# Patient Record
Sex: MALE | Race: WHITE | NOT HISPANIC OR LATINO | Employment: PART TIME | URBAN - METROPOLITAN AREA
[De-identification: names, ages, dates, MRNs, and addresses within clinical notes are randomized per-mention and may not be internally consistent; named-entity substitution may affect disease eponyms.]

---

## 2018-07-16 ENCOUNTER — OFFICE VISIT - HEALTHEAST (OUTPATIENT)
Dept: FAMILY MEDICINE | Facility: CLINIC | Age: 33
End: 2018-07-16

## 2018-07-16 DIAGNOSIS — L03.116 CELLULITIS OF LEFT LOWER EXTREMITY: ICD-10-CM

## 2018-07-16 DIAGNOSIS — I10 HYPERTENSION, UNSPECIFIED TYPE: ICD-10-CM

## 2018-07-16 LAB
ALBUMIN SERPL-MCNC: 3.3 G/DL (ref 3.5–5)
ALP SERPL-CCNC: 134 U/L (ref 45–120)
ALT SERPL W P-5'-P-CCNC: 43 U/L (ref 0–45)
ANION GAP SERPL CALCULATED.3IONS-SCNC: 10 MMOL/L (ref 5–18)
AST SERPL W P-5'-P-CCNC: 21 U/L (ref 0–40)
BASOPHILS # BLD AUTO: 0.1 THOU/UL (ref 0–0.2)
BASOPHILS NFR BLD AUTO: 1 % (ref 0–2)
BILIRUB SERPL-MCNC: 0.2 MG/DL (ref 0–1)
BUN SERPL-MCNC: 19 MG/DL (ref 8–22)
C REACTIVE PROTEIN LHE: 1.9 MG/DL (ref 0–0.8)
CALCIUM SERPL-MCNC: 9.3 MG/DL (ref 8.5–10.5)
CHLORIDE BLD-SCNC: 111 MMOL/L (ref 98–107)
CO2 SERPL-SCNC: 19 MMOL/L (ref 22–31)
CREAT SERPL-MCNC: 0.74 MG/DL (ref 0.7–1.3)
EOSINOPHIL # BLD AUTO: 0.4 THOU/UL (ref 0–0.4)
EOSINOPHIL NFR BLD AUTO: 4 % (ref 0–6)
ERYTHROCYTE [DISTWIDTH] IN BLOOD BY AUTOMATED COUNT: 14 % (ref 11–14.5)
GFR SERPL CREATININE-BSD FRML MDRD: >60 ML/MIN/1.73M2
GLUCOSE BLD-MCNC: 134 MG/DL (ref 70–125)
HCT VFR BLD AUTO: 45.5 % (ref 40–54)
HGB BLD-MCNC: 14.6 G/DL (ref 14–18)
LYMPHOCYTES # BLD AUTO: 2.3 THOU/UL (ref 0.8–4.4)
LYMPHOCYTES NFR BLD AUTO: 19 % (ref 20–40)
MCH RBC QN AUTO: 29.9 PG (ref 27–34)
MCHC RBC AUTO-ENTMCNC: 32.1 G/DL (ref 32–36)
MCV RBC AUTO: 93 FL (ref 80–100)
MONOCYTES # BLD AUTO: 0.7 THOU/UL (ref 0–0.9)
MONOCYTES NFR BLD AUTO: 5 % (ref 2–10)
NEUTROPHILS # BLD AUTO: 8.6 THOU/UL (ref 2–7.7)
NEUTROPHILS NFR BLD AUTO: 72 % (ref 50–70)
PLATELET # BLD AUTO: 389 THOU/UL (ref 140–440)
PMV BLD AUTO: 10 FL (ref 8.5–12.5)
POTASSIUM BLD-SCNC: 4.8 MMOL/L (ref 3.5–5)
PROT SERPL-MCNC: 7.5 G/DL (ref 6–8)
RBC # BLD AUTO: 4.89 MILL/UL (ref 4.4–6.2)
SODIUM SERPL-SCNC: 140 MMOL/L (ref 136–145)
WBC: 12.6 THOU/UL (ref 4–11)

## 2020-10-08 ENCOUNTER — RECORDS - HEALTHEAST (OUTPATIENT)
Dept: ADMINISTRATIVE | Facility: OTHER | Age: 35
End: 2020-10-08

## 2021-04-13 ENCOUNTER — APPOINTMENT (OUTPATIENT)
Dept: MRI IMAGING | Facility: CLINIC | Age: 36
DRG: 103 | End: 2021-04-13
Attending: PHYSICIAN ASSISTANT

## 2021-04-13 ENCOUNTER — HOSPITAL ENCOUNTER (INPATIENT)
Facility: CLINIC | Age: 36
LOS: 3 days | Discharge: HOME | DRG: 103 | End: 2021-04-16
Attending: INTERNAL MEDICINE | Admitting: HOSPITALIST

## 2021-04-13 DIAGNOSIS — R51.9 ACUTE INTRACTABLE HEADACHE, UNSPECIFIED HEADACHE TYPE: Primary | ICD-10-CM

## 2021-04-13 PROCEDURE — 120N000001 HC R&B MED SURG/OB

## 2021-04-13 PROCEDURE — 99254 IP/OBS CNSLTJ NEW/EST MOD 60: CPT | Mod: GC | Performed by: PSYCHIATRY & NEUROLOGY

## 2021-04-13 PROCEDURE — 250N000011 HC RX IP 250 OP 636: Performed by: PHYSICIAN ASSISTANT

## 2021-04-13 PROCEDURE — 99223 1ST HOSP IP/OBS HIGH 75: CPT | Mod: AI | Performed by: PHYSICIAN ASSISTANT

## 2021-04-13 PROCEDURE — 258N000003 HC RX IP 258 OP 636: Performed by: PHYSICIAN ASSISTANT

## 2021-04-13 PROCEDURE — 250N000013 HC RX MED GY IP 250 OP 250 PS 637: Performed by: PHYSICIAN ASSISTANT

## 2021-04-13 PROCEDURE — A9585 GADOBUTROL INJECTION: HCPCS | Performed by: INTERNAL MEDICINE

## 2021-04-13 PROCEDURE — 255N000002 HC RX 255 OP 636: Performed by: INTERNAL MEDICINE

## 2021-04-13 PROCEDURE — 70553 MRI BRAIN STEM W/O & W/DYE: CPT

## 2021-04-13 RX ORDER — PROCHLORPERAZINE MALEATE 10 MG
10 TABLET ORAL EVERY 6 HOURS PRN
Status: DISCONTINUED | OUTPATIENT
Start: 2021-04-13 | End: 2021-04-16 | Stop reason: HOSPADM

## 2021-04-13 RX ORDER — LABETALOL HYDROCHLORIDE 5 MG/ML
10 INJECTION, SOLUTION INTRAVENOUS
Status: DISCONTINUED | OUTPATIENT
Start: 2021-04-13 | End: 2021-04-16 | Stop reason: HOSPADM

## 2021-04-13 RX ORDER — GADOBUTROL 604.72 MG/ML
14 INJECTION INTRAVENOUS ONCE
Status: COMPLETED | OUTPATIENT
Start: 2021-04-13 | End: 2021-04-13

## 2021-04-13 RX ORDER — NALOXONE HYDROCHLORIDE 0.4 MG/ML
0.4 INJECTION, SOLUTION INTRAMUSCULAR; INTRAVENOUS; SUBCUTANEOUS
Status: DISCONTINUED | OUTPATIENT
Start: 2021-04-13 | End: 2021-04-16 | Stop reason: HOSPADM

## 2021-04-13 RX ORDER — NALOXONE HYDROCHLORIDE 0.4 MG/ML
0.2 INJECTION, SOLUTION INTRAMUSCULAR; INTRAVENOUS; SUBCUTANEOUS
Status: DISCONTINUED | OUTPATIENT
Start: 2021-04-13 | End: 2021-04-16 | Stop reason: HOSPADM

## 2021-04-13 RX ORDER — ACETAMINOPHEN 650 MG/1
650 SUPPOSITORY RECTAL EVERY 4 HOURS PRN
Status: DISCONTINUED | OUTPATIENT
Start: 2021-04-13 | End: 2021-04-14

## 2021-04-13 RX ORDER — ONDANSETRON 4 MG/1
4 TABLET, ORALLY DISINTEGRATING ORAL EVERY 6 HOURS PRN
Status: DISCONTINUED | OUTPATIENT
Start: 2021-04-13 | End: 2021-04-16 | Stop reason: HOSPADM

## 2021-04-13 RX ORDER — NAPHAZOLINE HCL 0.012 %
2 DROPS OPHTHALMIC (EYE) EVERY 8 HOURS PRN
Status: ON HOLD | COMMUNITY
End: 2021-04-16

## 2021-04-13 RX ORDER — SODIUM CHLORIDE 9 MG/ML
INJECTION, SOLUTION INTRAVENOUS CONTINUOUS
Status: DISCONTINUED | OUTPATIENT
Start: 2021-04-13 | End: 2021-04-14 | Stop reason: CLARIF

## 2021-04-13 RX ORDER — LIDOCAINE 40 MG/G
CREAM TOPICAL
Status: DISCONTINUED | OUTPATIENT
Start: 2021-04-13 | End: 2021-04-16 | Stop reason: HOSPADM

## 2021-04-13 RX ORDER — ACETAMINOPHEN 325 MG/1
650 TABLET ORAL EVERY 4 HOURS PRN
Status: DISCONTINUED | OUTPATIENT
Start: 2021-04-13 | End: 2021-04-14

## 2021-04-13 RX ORDER — HYDROMORPHONE HYDROCHLORIDE 1 MG/ML
.3-.5 INJECTION, SOLUTION INTRAMUSCULAR; INTRAVENOUS; SUBCUTANEOUS
Status: DISCONTINUED | OUTPATIENT
Start: 2021-04-13 | End: 2021-04-16

## 2021-04-13 RX ORDER — PROCHLORPERAZINE 25 MG
25 SUPPOSITORY, RECTAL RECTAL EVERY 12 HOURS PRN
Status: DISCONTINUED | OUTPATIENT
Start: 2021-04-13 | End: 2021-04-16 | Stop reason: HOSPADM

## 2021-04-13 RX ORDER — HYDRALAZINE HYDROCHLORIDE 20 MG/ML
10 INJECTION INTRAMUSCULAR; INTRAVENOUS EVERY 4 HOURS PRN
Status: DISCONTINUED | OUTPATIENT
Start: 2021-04-13 | End: 2021-04-16 | Stop reason: HOSPADM

## 2021-04-13 RX ORDER — ONDANSETRON 2 MG/ML
4 INJECTION INTRAMUSCULAR; INTRAVENOUS EVERY 6 HOURS PRN
Status: DISCONTINUED | OUTPATIENT
Start: 2021-04-13 | End: 2021-04-16 | Stop reason: HOSPADM

## 2021-04-13 RX ORDER — LORAZEPAM 2 MG/ML
1 INJECTION INTRAMUSCULAR
Status: DISCONTINUED | OUTPATIENT
Start: 2021-04-13 | End: 2021-04-16 | Stop reason: HOSPADM

## 2021-04-13 RX ADMIN — SODIUM CHLORIDE: 9 INJECTION, SOLUTION INTRAVENOUS at 15:54

## 2021-04-13 RX ADMIN — HYDROMORPHONE HYDROCHLORIDE 0.5 MG: 1 INJECTION, SOLUTION INTRAMUSCULAR; INTRAVENOUS; SUBCUTANEOUS at 20:58

## 2021-04-13 RX ADMIN — GADOBUTROL 14 ML: 604.72 INJECTION INTRAVENOUS at 20:16

## 2021-04-13 RX ADMIN — ACETAMINOPHEN 650 MG: 325 TABLET, FILM COATED ORAL at 20:58

## 2021-04-13 ASSESSMENT — MIFFLIN-ST. JEOR: SCORE: 2781

## 2021-04-13 ASSESSMENT — ACTIVITIES OF DAILY LIVING (ADL)
ADLS_ACUITY_SCORE: 15
ADLS_ACUITY_SCORE: 14

## 2021-04-13 NOTE — PHARMACY-ADMISSION MEDICATION HISTORY
Pharmacy Medication History  Admission medication history interview status for the 4/13/2021  admission is complete. See EPIC admission navigator for prior to admission medications     Location of Interview: Phone  Medication history sources: Patient    Significant changes made to the medication list:  Added excedrin ex    Additional medication history information:   none    Medication reconciliation completed by provider prior to medication history? No    Time spent in this activity: 5 minutes    Prior to Admission medications    Medication Sig Last Dose Taking? Auth Provider   aspirin-acetaminophen-caffeine (EXCEDRIN EXTRA STRENGTH) 250-250-65 MG tablet Take 2 tablets by mouth every 8 hours as needed for headaches 0120 Yes Unknown, Entered By History       The information provided in this note is only as accurate as the sources available at the time of update(s)

## 2021-04-13 NOTE — CONSULTS
"  [facility]    Stroke Consult Note    Reason for Consult:  Concern for CT negative SAH    Chief Complaint: No chief complaint on file.       HPI  Ady Godinez is a 35 year old male who presented after having thunderclap coital headache. Initial CT/CTA was negative, but LP had 1000 RBC in 1st and 4th tube and SAH could not be ruled out. Negative for xanthochromia. He was transferred here for cerebral angiogram.     Patient states that this is the first time he has ever experienced anything like this. He described a classes thunderclap headache with maximum intensity at onset that rapidly peak in seconds associated with light sensitivity. He did not have LOC and denies any focal weakness/sensory changes or nausea/vomitting. He states that he does not have history of migraine, but does have morning headache twice per week which he attributes to \"likely\" sleep apnea. He currently denies any neck stiffness, diplopia, new back pain, leg weakness, bowel/bladder symptoms.       Impression  Thunderclap Headache  Coital Cephalagia    Concern for CT negative SAH.   LP was equivocal.     Recommendations  MRI brain   Cerebral angiogram  NPO at midnight  We will reach out to our endovascular team.     Patient Follow-up    - final recommendation pending work-up    Thank you for this consult. We will continue to follow.     The Stroke Staff is Dr. Padgett.    Andrea Owen MD  Vascular Neurology Fellow  To page me or covering stroke neurology team member, click here: AMCOM   Choose \"On Call\" tab at top, then search dropdown box for \"Neurology Adult\", select location, press Enter, then look for stroke/neuro ICU/telestroke.    _____________________________________________________    Past Medical History   No past medical history on file.  Past Surgical History   No past surgical history on file.  Medications   Home Meds  Prior to Admission medications    Medication Sig Start Date End Date Taking? " Authorizing Provider   aspirin-acetaminophen-caffeine (EXCEDRIN EXTRA STRENGTH) 250-250-65 MG tablet Take 2 tablets by mouth every 8 hours as needed for headaches   Yes Unknown, Entered By History       Scheduled Meds    sodium chloride (PF)  3 mL Intracatheter Q8H       Infusion Meds    sodium chloride 100 mL/hr at 04/13/21 1554       PRN Meds  acetaminophen, acetaminophen, hydrALAZINE, HYDROmorphone, labetalol, lidocaine 4%, lidocaine (buffered or not buffered), LORazepam, melatonin, naloxone **OR** naloxone **OR** naloxone **OR** naloxone, ondansetron **OR** ondansetron, prochlorperazine **OR** prochlorperazine **OR** prochlorperazine, sodium chloride (PF), sodium chloride (PF)    Allergies   No Known Allergies  Family History   No family history on file.  Social History   Social History     Tobacco Use     Smoking status: Not on file   Substance Use Topics     Alcohol use: Not on file     Drug use: Not on file       Review of Systems   The 10 point Review of Systems is negative other than noted in the HPI or here.   Headache        PHYSICAL EXAMINATION   Temp:  [97.6  F (36.4  C)-98  F (36.7  C)] 98  F (36.7  C)  Pulse:  [82-86] 86  Resp:  [18] 18  BP: (129-135)/(67-81) 129/80  SpO2:  [96 %-97 %] 97 %    Neurologic  Mental Status:  alert, oriented x 3, follows commands, speech clear and fluent, naming and repetition normal  Cranial Nerves:  visual fields intact, PERRL, EOMI with normal smooth pursuit, facial sensation intact and symmetric, facial movements symmetric, hearing not formally tested but intact to conversation, palate elevation symmetric and uvula midline, no dysarthria, shoulder shrug strong bilaterally, tongue protrusion midline  Motor:  normal muscle tone and bulk, no abnormal movements, able to move all limbs spontaneously, strength 5/5 throughout upper and lower extremities, no pronator drift  Reflexes:  toes down-going  Sensory:  light touch sensation intact and symmetric throughout upper and  lower extremities, no extinction on double simultaneous stimulation   Coordination:  normal finger-to-nose and heel-to-shin bilaterally without dysmetria, rapid alternating movements symmetric  Station/Gait:  deferred    Dysphagia Screen  Per Nursing    Stroke Scales    NIHSS  Interval baseline (04/13/21 1400)   Interval Comments     1a. Level of Consciousness 0-->Alert, keenly responsive   1b. LOC Questions 0-->Answers both questions correctly   1c. LOC Commands 0-->Performs both tasks correctly   2.   Best Gaze 0-->Normal   3.   Visual 0-->No visual loss   4.   Facial Palsy 0-->Normal symmetrical movements   5a. Motor Arm, Left 0-->No drift, limb holds 90 (or 45) degrees for full 10 secs   5b. Motor Arm, Right 0-->No drift, limb holds 90 (or 45) degrees for full 10 secs   6a. Motor Leg, Left 0-->No drift, leg holds 30 degree position for full 5 secs   6b. Motor Leg, right 0-->No drift, leg holds 30 degree position for full 5 secs   7.   Limb Ataxia 0-->Absent   8.   Sensory 0-->Normal, no sensory loss   9.   Best Language 0-->No aphasia, normal   10. Dysarthria 0-->Normal   11. Extinction and Inattention  0-->No abnormality   Total 0 (04/13/21 1400)       Imaging  I personally reviewed all imaging; relevant findings per HPI.    Labs Data   CBC  No results for input(s): WBC, RBC, HGB, HCT, PLT in the last 168 hours.  Basic Metabolic Panel   No results for input(s): NA, POTASSIUM, CHLORIDE, CO2, BUN, CR, GLC, JEFRY in the last 168 hours.  Liver Panel  No results for input(s): PROTTOTAL, ALBUMIN, BILITOTAL, ALKPHOS, AST, ALT, BILIDIRECT in the last 168 hours.  INR  No lab results found.   Lipid Profile  No lab results found.  A1C  No lab results found.  Troponin I  No results for input(s): TROPI in the last 168 hours.       Stroke Code / Stroke Consult Data Data This was a non-emergent, non-tele stroke consult.

## 2021-04-13 NOTE — H&P
[facility]    History and Physical - Hospitalist Service       Date of Admission:  4/13/2021    Assessment & Plan   Ady Godinez is a 35 year old male admitted on 4/13/2021. He has PMH of morbid obesity. He presented to [facility] evaluation of thunderclap headache. CT and CTA negative for aneurysm and SAH. LP RBC and cannot rule out SAH. Transferred for consideration of cerebral angiogram.    Severe Headache: Presented for sudden onset headache during intercourse. CT and CTA negative. Underwent LP per IR and 1000 RBC on 1st tube and 1000 RBC on tube 4. He was transferred for consideration of cerebral angiogram  - Admit Neuro floor  - Stroke neurology consulted and appreciate assistance.   - Q2 neuro checks per neurology  - Stat MRI if body habitus allows  - Goal SBP < 140, IV labetalol and hydralazine available  - Pain control with tylenol and IV dilaudid  - Ok per neurology for diet. NPO at midnight for possible cerebral angiogram tomorrow    Morbid Obesity: BMI 54  - Follow up with PCP    COVID-19: Asymptomatic swab negative     Diet: NPO for Medical/Clinical Reasons Except for: Meds    DVT Prophylaxis: Pneumatic Compression Devices  Duvall Catheter: not present  Code Status: Full Code           Disposition Plan   Expected discharge: Admit inpatient   Entered: Jocelynn Nunes PA-C 04/13/2021, 3:13 PM     The patient's care was discussed with the Bedside Nurse, Patient and Neurology Consultant.    Jocelynn Nunes PA-C  [facility]  Contact information available via [facility] Paging/Directory      ______________________________________________________________________    Chief Complaint   Headache    History is obtained from the patient    History of Present Illness   Ady Godinez is a 35 year old male with a past medical history of morbid obesity who was transferred from [facility] emergency department for evaluation of severe, persistent  headache.    He had sudden onset of severe, thunderclap headache yesterday evening after having intercourse.  Starts behind his eyes and radiates to the back of his neck.  Has some light sensitivity but no other focal neurologic symptoms.  Presented to [facility] for evaluation.  CT head and CTA were negative.  He underwent a lumbar puncture by interventional radiology which was notable for thousand RBCs on first tube and 1000 RBCs on fourth tube.  It was recommended that he proceed with cerebral angiogram which was not possible at his outside facility so he was transferred for further evaluation    Presently, he is evaluated in his hospital room.  Continues to complain of severe persistent headache.  Indicates that improves with Dilaudid but comes back.  Notes some light sensitivity.  No visual changes.  No focal weakness.  Has a history of headaches that occur approximately 4 times a month though this is different.  Denies recent fevers or chills.  No runny nose sore throat or cough.  No nausea or vomiting.    Review of Systems    The 10 point Review of Systems is negative other than noted in the HPI or here.     Past Medical History    I have reviewed this patient's medical history and updated it with pertinent information if needed.   Obesity   Headaches    Past Surgical History   I have reviewed this patient's surgical history and updated it with pertinent information if needed.  Appendectomy    Social History   I have reviewed this patient's social history and updated it with pertinent information if needed.  Social History     Tobacco Use     Smoking status: Not on file   Substance Use Topics     Alcohol use: Not on file     Drug use: Not on file     He is a non smoker, drinks occasional alcohol. Denies recreation drug use. . Works for [employer]    Family History     States he does not know much regarding his PMH    Prior to Admission Medications   Prior to Admission Medications   Prescriptions Last Dose  Informant Patient Reported? Taking?   aspirin-acetaminophen-caffeine (EXCEDRIN EXTRA STRENGTH) 250-250-65 MG tablet 0120  Yes Yes   Sig: Take 2 tablets by mouth every 8 hours as needed for headaches      Facility-Administered Medications: None     Allergies   No Known Allergies    Physical Exam   Vital Signs: Temp: 97.6  F (36.4  C) Temp src: Oral BP: 134/81 Pulse: 85   Resp: 18 SpO2: 97 % O2 Device: None (Room air)    Weight: 398 lbs 9.47 oz    Physical Exam  Vitals signs and nursing note reviewed.   Constitutional:       General: He is not in acute distress.     Appearance: He is obese.   HENT:      Head: Normocephalic and atraumatic.   Eyes:      General: No scleral icterus.  Cardiovascular:      Rate and Rhythm: Normal rate and regular rhythm.      Heart sounds: No murmur.   Pulmonary:      Effort: Pulmonary effort is normal.      Breath sounds: No wheezing.   Abdominal:      General: There is no distension.   Skin:     General: Skin is warm and dry.      Findings: No rash.   Neurological:      Mental Status: He is alert and oriented to person, place, and time. Mental status is at baseline.      Cranial Nerves: No cranial nerve deficit or facial asymmetry.      Motor: No weakness.      Coordination: Romberg sign negative.           Data   Data reviewed today: I reviewed all medications, new labs and imaging results over the last 24 hours. I personally reviewed no images or EKG's today.    No lab results found in last 7 days.    No results found for this or any previous visit (from the past 24 hour(s)).

## 2021-04-14 ENCOUNTER — COMMUNICATION - HEALTHEAST (OUTPATIENT)
Dept: SCHEDULING | Facility: CLINIC | Age: 36
End: 2021-04-14

## 2021-04-14 ENCOUNTER — APPOINTMENT (OUTPATIENT)
Dept: CT IMAGING | Facility: CLINIC | Age: 36
DRG: 103 | End: 2021-04-14
Attending: INTERNAL MEDICINE

## 2021-04-14 LAB
ANION GAP SERPL CALCULATED.3IONS-SCNC: 4 MMOL/L (ref 3–14)
BASOPHILS # BLD AUTO: 0 10E9/L (ref 0–0.2)
BASOPHILS NFR BLD AUTO: 0.4 %
BUN SERPL-MCNC: 16 MG/DL (ref 7–30)
CALCIUM SERPL-MCNC: 8.6 MG/DL (ref 8.5–10.1)
CHLORIDE SERPL-SCNC: 107 MMOL/L (ref 94–109)
CO2 SERPL-SCNC: 30 MMOL/L (ref 20–32)
CREAT SERPL-MCNC: 0.86 MG/DL (ref 0.66–1.25)
DIFFERENTIAL METHOD BLD: NORMAL
EOSINOPHIL # BLD AUTO: 0.4 10E9/L (ref 0–0.7)
EOSINOPHIL NFR BLD AUTO: 4.9 %
ERYTHROCYTE [DISTWIDTH] IN BLOOD BY AUTOMATED COUNT: 13.8 % (ref 10–15)
GFR SERPL CREATININE-BSD FRML MDRD: >90 ML/MIN/{1.73_M2}
GLUCOSE SERPL-MCNC: 107 MG/DL (ref 70–99)
HCT VFR BLD AUTO: 42.8 % (ref 40–53)
HGB BLD-MCNC: 13.5 G/DL (ref 13.3–17.7)
IMM GRANULOCYTES # BLD: 0 10E9/L (ref 0–0.4)
IMM GRANULOCYTES NFR BLD: 0.4 %
INR PPP: 0.98 (ref 0.86–1.14)
LYMPHOCYTES # BLD AUTO: 2 10E9/L (ref 0.8–5.3)
LYMPHOCYTES NFR BLD AUTO: 23.9 %
MCH RBC QN AUTO: 28.4 PG (ref 26.5–33)
MCHC RBC AUTO-ENTMCNC: 31.5 G/DL (ref 31.5–36.5)
MCV RBC AUTO: 90 FL (ref 78–100)
MONOCYTES # BLD AUTO: 0.5 10E9/L (ref 0–1.3)
MONOCYTES NFR BLD AUTO: 5.4 %
NEUTROPHILS # BLD AUTO: 5.5 10E9/L (ref 1.6–8.3)
NEUTROPHILS NFR BLD AUTO: 65 %
NRBC # BLD AUTO: 0 10*3/UL
NRBC BLD AUTO-RTO: 0 /100
PLATELET # BLD AUTO: 240 10E9/L (ref 150–450)
POTASSIUM SERPL-SCNC: 3.9 MMOL/L (ref 3.4–5.3)
RBC # BLD AUTO: 4.75 10E12/L (ref 4.4–5.9)
SODIUM SERPL-SCNC: 141 MMOL/L (ref 133–144)
WBC # BLD AUTO: 8.5 10E9/L (ref 4–11)

## 2021-04-14 PROCEDURE — 70498 CT ANGIOGRAPHY NECK: CPT

## 2021-04-14 PROCEDURE — 85610 PROTHROMBIN TIME: CPT | Performed by: PHYSICIAN ASSISTANT

## 2021-04-14 PROCEDURE — 80320 DRUG SCREEN QUANTALCOHOLS: CPT | Performed by: STUDENT IN AN ORGANIZED HEALTH CARE EDUCATION/TRAINING PROGRAM

## 2021-04-14 PROCEDURE — 250N000013 HC RX MED GY IP 250 OP 250 PS 637: Performed by: PHYSICIAN ASSISTANT

## 2021-04-14 PROCEDURE — 250N000011 HC RX IP 250 OP 636: Performed by: INTERNAL MEDICINE

## 2021-04-14 PROCEDURE — 85025 COMPLETE CBC W/AUTO DIFF WBC: CPT | Performed by: PHYSICIAN ASSISTANT

## 2021-04-14 PROCEDURE — 250N000011 HC RX IP 250 OP 636: Performed by: PHYSICIAN ASSISTANT

## 2021-04-14 PROCEDURE — 250N000011 HC RX IP 250 OP 636: Performed by: STUDENT IN AN ORGANIZED HEALTH CARE EDUCATION/TRAINING PROGRAM

## 2021-04-14 PROCEDURE — 250N000009 HC RX 250: Performed by: INTERNAL MEDICINE

## 2021-04-14 PROCEDURE — 99233 SBSQ HOSP IP/OBS HIGH 50: CPT | Mod: GC | Performed by: PSYCHIATRY & NEUROLOGY

## 2021-04-14 PROCEDURE — 80048 BASIC METABOLIC PNL TOTAL CA: CPT | Performed by: PHYSICIAN ASSISTANT

## 2021-04-14 PROCEDURE — 250N000013 HC RX MED GY IP 250 OP 250 PS 637: Performed by: STUDENT IN AN ORGANIZED HEALTH CARE EDUCATION/TRAINING PROGRAM

## 2021-04-14 PROCEDURE — 258N000003 HC RX IP 258 OP 636: Performed by: PHYSICIAN ASSISTANT

## 2021-04-14 PROCEDURE — 36415 COLL VENOUS BLD VENIPUNCTURE: CPT | Performed by: PHYSICIAN ASSISTANT

## 2021-04-14 PROCEDURE — 99232 SBSQ HOSP IP/OBS MODERATE 35: CPT | Performed by: PHYSICIAN ASSISTANT

## 2021-04-14 PROCEDURE — 80307 DRUG TEST PRSMV CHEM ANLYZR: CPT | Performed by: STUDENT IN AN ORGANIZED HEALTH CARE EDUCATION/TRAINING PROGRAM

## 2021-04-14 PROCEDURE — 120N000001 HC R&B MED SURG/OB

## 2021-04-14 RX ORDER — ACETAMINOPHEN 325 MG/1
650 TABLET ORAL EVERY 6 HOURS
Status: DISCONTINUED | OUTPATIENT
Start: 2021-04-14 | End: 2021-04-16 | Stop reason: HOSPADM

## 2021-04-14 RX ORDER — IBUPROFEN 200 MG
400 TABLET ORAL
Status: COMPLETED | OUTPATIENT
Start: 2021-04-14 | End: 2021-04-15

## 2021-04-14 RX ORDER — IOPAMIDOL 755 MG/ML
70 INJECTION, SOLUTION INTRAVASCULAR ONCE
Status: COMPLETED | OUTPATIENT
Start: 2021-04-14 | End: 2021-04-14

## 2021-04-14 RX ORDER — MAGNESIUM SULFATE HEPTAHYDRATE 40 MG/ML
2 INJECTION, SOLUTION INTRAVENOUS ONCE
Status: COMPLETED | OUTPATIENT
Start: 2021-04-14 | End: 2021-04-14

## 2021-04-14 RX ORDER — OXYCODONE HYDROCHLORIDE 5 MG/1
5 TABLET ORAL EVERY 4 HOURS PRN
Status: DISCONTINUED | OUTPATIENT
Start: 2021-04-14 | End: 2021-04-16 | Stop reason: HOSPADM

## 2021-04-14 RX ORDER — VERAPAMIL HYDROCHLORIDE 40 MG/1
40 TABLET ORAL EVERY 8 HOURS SCHEDULED
Status: DISCONTINUED | OUTPATIENT
Start: 2021-04-14 | End: 2021-04-15

## 2021-04-14 RX ADMIN — OXYCODONE HYDROCHLORIDE 5 MG: 5 TABLET ORAL at 19:56

## 2021-04-14 RX ADMIN — SODIUM CHLORIDE 90 ML: 9 INJECTION, SOLUTION INTRAVENOUS at 11:22

## 2021-04-14 RX ADMIN — ACETAMINOPHEN 650 MG: 325 TABLET, FILM COATED ORAL at 10:53

## 2021-04-14 RX ADMIN — VERAPAMIL HYDROCHLORIDE 40 MG: 40 TABLET, FILM COATED ORAL at 21:09

## 2021-04-14 RX ADMIN — MAGNESIUM SULFATE HEPTAHYDRATE 2 G: 40 INJECTION, SOLUTION INTRAVENOUS at 13:34

## 2021-04-14 RX ADMIN — SODIUM CHLORIDE: 9 INJECTION, SOLUTION INTRAVENOUS at 02:55

## 2021-04-14 RX ADMIN — ACETAMINOPHEN 650 MG: 325 TABLET, FILM COATED ORAL at 03:50

## 2021-04-14 RX ADMIN — ACETAMINOPHEN 650 MG: 325 TABLET, FILM COATED ORAL at 15:30

## 2021-04-14 RX ADMIN — ACETAMINOPHEN 650 MG: 325 TABLET, FILM COATED ORAL at 20:20

## 2021-04-14 RX ADMIN — IOPAMIDOL 70 ML: 755 INJECTION, SOLUTION INTRAVENOUS at 11:22

## 2021-04-14 RX ADMIN — VERAPAMIL HYDROCHLORIDE 40 MG: 40 TABLET, FILM COATED ORAL at 13:33

## 2021-04-14 RX ADMIN — HYDROMORPHONE HYDROCHLORIDE 0.3 MG: 1 INJECTION, SOLUTION INTRAMUSCULAR; INTRAVENOUS; SUBCUTANEOUS at 03:58

## 2021-04-14 ASSESSMENT — ACTIVITIES OF DAILY LIVING (ADL)
ADLS_ACUITY_SCORE: 14
ADLS_ACUITY_SCORE: 15

## 2021-04-14 NOTE — PROVIDER NOTIFICATION
Hospitalist Vamshi Cover     Paged by RN regarding on-going headache. Pt was admitted for severe headache, and has thorough work-up.     Will try 1x dose of ibuprofen tonight to see if this provides any benefit and add Oxycodone PRN. Pt also has Tylenol and Dilaudid available.     Ayla Matt MD

## 2021-04-14 NOTE — UTILIZATION REVIEW
"Admission Status; Secondary Review Determination       Under the authority of the Utilization Management Committee, the utilization review process indicated a secondary review on the above patient. The review outcome is based on review of the medical records, discussions with staff, and applying clinical experience noted on the date of the review.     (x) Inpatient Status Appropriate - This patient's medical care is consistent with medical management for inpatient care and reasonable inpatient medical practice.     RATIONALE FOR DETERMINATION   34 yo man with severe morbid obesity (BMI 55) who presented to an outside emergency department ([facility]) and was directly admitted to [facility] due to lack of bed availability at the referring facility.  The patient presented with a \"thunderclap\" headache described as \"worst headache of my life\" during intercourse.  CT and CTA were negative for aneurysm and subarachnoid hemorrhage.  A neurology code stroke was activated.  MRI of the brain was negative for acute pathology on admission.  Cerebral angiogram was recommended.  CTA of the head and neck with contrast revealed no high-grade stenosis or large vessel occlusion involving the major cranial cervical arterial vasculature and no intracranial aneurysm or high flow vascular malformation were identified.  Latest neurology note indicates suspicion for reversible cerebral vasoconstriction syndrome.  Next step is to get a diagnostic cerebral angiogram which is unable to be done until tomorrow morning. Has needed at least 2 doses of IV opiate pain medication. Will continue to need pain medication adjustments and q 4 hour neuro checks. Possible discharge tomorrow.     At the time of admission with the information available to the attending physician more than 2 nights hospital complex care was anticipated, based on patient risk of adverse outcome if treated as outpatient and complex care required. Inpatient " admission is appropriate based on the Medicare guidelines.     This document was produced using voice recognition software.    The information on this document is developed by the utilization review team in order for the business office to ensure compliance. This only denotes the appropriateness of proper admission status and does not reflect the quality of care rendered.   The definitions of Inpatient Status and Observation Status used in making the determination above are those provided in the CMS Coverage Manual, Chapter 1 and Chapter 6, section 70.4.     Sincerely,   Simin Ayoub MD  Utilization Review  Physician Advisor  [facility].

## 2021-04-14 NOTE — PROGRESS NOTES
"[facility]    Medicine Progress Note - Hospitalist Service       Date of Admission:  4/13/2021    Assessment & Plan       Ady Godinez is a 35 year old male admitted on 4/13/2021. He has PMH of morbid obesity. He presented to [facility] evaluation of thunderclap headache. CT and CTA negative for aneurysm and SAH. LP RBC and cannot rule out SAH. Transferred for consideration of cerebral angiogram.     Severe Headache: Presented for sudden onset headache during intercourse. Localized to frontal region with radiation to posterior neck. Reports photophobia, but no N/V. No vision changes, tinnitus, or focal neuro deficits. No recent URI. Gets intermittent headaches, but this is the \"worse headache of my life.\" No recent chiropractic or masseuse visits. Denies recreational drug, alcohol, or tobacco use. CT and CTA negative. Underwent LP per IR and 1000 RBC on 1st tube and 1000 RBC on tube 4. He was transferred for consideration of cerebral angiogram  * MRI brain negative for acute pathology on admission   * Headache continues with photophobia, rates 5/10   - Stroke neurology consulted, plan for cerebral angiogram. CTA head and neck ordered today.    - Q2 neuro checks per neurology  - Goal SBP < 140, IV labetalol and hydralazine available  - Pain control with tylenol and IV dilaudid  - NPO until timing of cerebral angiogram established today.      Morbid Obesity: BMI 54  - Follow up with PCP     COVID-19: Asymptomatic swab negative     Diet: Regular Diet Adult    DVT Prophylaxis: Ambulate every shift  Duvall Catheter: not present  Code Status: Full Code         Disposition Plan   Expected discharge: 2 - 3 days, recommended to prior living arrangement once Neuro eval is completed.  Entered: Diana Fish PA-C 04/14/2021, 7:38 AM       The patient's care was discussed with the Attending Physician, Dr. Montoya, Bedside Nurse and Patient.    Diana Fish, " PRAKASH  Hospitalist Service  [facility]  Contact information available via [facility] Paging/Directory  ______________________________________________________________________    Interval History   Still with 5/10 headache and photophobia. Awaiting cerebral angiogram.     Data reviewed today: I reviewed all medications, new labs and imaging results over the last 24 hours. I personally reviewed all labs and imaging to date.     Physical Exam   Vital Signs: Temp: 97.4  F (36.3  C) Temp src: Oral BP: (!) 140/75 Pulse: 72   Resp: 16 SpO2: 97 % O2 Device: (P) Nasal cannula    Weight: 398 lbs 9.47 oz    CONSTITUTIONAL: Obese male laying in bed, dressed in hospital garb. Appears comfortable. Cooperative with interview.   HEENT: Normocephalic, atraumatic. Pupils equal, round, and reactive to light. Negative for conjunctival redness or scleral icterus.  Oral mucosa pink and moist; negative for ulcerations, erythema, or exudates.  Dentition in good repair.   CARDIOVASCULAR: RRR, no murmurs, rubs, or extra heart sounds appreciated. Pulses +2/4 and regular in upper and lower extremities, bilaterally.   RESPIRATORY: No increased work of breathing. CTA, bilat; no wheezes, rales, or rhonchi appreciated.  GASTROINTESTINAL:  Abdomen soft, non-distended. BS auscultated in all four quadrants. Negative for tenderness to palpation.  No masses or organomegaly noted.  MUSCULOSKELETAL: No gross deformities noted. Normal muscle tone.   HEMATOLOGIC/LYMPHATIC/IMMUNOLOGIC: No anterior or posterior cervical LAD, bilaterally. Negative for lower extremity edema, bilaterally.  NEUROLOGIC: Alert and oriented to person, place, and time.  strength intact. CN's II-XII grossly intact. Sensation equal and intact in all four extremities. Photophobia noted.   SKIN: Warm, dry, intact. No jaundice noted. Negative for suspicious lesions, rashes, bruising, open sores or abrasions.     Data   Recent Labs   Lab 04/14/21  0735   WBC 8.5    HGB 13.5   MCV 90      INR 0.98      POTASSIUM 3.9   CHLORIDE 107   CO2 30   BUN 16   CR 0.86   ANIONGAP 4   JEFRY 8.6   *     Recent Results (from the past 24 hour(s))   MR Brain w/o & w Contrast    Narrative    MRI BRAIN WITHOUT AND WITH CONTRAST  4/13/2021 8:28 PM     HISTORY:  Headache, intracranial hemorrhage suspected.    TECHNIQUE:  Multiplanar, multisequence MRI of the brain without and  with 14 mL Gadavist.     COMPARISON: Head CT from earlier the same day.     FINDINGS: There is no evidence of acute infarct, hemorrhage, mass, or  herniation. The brain parenchyma, ventricles and subarachnoid spaces  appear normal.     There is no abnormal intracranial enhancement.     The facial structures appear normal.       Impression    IMPRESSION:  Unremarkable brain MRI without evidence of acute infarct,  mass, hemorrhage, or herniation.      ISABEL WRIGHT MD     Medications       magnesium sulfate  2 g Intravenous Once     sodium chloride (PF)  3 mL Intracatheter Q8H     verapamil  40 mg Oral Q8H PABLO

## 2021-04-14 NOTE — CONSULTS
"[facility]    Stroke Progress Note    Interval Events  No acute events overnight.  Vitals stable.  Neuro exam remains unchanged.  -140 mmHg.  CT angiogram of the head and neck repeated this morning was reviewed, and appears to be similar to the one completed yesterday.  No obvious evidence of vasospasm observed on today's imaging.  No aneurysms noted.  These findings were discussed with the patient as well as his father, Lalo, over the phone.    Impression  Thunderclap headache, likely secondary to RCVS  Risk factors: Morbid obesity, energy drink consumption.  Urine toxicology was not sent upon admission.    Plan  [] Diagnostic cerebral angiogram in a.m.  [] RCVS/headache management:  migraine cocktail [Benadryl/Reglan], IV magnesium and verapamil 40 mg every 8 hours scheduled.  Tylenol changed to scheduled from as needed.  If there is no improvement, will consider starting the patient on scheduled Depakote.  [] Follow-up on urine tox screen    The Stroke Staff is Dr. Padgett.    RAJAN RODRIGUEZ MD  Vascular Neurology Fellow  To page me or covering stroke neurology team member, click here: AMCOM   Choose \"On Call\" tab at top, then search dropdown box for \"Neurology Adult\", select location, press Enter, then look for stroke/neuro ICU/telestroke.    ______________________________________________________    Medications   Home Meds  Prior to Admission medications    Medication Sig Start Date End Date Taking? Authorizing Provider   aspirin-acetaminophen-caffeine (EXCEDRIN EXTRA STRENGTH) 250-250-65 MG tablet Take 2 tablets by mouth every 8 hours as needed for headaches   Yes Unknown, Entered By History       Scheduled Meds    sodium chloride (PF)  3 mL Intracatheter Q8H     verapamil  40 mg Oral Q8H PABLO         PRN Meds  acetaminophen, acetaminophen, hydrALAZINE, HYDROmorphone, labetalol, lidocaine 4%, lidocaine (buffered or not buffered), LORazepam, melatonin, naloxone **OR** naloxone " **OR** naloxone **OR** naloxone, ondansetron **OR** ondansetron, prochlorperazine **OR** prochlorperazine **OR** prochlorperazine, sodium chloride (PF), sodium chloride (PF)       PHYSICAL EXAMINATION  Temp:  [97.3  F (36.3  C)-98.3  F (36.8  C)] 97.4  F (36.3  C)  Pulse:  [72-86] 80  Resp:  [14-18] 16  BP: (106-140)/(63-81) 106/63  SpO2:  [84 %-98 %] 94 %     Neurologic  Mental Status:  alert, oriented x 3, follows commands, speech clear and fluent, naming and repetition normal  Cranial Nerves:  visual fields intact, PERRL, EOMI with normal smooth pursuit, facial sensation intact and symmetric, facial movements symmetric, hearing not formally tested but intact to conversation, palate elevation symmetric and uvula midline, no dysarthria, shoulder shrug strong bilaterally, tongue protrusion midline  Motor:  normal muscle tone and bulk, no abnormal movements, able to move all limbs spontaneously, strength 5/5 throughout upper and lower extremities, no pronator drift  Reflexes:  toes down-going  Sensory:  light touch sensation intact and symmetric throughout upper and lower extremities, no extinction on double simultaneous stimulation   Coordination:  normal finger-to-nose and heel-to-shin bilaterally without dysmetria, rapid alternating movements symmetric  Station/Gait:  deferred     Dysphagia Screen  Per Nursing     Stroke Scales     NIHSS  Interval baseline (04/13/21 1400)   Interval Comments    1a. Level of Consciousness 0-->Alert, keenly responsive   1b. LOC Questions 0-->Answers both questions correctly   1c. LOC Commands 0-->Performs both tasks correctly   2.   Best Gaze 0-->Normal   3.   Visual 0-->No visual loss   4.   Facial Palsy 0-->Normal symmetrical movements   5a. Motor Arm, Left 0-->No drift, limb holds 90 (or 45) degrees for full 10 secs   5b. Motor Arm, Right 0-->No drift, limb holds 90 (or 45) degrees for full 10 secs   6a. Motor Leg, Left 0-->No drift, leg holds 30 degree position for full 5 secs    6b. Motor Leg, right 0-->No drift, leg holds 30 degree position for full 5 secs   7.   Limb Ataxia 0-->Absent   8.   Sensory 0-->Normal, no sensory loss   9.   Best Language 0-->No aphasia, normal   10. Dysarthria 0-->Normal   11. Extinction and Inattention  0-->No abnormality   Total 0 (04/13/21 1400)             Imaging  I personally reviewed all imaging; relevant findings per HPI.     Lab Results Data   CBC  Recent Labs   Lab 04/14/21  0735   WBC 8.5   RBC 4.75   HGB 13.5   HCT 42.8        Basic Metabolic Panel    Recent Labs   Lab 04/14/21  0735      POTASSIUM 3.9   CHLORIDE 107   CO2 30   BUN 16   CR 0.86   *   JEFRY 8.6     Liver Panel  No results for input(s): PROTTOTAL, ALBUMIN, BILITOTAL, ALKPHOS, AST, ALT, BILIDIRECT in the last 168 hours.  INR    Recent Labs   Lab Test 04/14/21  0735   INR 0.98      Lipid Profile  No lab results found.  A1C  No lab results found.  Troponin I  No results for input(s): TROPI in the last 168 hours.

## 2021-04-15 ENCOUNTER — APPOINTMENT (OUTPATIENT)
Dept: INTERVENTIONAL RADIOLOGY/VASCULAR | Facility: CLINIC | Age: 36
DRG: 103 | End: 2021-04-15
Attending: PHYSICIAN ASSISTANT

## 2021-04-15 LAB
AMPHETAMINES UR QL SCN: NEGATIVE
BARBITURATES UR QL: NEGATIVE
BENZODIAZ UR QL: NEGATIVE
CANNABINOIDS UR QL SCN: NEGATIVE
COCAINE UR QL: NEGATIVE
ETHANOL UR QL SCN: NEGATIVE
OPIATES UR QL SCN: NEGATIVE
PCP UR QL SCN: NEGATIVE

## 2021-04-15 PROCEDURE — 36218 PLACE CATHETER IN ARTERY: CPT | Mod: GC | Performed by: NEUROLOGICAL SURGERY

## 2021-04-15 PROCEDURE — 36226 PLACE CATH VERTEBRAL ART: CPT | Mod: 50

## 2021-04-15 PROCEDURE — 250N000013 HC RX MED GY IP 250 OP 250 PS 637: Performed by: PHYSICIAN ASSISTANT

## 2021-04-15 PROCEDURE — B3181ZZ FLUOROSCOPY OF BILATERAL INTERNAL CAROTID ARTERIES USING LOW OSMOLAR CONTRAST: ICD-10-PCS | Performed by: NEUROLOGICAL SURGERY

## 2021-04-15 PROCEDURE — B31G1ZZ FLUOROSCOPY OF BILATERAL VERTEBRAL ARTERIES USING LOW OSMOLAR CONTRAST: ICD-10-PCS | Performed by: NEUROLOGICAL SURGERY

## 2021-04-15 PROCEDURE — 250N000009 HC RX 250: Performed by: PSYCHIATRY & NEUROLOGY

## 2021-04-15 PROCEDURE — 36217 PLACE CATHETER IN ARTERY: CPT | Mod: GC | Performed by: NEUROLOGICAL SURGERY

## 2021-04-15 PROCEDURE — 272N000192 HC ACCESSORY CR2

## 2021-04-15 PROCEDURE — 250N000011 HC RX IP 250 OP 636: Performed by: PSYCHIATRY & NEUROLOGY

## 2021-04-15 PROCEDURE — 36216 PLACE CATHETER IN ARTERY: CPT | Mod: GC | Performed by: NEUROLOGICAL SURGERY

## 2021-04-15 PROCEDURE — 255N000002 HC RX 255 OP 636: Performed by: NEUROLOGICAL SURGERY

## 2021-04-15 PROCEDURE — 120N000001 HC R&B MED SURG/OB

## 2021-04-15 PROCEDURE — 250N000011 HC RX IP 250 OP 636: Performed by: PHYSICIAN ASSISTANT

## 2021-04-15 PROCEDURE — 99152 MOD SED SAME PHYS/QHP 5/>YRS: CPT

## 2021-04-15 PROCEDURE — 272N000570 HC SHEATH CR7

## 2021-04-15 PROCEDURE — C1769 GUIDE WIRE: HCPCS

## 2021-04-15 PROCEDURE — 250N000013 HC RX MED GY IP 250 OP 250 PS 637: Performed by: STUDENT IN AN ORGANIZED HEALTH CARE EDUCATION/TRAINING PROGRAM

## 2021-04-15 PROCEDURE — 99232 SBSQ HOSP IP/OBS MODERATE 35: CPT | Performed by: PHYSICIAN ASSISTANT

## 2021-04-15 PROCEDURE — C1887 CATHETER, GUIDING: HCPCS

## 2021-04-15 RX ORDER — HEPARIN SODIUM 1000 [USP'U]/ML
2000 INJECTION, SOLUTION INTRAVENOUS; SUBCUTANEOUS ONCE
Status: COMPLETED | OUTPATIENT
Start: 2021-04-15 | End: 2021-04-15

## 2021-04-15 RX ORDER — HEPARIN SODIUM 200 [USP'U]/100ML
1 INJECTION, SOLUTION INTRAVENOUS CONTINUOUS PRN
Status: DISCONTINUED | OUTPATIENT
Start: 2021-04-15 | End: 2021-04-15 | Stop reason: HOSPADM

## 2021-04-15 RX ORDER — FLUMAZENIL 0.1 MG/ML
0.2 INJECTION, SOLUTION INTRAVENOUS
Status: DISCONTINUED | OUTPATIENT
Start: 2021-04-15 | End: 2021-04-15 | Stop reason: HOSPADM

## 2021-04-15 RX ORDER — LIDOCAINE 40 MG/G
CREAM TOPICAL
Status: DISCONTINUED | OUTPATIENT
Start: 2021-04-15 | End: 2021-04-15

## 2021-04-15 RX ORDER — NALOXONE HYDROCHLORIDE 0.4 MG/ML
0.4 INJECTION, SOLUTION INTRAMUSCULAR; INTRAVENOUS; SUBCUTANEOUS
Status: DISCONTINUED | OUTPATIENT
Start: 2021-04-15 | End: 2021-04-15

## 2021-04-15 RX ORDER — INDOMETHACIN 25 MG/1
25 CAPSULE ORAL
Status: DISCONTINUED | OUTPATIENT
Start: 2021-04-15 | End: 2021-04-16 | Stop reason: HOSPADM

## 2021-04-15 RX ORDER — IOPAMIDOL 612 MG/ML
100 INJECTION, SOLUTION INTRAVASCULAR ONCE
Status: COMPLETED | OUTPATIENT
Start: 2021-04-15 | End: 2021-04-15

## 2021-04-15 RX ORDER — DEXTROSE MONOHYDRATE 25 G/50ML
25-50 INJECTION, SOLUTION INTRAVENOUS
Status: DISCONTINUED | OUTPATIENT
Start: 2021-04-15 | End: 2021-04-15 | Stop reason: HOSPADM

## 2021-04-15 RX ORDER — VERAPAMIL HYDROCHLORIDE 2.5 MG/ML
2.5 INJECTION, SOLUTION INTRAVENOUS ONCE
Status: COMPLETED | OUTPATIENT
Start: 2021-04-15 | End: 2021-04-15

## 2021-04-15 RX ORDER — NITROGLYCERIN 5 MG/ML
200 VIAL (ML) INTRAVENOUS ONCE
Status: COMPLETED | OUTPATIENT
Start: 2021-04-15 | End: 2021-04-15

## 2021-04-15 RX ORDER — NALOXONE HYDROCHLORIDE 0.4 MG/ML
0.2 INJECTION, SOLUTION INTRAMUSCULAR; INTRAVENOUS; SUBCUTANEOUS
Status: DISCONTINUED | OUTPATIENT
Start: 2021-04-15 | End: 2021-04-15

## 2021-04-15 RX ORDER — SODIUM CHLORIDE 9 MG/ML
INJECTION, SOLUTION INTRAVENOUS CONTINUOUS
Status: DISCONTINUED | OUTPATIENT
Start: 2021-04-15 | End: 2021-04-15 | Stop reason: HOSPADM

## 2021-04-15 RX ORDER — FENTANYL CITRATE 50 UG/ML
25-50 INJECTION, SOLUTION INTRAMUSCULAR; INTRAVENOUS EVERY 5 MIN PRN
Status: DISCONTINUED | OUTPATIENT
Start: 2021-04-15 | End: 2021-04-15 | Stop reason: HOSPADM

## 2021-04-15 RX ORDER — NICOTINE POLACRILEX 4 MG
15-30 LOZENGE BUCCAL
Status: DISCONTINUED | OUTPATIENT
Start: 2021-04-15 | End: 2021-04-15 | Stop reason: HOSPADM

## 2021-04-15 RX ADMIN — NITROGLYCERIN 200 MCG: 10 INJECTION INTRAVENOUS at 09:28

## 2021-04-15 RX ADMIN — IOPAMIDOL 105 ML: 612 INJECTION, SOLUTION INTRAVENOUS at 10:17

## 2021-04-15 RX ADMIN — MIDAZOLAM HYDROCHLORIDE 1 MG: 1 INJECTION, SOLUTION INTRAMUSCULAR; INTRAVENOUS at 09:28

## 2021-04-15 RX ADMIN — INDOMETHACIN 25 MG: 25 CAPSULE ORAL at 14:05

## 2021-04-15 RX ADMIN — ACETAMINOPHEN 650 MG: 325 TABLET, FILM COATED ORAL at 20:33

## 2021-04-15 RX ADMIN — MIDAZOLAM HYDROCHLORIDE 1 MG: 1 INJECTION, SOLUTION INTRAMUSCULAR; INTRAVENOUS at 09:12

## 2021-04-15 RX ADMIN — FENTANYL CITRATE 25 MCG: 50 INJECTION, SOLUTION INTRAMUSCULAR; INTRAVENOUS at 09:28

## 2021-04-15 RX ADMIN — FENTANYL CITRATE 25 MCG: 50 INJECTION, SOLUTION INTRAMUSCULAR; INTRAVENOUS at 09:57

## 2021-04-15 RX ADMIN — FENTANYL CITRATE 50 MCG: 50 INJECTION, SOLUTION INTRAMUSCULAR; INTRAVENOUS at 09:20

## 2021-04-15 RX ADMIN — ACETAMINOPHEN 650 MG: 325 TABLET, FILM COATED ORAL at 03:52

## 2021-04-15 RX ADMIN — FENTANYL CITRATE 50 MCG: 50 INJECTION, SOLUTION INTRAMUSCULAR; INTRAVENOUS at 09:33

## 2021-04-15 RX ADMIN — OXYCODONE HYDROCHLORIDE 5 MG: 5 TABLET ORAL at 12:44

## 2021-04-15 RX ADMIN — LIDOCAINE HYDROCHLORIDE 2 ML: 10 INJECTION, SOLUTION INFILTRATION; PERINEURAL at 09:25

## 2021-04-15 RX ADMIN — HEPARIN SODIUM 2000 UNITS: 1000 INJECTION INTRAVENOUS; SUBCUTANEOUS at 09:28

## 2021-04-15 RX ADMIN — HEPARIN SODIUM 4 BAG: 200 INJECTION, SOLUTION INTRAVENOUS at 09:25

## 2021-04-15 RX ADMIN — VERAPAMIL HYDROCHLORIDE 2.5 MG: 2.5 INJECTION INTRAVENOUS at 09:28

## 2021-04-15 RX ADMIN — MIDAZOLAM HYDROCHLORIDE 1 MG: 1 INJECTION, SOLUTION INTRAMUSCULAR; INTRAVENOUS at 09:20

## 2021-04-15 RX ADMIN — FENTANYL CITRATE 25 MCG: 50 INJECTION, SOLUTION INTRAMUSCULAR; INTRAVENOUS at 09:12

## 2021-04-15 RX ADMIN — OXYCODONE HYDROCHLORIDE 5 MG: 5 TABLET ORAL at 17:14

## 2021-04-15 RX ADMIN — ACETAMINOPHEN 650 MG: 325 TABLET, FILM COATED ORAL at 14:05

## 2021-04-15 RX ADMIN — OXYCODONE HYDROCHLORIDE 5 MG: 5 TABLET ORAL at 03:59

## 2021-04-15 RX ADMIN — FENTANYL CITRATE 25 MCG: 50 INJECTION, SOLUTION INTRAMUSCULAR; INTRAVENOUS at 10:06

## 2021-04-15 RX ADMIN — OXYCODONE HYDROCHLORIDE 5 MG: 5 TABLET ORAL at 08:43

## 2021-04-15 RX ADMIN — ONDANSETRON 4 MG: 2 INJECTION INTRAMUSCULAR; INTRAVENOUS at 10:36

## 2021-04-15 RX ADMIN — IBUPROFEN 400 MG: 200 TABLET, FILM COATED ORAL at 14:54

## 2021-04-15 RX ADMIN — INDOMETHACIN 25 MG: 25 CAPSULE ORAL at 17:13

## 2021-04-15 RX ADMIN — VERAPAMIL HYDROCHLORIDE 40 MG: 40 TABLET, FILM COATED ORAL at 06:30

## 2021-04-15 RX ADMIN — ACETAMINOPHEN 650 MG: 325 TABLET, FILM COATED ORAL at 08:43

## 2021-04-15 ASSESSMENT — ACTIVITIES OF DAILY LIVING (ADL)
ADLS_ACUITY_SCORE: 16
ADLS_ACUITY_SCORE: 14
ADLS_ACUITY_SCORE: 15
ADLS_ACUITY_SCORE: 14
ADLS_ACUITY_SCORE: 16
ADLS_ACUITY_SCORE: 14

## 2021-04-15 NOTE — PROGRESS NOTES
[facility]     Endovascular Surgical Neuroradiology Pre-Procedure Note      HPI:  Ady Godinez is a 35 year old male with history of obesity and headaches presents with thunderclap post coital headache. During workup, CTH/CTA and MRI and LP were negative for any significant findings. No neurological deficits, however does describe this headache as worst and lasting longer [ 3 days now]. Neuroendovascular service consulted for formal angiogram to rule out RCVS.          Medical History:  History reviewed. No pertinent past medical history.    Surgical History:  No past surgical history on file.    Family History:  No family history on file.    Social History:  Social History     Socioeconomic History     Marital status:      Spouse name: Not on file     Number of children: Not on file     Years of education: Not on file     Highest education level: Not on file   Occupational History     Not on file   Social Needs     Financial resource strain: Not on file     Food insecurity     Worry: Not on file     Inability: Not on file     Transportation needs     Medical: Not on file     Non-medical: Not on file   Tobacco Use     Smoking status: Not on file   Substance and Sexual Activity     Alcohol use: Not on file     Drug use: Not on file     Sexual activity: Not on file   Lifestyle     Physical activity     Days per week: Not on file     Minutes per session: Not on file     Stress: Not on file   Relationships     Social connections     Talks on phone: Not on file     Gets together: Not on file     Attends Zoroastrian service: Not on file     Active member of club or organization: Not on file     Attends meetings of clubs or organizations: Not on file     Relationship status: Not on file     Intimate partner violence     Fear of current or ex partner: Not on file     Emotionally abused: Not on file     Physically abused: Not on file     Forced sexual activity: Not on file   Other  Topics Concern     Not on file   Social History Narrative     Not on file       Allergies:  No Known Allergies    Is there a contrast allergy?  No    Medications:  Medications Prior to Admission   Medication Sig Dispense Refill Last Dose     aspirin-acetaminophen-caffeine (EXCEDRIN EXTRA STRENGTH) 250-250-65 MG tablet Take 2 tablets by mouth every 8 hours as needed for headaches   0120   .    ROS:  The 10 point Review of Systems is negative other than noted in the HPI or here.     PHYSICAL EXAMINATION  Vital Signs:  B/P: 128/77,  T: 98,  P: 64,  R: 16    Rrr, lungs clear bilaterally, abdomen soft/nt/nd  aao X 3, no facial droop, no pronator drift all 4 ext 5/5       LABS  (most recent Cr, BUN, GFR, PLT, INR, PTT within the past 7 days):  Recent Labs   Lab 04/14/21  0735   CR 0.86   BUN 16   GFRESTIMATED >90   GFRESTBLACK >90      INR 0.98            A/P: Proceed with angiogram            PRE-PROCEDURE SEDATION ASSESSMENT     Pre-Procedure Sedation Assessment done at 0830.    Expected Level:  Moderate Sedation    Indication:  Sedation is required to allow for neurointerventional procedure.    Consent obtained from patient after discussing the risks, benefits and alternatives.     PO Intake:  Appropriately NPO for procedure    ASA Class:  Class 2 - MILD SYSTEMIC DISEASE, NO ACUTE PROBLEMS, NO FUNCTIONAL LIMITATIONS.    Mallampati:  Grade 2:  Soft palate, base of uvula, tonsillar pillars, and portion of posterior pharyngeal wall visible    History and physical reviewed and no updates needed. I have reviewed the lab findings, diagnostic data, medications, and the plan for sedation. I have determined this patient to be an appropriate candidate for the planned sedation and procedure and have reassessed the patient IMMEDIATELY PRIOR to sedation and procedure.    Patient was discussed with the Attending, Dr. Potter, who agrees with the plan.    Medina Caraballo MD  Neuroendovascular Fellow  Pager: [phone]  04/15/2021 8:48  AM

## 2021-04-15 NOTE — PROGRESS NOTES
"  [facility]    Stroke Progress Note    Interval Events  No acute events overnight.  Vitals stable.  Neuro exam remains unchanged.  -191 mmHg.  CT angiogram of the head and neck [4/13 and 4/14] and Conventional angiogram 4/15/21 were unremarkable for vasospasm or beading.  No aneurysms reported either.  These findings were discussed with the patient as well as his father, Lalo, over the phone.    Impression  Post-Coital Headache without angiographic evidence of RCVS   Risk factors: Morbid obesity, energy drink consumption.  Urine toxicology was negative when checked on hospital day 3    Plan  [] headache management:  migraine cocktail Benadryl/Reglan, IV magnesium. Discontinue verapamil 40 mg every 8 hours, and start indomethacin 25mg TID, which maybe titrated  to be titrated upwards to 50mg TID over 1-2 weeks for headache control.  Indomethacin timed 30-60 minutes prior to sexual intercourse may help prevent the headache. Tylenol changed to scheduled from as needed.  If there is no improvement, will consider starting the patient on a triptan vs. Depakote.  Follow-up on urine tox screen    The Stroke Staff is Dr. Padgett.    RAJAN RODRIGUEZ MD  Vascular Neurology Fellow  To page me or covering stroke neurology team member, click here: AMCOM   Choose \"On Call\" tab at top, then search dropdown box for \"Neurology Adult\", select location, press Enter, then look for stroke/neuro ICU/telestroke.    ______________________________________________________    Medications   Home Meds  Prior to Admission medications    Medication Sig Start Date End Date Taking? Authorizing Provider   aspirin-acetaminophen-caffeine (EXCEDRIN EXTRA STRENGTH) 250-250-65 MG tablet Take 2 tablets by mouth every 8 hours as needed for headaches   Yes Unknown, Entered By History       Scheduled Meds    acetaminophen  650 mg Oral Q6H     verapamil  40 mg Oral Q8H PABLO         PRN Meds  hydrALAZINE, HYDROmorphone, " ibuprofen, labetalol, lidocaine 4%, lidocaine (buffered or not buffered), LORazepam, melatonin, naloxone **OR** naloxone **OR** naloxone **OR** naloxone, ondansetron **OR** ondansetron, oxyCODONE, prochlorperazine **OR** prochlorperazine **OR** prochlorperazine, sodium chloride (PF)       PHYSICAL EXAMINATION  Temp:  [97.2  F (36.2  C)-98.2  F (36.8  C)] 97.6  F (36.4  C)  Pulse:  [63-87] 83  Resp:  [10-24] 16  BP: (106-191)/() 125/64  SpO2:  [93 %-100 %] 95 %     Neurologic  Mental Status:  alert, oriented x 3, follows commands, speech clear and fluent, naming and repetition normal  Cranial Nerves:  visual fields intact, PERRL, EOMI with normal smooth pursuit, facial sensation intact and symmetric, facial movements symmetric, hearing not formally tested but intact to conversation, palate elevation symmetric and uvula midline, no dysarthria, shoulder shrug strong bilaterally, tongue protrusion midline  Motor:  normal muscle tone and bulk, no abnormal movements, able to move all limbs spontaneously, strength 5/5 throughout upper and lower extremities, no pronator drift  Reflexes:  toes down-going  Sensory:  light touch sensation intact and symmetric throughout upper and lower extremities, no extinction on double simultaneous stimulation   Coordination:  normal finger-to-nose and heel-to-shin bilaterally without dysmetria, rapid alternating movements symmetric  Station/Gait:  deferred     Dysphagia Screen  Per Nursing     Stroke Scales     NIHSS  Interval baseline (04/13/21 1400)   Interval Comments    1a. Level of Consciousness 0-->Alert, keenly responsive   1b. LOC Questions 0-->Answers both questions correctly   1c. LOC Commands 0-->Performs both tasks correctly   2.   Best Gaze 0-->Normal   3.   Visual 0-->No visual loss   4.   Facial Palsy 0-->Normal symmetrical movements   5a. Motor Arm, Left 0-->No drift, limb holds 90 (or 45) degrees for full 10 secs   5b. Motor Arm, Right 0-->No drift, limb holds 90 (or  45) degrees for full 10 secs   6a. Motor Leg, Left 0-->No drift, leg holds 30 degree position for full 5 secs   6b. Motor Leg, right 0-->No drift, leg holds 30 degree position for full 5 secs   7.   Limb Ataxia 0-->Absent   8.   Sensory 0-->Normal, no sensory loss   9.   Best Language 0-->No aphasia, normal   10. Dysarthria 0-->Normal   11. Extinction and Inattention  0-->No abnormality   Total 0 (04/13/21 1400)             Imaging  I personally reviewed all imaging; relevant findings per HPI.     Lab Results Data   CBC  Recent Labs   Lab 04/14/21  0735   WBC 8.5   RBC 4.75   HGB 13.5   HCT 42.8        Basic Metabolic Panel    Recent Labs   Lab 04/14/21  0735      POTASSIUM 3.9   CHLORIDE 107   CO2 30   BUN 16   CR 0.86   *   JEFRY 8.6     Liver Panel  No results for input(s): PROTTOTAL, ALBUMIN, BILITOTAL, ALKPHOS, AST, ALT, BILIDIRECT in the last 168 hours.  INR    Recent Labs   Lab Test 04/14/21  0735   INR 0.98      Lipid Profile  No lab results found.  A1C  No lab results found.  Troponin I  No results for input(s): TROPI in the last 168 hours.

## 2021-04-15 NOTE — PROGRESS NOTES
"[facility]    Medicine Progress Note - Hospitalist Service       Date of Admission:  4/13/2021    Assessment & Plan       Ady Godinez is a 35 year old male admitted on 4/13/2021. He has PMH of morbid obesity. He presented to [facility] evaluation of thunderclap headache. CT and CTA negative for aneurysm and SAH. LP RBC and cannot rule out SAH. Transferred for consideration of cerebral angiogram.     Severe Headache: Presented for sudden onset headache during intercourse. Localized to frontal region with radiation to posterior neck. Reports photophobia, but no N/V. No vision changes, tinnitus, or focal neuro deficits. No recent URI. Gets intermittent headaches, but this is the \"worse headache of my life.\" No recent chiropractic or masseuse visits. Denies recreational drug, alcohol, or tobacco use. CT and CTA negative. Underwent LP per IR and 1000 RBC on 1st tube and 1000 RBC on tube 4. He was transferred for consideration of cerebral angiogram  * MRI brain negative for acute pathology on admission   * Repeat CTA negative   * Headache waxes and wanes   - Stroke neurology consulted, cerebral angiogram today without evidence of RCVS  - Tylenol 650 mg q6 hrs   - Initially started on Verapamil due to concern for RCVS, given no angiographic evidence, this was discontinued and pt was started on indomethacin 25 mg TID. Will assess response and can uptitrate to 50 mg TID over 1-2 weeks for headache control. Consider Indomethacin 30-60 min prior to sexual intercourse. Consider Triptan vs Depakote if no response to above.       Morbid Obesity: BMI 54  - Follow up with PCP     COVID-19: Asymptomatic swab negative     Diet: Regular Diet Adult    DVT Prophylaxis: Ambulate every shift  Duvall Catheter: not present  Code Status: Full Code         Disposition Plan   Expected discharge: tomorrow, recommended to prior living arrangement once analgesic relief obtained with above regimen.     Entered: " Diana Fish PA-C 04/15/2021, 12:28 PM       The patient's care was discussed with Dr. Matt of the Hospitalist service who agrees with the plan as outlined above.     Diana Fish PA-C  Hospitalist Service  [facility]  Contact information available via [facility] Paging/Directory  ______________________________________________________________________    Interval History   Seen post-cerebral angiogram. Still with headache and mild photophobia. Waxes and wanes.     Data reviewed today: I reviewed all medications, new labs and imaging results over the last 24 hours. I personally reviewed all labs and imaging to date.     Physical Exam   Vital Signs: Temp: 97.6  F (36.4  C) Temp src: Oral BP: (!) 145/84 Pulse: 74   Resp: 16 SpO2: 99 % O2 Device: None (Room air) Oxygen Delivery: 2 LPM  Weight: 398 lbs 9.47 oz    CONSTITUTIONAL: Obese male laying in bed, dressed in hospital garb. Appears comfortable. Cooperative with interview. Father at bedside.   HEENT: Normocephalic, atraumatic. Pupils equal, round, and reactive to light. Mild photophobia noted. EOMI, bilat. Negative for conjunctival redness or scleral icterus.  Oral mucosa pink and moist; negative for ulcerations, erythema, or exudates.  Dentition in good repair.   CARDIOVASCULAR: RRR, no murmurs, rubs, or extra heart sounds appreciated. Pulses +2/4 and regular in upper and lower extremities, bilaterally.   RESPIRATORY: No increased work of breathing. CTA, bilat; no wheezes, rales, or rhonchi appreciated.  GASTROINTESTINAL:  Abdomen soft, non-distended. BS auscultated in all four quadrants. Negative for tenderness to palpation.  No masses or organomegaly noted.  MUSCULOSKELETAL: No gross deformities noted. Normal muscle tone.   HEMATOLOGIC/LYMPHATIC/IMMUNOLOGIC: Negative for lower extremity edema, bilaterally.  NEUROLOGIC: Alert and oriented to person, place, and time.  strength intact. CN's II-XII  grossly intact. Sensation equal and intact in all four extremities. Photophobia noted. Negative for pronator drift, bilat UE. Cerebellar fxn intact per finger to nose.   SKIN: Warm, dry, intact. No jaundice noted. Negative for suspicious lesions, rashes, bruising, open sores or abrasions.     Data   Recent Labs   Lab 04/14/21  0735   WBC 8.5   HGB 13.5   MCV 90      INR 0.98      POTASSIUM 3.9   CHLORIDE 107   CO2 30   BUN 16   CR 0.86   ANIONGAP 4   JEFRY 8.6   *     No results found for this or any previous visit (from the past 24 hour(s)).  Medications       acetaminophen  650 mg Oral Q6H     indomethacin  25 mg Oral TID w/meals

## 2021-04-15 NOTE — IR NOTE
Interventional Radiology Intra-procedural Nursing Note    Patient Name: Ady Godinez  Medical Record Number: [id number]  Today's Date: April 15, 2021    Procedure: Diagnostic cerebral angiogram with radial and/or femoral access and closure device.  Start time: 0923  End time: 1009  Report provided to: Station 73 RN  Patient depart time and location: 1017 to Station 73    Note: Patient entered IR Suite number 3 via cart. Patient awake, alert and orientated. Placed on procedural table in supine position. Prepped and draped.  Dr. Potter and Dr. Caraballo in room. Time out and procedure started. VSS. Telemetry reading SR.  Procedure well tolerated by patient without complications. Procedure end with debrief by Dr. Caraballo.  Pressure held until hemostasis achieved.  @ 1014 TR band applied to right radial access site with 10 mL of air.    Administered medication totals:  Versed 3 mg  Fentanyl 200 mcg  Lidocaine 1% 2 mL   Heparin 2,000 units intra-arterial  Nitroglycerin 200 mcg intra-arterial  Verapamil 2.5 mg intra-arterial    Last dose of sedation administered at 1006.      Nida Chinchilla RN

## 2021-04-15 NOTE — PRE-PROCEDURE
Neurosurgery Attending Preop Note:    35 y.o. with worst headache of his life.  Presented to [facility] and was found to have a negative head CT but blood in his CSF.  Concern for ruptured aneurysm.  Transferred to [facility] for cerebral angiogram.    Discussed risks and benefits with patient including stroke, dissection of blood vessels, bleeding at access site and death.  He understands the risks and has signed an informed consent.     Plan for 4 vessel cerebral angiogram.

## 2021-04-15 NOTE — PROCEDURES
[facility]     Endovascular Surgical Neuroradiology Post-Procedure Note    Pre-Procedure Diagnosis:  Concern for rcvs  Post-Procedure Diagnosis:  No vasculitis seen    Procedure(s):   Diagnostic cervicocerebral angiography    Findings:    No evidence of RCVS seen  No other vascular abnormalities seen    Plan:    As per primary team     Primary Surgeon:  Dr. Julio Cesar Potter  Fellow:  Rashmi      Prior to the start of the procedure and with procedural staff participation, I verbally confirmed: the patient s identity using two indicators, relevant allergies, that the procedure was appropriate and matched the consent or emergent situation, and that the correct equipment/implants were available. Immediately prior to starting the procedure I conducted the Time Out with the procedural staff and re-confirmed the patient s name, procedure, and site/side. (The Joint Commission universal protocol was followed.)  Yes    PRU value: Not applicable    Anesthesia:  Conscious Sedation  Medications:  Fentanyl, Midazolam  Puncture site:  Right Radial Artery    Fluoroscopy time (minutes):  12.5  Radiation dose (mGy):  1889    Contrast amount (mL):  105     Estimated blood loss (mL):  minimal    Closure:  TR band on right wrist     Disposition:  Will be followed in hospital by the Neurology team.        Sedation Post-Procedure Summary    Sedatives: Fentanyl and Midazolam (Versed)    Vital signs and pulse oximetry were monitored and remained stable throughout the procedure, and sedation was maintained until the procedure was complete.  The patient was monitored by staff until sedation discharge criteria were met.    Patient tolerance:  Patient tolerated the procedure well with no immediate complications.    Time of sedation in minutes:  45 minutes from beginning to end of physician one to one monitoring.    Medina Caraballo MD  Neuroendovascular Fellow  Pager: [phone]  04/15/2021 10:22 AM

## 2021-04-16 VITALS
WEIGHT: 315 LBS | HEIGHT: 72 IN | OXYGEN SATURATION: 95 % | TEMPERATURE: 97.5 F | BODY MASS INDEX: 42.66 KG/M2 | HEART RATE: 69 BPM | SYSTOLIC BLOOD PRESSURE: 127 MMHG | RESPIRATION RATE: 16 BRPM | DIASTOLIC BLOOD PRESSURE: 76 MMHG

## 2021-04-16 PROCEDURE — 250N000013 HC RX MED GY IP 250 OP 250 PS 637: Performed by: PHYSICIAN ASSISTANT

## 2021-04-16 PROCEDURE — 250N000013 HC RX MED GY IP 250 OP 250 PS 637: Performed by: STUDENT IN AN ORGANIZED HEALTH CARE EDUCATION/TRAINING PROGRAM

## 2021-04-16 PROCEDURE — 99232 SBSQ HOSP IP/OBS MODERATE 35: CPT | Mod: GC | Performed by: PSYCHIATRY & NEUROLOGY

## 2021-04-16 PROCEDURE — 99238 HOSP IP/OBS DSCHRG MGMT 30/<: CPT | Performed by: PHYSICIAN ASSISTANT

## 2021-04-16 RX ORDER — INDOMETHACIN 25 MG/1
25 CAPSULE ORAL
Qty: 40 CAPSULE | Refills: 0 | Status: SHIPPED | OUTPATIENT
Start: 2021-04-16 | End: 2021-09-10

## 2021-04-16 RX ORDER — ACETAMINOPHEN 325 MG/1
650 TABLET ORAL EVERY 6 HOURS PRN
Qty: 90 TABLET | Refills: 0 | Status: SHIPPED | OUTPATIENT
Start: 2021-04-16

## 2021-04-16 RX ADMIN — INDOMETHACIN 25 MG: 25 CAPSULE ORAL at 13:35

## 2021-04-16 RX ADMIN — INDOMETHACIN 25 MG: 25 CAPSULE ORAL at 08:28

## 2021-04-16 RX ADMIN — ACETAMINOPHEN 650 MG: 325 TABLET, FILM COATED ORAL at 14:47

## 2021-04-16 RX ADMIN — ACETAMINOPHEN 650 MG: 325 TABLET, FILM COATED ORAL at 08:28

## 2021-04-16 RX ADMIN — ACETAMINOPHEN 650 MG: 325 TABLET, FILM COATED ORAL at 03:56

## 2021-04-16 ASSESSMENT — ACTIVITIES OF DAILY LIVING (ADL)
ADLS_ACUITY_SCORE: 16
ADLS_ACUITY_SCORE: 14
ADLS_ACUITY_SCORE: 14
ADLS_ACUITY_SCORE: 15

## 2021-04-16 NOTE — PROGRESS NOTES
Care Management Discharge Note    Discharge Date: 04/16/21       Discharge Disposition:  home      Discharge Transportation: family or friend will provide    Private pay costs discussed: Let patient know that a downpayment may be requested at follow up appointment, he stated understanding.      Patient/family educated on Medicare website which has current facility and service quality ratings: no  Patient/Family in Agreement with the Plan: yes    Handoff Referral Completed: No    Additional Information:  Patient needs to establish care with a PCP.  Spoke to patient about this and he would like appointment at the [facility].  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  He does not have medical insurance, but is hoping to be eligible soon through his employer.  He would like to speak with a financial counselor.    An appointment for hospital follow up has been scheduled, see AVS.  Patient also needs follow up at the [facility] Neurology.  He will need to schedule this due to not having insurance.  He will need to provide insurance information if obtained or discuss financial policy when scheduling appointment.  AVS updated with [facility] contact information.    Email sent to Rudolph Ruano, financial counselor, requesting he contact patient.        Carlyn Hartmann RN

## 2021-04-16 NOTE — DISCHARGE INSTRUCTIONS
Please follow up with neurology in 4-6 weeks. You may call one of the following neurology clinics for an appointment or a clinic of your choice. Tell them you were recently hospitalized and need follow up as recommended at discharge.    1. [facility]   [address] , [address]   [place], [place] [address] [phone]  2. [facility]   [address], [address]   [place], [place] [address] [phone]

## 2021-04-16 NOTE — PLAN OF CARE
"Patient admitted with thunderclap headache. Imaging and cerebral angio negative for bleed, aneurysm or vasculitis. VSS on room air. CMS and neuros intact aside from photophobia and headache. Pain managed with tylenol and oxycodone, was a bit groggy this evening. Per patient, headache \"gets better but then comes back\" after oxycodone wears off. Up with Ax1/GB/W. Tolerating regular diet. Tele NSR. Plan is for discharge to home tomorrow if pain well managed with current regimen.   "
A&O. VSS. Pain reduced with PRN tylenol, oxy. Neuros intact ex photophobia, mild HA. Up SBA. Voiding adequately. Discharge pending progress.  
Date & Time: 4/16 8172-5885  Diagnosis: HA  Procedures: Angio 4/15   Orientation/Cognitive: AOx4  VS/O2: VSS, RA   Mobility: Independent  Diet: Regular   Pain Management: Scheduled tylenol, indocin   Bowel & Bladder: Continent   Skin: Pale   Abnormal Labs:    Tele: NSR    IV Access/Drips/Fluids: R PIVx2 SL   Drains: NA  Tests: Angio - negative   Consults: Neurology, hospitalist   Discharge Plan: 4/16 home @ 1530   Other: Hx chronic HA    Discharge instructions & safety reviewed. All questions answered. Discharge medications reviewed. Patient discharged home with wife & all belongings.   
Here with severe headache.  Neuros stable.  Headache managed with tylenol and oxycodone.  Nausea x 1 after cerebral angiogram, zofran with relief.  Right wrist TR band removed, no complications noted.  Tele: NSR.  Up with SBA, gait belt.  Plan for discharge tomorrow if headache remains controlled with oral medications.    1455 c/o headache not better, gave one time dose of ibuprofen  
Pt here with Possible SAH. A&Ox4. Neuros intact. VSS, SBP <140. Neuros and vitals Q2 hours. Tele NSR. Regular diet, thin liquids. Takes pills whole. Up with SBA and GB. Complaints of HA pain 9/10. Pt scoring green on the Aggression Stop Light Tool. Plan complete MRI, possible cerebral angio tomorrow, NPO at midnight. Discharge pending.  
Pt here with headache, possible SAH. A+Ox4. VSS on RA. SBP Parameters <140. Neuros intact. Tele: NSR. Regular diet, thin liquids. NPO since midnight for procedure today. Takes pills whole. Up with SBA and GB. C/O 8-/10 headache pain- Gave PRN 0.3 mg IV dilaudid X2 and PRN tylenol X2, along with cold application and environmental changes for relief. Bariatric bed now in place for patient comfort. IVF infusing. MRI completed overnight. Scoring Green on Aggression Stop Light Tool. Plan for cerebral angiogram today. Discharge pending.     ADDENDUM 0600: Pt placed on 2 L O2 via NC to maintain O2 saturations >94%, as pt dipped to 84% on RA while asleep. RR 15. Easily arousable, following commands.         
Pt here with thunderclap headache suspect related to reversible cerebral vasoconstriction syndrome. A+Ox4. VSS on RA- Elevated blood pressures but within parameters. SBP Parameters <140. Neuros intact, ex for headache, photophobia (Not new today per pt), finger to nose and heel to shin are slow but deliberate. Tele: NSR. Regular diet, thin liquids. NPO since midnight for procedure today. Takes pills whole. Up with SBA and GB. C/O 6-7/10 headache pain- Gave PRN 5 mg oxycodone X2 along with scheduled tylenol for relief. Scoring Green on Aggression Stop Light Tool. Plan for cerebral angiogram today. Discharge pending.  
Pt here with thunderclap headache suspected related to  reversible cerebral vasoconstriction syndrome. A&Ox4. Neuros intake. VSS. SBP parameters <140 Tele nsr. Reg diet, thin liquids. Takes pills whole. Up with sba. Rates headache 5-9/10 tylenol given. Pt scoring green on the Aggression Stop Light Tool. Plan is for angiogram tomorrow to rule our hemorrhage. Discharge home.    
No

## 2021-04-16 NOTE — DISCHARGE SUMMARY
"[facility]  Hospitalist Discharge Summary      Date of Admission:  4/13/2021  Date of Discharge:  4/16/2021  Discharging Provider: Diana Fish PA-C    Discharge Diagnoses   Severe headache; post coital headache vs migraine     Follow-ups Needed After Discharge   Follow-up Appointments     Follow-up and recommended labs and tests       Follow up with primary care provider, Physician No Ref-Primary, within 7   days for hospital follow- up.  No follow up labs or test are needed.    Follow up with [facility]rology in 4-6 weeks for hospital   follow-up           Unresulted Labs Ordered in the Past 30 Days of this Admission     No orders found from 3/14/2021 to 4/14/2021.      These results will be followed up by PCP    Discharge Disposition   Discharged to home  Condition at discharge: Stable    Hospital Course   Ady Godinez is a 35 year old male admitted on 4/13/2021. He has PMH of morbid obesity. He presented to [facility] evaluation of thunderclap headache. CT and CTA negative for aneurysm and SAH. LP RBC and cannot rule out SAH. Transferred for consideration of cerebral angiogram.     Severe Headache: Presented for sudden onset headache during intercourse. Localized to frontal region with radiation to posterior neck. Reports photophobia, but no N/V. No vision changes, tinnitus, or focal neuro deficits. No recent URI. Gets intermittent headaches, but this is the \"worse headache of my life.\" No recent chiropractic or masseuse visits. Denies recreational drug, alcohol, or tobacco use. CT and CTA negative. Underwent LP per IR and 1000 RBC on 1st tube and 1000 RBC on tube 4. He was transferred for consideration of cerebral angiogram  * MRI brain negative for acute pathology on admission   * Repeat CTA negative   * Headache waxes and wanes   - Stroke neurology consulted, cerebral angiogram today without evidence of RCVS  - Tylenol PRN  - Initially " started on Verapamil due to concern for RCVS, given no angiographic evidence, this was discontinued and pt was started on indomethacin 25 mg TID. Plan to take scheduled dose X7 days then switch to 25-50 mg PRN 30-60 minutes prior to intercourse. Consider Triptan vs Depakote outpatient if no response to above.    - Follow-up with [facility] within 4-8 weeks of discharge   - PCP follow-up within 7-10 days of hospital discharge; Care Coordinator helped to facilitate this.   - Limit caffeine intake      Morbid Obesity: BMI 54  - Follow up with PCP     COVID-19: Asymptomatic swab negative     Consultations This Hospital Stay   NEUROLOGY IP CONSULT  CARE MANAGEMENT / SOCIAL WORK IP CONSULT  CARE MANAGEMENT / SOCIAL WORK IP CONSULT    Code Status   Full Code    Time Spent on this Encounter   I, Diana Fish PA-C, personally saw the patient today and spent less than or equal to 30 minutes discharging this patient.       Diana Fish PA-C  [address] [place]  [address] [place] [address]  Phone: [phone]  ______________________________________________________________________    Physical Exam   Vital Signs: Temp: 97.5  F (36.4  C) Temp src: Oral BP: 127/76 Pulse: 69   Resp: 16 SpO2: 95 % O2 Device: None (Room air)    Weight: 398 lbs 9.47 oz    CONSTITUTIONAL: Obese male laying in bed, dressed in hospital garb. Appears comfortable. Cooperative with interview.  HEENT: Normocephalic, atraumatic. Pupils equal, round, and reactive to light. EOMI, bilat. Negative for conjunctival redness or scleral icterus.  Oral mucosa pink and moist; negative for ulcerations, erythema, or exudates.  Dentition in good repair.   CARDIOVASCULAR: RRR, no murmurs, rubs, or extra heart sounds appreciated. Pulses +2/4 and regular in upper and lower extremities, bilaterally.   RESPIRATORY: No increased work of breathing. CTA, bilat; no wheezes, rales, or rhonchi  appreciated.  GASTROINTESTINAL:  Abdomen soft, non-distended. BS auscultated in all four quadrants. Negative for tenderness to palpation.  No masses or organomegaly noted.  MUSCULOSKELETAL: No gross deformities noted. Normal muscle tone.   HEMATOLOGIC/LYMPHATIC/IMMUNOLOGIC: Negative for lower extremity edema, bilaterally.  NEUROLOGIC: Alert and oriented to person, place, and time.  strength intact. CN's II-XII grossly intact. Sensation equal and intact in all four extremities. Photophobia noted. Negative for pronator drift, bilat UE. Cerebellar fxn intact per finger to nose.   SKIN: Warm, dry, intact. No jaundice noted. Negative for suspicious lesions, rashes, bruising, open sores or abrasions.        Primary Care Physician   Physician No Ref-Primary    Discharge Orders      Reason for your hospital stay    You were hospitalized for further evaluation of intractable headache.     Follow-up and recommended labs and tests     Follow up with primary care provider, Physician No Ref-Primary, within 7 days for hospital follow- up.  No follow up labs or test are needed.    Follow up with [facility] in 4-6 weeks for hospital follow-up     Activity    Your activity upon discharge: activity as tolerated     Discharge Instructions    1. Complete a 7-day course of indomethacin 25mg TID.  Following that, take indomethacin 25 or 50 mg orally approximately 30-60 minutes prior to intercourse.  Can supplement tylenol as needed for pain   2. Follow-up with [facility] neurology within 4-8 weeks of discharge.  3. Limit caffeine intake  4. Take indomethacin on a full stomach as it can cause GI upset.     Diet    Follow this diet upon discharge: Orders Placed This Encounter      Regular Diet Adult       Significant Results and Procedures   Most Recent 3 CBC's:  Recent Labs   Lab Test 04/14/21  0735   WBC 8.5   HGB 13.5   MCV 90        Most Recent 3 BMP's:  Recent Labs   Lab Test 04/14/21  0735       POTASSIUM 3.9   CHLORIDE 107   CO2 30   BUN 16   CR 0.86   ANIONGAP 4   JEFRY 8.6   *   ,   Results for orders placed or performed during the hospital encounter of 04/13/21   IR Carotid Cerebral Angiogram Bilateral    Narrative    ADY GODINEZ  [id number]  1985    History:  Ady Godinez is a 35 year old male with?history of obesity and  headaches presents with thunderclap post coital headache. During  workup, CTH/CTA and MRI and LP were negative for any significant  findings. No neurological deficits, however does describe this  headache as worst and lasting longer [ 3 days now]. Neuroendovascular  service consulted for formal angiogram to rule out RCVS.    Indication for the procedure:   Thunderclap headache    : Julio Cesar Potter MD  [place]: Medina Caraballo MD  Anesthesia: Local anesthesia and conscious sedation  Contrast used: 105 cc of VISI 320  Fluoro time: 12.5 minutes, 1889 mGy  Sedation time: 45 minutes  Sedatives: Midazolam 2.5 mg, Fentanyl 200 mcg  Other medications: Radial Cocktail: Heparin 2000 Unit, Nitroglycerin  200 mcg, Verapamil 2.5 mg     Procedure:  1.  Diagnostic cerebral angiography and interpretation of the images.  2.  Ultrasound guided right radial arteriotomy with permanent image of  the anatomy stored in the electronic medical record.  3.  Selective catheterization and diagnostic cerebral angiography of  the right vertebral artery, right internal carotid artery and left  internal carotid artery, and left vertebral artery.    Consent: The risks, benefits of a conventional diagnostic cerebral  angiography were discussed with the patient who agreed to proceed. The  risks, which were discussed included risk of stroke, arterial  dissection in the neck, hematoma / arteriovenous fistula /  pseudoaneurysm at the puncture site of the femoral/ radial artery.  Complications related to radial arteriotomy including occlusion of the  radial artery, complications related to  brachial artery dissection,  and possibility of requiring femoral arteriotomy in setting of failure  to obtain ideal radial artery access were separately explained.  Subsequently, verbal and written informed consent was obtained.    Technique/findings: The patient was brought to the angiography suite  and placed in supine position. The medications were administered by  the radiology nursing staff. The nursing staff independently monitored  the patient's vital signs during the procedure.    The patient's right groin and wrist were prepped and draped in  standard fashion. Aime's test was performed preprocedurally with  evidence of adequate collateral circulation in the hand after complete  radial artery occlusion.  Lidocaine was injected locally and a small  skin incision was made over the radial artery using a scalpel.  Using  real-time ultrasound guidance, a 21 gauge needle was placed into the  right radial artery which was exchanged for a 5 Israeli slender Terumo  radial sheath over the radial access micro-wire. The sheath was  connected to a continuous flush of heparinized saline and radial  artery cocktail (Heparin 2000 Unit, Nitroglycerin 200 mcg, Verapamil  2.5 mg) was infused through the sheath. A hard copy of ultrasound  image was stored in the patient's record.     A 5 Israeli Sylvester 2 diagnostic catheter was advanced through the  radial sheath over a 0.035 inch diameter Terumo glidewire under  roadmap guidance. The Sylvester 2 catheter curve was formed over the  aortic arch by initially advancing the catheter into the proximal  descending thoracic aorta. Double flush technique was used throughout  the procedure.    Findings:    Right vertebral artery: Cranial view  Under fluoroscopic guidance and using roadmap technique, the catheter  was advanced over the guidewire into the proximal right vertebral  artery. Biplane angiography was performed over the cranium.  Intracranial view of the right vertebral artery  in the AP and lateral  projections demonstrates a normal right vertebral artery. The right  posterior inferior cerebellar artery originates from the distal right  vertebral artery. The basilar artery is patent and bifurcates into  bilateral posterior cerebral arteries. The bilateral anterior inferior  cerebellar arteries and the bilateral superior cerebellar arteries are  normal. The capillary and venous phases are normal. No evidence of  vasculitis or aneurysms.    Right internal carotid artery injection: Cranial view   Under fluoroscopic guidance, the catheter was advanced over the  glidewire into the right internal carotid artery. Biplane angiography  was performed over the cranium. Cranial view of the right internal  carotid artery injection in the AP and lateral projections  demonstrates normal cervical, petrous, laceral, cavernous, clinoid,  ophthalmic, and communicating segments of the right internal carotid  artery. The right internal carotid artery bifurcates into the right  anterior cerebral artery and right middle cerebral artery. There is  contralateral filling of left A2 through the anterior communicating  artery. The proximal segments and distal branches of the right  anterior cerebral artery and right middle cerebral artery are normal.  The right posterior communicating artery is visualized. The capillary  and venous phases are normal. No evidence of vasculitis or aneurysms.    Left internal carotid artery injection: Cranial view   Under fluoroscopic guidance and roadmap technique the catheter was  advanced over the glidewire into the left internal carotid artery.  Biplane angiography was performed over the cranium. Intracranial view  of the left internal carotid artery injection in the AP and lateral  projections demonstrates normal cervical, petrous, laceral, cavernous,  clinoidal, ophthalmic, and communicating segments of the left internal  carotid artery. The left internal carotid artery  bifurcates into the  left anterior cerebral artery and left middle cerebral artery. The  proximal segments and distal branches of the left anterior cerebral  artery and left middle cerebral artery are normal. The left posterior  communicating artery is visualized and there is incidental 1 mm left  pcom infundibulum.  The capillary and venous phases are normal. No evidence of vasculitis  or aneurysms.    Left vertebral artery: Cranial view  Under fluoroscopic guidance, the catheter was advanced over the  guidewire into the proximal left vertebral artery. Biplane angiography  was performed over the cranium. Intracranial view of the left  vertebral artery in the AP and lateral projections demonstrates a  normal left vertebral artery. The left posterior inferior cerebellar  artery originates from the distal left vertebral artery and there is  no evidence of aneurysm. The basilar artery is patent and bifurcates  into bilateral posterior cerebral arteries.     Upon completion of the procedure the 5 Burkinan sheath was removed.  Hemostasis was obtained with a right TR band. Procedure was completed  without any complications. Patient was then transferred to stroke unit  in stable condition.    Julio Cesar Potter MD was present for the entire procedure.      Impression    Impression:  1.  No angiographic evidence of reversible cerebral vasoconstriction  syndrome, vascular malformations or intracranial aneurysms. Study was  limited by patient movement predominantly with difficulty with breath  holds.   2.  Incidental 1 mm left posterior communicating artery infundibulum  is seen.    Plan:  Rest as per stroke team.    Medina Caraballo MD   Endovascular Surgical Neuroradiology Fellow  Pager: [phone]   MR Brain w/o & w Contrast    Narrative    MRI BRAIN WITHOUT AND WITH CONTRAST  4/13/2021 8:28 PM     HISTORY:  Headache, intracranial hemorrhage suspected.    TECHNIQUE:  Multiplanar, multisequence MRI of the brain without and  with 14  mL Gadavist.     COMPARISON: Head CT from earlier the same day.     FINDINGS: There is no evidence of acute infarct, hemorrhage, mass, or  herniation. The brain parenchyma, ventricles and subarachnoid spaces  appear normal.     There is no abnormal intracranial enhancement.     The facial structures appear normal.       Impression    IMPRESSION:  Unremarkable brain MRI without evidence of acute infarct,  mass, hemorrhage, or herniation.      ISABEL WRIGHT MD   CTA Head Neck with Contrast    Narrative    CT ANGIOGRAM OF THE HEAD AND NECK WITH CONTRAST  4/14/2021 12:07 PM     HISTORY: Stroke/TIA, assess intracranial arteries. Severe headache.     TECHNIQUE:  CT angiography with an injection of 70mL [id number]-[id number] IV  with scans through the head and neck. Images were transferred to a  separate 3-D workstation where multiplanar reformations and 3-D images  were created. Estimates of carotid stenoses are made relative to the  distal internal carotid artery diameters except as noted. Radiation  dose for this scan was reduced using automated exposure control,  adjustment of the mA and/or kV according to patient size, or iterative  reconstruction technique.    COMPARISON: MRI brain dated 4/13/2021.     CT HEAD FINDINGS: No contrast enhancing lesions. Cerebral blood flow  is grossly normal.     CT ANGIOGRAM HEAD FINDINGS:  The major intracranial arteries including  the proximal branches of the anterior cerebral, middle cerebral, and  posterior cerebral arteries appear patent without vascular cutoff. No  aneurysm identified. No significant stenosis. Venous circulation is  unremarkable.     CT ANGIOGRAM NECK FINDINGS:   Common origin of the brachiocephalic and left common carotid arteries  from the aortic arch, a normal variant. No stenosis of the origins of  the great vessels.    Right carotid artery: The right common and internal carotid arteries  are patent. No significant stenosis or atherosclerotic disease in the  carotid  artery.     Left carotid artery: The left common and internal carotid arteries are  patent. No significant stenosis or atherosclerotic disease in the  carotid artery.     Vertebral arteries: Vertebral arteries are patent without evidence of  dissection. No significant stenosis.       Impression    IMPRESSION:   1. No high-grade stenosis or large vessel occlusion involving the  major craniocervical arterial vasculature.  2. No intracranial aneurysm or high flow vascular malformation  identified.    TRESSA SANTIZO MD       Discharge Medications   Current Discharge Medication List      START taking these medications    Details   acetaminophen (TYLENOL) 325 MG tablet Take 2 tablets (650 mg) by mouth every 6 hours as needed for mild pain  Qty: 90 tablet, Refills: 0    Associated Diagnoses: Acute intractable headache, unspecified headache type      indomethacin (INDOCIN) 25 MG capsule Take 1 capsule (25 mg) by mouth 3 times daily (with meals)  Qty: 40 capsule, Refills: 0    Comments: Complete a 7-day course of indomethacin 25mg TID.  Following that, take indomethacin 25 or 50 mg orally approximately 30-60 minutes prior to intercourse.  Associated Diagnoses: Acute intractable headache, unspecified headache type         STOP taking these medications       aspirin-acetaminophen-caffeine (EXCEDRIN EXTRA STRENGTH) 250-250-65 MG tablet Comments:   Reason for Stopping:             Allergies   No Known Allergies

## 2021-06-01 VITALS — BODY MASS INDEX: 47.23 KG/M2 | WEIGHT: 315 LBS

## 2021-06-19 NOTE — PROGRESS NOTES
Assessment/Plan:        1. Cellulitis of left lower extremity  Improving   Continue current management.     - HM1(CBC and Differential)  - C-Reactive Protein(CRP)  - HM1 (CBC with Diff)    See workability note.     2. Hypertension, unspecified type  Normotension  Continue current management.     - Comprehensive Metabolic Panel     follow up in a month .           Subjective:    Patient ID:   Ady Godinez is a 32 y.o. male with a h/o Obesity and HTN, here to establish care and in post hospital follow up of sepsis from cellulitis.  He had presented to the hospital with left lower extremity cellulitis complicated with sepsis I was consulted by infectious disease.  He was cleared by ID with 7 day course of p.o. Bactrim and Omnicef.  He also presented with leukocytosis which needs to be monitored in outpatient.    He is reporting that is starting to feel all better, with BP normalizing, but still having pain on walking.  He would like to know when he can return to work as he has a physical job and up on his feet while at work.          Review of Systems  Constitutional: negative  Eyes: negative  ENT and face: negative  Respiratory: negative  CV: negative  GI: negative  : neg   Skin: see HPi    Musculoskeletal:see HPI , + pain with walking   Neuro/ psych: neg      The following patient's history were reviewed and updated as appropriate:   He  has a past medical history of Cellulitis (05/2016); Obesity; and Pure hypercholesterolemia.  He  does not have any pertinent problems on file.  He  has a past surgical history that includes Appendectomy.  His family history is not on file.  He  reports that he has never smoked. He has never used smokeless tobacco. He reports that he does not drink alcohol or use illicit drugs..      Outpatient Encounter Prescriptions as of 7/16/2018   Medication Sig Dispense Refill     cefdinir (OMNICEF) 300 MG capsule Take 1 capsule (300 mg total) by mouth 2 (two) times a day for 7 days. 14  capsule 0     ibuprofen (ADVIL,MOTRIN) 600 MG tablet Take 1 tablet (600 mg total) by mouth every 6 (six) hours as needed. 30 tablet 0     lisinopril (PRINIVIL,ZESTRIL) 10 MG tablet Take 1 tablet (10 mg total) by mouth daily. 30 tablet 0     sulfamethoxazole-trimethoprim (SEPTRA DS) 800-160 mg per tablet Take 1 tablet by mouth 2 (two) times a day for 7 days. 14 tablet 0     acetaminophen (TYLENOL) 500 MG tablet Take 1 tablet (500 mg total) by mouth every 6 (six) hours as needed for pain.  0     aspirin-acetaminophen-caffeine (EXCEDRIN EXTRA STRENGTH) 250-250-65 mg per tablet Take 2 tablets by mouth every 6 (six) hours as needed for pain.       No facility-administered encounter medications on file as of 7/16/2018.          Objective:   /70 (Patient Site: Right Arm, Patient Position: Sitting, Cuff Size: Adult Regular)  Pulse 96  Temp 98.2  F (36.8  C) (Oral)   Wt (!) 338 lb 9.6 oz (153.6 kg)  SpO2 97%  BMI 47.23 kg/m2      Physical Exam  General Appearance:    Alert, cooperative, no distress, appears stated age   Head:    Normocephalic, without obvious abnormality, atraumatic   Eyes:    PERRL, conjunctiva/corneas clear, EOM's intact, fundi     benign, both eyes        Throat:   Lips, mucosa, and tongue normal;  gums normal   Neck:   Supple, symmetrical, trachea midline, no adenopathy;        thyroid:  No enlargement/tenderness/nodules; no carotid    bruit or JVD   Back:     Symmetric, no curvature, ROM normal, no CVA tenderness   Lungs:     Clear to auscultation bilaterally, respirations unlabored   Heart:    Regular rate and rhythm, S1 and S2 normal, no murmur, rub   or gallop   Abdomen:     Obese, Soft, non-tender, bowel sounds active all four quadrants, no guarding     no masses, no organomegaly   Extremities:   No palpable pain to the left ext. No sig swelling atraumatic, no cyanosis    Pulses:   2+ and symmetric all extremities   Skin:   Skin color, texture, turgor normal, no rashes or lesions    Neurologic:   CNII-XII intact. Normal strength, sensation and reflexes       throughout

## 2021-09-10 ENCOUNTER — APPOINTMENT (OUTPATIENT)
Dept: RADIOLOGY | Facility: HOSPITAL | Age: 36
End: 2021-09-10
Attending: HOSPITALIST
Payer: COMMERCIAL

## 2021-09-10 ENCOUNTER — APPOINTMENT (OUTPATIENT)
Dept: CT IMAGING | Facility: HOSPITAL | Age: 36
End: 2021-09-10
Attending: EMERGENCY MEDICINE
Payer: COMMERCIAL

## 2021-09-10 ENCOUNTER — HOSPITAL ENCOUNTER (OUTPATIENT)
Facility: HOSPITAL | Age: 36
Setting detail: OBSERVATION
Discharge: HOME | End: 2021-09-11
Attending: EMERGENCY MEDICINE | Admitting: HOSPITALIST
Payer: COMMERCIAL

## 2021-09-10 DIAGNOSIS — R06.89 HYPERCARBIA: ICD-10-CM

## 2021-09-10 DIAGNOSIS — K42.9 UMBILICAL HERNIA WITHOUT OBSTRUCTION AND WITHOUT GANGRENE: ICD-10-CM

## 2021-09-10 DIAGNOSIS — E66.01 MORBID OBESITY (H): Primary | ICD-10-CM

## 2021-09-10 LAB
ALBUMIN SERPL-MCNC: 3.6 G/DL (ref 3.5–5)
ALP SERPL-CCNC: 103 U/L (ref 45–120)
ALT SERPL W P-5'-P-CCNC: 40 U/L (ref 0–45)
ANION GAP SERPL CALCULATED.3IONS-SCNC: 8 MMOL/L (ref 5–18)
AST SERPL W P-5'-P-CCNC: 37 U/L (ref 0–40)
BASE EXCESS BLDV CALC-SCNC: 6.9 MMOL/L
BASOPHILS # BLD AUTO: 0 10E3/UL (ref 0–0.2)
BASOPHILS NFR BLD AUTO: 0 %
BILIRUB SERPL-MCNC: 0.3 MG/DL (ref 0–1)
BUN SERPL-MCNC: 19 MG/DL (ref 8–22)
CALCIUM SERPL-MCNC: 9.9 MG/DL (ref 8.5–10.5)
CHLORIDE BLD-SCNC: 102 MMOL/L (ref 98–107)
CO2 SERPL-SCNC: 30 MMOL/L (ref 22–31)
CREAT SERPL-MCNC: 0.81 MG/DL (ref 0.7–1.3)
EOSINOPHIL # BLD AUTO: 0.3 10E3/UL (ref 0–0.7)
EOSINOPHIL NFR BLD AUTO: 2 %
ERYTHROCYTE [DISTWIDTH] IN BLOOD BY AUTOMATED COUNT: 14.1 % (ref 10–15)
GFR SERPL CREATININE-BSD FRML MDRD: >90 ML/MIN/1.73M2
GLUCOSE BLD-MCNC: 156 MG/DL (ref 70–125)
GLUCOSE BLDC GLUCOMTR-MCNC: 138 MG/DL (ref 70–99)
HBA1C MFR BLD: 5.9 %
HCO3 BLDV-SCNC: 28 MMOL/L (ref 24–30)
HCT VFR BLD AUTO: 44.6 % (ref 40–53)
HGB BLD-MCNC: 14.3 G/DL (ref 13.3–17.7)
IMM GRANULOCYTES # BLD: 0.1 10E3/UL
IMM GRANULOCYTES NFR BLD: 1 %
LIPASE SERPL-CCNC: 13 U/L (ref 0–52)
LYMPHOCYTES # BLD AUTO: 1.9 10E3/UL (ref 0.8–5.3)
LYMPHOCYTES NFR BLD AUTO: 15 %
MCH RBC QN AUTO: 28.8 PG (ref 26.5–33)
MCHC RBC AUTO-ENTMCNC: 32.1 G/DL (ref 31.5–36.5)
MCV RBC AUTO: 90 FL (ref 78–100)
MONOCYTES # BLD AUTO: 0.5 10E3/UL (ref 0–1.3)
MONOCYTES NFR BLD AUTO: 4 %
NEUTROPHILS # BLD AUTO: 9.5 10E3/UL (ref 1.6–8.3)
NEUTROPHILS NFR BLD AUTO: 78 %
NRBC # BLD AUTO: 0 10E3/UL
NRBC BLD AUTO-RTO: 0 /100
OXYHGB MFR BLDV: 69.3 % (ref 70–75)
PCO2 BLDV: 72 MM HG (ref 35–50)
PH BLDV: 7.28 [PH] (ref 7.35–7.45)
PLATELET # BLD AUTO: 279 10E3/UL (ref 150–450)
PO2 BLDV: 39 MM HG (ref 25–47)
POTASSIUM BLD-SCNC: 4.7 MMOL/L (ref 3.5–5)
PROT SERPL-MCNC: 8.3 G/DL (ref 6–8)
RBC # BLD AUTO: 4.96 10E6/UL (ref 4.4–5.9)
SAO2 % BLDV: 70.4 % (ref 70–75)
SARS-COV-2 RNA RESP QL NAA+PROBE: NEGATIVE
SODIUM SERPL-SCNC: 140 MMOL/L (ref 136–145)
WBC # BLD AUTO: 12.1 10E3/UL (ref 4–11)

## 2021-09-10 PROCEDURE — 999N000065 XR ABDOMEN 1 VIEW

## 2021-09-10 PROCEDURE — 99219 PR INITIAL OBSERVATION CARE,LEVEL II: CPT | Performed by: HOSPITALIST

## 2021-09-10 PROCEDURE — 82805 BLOOD GASES W/O2 SATURATION: CPT | Performed by: HOSPITALIST

## 2021-09-10 PROCEDURE — 250N000011 HC RX IP 250 OP 636: Performed by: EMERGENCY MEDICINE

## 2021-09-10 PROCEDURE — 36415 COLL VENOUS BLD VENIPUNCTURE: CPT | Performed by: EMERGENCY MEDICINE

## 2021-09-10 PROCEDURE — 96376 TX/PRO/DX INJ SAME DRUG ADON: CPT

## 2021-09-10 PROCEDURE — 258N000003 HC RX IP 258 OP 636: Performed by: EMERGENCY MEDICINE

## 2021-09-10 PROCEDURE — 96361 HYDRATE IV INFUSION ADD-ON: CPT

## 2021-09-10 PROCEDURE — 74177 CT ABD & PELVIS W/CONTRAST: CPT

## 2021-09-10 PROCEDURE — 96374 THER/PROPH/DIAG INJ IV PUSH: CPT | Mod: XU

## 2021-09-10 PROCEDURE — G0378 HOSPITAL OBSERVATION PER HR: HCPCS

## 2021-09-10 PROCEDURE — 87635 SARS-COV-2 COVID-19 AMP PRB: CPT | Performed by: EMERGENCY MEDICINE

## 2021-09-10 PROCEDURE — C9803 HOPD COVID-19 SPEC COLLECT: HCPCS

## 2021-09-10 PROCEDURE — 250N000013 HC RX MED GY IP 250 OP 250 PS 637: Performed by: HOSPITALIST

## 2021-09-10 PROCEDURE — 258N000003 HC RX IP 258 OP 636: Performed by: HOSPITALIST

## 2021-09-10 PROCEDURE — 999N000065 XR ABDOMEN PORT 1 VIEWS

## 2021-09-10 PROCEDURE — 74250 X-RAY XM SM INT 1CNTRST STD: CPT

## 2021-09-10 PROCEDURE — 83036 HEMOGLOBIN GLYCOSYLATED A1C: CPT | Performed by: HOSPITALIST

## 2021-09-10 PROCEDURE — 99285 EMERGENCY DEPT VISIT HI MDM: CPT | Mod: 25

## 2021-09-10 PROCEDURE — 36415 COLL VENOUS BLD VENIPUNCTURE: CPT | Performed by: HOSPITALIST

## 2021-09-10 PROCEDURE — 85025 COMPLETE CBC W/AUTO DIFF WBC: CPT | Performed by: EMERGENCY MEDICINE

## 2021-09-10 PROCEDURE — 80053 COMPREHEN METABOLIC PANEL: CPT | Performed by: EMERGENCY MEDICINE

## 2021-09-10 PROCEDURE — 96375 TX/PRO/DX INJ NEW DRUG ADDON: CPT

## 2021-09-10 PROCEDURE — 83690 ASSAY OF LIPASE: CPT | Performed by: EMERGENCY MEDICINE

## 2021-09-10 PROCEDURE — 99204 OFFICE O/P NEW MOD 45 MIN: CPT | Performed by: SURGERY

## 2021-09-10 RX ORDER — MAGNESIUM HYDROXIDE/ALUMINUM HYDROXICE/SIMETHICONE 120; 1200; 1200 MG/30ML; MG/30ML; MG/30ML
30 SUSPENSION ORAL EVERY 4 HOURS PRN
Status: DISCONTINUED | OUTPATIENT
Start: 2021-09-10 | End: 2021-09-11 | Stop reason: HOSPADM

## 2021-09-10 RX ORDER — DEXTROSE MONOHYDRATE 25 G/50ML
25-50 INJECTION, SOLUTION INTRAVENOUS
Status: DISCONTINUED | OUTPATIENT
Start: 2021-09-10 | End: 2021-09-11 | Stop reason: HOSPADM

## 2021-09-10 RX ORDER — NICOTINE POLACRILEX 4 MG
15-30 LOZENGE BUCCAL
Status: DISCONTINUED | OUTPATIENT
Start: 2021-09-10 | End: 2021-09-11 | Stop reason: HOSPADM

## 2021-09-10 RX ORDER — LIDOCAINE 40 MG/G
CREAM TOPICAL
Status: DISCONTINUED | OUTPATIENT
Start: 2021-09-10 | End: 2021-09-11 | Stop reason: HOSPADM

## 2021-09-10 RX ORDER — MORPHINE SULFATE 4 MG/ML
4 INJECTION, SOLUTION INTRAMUSCULAR; INTRAVENOUS
Status: COMPLETED | OUTPATIENT
Start: 2021-09-10 | End: 2021-09-10

## 2021-09-10 RX ORDER — AMITRIPTYLINE HYDROCHLORIDE 10 MG/1
10 TABLET ORAL AT BEDTIME
Status: DISCONTINUED | OUTPATIENT
Start: 2021-09-10 | End: 2021-09-10

## 2021-09-10 RX ORDER — SODIUM CHLORIDE, SODIUM LACTATE, POTASSIUM CHLORIDE, CALCIUM CHLORIDE 600; 310; 30; 20 MG/100ML; MG/100ML; MG/100ML; MG/100ML
INJECTION, SOLUTION INTRAVENOUS CONTINUOUS
Status: DISCONTINUED | OUTPATIENT
Start: 2021-09-10 | End: 2021-09-11

## 2021-09-10 RX ORDER — ONDANSETRON 2 MG/ML
4 INJECTION INTRAMUSCULAR; INTRAVENOUS EVERY 6 HOURS PRN
Status: DISCONTINUED | OUTPATIENT
Start: 2021-09-10 | End: 2021-09-11 | Stop reason: HOSPADM

## 2021-09-10 RX ORDER — NALOXONE HYDROCHLORIDE 0.4 MG/ML
0.4 INJECTION, SOLUTION INTRAMUSCULAR; INTRAVENOUS; SUBCUTANEOUS
Status: DISCONTINUED | OUTPATIENT
Start: 2021-09-10 | End: 2021-09-11 | Stop reason: HOSPADM

## 2021-09-10 RX ORDER — MORPHINE SULFATE 4 MG/ML
4 INJECTION, SOLUTION INTRAMUSCULAR; INTRAVENOUS ONCE
Status: COMPLETED | OUTPATIENT
Start: 2021-09-10 | End: 2021-09-10

## 2021-09-10 RX ORDER — ACETAMINOPHEN 325 MG/1
650 TABLET ORAL EVERY 6 HOURS PRN
Status: DISCONTINUED | OUTPATIENT
Start: 2021-09-10 | End: 2021-09-11 | Stop reason: HOSPADM

## 2021-09-10 RX ORDER — AMITRIPTYLINE HYDROCHLORIDE 10 MG/1
10 TABLET ORAL AT BEDTIME
COMMUNITY
Start: 2021-08-23 | End: 2023-11-13

## 2021-09-10 RX ORDER — ONDANSETRON 4 MG/1
4 TABLET, ORALLY DISINTEGRATING ORAL EVERY 6 HOURS PRN
Status: DISCONTINUED | OUTPATIENT
Start: 2021-09-10 | End: 2021-09-11 | Stop reason: HOSPADM

## 2021-09-10 RX ORDER — ACETAMINOPHEN 650 MG/1
650 SUPPOSITORY RECTAL EVERY 6 HOURS PRN
Status: DISCONTINUED | OUTPATIENT
Start: 2021-09-10 | End: 2021-09-11 | Stop reason: HOSPADM

## 2021-09-10 RX ORDER — ONDANSETRON 2 MG/ML
4 INJECTION INTRAMUSCULAR; INTRAVENOUS EVERY 30 MIN PRN
Status: COMPLETED | OUTPATIENT
Start: 2021-09-10 | End: 2021-09-10

## 2021-09-10 RX ORDER — NALOXONE HYDROCHLORIDE 0.4 MG/ML
0.2 INJECTION, SOLUTION INTRAMUSCULAR; INTRAVENOUS; SUBCUTANEOUS
Status: DISCONTINUED | OUTPATIENT
Start: 2021-09-10 | End: 2021-09-11 | Stop reason: HOSPADM

## 2021-09-10 RX ORDER — IOPAMIDOL 755 MG/ML
100 INJECTION, SOLUTION INTRAVASCULAR ONCE
Status: COMPLETED | OUTPATIENT
Start: 2021-09-10 | End: 2021-09-10

## 2021-09-10 RX ADMIN — MORPHINE SULFATE 4 MG: 4 INJECTION INTRAVENOUS at 06:43

## 2021-09-10 RX ADMIN — MORPHINE SULFATE 4 MG: 4 INJECTION INTRAVENOUS at 05:42

## 2021-09-10 RX ADMIN — DIATRIZOATE MEGLUMINE AND DIATRIZOATE SODIUM 120 ML: 660; 100 SOLUTION ORAL; RECTAL at 20:09

## 2021-09-10 RX ADMIN — ONDANSETRON 4 MG: 2 INJECTION INTRAMUSCULAR; INTRAVENOUS at 14:59

## 2021-09-10 RX ADMIN — PROMETHAZINE HYDROCHLORIDE 25 MG: 25 INJECTION INTRAMUSCULAR; INTRAVENOUS at 13:18

## 2021-09-10 RX ADMIN — IOPAMIDOL 100 ML: 755 INJECTION, SOLUTION INTRAVENOUS at 06:31

## 2021-09-10 RX ADMIN — SODIUM CHLORIDE, POTASSIUM CHLORIDE, SODIUM LACTATE AND CALCIUM CHLORIDE: 600; 310; 30; 20 INJECTION, SOLUTION INTRAVENOUS at 16:41

## 2021-09-10 RX ADMIN — PHENOL 1 ML: 1.5 LIQUID ORAL at 18:36

## 2021-09-10 RX ADMIN — ONDANSETRON 4 MG: 2 INJECTION INTRAMUSCULAR; INTRAVENOUS at 11:41

## 2021-09-10 RX ADMIN — MORPHINE SULFATE 4 MG: 4 INJECTION INTRAVENOUS at 05:17

## 2021-09-10 RX ADMIN — ONDANSETRON 4 MG: 2 INJECTION INTRAMUSCULAR; INTRAVENOUS at 05:17

## 2021-09-10 RX ADMIN — SODIUM CHLORIDE 1000 ML: 9 INJECTION, SOLUTION INTRAVENOUS at 08:59

## 2021-09-10 RX ADMIN — MORPHINE SULFATE 4 MG: 4 INJECTION INTRAVENOUS at 09:27

## 2021-09-10 ASSESSMENT — ENCOUNTER SYMPTOMS
HEMATURIA: 0
EYE REDNESS: 0
NECK STIFFNESS: 0
BLOOD IN STOOL: 0
DIARRHEA: 0
COLOR CHANGE: 0
CONFUSION: 0
VOMITING: 1
ARTHRALGIAS: 0
DIFFICULTY URINATING: 0
ABDOMINAL PAIN: 1
NAUSEA: 1
HEADACHES: 0
CHEST TIGHTNESS: 0
BACK PAIN: 0
FEVER: 0
CONSTIPATION: 0
DYSURIA: 0
FREQUENCY: 0
SHORTNESS OF BREATH: 0
FLANK PAIN: 0

## 2021-09-10 ASSESSMENT — MIFFLIN-ST. JEOR
SCORE: 2817.32
SCORE: 2777.85

## 2021-09-10 NOTE — PROGRESS NOTES
Seen again around 5PM.  Much more awake and able to sustain conversation.  VBG with modest acute on mostly chronic respiratory acidosis.  Discussed with nursing just now and alert and interacting appropriately and getting up to go the restroom. Wouldn't use BiPAP with vomiting and clinically mental status is much improved.    NG to LIWS until SBFT.  Chloraseptic spray PRN  Referral to sleep medicine on discharge for evaluation for RODNEY/OHS.    Shiv Lees MD  Internal Medicine Hospitalist  9/10/2021

## 2021-09-10 NOTE — CONSULTS
HPI  36 year old year old male who I have been consulted by No ref. provider found for evaluation of Abdominal Pain  36-year-old morbidly obese male presents with several days of periumbilical discomfort. He has noticed a bulge above the umbilicus in the past but attributed this to his body habitus. Over the past several days he has had some intermittent upper abdominal discomfort with some nausea. No fevers or chills. No sick contacts or any other complaints.      Allergies:Patient has no known allergies.    Past Medical History:   Diagnosis Date     Cellulitis 05/2016    right index finger     Obesity      Pure hypercholesterolemia        Past Surgical History:   Procedure Laterality Date     APPENDECTOMY       IR CAROTID CEREBRAL ANGIOGRAM BILATERAL  4/15/2021     LUMBAR PUNCTURE FLUORO GUIDED DIAGNOSTIC  4/13/2021         CURRENT MEDS:    Current Facility-Administered Medications:      0.9% sodium chloride BOLUS, 1,000 mL, Intravenous, Once, Bro Benton MD, Last Rate: 250 mL/hr at 09/10/21 0859, 1,000 mL at 09/10/21 0859     morphine (PF) injection 4 mg, 4 mg, Intravenous, Once, Bro Benton MD, 4 mg at 09/10/21 0927     ondansetron (ZOFRAN) injection 4 mg, 4 mg, Intravenous, Q30 Min PRN, Paula Cheek MD, 4 mg at 09/10/21 0517    Current Outpatient Medications:      acetaminophen (TYLENOL) 325 MG tablet, Take 2 tablets (650 mg) by mouth every 6 hours as needed for mild pain, Disp: 90 tablet, Rfl: 0     amitriptyline (ELAVIL) 10 MG tablet, Take 10 mg by mouth At Bedtime, Disp: , Rfl:      aspirin-acetaminophen-caffeine (EXCEDRIN MIGRAINE) 250-250-65 MG tablet, Take 2 tablets by mouth every 6 hours as needed, Disp: , Rfl:     FAMHX-reviewed and noncontributory to the current admission         Review of Systems:  The 12 point review of systems  is within normal limits except for as mentioned above in the HPI.  General ROS: No complaints or constitutional symptoms  Ophthalmic ROS: No complaints of  visual changes  Skin: No complaints or symptoms   Endocrine: No complaints or symptoms  Hematologic/Lymphatic: No symptoms or complaints  Psychiatric: No symptoms or complaints  Respiratory ROS: no cough, shortness of breath, or wheezing  Cardiovascular ROS: no chest pain or dyspnea on exertion  Gastrointestinal ROS: As per HPI  Genito-Urinary ROS: no dysuria, trouble voiding, or hematuria  Musculoskeletal ROS: no joint or muscle pain  Neurological ROS: no TIA or stroke symptoms      EXAM:  BP (!) 151/81   Pulse 92   Temp 98.3  F (36.8  C) (Oral)   Resp 16   Ht 1.829 m (6')   Wt (!) 181 kg (399 lb)   SpO2 99%   BMI 54.11 kg/m    GENERAL: Well developed male, No acute distress, pleasant and conversant   EYES: Pupils equal, round and reactive, no scleral icterus  ABDOMEN: Morbidly obese with mild tenderness palpation in the upper umbilical region with visible incarcerated adipose tissue  SKIN: Pink, warm and dry, no obvious rashes or lesions   NEURO:No focal deficits, ambulatory  MUSCULOSKELETAL:No obvious deformities, no swelling, normal appearing      LABS:  Lab Results   Component Value Date    WBC 12.1 09/10/2021    WBC 8.5 04/14/2021    HGB 14.3 09/10/2021    HGB 13.5 04/14/2021    HCT 44.6 09/10/2021    HCT 42.8 04/14/2021    MCV 90 09/10/2021    MCV 90 04/14/2021     09/10/2021     04/14/2021     INR/Prothrombin Time  Recent Labs   Lab 09/10/21  0533      CO2 30   BUN 19     Lab Results   Component Value Date    ALT 40 09/10/2021    AST 37 09/10/2021    ALKPHOS 103 09/10/2021       IMAGES:   Relevant images were reviewed and discussed with the patient.  Notable findings were:   CT scan demonstrates 2 separate areas of ventral hernias. The superior 1 has a small amount of incarcerated adipose tissue the inferior 1 has a loop of small intestine    Assessment/Plan:   Ady Godinez is a 36 year old male with morbid obesity as well as 2 separate ventral hernias. The most superior  ventral hernia is the most uncomfortable. This is likely incarcerated adipose tissue that is causing him discomfort. The inferior umbilical defect is soft and nontender. I was able to push on this and heard intestinal contents swishing around indicating there is no evidence of any significant distention and/or strangulation. At this point I will order a small bowel follow-through which can be done either orally or through a nasogastric tube to see if we can alleviate the intestinal loop incarceration. Additionally, I will place an order for an inpatient bariatric consult as his BMI is over 54.  -Continue to follow      Asad Alvarez D.O. [facility]  [phone]

## 2021-09-10 NOTE — ED NOTES
Bed: JNED-11  Expected date: 9/10/21  Expected time: 4:28 AM  Means of arrival: Ambulance  Comments:  36 M ABD PAIN

## 2021-09-10 NOTE — ED NOTES
[facility] ED Handoff Report    ED Chief Complaint:Abd pain    ED Diagnosis:  (K42.9) Umbilical hernia without obstruction and without gangrene  Comment:  Plan:Manage nausea and pain       PMH:    Past Medical History:   Diagnosis Date     Cellulitis 05/2016    right index finger     Obesity      Pure hypercholesterolemia         Code Status:  Prior     Falls Risk: No Band: Not applicable    Current Living Situation/Residence: lives alone     Elimination Status: Continent: Yes     Activity Level: Independent    Patients Preferred Language:  English     Needed: No    Vital Signs:  /65   Pulse 84   Temp 98.3  F (36.8  C) (Oral)   Resp 15   Ht 1.829 m (6')   Wt (!) 181 kg (399 lb)   SpO2 94%   BMI 54.11 kg/m       Cardiac Rhythm: NSR    Pain Score: Currently 5    Is the Patient Confused:  No    Last Food or Drink: 09/10/21 at Unknown    Focused Assessment: Pt has abd pain and nausea. Surgeon attempted to manipulate hernia without success. Medicated for nausea. Pt desats when lying down or asleep. 3L via NC applied.    Tests Performed:Labs/Imaging  Treatments Provided: N/A    Family Dynamics/Concerns: No    Family Updated On Visitor Policy: Yes    Plan of Care Communicated to Family: Yes    Who Was Updated about Plan of Care: per pt    Belongings Checklist Done and Signed by Patient: No    Covid: asymptomatic , negative    Additional Information: Unable to obtain UA.    RN: Kelsie Madera     9/10/2021 12:34 PM

## 2021-09-10 NOTE — H&P
Admission History and Physical   Ady Godinez,  1985, MRN [id number]    [facility]    PCP: No Ref-Primary, Physician, None          Extended Emergency Contact Information  Primary Emergency Contact: sharmin godinez  Home Phone: [phone]  Relation: Father  Secondary Emergency Contact: LANI GODINEZ  Address: [address] [place], [place] [address] [place] [id number] [place]  Mobile Phone: [phone]  Relation: Significant other       Assessment and Plan     36M with morbid obesity a/w venral hernia and pSBO.    #pSBO due to ventral hernia - Appreciate surgery care.  SBFT. Supportive care.  #morbid obesity Body mass index is 54.11 kg/m . - bariatrics consulted       Addendum:  Emesis.  Continued lethargy on arrival to unit but able to be awakened and call his wife and answer questions appropriately but then quickly back to sleep.  +asterixis on exam.  -check VBG,  Continuous O2 sat monitoring, hold on further opiates for now  -has narcan PRN, hold off for the moment as easily awakened.    -NG and SBFT  -A1c, sliding scale insulin for hyperglycemia  3:44 PM    Checklist:  Code Status:   Full  Diet: NPO for Medical/Clinical Reasons Except for: No Exceptions    Duvall Catheter: Not present  Central Lines: None  DVT px:  Enoxaparin (Lovenox) SQ        Advanced Care Planning  I anticipate the patient will be admitted to the hospital for at least 2 midnights for the evaluation and treatment of the conditions discussed above.     Chief Complaint: Abdominal pain     HPI:    Ady Godinez is a 36 year old old male with morbid obesity presents with several days of abdominal bulge, mild discomfort and nausea without vomiting.  +BM today.  Last emesis yesterday.      In the ER:  CT A/P with ventral hernia with pSBO.  Surgery consulted and hernia could not be reduced.  Morphine 4mg IV x 4, zofran and phenergen thus far..    History is provided by patient       Physical  Exam:  Temp:  [97.8  F (36.6  C)-98.3  F (36.8  C)] 98.3  F (36.8  C)  Pulse:  [84-92] 91  Resp:  [12-22] 12  BP: (122-166)/(65-90) 123/68  SpO2:  [87 %-99 %] 97 %  /68   Pulse 91   Temp 98.3  F (36.8  C) (Oral)   Resp 12   Ht 1.829 m (6')   Wt (!) 181 kg (399 lb)   SpO2 97%   BMI 54.11 kg/m       General:  Lethargic after pain meds but easily awakened and answers questions appropriately, cooperative, no distress,  Appears stated age  Neurologic:  oriented, facial symmetry preserved, fluent speech. Moves all 4 spontaneously  Psych: calm, mood and affect appropriate to situation  HEENT:  Anicteric, MMM, unremarkable dentition  CV: RRR no MRG, normal S1 and S2, no edema  Lungs: CTAB.  Easyrespirations  Abd: soft, obese, umbilical hernia is soft, NT, normoactive BS  Skin: no rashes noted on exposed skin. Color and turgor normal  Central Lines and Tubes: None (no gandhi, CVC, feeding tubes)         Pertinent Test Findings  Radiology Results (results reviewed):   Results for orders placed or performed during the hospital encounter of 09/10/21   CT Abdomen Pelvis w Contrast    Impression    IMPRESSION:   1.  Small to moderate-sized periumbilical hernia containing a loop of ileum appears to be causing at least partial small bowel obstruction.  2.  Additional small supraumbilical abdominal wall hernia containing only fat.  3.  Hepatic steatosis.         Medical History  Past Medical History:   Diagnosis Date     Cellulitis 05/2016    right index finger     Obesity      Pure hypercholesterolemia         Surgical History  Past Surgical History:   Procedure Laterality Date     APPENDECTOMY       IR CAROTID CEREBRAL ANGIOGRAM BILATERAL  4/15/2021     LUMBAR PUNCTURE FLUORO GUIDED DIAGNOSTIC  4/13/2021          Social History  Social History     Tobacco Use     Smoking status: None   Substance Use Topics     Alcohol use: None     Drug use: None          Allergies  No Known Allergies Family History  Family  History    Medical History Relation Name Comments   Good Health Brother   Younger half-brother   Unknown Father   No contact   Unknown Mother   Multiple, but exact ones not known   Good Health Sister   Older half-sister     No CAD          Prior to Admission Medications   Prior to Admission Medications   Prescriptions Last Dose Informant Patient Reported? Taking?   acetaminophen (TYLENOL) 325 MG tablet Past Month at Unknown time  No Yes   Sig: Take 2 tablets (650 mg) by mouth every 6 hours as needed for mild pain   amitriptyline (ELAVIL) 10 MG tablet 9/9/2021 at Unknown time  Yes Yes   Sig: Take 10 mg by mouth At Bedtime   aspirin-acetaminophen-caffeine (EXCEDRIN MIGRAINE) 250-250-65 MG tablet Past Month at Unknown time  Yes Yes   Sig: Take 2 tablets by mouth every 6 hours as needed      Facility-Administered Medications: None          Review of Systems:    10point review of systems negative except as listed in HPI      Pertinent Labs  Lab Results: personally reviewed.     Most Recent 3 CBC's:  Recent Labs   Lab Test 09/10/21  0533 04/14/21  0735 04/13/21  0243   WBC 12.1* 8.5 12.2*   HGB 14.3 13.5 14.0   MCV 90 90 89    240 253     Most Recent 3 BMP's:  Recent Labs   Lab Test 09/10/21  0533 04/14/21  0735 04/13/21  0242    141 141   POTASSIUM 4.7 3.9 3.7   CHLORIDE 102 107 103   CO2 30 30 28   BUN 19 16 20   CR 0.81 0.86 0.81   ANIONGAP 8 4 10   JEFRY 9.9 8.6 9.4   * 107* 119     Most Recent 2 LFT's:  Recent Labs   Lab Test 09/10/21  0533 07/16/18  1020   AST 37 21   ALT 40 43   ALKPHOS 103 134*   BILITOTAL 0.3 0.2     Most Recent 3 INR's:  Recent Labs   Lab Test 04/14/21  0735 07/07/18  0034   INR 0.98 1.00     Most Recent 3 Troponin's:No lab results found.    Shiv Lees MD  Internal Medicine Hospitalist  9/10/2021  10:47 AM

## 2021-09-10 NOTE — ED PROVIDER NOTES
ED SIGNOUT  Date/Time:9/10/2021 6:31 AM    Patient signed out to me by my colleague, Dr. Cheek.  Please see their note for complete history and physical. Plan to follow up on CT and disposition.     The creation of this record is based on the scribe s observations of the work being performed by Dr. Benton and the provider s statements to them. It was created on their behalf by Patti varela trained medical scribe. This document has been checked and approved by the attending provider.      REMAINING ED WORKUP:    Vitals:  BP (!) 140/72   Pulse 84   Temp 97.8  F (36.6  C) (Oral)   Resp 13   Ht 1.829 m (6')   Wt (!) 181 kg (399 lb)   SpO2 97%   BMI 54.11 kg/m        Pertinent labs results reviewed   Results for orders placed or performed during the hospital encounter of 09/10/21   CT Abdomen Pelvis w Contrast    Impression    IMPRESSION:   1.  Small to moderate-sized periumbilical hernia containing a loop of ileum appears to be causing at least partial small bowel obstruction.  2.  Additional small supraumbilical abdominal wall hernia containing only fat.  3.  Hepatic steatosis.   Comprehensive metabolic panel   Result Value Ref Range    Sodium 140 136 - 145 mmol/L    Potassium 4.7 3.5 - 5.0 mmol/L    Chloride 102 98 - 107 mmol/L    Carbon Dioxide (CO2) 30 22 - 31 mmol/L    Anion Gap 8 5 - 18 mmol/L    Urea Nitrogen 19 8 - 22 mg/dL    Creatinine 0.81 0.70 - 1.30 mg/dL    Calcium 9.9 8.5 - 10.5 mg/dL    Glucose 156 (H) 70 - 125 mg/dL    Alkaline Phosphatase 103 45 - 120 U/L    AST 37 0 - 40 U/L    ALT 40 0 - 45 U/L    Protein Total 8.3 (H) 6.0 - 8.0 g/dL    Albumin 3.6 3.5 - 5.0 g/dL    Bilirubin Total 0.3 0.0 - 1.0 mg/dL    GFR Estimate >90 >60 mL/min/1.73m2   Result Value Ref Range    Lipase 13 0 - 52 U/L   CBC with platelets and differential   Result Value Ref Range    WBC Count 12.1 (H) 4.0 - 11.0 10e3/uL    RBC Count 4.96 4.40 - 5.90 10e6/uL    Hemoglobin 14.3 13.3 - 17.7 g/dL    Hematocrit 44.6 40.0 -  53.0 %    MCV 90 78 - 100 fL    MCH 28.8 26.5 - 33.0 pg    MCHC 32.1 31.5 - 36.5 g/dL    RDW 14.1 10.0 - 15.0 %    Platelet Count 279 150 - 450 10e3/uL    % Neutrophils 78 %    % Lymphocytes 15 %    % Monocytes 4 %    % Eosinophils 2 %    % Basophils 0 %    % Immature Granulocytes 1 %    NRBCs per 100 WBC 0 <1 /100    Absolute Neutrophils 9.5 (H) 1.6 - 8.3 10e3/uL    Absolute Lymphocytes 1.9 0.8 - 5.3 10e3/uL    Absolute Monocytes 0.5 0.0 - 1.3 10e3/uL    Absolute Eosinophils 0.3 0.0 - 0.7 10e3/uL    Absolute Basophils 0.0 0.0 - 0.2 10e3/uL    Absolute Immature Granulocytes 0.1 (H) <=0.0 10e3/uL    Absolute NRBCs 0.0 10e3/uL       Pertinent imaging reviewed   Please see official radiology report.  CT Abdomen Pelvis w Contrast   Final Result   IMPRESSION:    1.  Small to moderate-sized periumbilical hernia containing a loop of ileum appears to be causing at least partial small bowel obstruction.   2.  Additional small supraumbilical abdominal wall hernia containing only fat.   3.  Hepatic steatosis.           Interventions  Medications   ondansetron (ZOFRAN) injection 4 mg (4 mg Intravenous Given 9/10/21 0517)   morphine (PF) injection 4 mg (4 mg Intravenous Given 9/10/21 0643)   iopamidol (ISOVUE-370) solution 100 mL (100 mLs Intravenous Given 9/10/21 0631)        ED Course/MDM:  6:31 AM Signout accepted from Dr. Cheek.  Prior records were reviewed.  Diagnostics from this visit are reviewed.  7:37 AM I introduced myself to the patient an updated him on CT results.  Unable to palpate the hernia secondary to patient's body habitus.  The need for hospitalization likely surgery explained to the patient.  7:41 AM I paged general surgery and admitting physician.   7:47 AM I discussed case with Dr. Alvarez, general surgery. He will attempt to reduce hernia here in the department.  7:56 AM Discussed possible admission with Dr. Lees, hospitalist.   8:48 AM. Patient had been seen by surgery. Unable to successfully reduce.  Plan will be for admission with Gastrografin swallow. No imminent plans for surgery but still a possibility.  12:55 PM. Patient with recurrent nausea vomiting. Patient is already received Zofran. We will proceed with Phenergan for additional symptomatic relief.  Periumbilical hernia  Partial small bowel obstruction  Super morbid obesity      Bro Benton MD  [facility] Emergency Department          Bro Benton MD  09/10/21 0849       Bro Benton MD  09/10/21 1254

## 2021-09-10 NOTE — PROGRESS NOTES
Pt arrived to unit at 1450.  Vomiting upon arrival.  Dark orange in color.  Zofran just given. Dr Lees notified as SBFT ordered and pt will not be able to drink gastrografin.  O2 placed at 3L since pt sleeping. Report given to oncoming RN.

## 2021-09-10 NOTE — CONSULTS
Care Management Initial Consult    General Information  Assessment completed with: Patient,         Primary Care Provider verified and updated as needed: Yes   Readmission within the last 30 days:        Reason for Consult: discharge planning  Advance Care Planning:            Communication Assessment  Patient's communication style: spoken language (English or Bilingual)    Hearing Difficulty or Deaf: no   Wear Glasses or Blind: no    Cognitive  Cognitive/Neuro/Behavioral: WDL                      Living Environment:   People in home: alone     Current living Arrangements:        Able to return to prior arrangements: yes       Family/Social Support:  Care provided by: self  Provides care for:                  Description of Support System:           Current Resources:   Patient receiving home care services: No     Community Resources: None  Equipment currently used at home: none  Supplies currently used at home:      Employment/Financial:  Employment Status:          Financial Concerns:             Lifestyle & Psychosocial Needs:  Social Determinants of Health     Tobacco Use:      Smoking Tobacco Use:      Smokeless Tobacco Use:    Alcohol Use:      Frequency of Alcohol Consumption:      Average Number of Drinks:      Frequency of Binge Drinking:    Financial Resource Strain:      Difficulty of Paying Living Expenses:    Food Insecurity:      Worried About Running Out of Food in the Last Year:      Ran Out of Food in the Last Year:    Transportation Needs:      Lack of Transportation (Medical):      Lack of Transportation (Non-Medical):    Physical Activity:      Days of Exercise per Week:      Minutes of Exercise per Session:    Stress:      Feeling of Stress :    Social Connections:      Frequency of Communication with Friends and Family:      Frequency of Social Gatherings with Friends and Family:      Attends [employer] Services:      Active Member of Clubs or Organizations:      Attends Club or Organization  Meetings:      Marital Status:    Intimate Partner Violence:      Fear of Current or Ex-Partner:      Emotionally Abused:      Physically Abused:      Sexually Abused:    Depression:      PHQ-2 Score:    Housing Stability:      Unable to Pay for Housing in the Last Year:      Number of Places Lived in the Last Year:      Unstable Housing in the Last Year:        Functional Status:  Prior to admission patient needed assistance:              Mental Health Status:          Chemical Dependency Status:                Values/Beliefs:  Spiritual, Cultural Beliefs, [employer] Practices, Values that affect care:                 Additional Information:     Pt lives alone. He is independent with all ADL's, has no services and no DME used.     Anticipate pt will DC back home without needs depending on progression of care.     Family will transport upon DC. Informed that CM will follow progression of care.   Yamini Márquez RN, CM, ARIANNA

## 2021-09-10 NOTE — ED PROVIDER NOTES
EMERGENCY DEPARTMENT ENCOUNTER     NAME: Ady Godinez   AGE: 36 year old male   YOB: 1985   MRN: [id number]   EVALUATION DATE & TIME: 9/10/2021  4:35 AM   PCP: No Ref-Primary, Physician     Chief Complaint   Patient presents with     Abdominal Pain   :    FINAL IMPRESSION       No diagnosis found.   Abdominal pain      ED COURSE & MEDICAL DECISION MAKING      4:38AM I met with the patient for the initial interview and physical examination. Discussed plan for treatment and workup in the ED. PPE: Provider wore gloves, goggles, N95 mask, and surgical mask.    Pertinent Labs & Imaging studies reviewed. (See chart for details)   36 year old male  presents to the Emergency Department for evaluation of 1 evening of nausea, vomiting, and lower abdominal pain. Initial Vitals Reviewed. Initial exam notable for morbidly obese male who has hypoactive bowel sounds and no focal reproducible tenderness, though exam is limited secondary to body habitus.  He denies urinary symptoms or flank pain, and he is status post appendectomy so that is not of concern.  I did consider something like gastroenteritis, nephrolithiasis presenting with anterior abdominal pain and vomiting, bowel obstruction, constipation, colitis.  He is otherwise well-appearing with generally normal vitals.  Labs were obtained including CBC, history, LFTs and lipase and other than a mild leukocytosis of 12, which appears stable from when he has had labs checked in the past, are unremarkable.  CT of the abdomen pelvis obtained and is pending at time of signout.  Final disposition per oncoming physician Dr. Benton.         At the conclusion of the encounter I discussed the results of all of the tests and the disposition. The questions were answered. The patient or family acknowledged understanding and was agreeable with the care plan.         MEDICATIONS GIVEN IN THE EMERGENCY:   Medications   morphine (PF) injection 4 mg (has no administration  in time range)   ondansetron (ZOFRAN) injection 4 mg (has no administration in time range)      NEW PRESCRIPTIONS STARTED AT TODAY'S ER VISIT   New Prescriptions    No medications on file     ================================================================   HISTORY OF PRESENT ILLNESS       Patient information was obtained from: patient   Use of Intrepreter: N/A    Ady Godinez is a 36 year old male with history of s/p appendectomy and obesity who presents to this ED via EMS for evaluation of abdominal pain.     Patient reports severe lower abdominal pain onset last night after having an episode of vomiting. Pain is currently rated 10/10 and feels similar to when he had appendicitis as a child, but has had an appendectomy. Otherwise denies back pain, flank pain, dysuria, frequency, hematuria, difficulty urinating, stool changes, or any other complaints at this time.      ================================================================    REVIEW OF SYSTEMS     Review of Systems   Constitutional: Negative for fever.   HENT: Negative for congestion.    Eyes: Negative for redness.   Respiratory: Negative for chest tightness and shortness of breath.    Cardiovascular: Negative for chest pain.   Gastrointestinal: Positive for abdominal pain (diffuse lower), nausea and vomiting. Negative for blood in stool, constipation and diarrhea.   Genitourinary: Negative for difficulty urinating, dysuria, flank pain, frequency and hematuria.   Musculoskeletal: Negative for arthralgias, back pain and neck stiffness.   Skin: Negative for color change.   Neurological: Negative for headaches.   Psychiatric/Behavioral: Negative for confusion.   All other systems reviewed and are negative.        PAST HISTORY     PAST MEDICAL HISTORY:   Past Medical History:   Diagnosis Date     Cellulitis 05/2016    right index finger     Obesity      Pure hypercholesterolemia       PAST SURGICAL HISTORY:   Past Surgical History:   Procedure  Laterality Date     APPENDECTOMY       IR CAROTID CEREBRAL ANGIOGRAM BILATERAL  4/15/2021     LUMBAR PUNCTURE FLUORO GUIDED DIAGNOSTIC  4/13/2021      CURRENT MEDICATIONS:   acetaminophen (TYLENOL) 325 MG tablet  indomethacin (INDOCIN) 25 MG capsule      ALLERGIES:   No Known Allergies   FAMILY HISTORY:   History reviewed. No pertinent family history.   SOCIAL HISTORY:   Social History     Socioeconomic History     Marital status:      Spouse name: Not on file     Number of children: Not on file     Years of education: Not on file     Highest education level: Not on file   Occupational History     Not on file   Tobacco Use     Smoking status: Not on file   Substance and Sexual Activity     Alcohol use: Not on file     Drug use: Not on file     Sexual activity: Not on file   Other Topics Concern     Not on file   Social History Narrative     Not on file     Social Determinants of Health     Financial Resource Strain:      Difficulty of Paying Living Expenses:    Food Insecurity:      Worried About Running Out of Food in the Last Year:      Ran Out of Food in the Last Year:    Transportation Needs:      Lack of Transportation (Medical):      Lack of Transportation (Non-Medical):    Physical Activity:      Days of Exercise per Week:      Minutes of Exercise per Session:    Stress:      Feeling of Stress :    Social Connections:      Frequency of Communication with Friends and Family:      Frequency of Social Gatherings with Friends and Family:      Attends [employer] Services:      Active Member of Clubs or Organizations:      Attends Club or Organization Meetings:      Marital Status:    Intimate Partner Violence:      Fear of Current or Ex-Partner:      Emotionally Abused:      Physically Abused:      Sexually Abused:         VITALS  Patient Vitals for the past 24 hrs:   BP Temp Temp src Pulse Resp SpO2 Height Weight   09/10/21 0444 (!) 151/90 97.8  F (36.6  C) Oral 90 16 97 % 1.829 m (6') (!) 181 kg (399 lb)         ================================================================    PHYSICAL EXAM     VITAL SIGNS: BP (!) 151/90   Pulse 90   Temp 97.8  F (36.6  C) (Oral)   Resp 16   Ht 1.829 m (6')   Wt (!) 181 kg (399 lb)   SpO2 97%   BMI 54.11 kg/m     Constitutional:  Awake, no acute distress   HENT:  Atraumatic, oropharynx without exudate or erythema, membranes moist  Lymph:  No adenopathy  Eyes: EOM intact, PERRL, no injection  Neck: Supple  Respiratory:  Clear to auscultation bilaterally, no wheezes or crackles   Cardiovascular:  Regular rate and rhythm, single S1 and S2   GI: Morbidly obese. Soft, nondistended, no reproducible tenderness, no rebound or guarding. Hypoactive bowel sounds.   Musculoskeletal:  Moves all extremities, no lower extremity edema, no deformities    Skin:  Warm, dry  Neurologic:  Alert and oriented x3, no focal deficits noted       ================================================================  LAB       All pertinent labs reviewed and interpreted.   Labs Ordered and Resulted from Time of ED Arrival Up to the Time of Departure from the ED   COMPREHENSIVE METABOLIC PANEL - Abnormal; Notable for the following components:       Result Value    Glucose 156 (*)     Protein Total 8.3 (*)     All other components within normal limits   CBC WITH PLATELETS AND DIFFERENTIAL - Abnormal; Notable for the following components:    WBC Count 12.1 (*)     Absolute Neutrophils 9.5 (*)     Absolute Immature Granulocytes 0.1 (*)     All other components within normal limits   LIPASE - Normal   CBC WITH PLATELETS & DIFFERENTIAL    Narrative:     The following orders were created for panel order CBC with platelets differential.  Procedure                               Abnormality         Status                     ---------                               -----------         ------                     CBC with platelets and d...[[id number]]  Abnormal            Final result                 Please view results  for these tests on the individual orders.   ROUTINE UA WITH MICROSCOPIC REFLEX TO CULTURE   PERIPHERAL IV CATHETER        ===============================================================  RADIOLOGY       Reviewed all pertinent imaging. Please see official radiology report.   CT Abdomen Pelvis w Contrast    (Results Pending)         ================================================================  EKG         I have independently reviewed and interpreted the EKG(s) documented above.     ================================================================  PROCEDURES         I, Caty Perales, am serving as a scribe to document services personally performed by Dr. Cheek based on my observation and the provider's statements to me. I, Paula Cheek MD attest that Caty Perales is acting in a scribe capacity, has observed my performance of the services and has documented them in accordance with my direction.   Paula Cheek M.D.   Emergency Medicine   [facility] EMERGENCY DEPARTMENT  [facility] [address]  [phone]  Dept: [phone]      Paula Cheek MD  09/10/21 0631

## 2021-09-10 NOTE — PHARMACY-ADMISSION MEDICATION HISTORY
Pharmacy Note - Admission Medication History    Pertinent Provider Information:     ______________________________________________________________________    Prior to Admission Medications   Prescriptions Last Dose Informant Patient Reported? Taking?   acetaminophen (TYLENOL) 325 MG tablet Past Month at Unknown time  No Yes   Sig: Take 2 tablets (650 mg) by mouth every 6 hours as needed for mild pain   amitriptyline (ELAVIL) 10 MG tablet 9/9/2021 at Unknown time  Yes Yes   Sig: Take 10 mg by mouth At Bedtime   aspirin-acetaminophen-caffeine (EXCEDRIN MIGRAINE) 250-250-65 MG tablet Past Month at Unknown time  Yes Yes   Sig: Take 2 tablets by mouth every 6 hours as needed      Facility-Administered Medications: None     Information source(s): Patient and CareEverywhere[facility]  Method of interview communication: in-person    Summary of Changes to PTA Med List  New:   Discontinued:   Changed:     Patient was asked about OTC/herbal products specifically.  PTA med list reflects this.    In the past week, patient estimated taking medication this percent of the time:  greater than 90%.    Allergies were reviewed, assessed, and updated with the patient.      Patient does not use any multi-dose medications prior to admission.    The information provided in this note is only as accurate as the sources available at the time of the update(s).    Thank you for the opportunity to participate in the care of this patient.    Vannesa Leal PharmD.  9/10/2021 9:07 AM

## 2021-09-10 NOTE — ED TRIAGE NOTES
Pt brought in by [facility] ambulance c/o lower abd pain 10/10. Pt vomited x1. Hx of appendectomy at a young age, unknown age.

## 2021-09-10 NOTE — CONSULTS
"Inpatient Bariatric Surgery Consult:    SUBJ: Ady Godinez is a 36 year old male who we are consulted to see for morbid obesity.  Patient is being admitted to the hospital with partial bowel obstruction likely secondary to an incarcerated ventral hernia.  Ventral hernia does not look strangulated and does not require emergent surgery at this time, but given patient's morbid obesity (BMI 54) it is prudent to discuss medical/surgical weight management with the patient.      Patient reports that he has struggled with weight his entire life.  He has considered weight loss surgery in the past but has never formally explored his options.  He reports that recently he was weighed over 400 pounds and this was a shock to him.  Patient reports that he began thinking seriously about getting help with weight loss at that time.  He does not have hypertension, diabetes, or hyperlipidemia.. He did have an elevated cholesterol in 2011 but this has not been checked in recent years and he is unsure if it is still elevated.  Patient reports that he has \"bad knees\" and regular back pain.  He is very motivated to seek help with his weight and is very interested in surgical options.    OBJ: Vital signs stable.  Current weight 181 kg with BMI 54.1.  Physical exam deferred at this time    A/P: 36-year-old male with super morbid obesity and a past medical history significant for hypercholesterolemia and recurrent knee pain.  Patient seems to be motivated to lose weight but acknowledges he needs help with this.  We will place a formal consult to the comprehensive weight management program in [place].  He can follow-up with us after discharge from the hospital.    This was an informational only visit and will not be charged as a consult.    Velma Melendez, CNP  [phone]  [facility] General and Bariatric Surgery   "

## 2021-09-11 VITALS
OXYGEN SATURATION: 96 % | RESPIRATION RATE: 16 BRPM | BODY MASS INDEX: 42.66 KG/M2 | HEART RATE: 84 BPM | SYSTOLIC BLOOD PRESSURE: 120 MMHG | DIASTOLIC BLOOD PRESSURE: 66 MMHG | TEMPERATURE: 98.1 F | WEIGHT: 315 LBS | HEIGHT: 72 IN

## 2021-09-11 LAB
ANION GAP SERPL CALCULATED.3IONS-SCNC: 9 MMOL/L (ref 5–18)
BUN SERPL-MCNC: 19 MG/DL (ref 8–22)
CALCIUM SERPL-MCNC: 9.4 MG/DL (ref 8.5–10.5)
CHLORIDE BLD-SCNC: 103 MMOL/L (ref 98–107)
CO2 SERPL-SCNC: 30 MMOL/L (ref 22–31)
CREAT SERPL-MCNC: 0.8 MG/DL (ref 0.7–1.3)
ERYTHROCYTE [DISTWIDTH] IN BLOOD BY AUTOMATED COUNT: 14.4 % (ref 10–15)
GFR SERPL CREATININE-BSD FRML MDRD: >90 ML/MIN/1.73M2
GLUCOSE BLD-MCNC: 113 MG/DL (ref 70–125)
GLUCOSE BLDC GLUCOMTR-MCNC: 94 MG/DL (ref 70–99)
GLUCOSE BLDC GLUCOMTR-MCNC: 96 MG/DL (ref 70–99)
HCT VFR BLD AUTO: 43 % (ref 40–53)
HGB BLD-MCNC: 13.5 G/DL (ref 13.3–17.7)
LACTATE SERPL-SCNC: 1.3 MMOL/L (ref 0.7–2)
LACTATE SERPL-SCNC: 2.6 MMOL/L (ref 0.7–2)
MCH RBC QN AUTO: 28.7 PG (ref 26.5–33)
MCHC RBC AUTO-ENTMCNC: 31.4 G/DL (ref 31.5–36.5)
MCV RBC AUTO: 91 FL (ref 78–100)
PLATELET # BLD AUTO: 267 10E3/UL (ref 150–450)
POTASSIUM BLD-SCNC: 4.6 MMOL/L (ref 3.5–5)
RBC # BLD AUTO: 4.71 10E6/UL (ref 4.4–5.9)
SODIUM SERPL-SCNC: 142 MMOL/L (ref 136–145)
WBC # BLD AUTO: 10.8 10E3/UL (ref 4–11)

## 2021-09-11 PROCEDURE — 99217 PR OBSERVATION CARE DISCHARGE: CPT | Performed by: HOSPITALIST

## 2021-09-11 PROCEDURE — 83605 ASSAY OF LACTIC ACID: CPT | Performed by: HOSPITALIST

## 2021-09-11 PROCEDURE — 85018 HEMOGLOBIN: CPT | Performed by: HOSPITALIST

## 2021-09-11 PROCEDURE — 36415 COLL VENOUS BLD VENIPUNCTURE: CPT | Performed by: HOSPITALIST

## 2021-09-11 PROCEDURE — 80048 BASIC METABOLIC PNL TOTAL CA: CPT | Performed by: HOSPITALIST

## 2021-09-11 PROCEDURE — 258N000003 HC RX IP 258 OP 636: Performed by: INTERNAL MEDICINE

## 2021-09-11 PROCEDURE — G0378 HOSPITAL OBSERVATION PER HR: HCPCS

## 2021-09-11 PROCEDURE — 99213 OFFICE O/P EST LOW 20 MIN: CPT | Performed by: SURGERY

## 2021-09-11 RX ADMIN — SODIUM CHLORIDE 250 ML: 9 INJECTION, SOLUTION INTRAVENOUS at 01:31

## 2021-09-11 NOTE — DISCHARGE SUMMARY
[facility] MEDICINE  DISCHARGE SUMMARY     Primary Care Physician: No Ref-Primary, Physician  Admission Date: 9/10/2021   Discharge Provider: Shiv Lees MD Discharge Date: 9/11/2021   Diet:   Active Diet and Nourishment Order   Procedures     Diet     Low Fiber Diet       Code Status: Full Code   Activity: DCACTIVITY: Activity as tolerated        Condition at Discharge: Satisfactory     REASON FOR PRESENTATION(See Admission Note for Details)     Abdominal pain and vomiting    PRINCIPAL & ACTIVE DISCHARGE DIAGNOSES     Principal Problem:    Umbilical hernia without obstruction and without gangrene  Active Problems:    Morbid obesity (H)      PENDING LABS     Unresulted Labs Ordered in the Past 30 Days of this Admission     No orders found for last 31 day(s).            PROCEDURES ( this hospitalization only)          RECOMMENDATIONS TO OUTPATIENT PROVIDER FOR F/U VISIT     Follow-up Appointments     Follow-up and recommended labs and tests       Follow up with primary care provider, Physician No Ref-Primary, within 7   days for hospital follow- up.  The following labs/tests are recommended:   Sleep study, referral to sleep medicine sent.             DISPOSITION     Home    SUMMARY OF HOSPITAL COURSE:      36M with morbid obesity a/w venral hernia and pSBO.  SBFT with pSBO with normal bowel function and tolerating PO.  Evaluated by surgery and no surgery at this time.  Optimally would have significant weight loss first.  Referral to bariatrics sent.   Also noted to have acute on chronic hypercarbic respiratory failure after opiates.  Mental status improved back to baseline with time.  Referral sent to sleep medicine for evaluation of RODNEY.     #pSBO due to ventral hernia - no surgery at this time.  #morbid obesity Body mass index is 54.11 kg/m  - bariatrics consult  #insulin resistance, pre DM2  #chronic hypercarbic respiratory failure - likely due to obesity hypoventilation  syndrome.   #suspected RODNEY - referral to sleep medicine.      Discharge Medications with Med changes:     Current Discharge Medication List      CONTINUE these medications which have NOT CHANGED    Details   acetaminophen (TYLENOL) 325 MG tablet Take 2 tablets (650 mg) by mouth every 6 hours as needed for mild pain  Qty: 90 tablet, Refills: 0    Associated Diagnoses: Acute intractable headache, unspecified headache type      amitriptyline (ELAVIL) 10 MG tablet Take 10 mg by mouth At Bedtime      aspirin-acetaminophen-caffeine (EXCEDRIN MIGRAINE) 250-250-65 MG tablet Take 2 tablets by mouth every 6 hours as needed                   Rationale for medication changes:            Consults       BARIATRIC SURGERY IP CONSULT  SOCIAL WORK IP CONSULT  CARE MANAGEMENT / SOCIAL WORK IP CONSULT    Immunizations given this encounter     Most Recent Immunizations   Administered Date(s) Administered     COVID-19,PF,Pfizer 05/23/2021     TD (ADULT, 7+) 10/22/2009     Tdap (Adacel,Boostrix) 06/26/2018           Anticoagulation Information        SIGNIFICANT IMAGING FINDINGS     Results for orders placed or performed during the hospital encounter of 09/10/21   CT Abdomen Pelvis w Contrast    Impression    IMPRESSION:   1.  Small to moderate-sized periumbilical hernia containing a loop of ileum appears to be causing at least partial small bowel obstruction.  2.  Additional small supraumbilical abdominal wall hernia containing only fat.  3.  Hepatic steatosis.   XR Gastrografin Sm Bowel Follow Through    Impression    IMPRESSION:  1.  Partial small bowel obstruction contrast reaching the colon within 4 hours.   XR Abdomen Port 1 View    Impression    IMPRESSION: Likely a loop portion of the NG tube that is outside the patient is visualized in the epigastric region. There is slight amount of tubing projecting over the proximal stomach which may be the tip of the tube, if so the distal side-port is   still within the  esophagus.    Recommend advancing the tube approximately 10 cm and repeating this exam with all extrinsic tubing removed from the region of interest.   XR Abdomen 1 View    Impression    IMPRESSION:     The nasogastric tube was slightly advanced however the side port projects near the gastroesophageal junction and is not yet within the stomach.    Artifacts are present. No dilated bowel loops in the imaged upper abdomen.       SIGNIFICANT LABORATORY FINDINGS       Discharge Orders        Comprehensive Weight Management      SLEEP EVALUATION & MANAGEMENT REFERRAL - ADULT -      Reason for your hospital stay    You were admitted with a partial small obstruction from an umbilical hernia.  This does not require surgery at this time.  It is advised that you follow-up with the bariatric team for weight loss, and with sleep medicine for evaluation for sleep apnea.     Follow-up and recommended labs and tests     Follow up with primary care provider, Physician No Ref-Primary, within 7 days for hospital follow- up.  The following labs/tests are recommended: Sleep study, referral to sleep medicine sent.     Activity    Your activity upon discharge: activity as tolerated     Diet    Follow this diet upon discharge: Orders Placed This Encounter      Low fiber diet       Examination   Physical Exam   Temp:  [97.8  F (36.6  C)-98.9  F (37.2  C)] 98.7  F (37.1  C)  Pulse:  [] 89  Resp:  [14-20] 20  BP: (119-154)/(65-76) 147/75  SpO2:  [89 %-99 %] 94 %  Wt Readings from Last 1 Encounters:   09/10/21 (!) 184.9 kg (407 lb 11.2 oz)       Feeling much better.  Mental status back at baseline.  Tolerating PO.    Agrees he likely has RODNEY and would like sleep medicine evaluation.      Please see EMR for more detailed significant labs, imaging, consultant notes etc.    Shiv Lees MD  [facility]    CC:No Ref-Primary, Physician

## 2021-09-11 NOTE — PLAN OF CARE
Problem: Adult Inpatient Plan of Care  Goal: Patient-Specific Goal (Individualized)  Outcome: Improving   Pt wanted his NG out, and after his x-ray showing the contents made it thru his colon, he insisted on having it pulled.  His abdomen is soft with positive bowel sound.  He had a b.m. before going to x-ray, but also had about a 150cc emesis of green bile.  He has denied any more nausea.

## 2021-09-11 NOTE — PLAN OF CARE
PRIMARY DIAGNOSIS: Umbilical Hernia  OUTPATIENT/OBSERVATION GOALS TO BE MET BEFORE DISCHARGE:  ADLs back to baseline: No    Activity and level of assistance: Up with standby assistance.    Pain status: Improved with use of alternative comfort measures i.e.:  essential oil; distraction    Return to near baseline physical activity: No     Discharge Planner Nurse   Safe discharge environment identified: Yes  Barriers to discharge: Yes       Entered by: Carol Ernst 09/10/2021 11:53 PM   Patient complained pain and throat discomfort d/t NG tube placement. Patient NPO, essential oil and distraction provided. Plan of care and reason for NG placement (for gastrografin and decompression) explained to patient's father;  pt's father verbalized understanding. Patient sent to XR x 3. Sepsis protocol fired. Awaiting sepsis test result.  Please review provider order for any additional goals.   Nurse to notify provider when observation goals have been met and patient is ready for discharge.

## 2021-09-11 NOTE — PLAN OF CARE
Pt. Tolerating two meals of regular, low fiber diet. Several large loose bowel movements have occurred. No nausea or vomiting. Denies abdominal pain. Bowel sounds active X4 quads.

## 2021-09-11 NOTE — PROGRESS NOTES
General Surgery Progress Note  Hospital Day # 0    Subjective:   CC: Ventral hernia  Status: Small bowel follow-through successful.  Patient has had multiple bowel movements and has minimal abdominal discomfort.  He is hungry wants to go home.    Vitals:    09/11/21 0045 09/11/21 0100 09/11/21 0345 09/11/21 0830   BP:  134/76 138/72 (!) 147/75   BP Location:  Left arm Left arm Left arm   Pulse: 97 95 85 89   Resp:  14 14 20   Temp:  98.2  F (36.8  C) 98.8  F (37.1  C) 98.7  F (37.1  C)   TempSrc:  Oral Oral Oral   SpO2: 92% 93% 92% 94%   Weight:       Height:           Physical Exam:  General: NAD, pleasant  ABD: BS, soft, nontender, nondistended  EXT:no CCE    Recent Labs   Lab 09/10/21  0533   WBC 12.1*   HGB 14.3   HCT 44.6          Recent Labs   Lab 09/10/21  0533      CO2 30   BUN 19   ALBUMIN 3.6   ALKPHOS 103   ALT 40   AST 37       Assessment: Ventral hernia with small bowel obstruction-resolved    Plan: At this point he is doing relatively well.  I will advance his diet starting with clears to regular diet.  If he tolerates this at lunch she can go home.  He will follow-up with the bariatric team in the next 2 to 3 weeks to discuss bariatric surgery.  -No lifting restrictions at this point  -Surgery will sign off.  Please call with any questions or concerns.    Asad Alvarez DO [facility] Surgery  [phone]

## 2021-09-11 NOTE — PLAN OF CARE
PRIMARY DIAGNOSIS: ACUTE PAIN  OUTPATIENT/OBSERVATION GOALS TO BE MET BEFORE DISCHARGE:  1. Pain Status: No improvement noted. Consider adjustment in pain regimen.    2. Return to near baseline physical activity: No    3. Cleared for discharge by consultants (if involved): No    Discharge Planner Nurse   Safe discharge environment identified: Yes  Barriers to discharge: Yes       Entered by: Liane Ruiz 09/10/2021 11:37 PM   X-ray at 2330  Please review provider order for any additional goals.   Nurse to notify provider when observation goals have been met and patient is ready for discharge.

## 2021-10-14 ENCOUNTER — VIRTUAL VISIT (OUTPATIENT)
Dept: SURGERY | Facility: CLINIC | Age: 36
End: 2021-10-14
Payer: COMMERCIAL

## 2021-10-14 VITALS — BODY MASS INDEX: 42.66 KG/M2 | HEIGHT: 72 IN | WEIGHT: 315 LBS

## 2021-10-14 DIAGNOSIS — E66.813 CLASS 3 SEVERE OBESITY DUE TO EXCESS CALORIES WITH SERIOUS COMORBIDITY AND BODY MASS INDEX (BMI) OF 50.0 TO 59.9 IN ADULT (H): Primary | ICD-10-CM

## 2021-10-14 DIAGNOSIS — K43.9 ABDOMINAL WALL HERNIA: ICD-10-CM

## 2021-10-14 DIAGNOSIS — R10.13 DYSPEPSIA: ICD-10-CM

## 2021-10-14 DIAGNOSIS — Z87.820 HX OF CONCUSSION: ICD-10-CM

## 2021-10-14 DIAGNOSIS — E66.01 MORBID OBESITY (H): ICD-10-CM

## 2021-10-14 DIAGNOSIS — K76.0 HEPATIC STEATOSIS: ICD-10-CM

## 2021-10-14 DIAGNOSIS — Z87.898 HISTORY OF PREDIABETES: ICD-10-CM

## 2021-10-14 DIAGNOSIS — E66.01 CLASS 3 SEVERE OBESITY DUE TO EXCESS CALORIES WITH SERIOUS COMORBIDITY AND BODY MASS INDEX (BMI) OF 50.0 TO 59.9 IN ADULT (H): Primary | ICD-10-CM

## 2021-10-14 PROCEDURE — 99205 OFFICE O/P NEW HI 60 MIN: CPT | Mod: GT | Performed by: EMERGENCY MEDICINE

## 2021-10-14 RX ORDER — CYCLOBENZAPRINE HCL 5 MG
5-10 TABLET ORAL
COMMUNITY
Start: 2021-07-21 | End: 2023-11-13

## 2021-10-14 RX ORDER — ONDANSETRON 4 MG/1
4 TABLET, ORALLY DISINTEGRATING ORAL
COMMUNITY
Start: 2021-07-19 | End: 2023-11-13

## 2021-10-14 ASSESSMENT — MIFFLIN-ST. JEOR: SCORE: 2782.39

## 2021-10-14 NOTE — LETTER
"    10/14/2021         RE: Ady Godinez  [address]  [facility] [address]        Dear Colleague,    Thank you for referring your patient, Ady Godinez, to the [facility] [place]. Please see a copy of my visit note below.    Ady Godinez is 36 year old  male who presents for a billable video visit today.    How would you like to obtain your AVS? MyChart  If dropped from the video visit, the video invitation should be resent by: Text to cell phone: [phone]  Will anyone else be joining your video visit? No      Video Start Time: 10:49 AM    Provider Notes:       New Bariatric Surgery Consultation Note    2021    RE: Ady Godinez  MR#: [id number]  : 1985      Referring provider:       10/13/2021   Who referred you? Not sure       Chief Complaint/Reason for visit: evaluation for possible weight loss surgery        I had the pleasure of seeing your patient, Ady Godinez, to evaluate his obesity and consider him for possible weight loss surgery. As you know, Ady Godinez is 36 year old.  He has a height of 6' 0\", a weight of 400 lbs 0 oz, and calculated Body mass index is 54.25 kg/m ..  Today we discussed our Bariatric Surgery program to address their weight related health. He has not yet  viewed our online seminar prior to this visit and on discussion today, have decided to pursue the surgical program for general health improvements but also to reduce future complication/recurrence problems from hernia repair surgery planned once he loses weight. This hernia issue created hospitalization this past year due to partial small bowel obstruction.    Overall, we reviewed the basics of bariatric method today and he's on board with the changes necessary for success. We'll need him to come in for examination to confirm measurements and determine if preoperative weight loss will be mandatory. If so, I anticipate " initiation of appetite suppressant therapy, perhaps with topiramate as a way to mitigate headaches with a less obesogenic drug compared to his Elavil (hasn't noticed any weight gain in the short term since starting it, but this medication can increase hunger trough antihistamine pathways).     Plan:  1. Watch seminar and complete the quiz.  2. Intake labs can be done anytime.   3. Set up visit with me for examination and measurements to determine if any additional workup or preoperative weight loss will be required (getting your weight down is encouraged to improve the safety of your future surgery).  4. Follow up as planned for your sleep study given concern for hypoventilation issues during your recent hospital stay. Sleep apnea is likely based on your anatomy.  5. Follow up with dietician and psychology for teaching and clearance evaluations.  6. Anticipate actigall use after surgery to reduce the risk of gallstones those first 6 months after surgery.  7. Attend support group at least once (see below for information).   8. Given some of your dyspepsia, stop the soda pop/high sugar snacks and if not improving, you can start some OTC Prilosec once daily and let me know.         .    Assessment & Plan   Problem List Items Addressed This Visit        Digestive    Morbid obesity (H)      Other Visit Diagnoses     Class 3 severe obesity due to excess calories with serious comorbidity and body mass index (BMI) of 50.0 to 59.9 in adult (H)    -  Primary    Relevant Orders    CBC with platelets    Comprehensive metabolic panel    Ferritin    Lipid Profile    Parathyroid Hormone Intact    Prolactin    Vitamin A    TSH    Vitamin B1 whole blood    Vitamin B12    25- OH-Vitamin D    Zinc    History of prediabetes        A1c 5.9% in Sept 2021.     Relevant Orders    Lipid Profile    Abdominal wall hernia        Hx of concussion        Dyspepsia        Hepatic steatosis        seen on Sept 2021 CT abdomen/pelvis.                 HISTORY OF PRESENT ILLNESS:  Weight Loss History Reviewed with Patient 10/13/2021   How long have you been overweight? Since late teens through early 20's   What is the most that you have ever weighed? 400   What is the most weight you have lost? 20   I have tried the following methods to lose weight Watching portions or calories   I have tried the following weight loss medications? (Check all that apply) None   Have you ever had weight loss surgery? No       CO-MORBIDITIES OF OBESITY INCLUDE:     10/13/2021   I have the following health issues associated with obesity: None of the above   ventral hernia w/ partial obstruction this year, referred to us for weight reduction to improve durability of his future hernia repair given BMI of 54.3.    PAST MEDICAL HISTORY:  Past Medical History:   Diagnosis Date     Abdominal hernia      Cellulitis 05/2016    right index finger     Migraines      Obesity      Pure hypercholesterolemia        PAST SURGICAL HISTORY:  Past Surgical History:   Procedure Laterality Date     APPENDECTOMY      childhood.     IR CAROTID CEREBRAL ANGIOGRAM BILATERAL  4/15/2021     LUMBAR PUNCTURE FLUORO GUIDED DIAGNOSTIC  4/13/2021       FAMILY HISTORY:   No family history on file.    SOCIAL HISTORY:   Social History Questions Reviewed With Patient 10/13/2021   Which best describes your employment status (select all that apply) I work full-time   If you work, what is your occupation?  at "[employer]   Which best describes your marital status:    Do you have children? Yes   Who do you have in your support network that can be available to help you for the first 2 weeks after surgery? My wife and sister in-law   Who can you count on for support throughout your weight loss surgery journey? My wife my sister in-law my parents   Can you afford 3 meals a day?  Yes   Can you afford 50-60 dollars a month for vitamins? No   2 kids in home 10 and 4 yo.    HABITS:     10/13/2021   How  often do you drink alcohol? Monthly or less   If you do drink alcohol, how many drinks might you have in a day? (one drink = 5 oz. wine, 1 can/bottle of beer, 1 shot liquor) 3 or 4   Have you ever used any of the following nicotine products? No   Have you or are you currently using street drugs or prescription strength medication for which you do not have a prescription for? No   Do you have a history of chemical dependency (alcohol or drug abuse)? No     Currently taking narcotic/opioids No. excedrin migraine for post concussive headaches. Amitriptyline for sleep post concussive sx.     PSYCHOLOGICAL HISTORY:   Psychological History Reviewed With Patient 10/13/2021   Have you ever attempted suicide? Never.   Have you had thoughts of suicide in the past year? Yes   Have you ever been hospitalized for mental illness or a suicide attempt? Never.   Do you have a history of chronic pain? No   Have you ever been diagnosed with fibromyalgia? No   Are you currently seeing a therapist or counselor?  No   Are you currently seeing a psychiatrist? No       ROS:     10/13/2021   Skin:  Skin fold rashes (groin or other folds)   HEENT: Headaches, Dizziness/lightheadedness   Musculoskeletal: Joint Pain   Cardiovascular: Shortness of breath with activity   Pulmonary: Shortness of breath with activity, Snoring, Awaken from sleep to catch your breath, People have told me I stop breathing while asleep   Gastrointestinal: Heartburn   Genitourinary: None of the above   Hematological: None of the above   Neurological: None of the above       EATING BEHAVIORS:     10/13/2021   Have you or anyone else thought that you had an eating disorder? No   Do you currently binge eat (eat a large amount of food in a short time)? Yes   Are you an emotional eater? No   Do you get up to eat after falling asleep? No   soda pop orange soda/root beerDr. TURNER drinks one 20oz bottle daily.    EXERCISE:     10/13/2021   How often do you exercise? Never   What  keeps you from being more active?  Pain, I have recently been sick, Shortness of breath, Too tired       MEDICATIONS:  Current Outpatient Medications   Medication Sig Dispense Refill     amitriptyline (ELAVIL) 10 MG tablet Take 10 mg by mouth At Bedtime       aspirin-acetaminophen-caffeine (EXCEDRIN MIGRAINE) 250-250-65 MG tablet Take 2 tablets by mouth every 6 hours as needed       acetaminophen (TYLENOL) 325 MG tablet Take 2 tablets (650 mg) by mouth every 6 hours as needed for mild pain (Patient not taking: Reported on 10/14/2021) 90 tablet 0     cyclobenzaprine (FLEXERIL) 5 MG tablet Take 5-10 mg by mouth (Patient not taking: Reported on 10/14/2021)       ondansetron (ZOFRAN-ODT) 4 MG ODT tab Place 4 mg under the tongue  (Patient not taking: Reported on 10/14/2021)         ALLERGIES:  No Known Allergies    A1c 5.9% in Sept 2021.   EXAM: XR GASTROGRAFIN SM BOWEL FOLLOW THROUGH  LOCATION: [facility]  DATE/TIME: 9/10/2021 7:48 PM     INDICATION: pSBO, ventral hernia  COMPARISON: Plain film and CT from 9/10/2021  TECHNIQUE: Routine.     FINDINGS:  FLUOROSCOPIC TIME: .3 minutes  NUMBER OF IMAGES: 9     Gastrografin delivered through the pre-existing NG tube in the stomach. Stomach and proximal small bowel appear unremarkable.  Images at 45 minutes and 1 hour 45 minutes demonstrate slow emptying of contrast from stomach and proximal small bowel.     Images at 4 hours demonstrates dilute contrast throughout the colon indicating SBO is only partial.                                                                      IMPRESSION:  1.  Partial small bowel obstruction contrast reaching the colon within 4 hours.      Narrative & Impression   EXAM: CT ABDOMEN PELVIS W CONTRAST  LOCATION: [facility]  DATE/TIME: 9/10/2021 6:24 AM     INDICATION: Abdominal pain and vomiting.  COMPARISON: None.  TECHNIQUE: CT scan of the abdomen and pelvis was performed following injection of IV  contrast. Multiplanar reformats were obtained. Dose reduction techniques were used.  CONTRAST: ISOVUE 370 100ML     FINDINGS:   LOWER CHEST: Normal.     HEPATOBILIARY: Advanced diffuse hepatic steatosis. Normal gallbladder and bile ducts.     PANCREAS: Normal.     SPLEEN: Normal.     ADRENAL GLANDS: Normal.     KIDNEYS/BLADDER: Normal.     BOWEL: Small to moderate-sized periumbilical hernia contains a loop of ileum. Proximal to the hernia, small bowel is mildly distended and fecalized. Ileum distal to the hernia is decompressed. Colon is mostly decompressed. No perforation or abscess.     LYMPH NODES: Normal.     VASCULATURE: Unremarkable.     PELVIC ORGANS: Normal.     MUSCULOSKELETAL: Additional small hernia of the ventral abdominal wall superior to the umbilicus contains only fat.                                                                      IMPRESSION:   1.  Small to moderate-sized periumbilical hernia containing a loop of ileum appears to be causing at least partial small bowel obstruction.  2.  Additional small supraumbilical abdominal wall hernia containing only fat.  3.  Hepatic steatosis.         PHYSICAL EXAM:  Objective    Ht 1.829 m (6')   Wt (!) 181.4 kg (400 lb)   BMI 54.25 kg/m    Vitals - Patient Reported  Weight (Patient Reported): 181.4 kg (400 lb)  Height (Patient Reported): 182.9 cm (6')  BMI (Based on Pt Reported Ht/Wt): 54.25        Physical Exam   GENERAL: Healthy, alert and no distress  EYES: Eyes grossly normal to inspection.  No discharge or erythema, or obvious scleral/conjunctival abnormalities.  RESP: No audible wheeze, cough, or visible cyanosis.  No visible retractions or increased work of breathing.    SKIN: Visible skin clear. No significant rash, abnormal pigmentation or lesions.  NEURO: Cranial nerves grossly intact.  Mentation and speech appropriate for age.  PSYCH: Mentation appears normal, affect normal/bright, judgement and insight intact, normal speech and appearance  well-groomed.  Thick neck, fatiuged appearing. 4 yo daughter intermittently in back ground.      In summary, Ady Godinez has Class III super morbid obesity with a body mass index of Body mass index is 54.25 kg/m . kg/m2 and the comorbidities stated above. He completed an informational seminar and is a possible candidate for the TBD, favoring sleeve given minimal dyspepsia sx likely due to high sugar diet/soda habits.       Once the patient has completed the requirements in their task list and there are no further recommendations, the pt will be allowed to see the surgeon of her choice for consultation on the laparoscopic gastric sleeve surgery. Patient verbalizes understanding of the process to surgery and expectations for the postoperative period including the need for lifelong lifestyle changes, vitamin supplementation, and laboratory monitoring.    Sincerely,     Ry Vogt MD            Video-Visit Details    Type of service:  Video Visit    Video End Time (time video stopped): 11:49 AM  Originating Location (pt. Location): Home    Distant Location (provider location):  [facility] [place]     Platform used for Video Visit: AmWell      Again, thank you for allowing me to participate in the care of your patient.        Sincerely,        Ry Vogt MD

## 2021-10-14 NOTE — Clinical Note
New surg, coming in for exam to see if preop weight loss needed. Needs to complete seminar. Hernia patient referred by Kim. Sleep study previously ordered, pending soon.  Ry Vogt MD

## 2021-10-14 NOTE — PATIENT INSTRUCTIONS
Plan:  1. Watch seminar and complete the quiz.  2. Intake labs can be done anytime.   3. Set up visit with me for examination and measurements to determine if any additional workup or preoperative weight loss will be required (getting your weight down is encouraged to improve the safety of your future surgery).  4. Follow up as planned for your sleep study given concern for hypoventilation issues during your recent hospital stay. Sleep apnea is likely based on your anatomy.  5. Follow up with dietician and psychology for teaching and clearance evaluations.  6. Anticipate actigall use after surgery to reduce the risk of gallstones those first 6 months after surgery.  7. Attend support group at least once (see below for information).   8. Given some of your dyspepsia, stop the soda pop/high sugar snacks and if not improving, you can start some OTC Prilosec once daily and let me know.           Before being submitted for insurance approval, you will need the following:    -Clearance by the Psychologist  -Clearance by the dietitian  -Attend Support Group ([address] [place] [address] at [facility]) 2nd Tuesday of the month 6:30-8pm. Make sure to sign in.  -Routine Health Care Maintenance must be up to date (mammograms yearly after age 40, paps as recommended by your primary provider,  colonoscopy after age 50, earlier if high risk.  -If you are on estrogen, estrogen will be discontinued one month prior to surgery. It may be resumed one month after surgery unless otherwise advised to limit blood clotting risks.  -Pre Operative Lab work-ordered today.    -Structured weight loss visits IF mandated by your insurance carrier or bariatrician.  -Surgeon consult as we get nearer to potential surgery or sooner if you have special considerations that may make your surgery more complex.  -If you have sleep apnea you will need to be using CPAP for at least one month before surgery to reduce your risk of breathing problems after surgery. Make  sure to bring your CPAP or BiPAP to the hospital at the time of surgery.    -You will need to be tobacco free for 2 months before surgery and remain a non-smoker thereafter. If you are currently smoking or have recently quit, your urine will be evaluated for tobacco metabolites pre-operatively.  Smoking is a major risk factor for developing ulceration of your surgical sites and potential severe complications.    -If you are on insulin, you might be referred to an endocrinologist who will manage your insulin during the liquid diet and around the time of surgery. This endocrinologist does not replace your primary provider or your endocrinologist.   -You will need an exercise plan which includes MOVE, ie., walking and MUSCLE, ie.,calisthenics, bands, weight, machines, etc...Maintenance of long term weight loss and quality of life is much improved with regular exercise as the weight comes off.  ______________________________________________________________________    Remember that after your bariatric surgery, vitamin supplementation is a lifelong need.    You will take:    B-12 300-500mcg or higher sublingual (under the tongue) daily or by 1000mcg injection 1-2X/month  D3:  3000- 5000 IUs daily   Multivitamin containing 18mg of iron twice a day  Calcium citrate 1 or 2 daily    To keep your weight off and your vitamin levels up, follow-up is important.    Your labs will be monitored every 6 months for the first two years (every 3 months if you had a duodenal switch) and yearly thereafter.    To avoid ulcers in your stomach avoid tobacco forever, alcohol in excess, caffeine in excess and anti-inflammatories (NSAIDS)  (Aspirin, Ibuprofen, Naproxen and similar medications). Tylenol is fine at usual doses on the box.    If you are told by your physician take Aspirin to protect your heart or for another reason, make sure to take omeprazole or similar medication (protonix, nexium, prevacid) to protect your  stomach.    Remember that alcohol affects you differently after bariatric surgery. If you have even ONE drink DO NOT DRIVE due to rapid absorption and fast spiking of blood alcohol levels within minutes of consumption.     Slowly Increasing your exercise capacity such that 3-6 months after your surgery you're tolerating 150-250 minutes of exercise weekly will help optimize your metabolism and improve the chance of good weight loss maintenance.              LEAN PROTEIN SOURCES  Getting 20-30 grams of protein, 3 meals daily, is appropriate for most people, some need more but more than about 40 grams per meal is not useful.  General rule is drinking one ounce of water per gram of protein eaten over the course of the day:  70 grams of protein each day, drink 70 oz of water.  Protein Source Portion Calories Grams of Protein                           Nonfat, plain Greek yogurt    (10 grams sugar or less) 3/4 cup (6 oz)  12-17   Light Yogurt (10 grams sugar or less) 3/4 cup (6 oz)  6-8   Protein Shake 1 shake 110-180 15-30   Skim/1% Milk or lactose-free milk 1 cup ( 8 oz)  8   Plain or light, flavored soymilk 1 cup  7-8   Plain or light, hemp milk 1 cup 110 6   Fat Free or 1% Cottage Cheese 1/2 cup 90 15   Part skim ricotta cheese 1/2 cup 100 14   Part skim or reduced fat cheese slices 1 ounce 65-80 8     Mozzarella String Cheese 1 80 8   Canned tuna, chicken, crab or salmon  (canned in water)  1/2 cup 100 15-20   White fish (broiled, grilled, baked) 3 ounces 100 21   White Stone/Tuna (broiled, grilled, baked) 3 ounces 150-180 21   Shrimp, Scallops, Lobster, Crab 3 ounces 100 21   Pork loin, Pork Tenderloin 3 ounces 150 21   Boneless, skinless chicken /turkey breast                          (broiled, grilled, baked) 3 ounces 120 21   [place], [place], [place], and Venison 3 ounces 120 21   Lean cuts of red meat and pork (sirloin,   round, tenderloin, flank, ground 93%-96%) 3 ounces 170 21   Lean or Extra Lean  Ground Turkey 1/2 cup 150 20   90-95% Lean [place] Burger 1 noelle 140-180 21   Low-fat casserole with lean meat 3/4 cup 200 17   Luncheon Meats                                                        (turkey, lean ham, roast beef, chicken) 3 ounces 100 21   Egg (boiled, poached, scrambled) 1 Egg 60 7   Egg Substitute 1/2 cup 70 10   Nuts (limit to 1 serving per day)  3 Tbsp. 150 7   Nut [place] (peanut, almond)  Limit to 1 serving or less daily 1 Tbsp. 90 4   Soy Burger (varies) 1  15   Garbanzo, Black, [place] Beans 1/2 cup 110 7   Refried Beans 1/2 cup 100 7   Kidney and Lima beans 1/2 cup 110 7   Tempeh 3 oz 175 18   Vegan crumbles 1/2 cup 100 14   Tofu 1/2 cup 110 14   Chili (beans and extra lean beef or turkey) 1 cup 200 23   Lentil Stew/Soup 1 cup 150 12   Black Bean Soup 1 cup 175 12         Example Meal Plan for a 7027-9276 Calorie Diet:  Earnest, increase consumption by 30% over the portions listed below to get adequate nourishment for preop weight loss phase/starting point until you see the dietician. Aim  For 80-100g of lean protein daily for satiety improvement.    In order to fuel your weight loss properly and avoid hunger-induced overeating later in the day, for your height and weight, you will enjoy the most success by following the diet below or similar with adjustments based on your particular tastes and preferences.  Exercise may influence speed, amount of weight loss further.     I recommend getting into a meal routine and keeping it similar day to day in the beginning so you don t have to think too hard about what you re going to make/eat.  Keep snacks healthy, ideally containing protein and some vegetables.  Non-processed food is preferable to packaged items.  Eat at least a few crunchy green vegetables if having a snack, which should be 2-3 hours after your mealtimes(prepare these ahead of time for ease of use).  Drink 64 oz -80 oz of water daily for most, some of you will need more and we'll  discuss it at your visit if that is the case.  When changing our diet and reducing our intake,  we can often mistake thirst for hunger or just have some grazing habits we have to break ('bored/mindless eating') and a glass of water and reconsideration of our hunger is often all that is needed.  Having the urge is not the problem, but watching it pass by without acting on it is the goal.    If you re having hunger problems, add a protein drink/snack to your morning hours or afternoon snack with at least 20grams of protein and not too much sugar (under 10g).  A carton of higher protein/low sugar yogurt can work as well.  If the urge to snack is overwhelming and not satiated, try going for a 10 minute walk/exercise, come home and drink a glass of water and if still hungry, have a  calorie snack (handful of raw/sprouted nuts, veggies and string cheese, protein bar, etc).  Savor it.    It is better to have a large breakfast, a moderate lunch and a smaller dinner to fuel your day.  People lose 10-15% more weight during their weight loss season with this strategy. Optimizing your protein intake at each meal will further keep you more satisfied while eating less food overall.  Getting exercise in early has also been shown to offer the best results (before breakfast ideally but anytime is the right time to exercise if that is not an option for you).    To make sure you re getting adequate vitamins and minerals during weight loss, I recommend one complete multivitamin a day of your choice.  Consider a probiotic and taking some vitamin D 2000 IU daily.    Let supper be your last meal of the day and ideally try to have at least 12 hours between supper and breakfast the next day to tap into some beneficial overnight fasting dynamics.  Water in the evening is fine, unsweetened, non caffeinated herbal tea is helpful as well.  Consolidating your meals within a 8-12 hour period of your day will help tap into these additional  metabolic benefits and tends to keep your appetite up for breakfast, further helping to stay on track.  For most of my patients I don't recommend an intermittent fasting style diet (many find it hard to fit in their lifestyle) but an overnight fast is very doable for most patients and helps regulate our hunger drives a little better.  This makes it very important to nail good intake at all three meals to feel satisfied/energized and still lose weight.  If evening snacking desires are high, consider a glass of fiber supplement for some additional fullness (metamucil or similar). Most of us don't get the 25-30 grams per day of fiber that promotes good gut health/satiety.  Benefiber, metamucil, citrucel are reasonable/affordable options for most people.  Inulin, chicory, psyllium husk are reasonable options but start slow and low in the dose to avoid gas/bloating until your gut gets acclimated (ramping up to 5-10 grams per day of supplemental fiber after 3-4 weeks if needed).      Example Meal Plan:  Breakfast: 450-475 Calories  1 egg cooked on low in olive oil:   calories.  5oz Greek Yogurt (Fage plain classic: ~150 javon)  Handful of Berries of your choice (about a calorie per berry or 20-40cal per handful)    cup(cooked) of  old fashioned oatmeal or 1/2 cup(cooked) steel cut oats. (150 javon)  Sprinkle amount of brown sugar and a pat of butter. (40 javon)  Glass of  Water  Black coffee or unsweetened Tea (0calories).      2-3 hours Later Snack: (195 calories).  Glass of water  One string Cheese (80 calories) or 4 oz creamed cottage cheese (115 calories) with  Crunchy Celery sticks (less than 10 calories per large stalk) 2 stalks. (20 calories)    of a  Large Banana or   of a Large Apple (60 calories):  eat second half at lunch or afternoon snack.     Lunch:300 -350 calories   Chicken Breast  (baked/broiled/roasted/grilled)  4-6 oz.  (125-180 javon), BBQ sauce/hot sauce/mustard/seasoning is free. Just use a reasonable  amount. Or a can of tuna with 1 tablespoon mayonnaise.  Salad: lettuce, any other veggies (cucumbers, green peppers/celery you like and a small drizzle of dressing to just flavor.  Go as big on the veggies as you like,  as they are practically calorie free.   A whole, 8 inch cucumber is 45 calories, a whole green pepper is 23 calories, a stalk of celery is 9 calories.  [place] Dressing is 60 calories per tablespoon..so moderate your desired dressing or do a drizzle of olive oil and splash of balsamic vinegar on top,  Total calories unlikely to be over 150 even with dressing.  Glass of Water.    Option for lunch is meal replacement protein drink/smoothie.  Need at least 20 grams of protein and eat the rest of your apple/banana from the morning snack.      Afternoon Snack: 150-200 calories   Cheese Stick or cottage cheese again  and a fresh fruit OR  Granola Bar (protein Bar acceptable if under 200 calories OR  Homemade smoothies:  8oz skim milk,  a handful of berries (fresh or frozen and a serving of protein powder such as BiPro or Ekaterina sWhey for example.  If you don't like dairy, make with 8oz water, one small banana, handful of berries and the protein powder, add any veggies you want as well:  roughly 200 calories.   Glass of Water    Dinner: 325 calories  4oz of fresh, [place] salmon.  Broiled (salt/pepper/dill) for about 8-8.5 minutes (200calories) or  4oz filet mignon steak or sirloin steak  Salad or vegetable sautéed lightly in olive oil or   Broccoli 1.5  cups chopped and steamed  or micro-waved in a little water (75 calories)  Glass of Water,    Cup of herbal tea (unsweetened, caffeine free)      Herbs and seasonings are encouraged to flavor your foods/vegetables.  Make your food delicious.      Tips for Success:  1.  Prepare proteins ahead of time (broil chicken breasts in bulk so you can grab and go), steel cut oats/lentils can be stored in casserole dish/bowl in the fridge for quick scoop in the  "morning and rewarm in microwave, make use of crock pot recipes (watch salt content).  Making meals that cover 3-4 future meals is an easy way to stay on track.  2.  Drink a 8-12 oz glass of water every 2-3 hours when awake.  We often mistake hunger for thirst, especially when losing weight.  3. Remember your Reward and Motivation when things get hard.  4.  Weigh yourself every morning and record, you'll stay on track better and learn how our biorhythms, diet and elimination patterns show up on the scale. Don't worry about 1 or 2 day patterns, but when on track you'll notice good trend downward of weight over 3-4 day segments.  Plateaus tend to resolve after 4-8 days in most cases if you stay consistent with your plan.  These are natural and part of weight loss, even if you're perfect with your plan execution.  5. Call if problems/concerns.  [employer] is a great tool to stay in touch and provide weekly outside accountability. Check in with questions or if you want to brag.  6.  Find a handful of meals/foods that keep you on track and feeling good and get into a routine that is sustainable for you.  It's OK to have a routine that works for you.  7.  Consider taking a complete multivitamin just to make sure all micronutrients are adequate during weight loss.  8. If losing hair/brittle nails it usually means you are not taking enough protein.  Minimum goal is 60 grams daily of protein for smaller women, 80 grams a day for men. Consider taking Biotin as supplement or a \"Hair and Nail\" multivitamin.        If you are planning to have your surgery at [facility], please complete this online video seminar series.    Throughout this four-video series, you will be introduced to our Bariatric Care team, gain a better understanding of what obesity is, be introduced to the surgery process, review the benefits and risks of surgery, and see a glimpse of life after surgery.    Completion time of all four " "videos and quizzes is about one hour.     Start by clicking on the link below and under the heading \"Our Approach\" make sure you click \"READ MORE\" to see the full instructions for [facility] Weight Loss Surgery Seminar. PLEASE FOLLOW THE DIRECTIONS CAREFULLY!    [email or web address]    To access the videos and complete the quizzes, please follow these instructions:    1. Scroll to the bottom of the webpage to watch the first video (it starts with  speaking)  2. When done with the video, click on the link in the instructions on the webpage to take the first quiz.   3. Once you complete your first quiz, you will receive an email with links to the second video and quiz.  4. You will repeat step 3 for video and quizzes three and four.    Please let us know if you have any questions or issues.        Welcome to [facility]!      We are grateful that you have chosen us to partner with you on your journey to better health!  We have included some very important information for you to read as you begin your journey towards weight loss surgery. We will discuss parts of this as you move closer to surgery but please reach out if you have any questions or concerns.      Please click on the following links and they will lead you to a printable version of each handout or copy into your browser to view/print at home:    Making Your Decision, Understanding Weight Loss Surgery  [email or web address]    A Roadmap to Your Weight Loss Surgery  [email or web address]    Honoring Choices - Your Rights  [email or web address]    Honoring Choices - [place] Health Care Directive (form that can be filled out)  [email or web address]      Insurance Coverage and Approval for Surgery  Every insurance plan is different.  Many companies approve benefits for surgery, but many have specific requirements that must be completed prior " to being approved.    Verification of benefits is the patient's responsibility.  You will be responsible for any charges not covered by your insurance plan - including, but not limited to copays, deductible, out of pocket, any amount over your cap limit, etc.  Using the guideline below, please contact your insurance plan (we recommend you call the Customer Service number on the back of your card).  Please bring this completed form with you to your Initial Consultation appointment.    Once all of the requirements for surgery are complete, you will see the surgeon.  Following this visit, we will submit your information to your insurance plan for Prior Authorization.  We strongly encourage you to submit any documentation that supports your weight loss attempts to us.    Questions that are important to ask your insurance company when you call them:    1. Are [facility] in my network?  2. Is bariatric surgery a covered benefit under my policy?  If so, what is my estimated out of pocket expense?  3. Are there any exclusions or cap limits on my plan for bariatric surgery?  If so, what are they?  4. What are the criteria necessary to be approved for bariatric surgery?  5. Do you require medically supervised weight loss visits for approval of surgery?  If yes, for how many months?  Within the last # of years?  6. Are the following procedures a covered benefit under my plan?  a. Laparoscopic Gastric Bypass (CPT Code 56234)  b. Laparoscopic Sleeve Gastrectomy (CPT Code 28531)  c. Nutrition/Dietitian Consult (CPT Code 53787  d. Nutrition/Dietitian Reassessment (CPT Code 50081  e. Psychological Assment (CPT Codes 42315 & 09450          Keys to Success for Bariatric Surgery  Eat 3 nutrient-rich meals each day     Don't skip meals--it will cause you to overeat later in the day! Space meals 4-6 hours apart    Eat around the same times each day to develop a routine eating schedule    Avoid snacking unless  physically hungry.   Planned snacks: 1-2 times per day and no more than 150 calories      Eating protein and fiber (vegetables/fruits/whole grains) with meals helps you stay full     Choose foods with less than 10 grams of sugar and 5-10 grams of fat per serving to prevent excess calories and weight gain  Eat protein first    Protein helps with healing, maintaining muscle mass and good nutrition, and reducing hunger     For RNY Gastric Bypass, Sleeve Gastrectomy, and Duodenal Switch: aim for 60-80 grams of protein per day    Eat protein first, then veggies and the fruits or grains  Stop drinking 15-30 minutes before meals and wait 30-45 minutes after eating to drink    Drinking too soon before a meal may cause you to be too full to eat    Drinking too soon after you eat will cause the food to  wash  through your new stomach too quickly--leaving you hungry and likely to eat more    Eat [id number]    Take 20-30 minutes to eat each meal by taking small bites, chewing foods to applesauce consistency or 20-30 times before you swallow    Not chewing food properly can block the opening of your pouch--causing pain, nausea, vomiting    Eating foods too fast can delay those full signals and increase overeating   ? Slow down your eating by smaller plates/bowls, toddler utensils, putting your fork/spoon down between bites and not watching TV/computer during meals!  Make a list of activities to prevent boredom, stress and emotional eating    Go for a walk or lift weights while watching your favorite TV show    Talk to a co-worker or email/call a friend     Keep your hands and mind busy with woodworking, puzzles, knitting or painting your nails    Practice deep breathing or meditation  Drink 64 ounces of 0-Calorie drinks between meals     Water    Zero calorie Propel , Vitamin Water Zero  or SoBe [place] , Crystal Light , Sugar-Free Darvin-Aid , and other sugar-free lemonade or flavored small    Decaffeinated, unsweetened  coffee/ tea (1 cup or less per day)   Avoid Caffeine and Carbonation- NO STRAWS    Caffeine can irritate your new pouch, increase risk for ulcers, and can increase your appetite      Carbonation can lead to increased bloating/gas and discomfort   Work up to a total of 30-60 minutes of physical activity most days of the week    Helps with losing weight and preventing weight regain after surgery     Do a combination of cardio (walking/water exercises/biking/swimming) and strength training  Take daily vitamin/mineral supplements after surgery    Daily use of vitamins/minerals is necessary for the rest of your life      Psychological Evaluation for Bariatric Surgery      Why do I need a psychological evaluation before bariatric surgery?       The psychologist's main purpose is to provide the support and education to ensure you have the most successful outcome after surgery.     Preparing for bariatric surgery often involves changing behavior patterns. Working on these changes before surgery allows you to achieve the best results after surgery as some of these behaviors have been entrenched for many years. In addition, your relationship with food may need to change. Psychologists are experts in behavior change and are there to guide and support you as you make these important changes.     A significant life change, like losing a lot of weight, may affect many areas of your life--self-concept, physical activity and relationships with others. Your psychologist will help you prepare to adjust to these changes in a healthy way.     If you have mental health symptoms, relationship struggles, or other challenges, your psychologist will suggest how to best address these concerns so that they do not interfere with your progress toward a healthier lifestyle.       Is the psychologist looking for reasons to say I can't have surgery?     The goal is to not find specific psychological problems. Instead, the psychologist will be  working with your strengths to ensure you have the most optimal outcome after surgery.    There is no expectation that you will have everything figured out already or that you must have  perfect  behaviors before moving forward with surgery. Your psychologist is expecting that you will have some behavior changes that will need to occur concurrent with the surgery.     You can feel comfortable that the psychologist is not there to    you or your habits. The psychologist is there to provide support and encourage your progress.      What should I expect during the evaluation?     During the interview, which could take place over 2 or more sessions, the psychologist will ask about:   -weight history, current eating pattern and fluid intake, and previous attempts at weight loss   -activity level   -psychiatric history  -drug, alcohol and nicotine use   -social and developmental history  -support system   -current stressors and coping mechanisms   -understanding of the risks of surgery   -expectations for outcomes after surgery     You will complete various questionnaires that will focus on coping strategies, eating behaviors, quality of life and adverse childhood experiences in order to provide additional information to inform the assessment.     Your psychologist will likely give you some  homework assignments  to practice as you prepare for surgery. This will be individually tailored to your specific needs.     Your psychologist will talk with you about some of the typical challenges that patients might encounter after surgery and how to prepare for these.     A final report will be generated and any treatment recommendations will be discussed with you and the bariatric team to determine next steps.     If you would like, you may have any support people (e.g., spouse) join a session of the evaluation to provide additional information.       Bariatric surgery offers a physical  tool  for weight management.  Similarly, your work with the psychologist will provide you with some additional emotional and behavioral  tools  as you work toward your healthier lifestyle goals.      Support Group    Support and accountability is an important part of your weight loss journey and maintenance once you reach your health goals.  Because of this, we require that you attend at least one of our support groups before you meet with the surgeon so you get a feel for what they are like and hopefully establish a connection to others who are going through a similar process or have had surgery before so you can ask your questions.    We currently have two options for support group but they are in the virtual format only at this time.  Both groups are using Microsoft Teams for their platform and you can access it through the web or an mario that can be downloaded to a smart phone if you have one.      The Pre- and Post-op Connections Group is on the second Tuesday of the month from 6:30-8pm and is hosted by Bro Denton, PhD.  If you are interested in this group, you will need to email him at [email or web address] each month and then he will in turn send you the invitation to join.      We also have a Post-op focused Connections Group the 4th Wednesday of the month from 11am-12pm that is mostly geared toward post-operative patients who are past three months post-op.  This group is hosted by Sweta Edwards, OLLIE, CBN, CIC and you can email her to join the group at [email or web address] each month and she will send you an invite similar to Bro's group.  If you decide you would like to be a regular attender at this group, we can add you to an automatic invitation list of people.    You can see more information about available support groups that the [employer] [facility] offers to our patients as well by following the  "link:    [email or web address]      Please let us know if you have any questions about all the information above and we will be talking more about this during future visits.     Thank you,   [facility] Bariatric Team    Bariatric Task List    Fax:  Please fax all paperwork to: [phone] -     Status:  Is patient a candidate for bariatric surgery?:  patient is a candidate for bariatric surgery -     Cleared to schedule surgeon consult?:  not cleared to schedule surgeon consult -     Status:  surgery evaluation in process -     Surgeon: Diane or Pushpa -        Insurance: Insurance:  Cigna -     Cigna: PCP Recommendation and Medical Clearance:  Needed -     Other:  Call Giselle at [phone] to discuss insurance requirements. -        Patient Info: Initial Weight:  400#, 6'0, bmi 54.25 - reported, will come in for measurements to determine if preop weight loss needed.   Date of Initial Weight/Height:  10/14/2021 -     Required Weight Loss:  To be determined at in person visit.  -     Surgery Type:  sleeve gastrectomy -        Dietician Visits: Clearance from dietician to see surgeon?:  Needed - Danette      Psychological Evaluation: Psych eval:  Needed - Bro      Lab Work: Complete Blood Count:  Needed -     Comprehensive Metabolic Panel:  Needed -     PTH:  Needed -     Hgb A1c:    -  5.9 on 9/10/2021    Lipids: Needed -      TSH ([facility], [facility], [place] MA): Needed -       Ferritin: Needed -     Thiamine: Needed -     Vitamin A: Needed -     Vitamin B12: Needed -     Zinc: Needed -     Other:  Needed - Prolactin      Consults/ Clearance Sleep Medicine:  Needed - Appt in Nov      Testing: Sleep Study: Needed - pending.      Stopping Smoking/ Alcohol Use: Quit tobacco use (3 months smoke free)?:    - Never smoker      Patient Education:  Information Session:  Needed - MUST DO! See instructions.   Given \"Making " "your decision\" handout?:  Yes -     Given \"A Roadmap to you Weight Loss Surgery\" handout?: Yes -     Given support group information?:    -  Send email to [email or web address] to request invite to next meeting.   Attended support group?:  Needed - Must attend at least once!   Support plan in place?:  Completed - Wife, sister-in-law, parents      Additional Surgery Requirements: Review Coag plan:  Completed - standard.   HgA1c <8:  Completed - 5.9%   Gallstone prevention plan (Actigall for 6 months postop): Needed -        Final Tasks:  Before surgery online class:  Needed - with dietitian and nurse   After surgery online class:  Needed - 1 week after surgery w/dietitian   Nurse visit per clinic:  Needed - Need in-person visit for physcial exam, weight, measurements--call [phone] to schedule w/.   History and Physical per clinic:   -Needed within 30 days of surgery    Final labs per clinic: Needed - within 30 days of surgery.   Chest xray per clinic:   - Needed within 90 days of surgery    Electrocardiogram (ECG) per clinic:   - Needed within 90 days of surgery      Notes: Given some of your dyspepsia, stop the soda pop/high sugar snacks and if not improving, you can start some OTC Prilosec once daily and let me know.     Let nurses know if you need help getting your labs drawn. -            "

## 2021-10-14 NOTE — PROGRESS NOTES
"Ady Godinez is 36 year old  male who presents for a billable video visit today.    How would you like to obtain your AVS? MyChart  If dropped from the video visit, the video invitation should be resent by: Text to cell phone: [phone]  Will anyone else be joining your video visit? No      Video Start Time: 10:49 AM    Provider Notes:       New Bariatric Surgery Consultation Note    2021    RE: Ady Godinez  MR#: [id number]  : 1985      Referring provider:       10/13/2021   Who referred you? Not sure       Chief Complaint/Reason for visit: evaluation for possible weight loss surgery        I had the pleasure of seeing your patient, Ady Godinez, to evaluate his obesity and consider him for possible weight loss surgery. As you know, Ayd Godinez is 36 year old.  He has a height of 6' 0\", a weight of 400 lbs 0 oz, and calculated Body mass index is 54.25 kg/m ..  Today we discussed our Bariatric Surgery program to address their weight related health. He has not yet  viewed our online seminar prior to this visit and on discussion today, have decided to pursue the surgical program for general health improvements but also to reduce future complication/recurrence problems from hernia repair surgery planned once he loses weight. This hernia issue created hospitalization this past year due to partial small bowel obstruction.    Overall, we reviewed the basics of bariatric method today and he's on board with the changes necessary for success. We'll need him to come in for examination to confirm measurements and determine if preoperative weight loss will be mandatory. If so, I anticipate initiation of appetite suppressant therapy, perhaps with topiramate as a way to mitigate headaches with a less obesogenic drug compared to his Elavil (hasn't noticed any weight gain in the short term since starting it, but this medication can increase hunger trough antihistamine pathways). "     Plan:  1. Watch seminar and complete the quiz.  2. Intake labs can be done anytime.   3. Set up visit with me for examination and measurements to determine if any additional workup or preoperative weight loss will be required (getting your weight down is encouraged to improve the safety of your future surgery).  4. Follow up as planned for your sleep study given concern for hypoventilation issues during your recent hospital stay. Sleep apnea is likely based on your anatomy.  5. Follow up with dietician and psychology for teaching and clearance evaluations.  6. Anticipate actigall use after surgery to reduce the risk of gallstones those first 6 months after surgery.  7. Attend support group at least once (see below for information).   8. Given some of your dyspepsia, stop the soda pop/high sugar snacks and if not improving, you can start some OTC Prilosec once daily and let me know.         .    Assessment & Plan   Problem List Items Addressed This Visit        Digestive    Morbid obesity (H)      Other Visit Diagnoses     Class 3 severe obesity due to excess calories with serious comorbidity and body mass index (BMI) of 50.0 to 59.9 in adult (H)    -  Primary    Relevant Orders    CBC with platelets    Comprehensive metabolic panel    Ferritin    Lipid Profile    Parathyroid Hormone Intact    Prolactin    Vitamin A    TSH    Vitamin B1 whole blood    Vitamin B12    25- OH-Vitamin D    Zinc    History of prediabetes        A1c 5.9% in Sept 2021.     Relevant Orders    Lipid Profile    Abdominal wall hernia        Hx of concussion        Dyspepsia        Hepatic steatosis        seen on Sept 2021 CT abdomen/pelvis.                HISTORY OF PRESENT ILLNESS:  Weight Loss History Reviewed with Patient 10/13/2021   How long have you been overweight? Since late teens through early 20's   What is the most that you have ever weighed? 400   What is the most weight you have lost? 20   I have tried the following methods to  lose weight Watching portions or calories   I have tried the following weight loss medications? (Check all that apply) None   Have you ever had weight loss surgery? No       CO-MORBIDITIES OF OBESITY INCLUDE:     10/13/2021   I have the following health issues associated with obesity: None of the above   ventral hernia w/ partial obstruction this year, referred to us for weight reduction to improve durability of his future hernia repair given BMI of 54.3.    PAST MEDICAL HISTORY:  Past Medical History:   Diagnosis Date     Abdominal hernia      Cellulitis 05/2016    right index finger     Migraines      Obesity      Pure hypercholesterolemia        PAST SURGICAL HISTORY:  Past Surgical History:   Procedure Laterality Date     APPENDECTOMY      childhood.     IR CAROTID CEREBRAL ANGIOGRAM BILATERAL  4/15/2021     LUMBAR PUNCTURE FLUORO GUIDED DIAGNOSTIC  4/13/2021       FAMILY HISTORY:   No family history on file.    SOCIAL HISTORY:   Social History Questions Reviewed With Patient 10/13/2021   Which best describes your employment status (select all that apply) I work full-time   If you work, what is your occupation?  at [employer]   Which best describes your marital status:    Do you have children? Yes   Who do you have in your support network that can be available to help you for the first 2 weeks after surgery? My wife and sister in-law   Who can you count on for support throughout your weight loss surgery journey? My wife my sister in-law my parents   Can you afford 3 meals a day?  Yes   Can you afford 50-60 dollars a month for vitamins? No   2 kids in home 10 and 6 yo.    HABITS:     10/13/2021   How often do you drink alcohol? Monthly or less   If you do drink alcohol, how many drinks might you have in a day? (one drink = 5 oz. wine, 1 can/bottle of beer, 1 shot liquor) 3 or 4   Have you ever used any of the following nicotine products? No   Have you or are you currently using street drugs or  prescription strength medication for which you do not have a prescription for? No   Do you have a history of chemical dependency (alcohol or drug abuse)? No     Currently taking narcotic/opioids No. excedrin migraine for post concussive headaches. Amitriptyline for sleep post concussive sx.     PSYCHOLOGICAL HISTORY:   Psychological History Reviewed With Patient 10/13/2021   Have you ever attempted suicide? Never.   Have you had thoughts of suicide in the past year? Yes   Have you ever been hospitalized for mental illness or a suicide attempt? Never.   Do you have a history of chronic pain? No   Have you ever been diagnosed with fibromyalgia? No   Are you currently seeing a therapist or counselor?  No   Are you currently seeing a psychiatrist? No       ROS:     10/13/2021   Skin:  Skin fold rashes (groin or other folds)   HEENT: Headaches, Dizziness/lightheadedness   Musculoskeletal: Joint Pain   Cardiovascular: Shortness of breath with activity   Pulmonary: Shortness of breath with activity, Snoring, Awaken from sleep to catch your breath, People have told me I stop breathing while asleep   Gastrointestinal: Heartburn   Genitourinary: None of the above   Hematological: None of the above   Neurological: None of the above       EATING BEHAVIORS:     10/13/2021   Have you or anyone else thought that you had an eating disorder? No   Do you currently binge eat (eat a large amount of food in a short time)? Yes   Are you an emotional eater? No   Do you get up to eat after falling asleep? No   soda pop orange soda/root beer, Dr. TURNER drinks one 20oz bottle daily.    EXERCISE:     10/13/2021   How often do you exercise? Never   What keeps you from being more active?  Pain, I have recently been sick, Shortness of breath, Too tired       MEDICATIONS:  Current Outpatient Medications   Medication Sig Dispense Refill     amitriptyline (ELAVIL) 10 MG tablet Take 10 mg by mouth At Bedtime       aspirin-acetaminophen-caffeine  (EXCEDRIN MIGRAINE) 250-250-65 MG tablet Take 2 tablets by mouth every 6 hours as needed       acetaminophen (TYLENOL) 325 MG tablet Take 2 tablets (650 mg) by mouth every 6 hours as needed for mild pain (Patient not taking: Reported on 10/14/2021) 90 tablet 0     cyclobenzaprine (FLEXERIL) 5 MG tablet Take 5-10 mg by mouth (Patient not taking: Reported on 10/14/2021)       ondansetron (ZOFRAN-ODT) 4 MG ODT tab Place 4 mg under the tongue  (Patient not taking: Reported on 10/14/2021)         ALLERGIES:  No Known Allergies    A1c 5.9% in Sept 2021.   EXAM: XR GASTROGRAFIN SM BOWEL FOLLOW THROUGH  LOCATION: [facility]  DATE/TIME: 9/10/2021 7:48 PM     INDICATION: pSBO, ventral hernia  COMPARISON: Plain film and CT from 9/10/2021  TECHNIQUE: Routine.     FINDINGS:  FLUOROSCOPIC TIME: .3 minutes  NUMBER OF IMAGES: 9     Gastrografin delivered through the pre-existing NG tube in the stomach. Stomach and proximal small bowel appear unremarkable.  Images at 45 minutes and 1 hour 45 minutes demonstrate slow emptying of contrast from stomach and proximal small bowel.     Images at 4 hours demonstrates dilute contrast throughout the colon indicating SBO is only partial.                                                                      IMPRESSION:  1.  Partial small bowel obstruction contrast reaching the colon within 4 hours.      Narrative & Impression   EXAM: CT ABDOMEN PELVIS W CONTRAST  LOCATION: [facility]  DATE/TIME: 9/10/2021 6:24 AM     INDICATION: Abdominal pain and vomiting.  COMPARISON: None.  TECHNIQUE: CT scan of the abdomen and pelvis was performed following injection of IV contrast. Multiplanar reformats were obtained. Dose reduction techniques were used.  CONTRAST: ISOVUE 370 100ML     FINDINGS:   LOWER CHEST: Normal.     HEPATOBILIARY: Advanced diffuse hepatic steatosis. Normal gallbladder and bile ducts.     PANCREAS: Normal.     SPLEEN:  Normal.     ADRENAL GLANDS: Normal.     KIDNEYS/BLADDER: Normal.     BOWEL: Small to moderate-sized periumbilical hernia contains a loop of ileum. Proximal to the hernia, small bowel is mildly distended and fecalized. Ileum distal to the hernia is decompressed. Colon is mostly decompressed. No perforation or abscess.     LYMPH NODES: Normal.     VASCULATURE: Unremarkable.     PELVIC ORGANS: Normal.     MUSCULOSKELETAL: Additional small hernia of the ventral abdominal wall superior to the umbilicus contains only fat.                                                                      IMPRESSION:   1.  Small to moderate-sized periumbilical hernia containing a loop of ileum appears to be causing at least partial small bowel obstruction.  2.  Additional small supraumbilical abdominal wall hernia containing only fat.  3.  Hepatic steatosis.         PHYSICAL EXAM:  Objective    Ht 1.829 m (6')   Wt (!) 181.4 kg (400 lb)   BMI 54.25 kg/m    Vitals - Patient Reported  Weight (Patient Reported): 181.4 kg (400 lb)  Height (Patient Reported): 182.9 cm (6')  BMI (Based on Pt Reported Ht/Wt): 54.25        Physical Exam   GENERAL: Healthy, alert and no distress  EYES: Eyes grossly normal to inspection.  No discharge or erythema, or obvious scleral/conjunctival abnormalities.  RESP: No audible wheeze, cough, or visible cyanosis.  No visible retractions or increased work of breathing.    SKIN: Visible skin clear. No significant rash, abnormal pigmentation or lesions.  NEURO: Cranial nerves grossly intact.  Mentation and speech appropriate for age.  PSYCH: Mentation appears normal, affect normal/bright, judgement and insight intact, normal speech and appearance well-groomed.  Thick neck, fatiuged appearing. 4 yo daughter intermittently in back ground.      In summary, Ady Godinez has Class III super morbid obesity with a body mass index of Body mass index is 54.25 kg/m . kg/m2 and the comorbidities stated above. He  completed an informational seminar and is a possible candidate for the TBD, favoring sleeve given minimal dyspepsia sx likely due to high sugar diet/soda habits.       Once the patient has completed the requirements in their task list and there are no further recommendations, the pt will be allowed to see the surgeon of her choice for consultation on the laparoscopic gastric sleeve surgery. Patient verbalizes understanding of the process to surgery and expectations for the postoperative period including the need for lifelong lifestyle changes, vitamin supplementation, and laboratory monitoring.    Sincerely,     Ry Vogt MD            Video-Visit Details    Type of service:  Video Visit    Video End Time (time video stopped): 11:49 AM  Originating Location (pt. Location): Home    Distant Location (provider location):  [facility] [place]     Platform used for Video Visit: [employer]

## 2021-10-17 ENCOUNTER — HEALTH MAINTENANCE LETTER (OUTPATIENT)
Age: 36
End: 2021-10-17

## 2021-11-01 ENCOUNTER — VIRTUAL VISIT (OUTPATIENT)
Dept: SURGERY | Facility: CLINIC | Age: 36
End: 2021-11-01
Payer: COMMERCIAL

## 2021-11-01 DIAGNOSIS — Z71.3 NUTRITIONAL COUNSELING: ICD-10-CM

## 2021-11-01 DIAGNOSIS — E66.813 CLASS 3 SEVERE OBESITY DUE TO EXCESS CALORIES WITH SERIOUS COMORBIDITY AND BODY MASS INDEX (BMI) OF 50.0 TO 59.9 IN ADULT (H): Primary | ICD-10-CM

## 2021-11-01 DIAGNOSIS — E66.01 CLASS 3 SEVERE OBESITY DUE TO EXCESS CALORIES WITH SERIOUS COMORBIDITY AND BODY MASS INDEX (BMI) OF 50.0 TO 59.9 IN ADULT (H): Primary | ICD-10-CM

## 2021-11-01 DIAGNOSIS — R10.13 DYSPEPSIA: ICD-10-CM

## 2021-11-01 PROCEDURE — 97802 MEDICAL NUTRITION INDIV IN: CPT | Mod: GT | Performed by: DIETITIAN, REGISTERED

## 2021-11-01 NOTE — LETTER
11/1/2021         RE: Ady Godinez  [address]  [facility] [address]        Dear Colleague,    Thank you for referring your patient, Ady Godinez, to the [facility] SURGERY CL[facility] AND BARIATRICS CARE [place]. Please see a copy of my visit note below.    Ady Godinez is a 36 year old who is being evaluated via a billable video visit.        How would you like to obtain your AVS? MyChart  If the video visit is dropped, the invitation should be resent by: Send to e-mail at: [email or web address]  Will anyone else be joining your video visit? No      Video Start Time: 12:26 PM      Initial Structured Weight Loss Supervised Diet Evaluation     Assessment:  Ady is being seen today for initial RD nutritional evaluation. Patient has been unsuccessful with non-surgical weight loss methods and is interested in bariatric surgery. Today we reviewed current eating habits and level of physical activity, and instructed on the changes that are required for successful bariatric outcomes.    Surgery of interest per pt: LSG.    Workflow review:  Support Group: Not completed.  Psychology:Not completed.-scheduled for Nov 11th  Lab work:Not completed.  SWL:Yes 2nd Visit   Sleep Study Scheduled     Weight goal: to be determined at in person appt with Dr Vogt.  .    Anthropometrics:  Pt's weight is 398 lbs  Initial weight: 400 lbs   Weight change: 2 lbs down   BMI: There is no height or weight on file to calculate BMI.   Ideal body weight: 77.6 kg (171 lb 1.2 oz)  Adjusted ideal body weight: 119.1 kg (262 lb 10.3 oz)    Estimated RMR ([place]-St Jeor equation):  2776 kcals x 1.2 (sedentary) = 3331 kcals (for weight maintenance)    Medical History:  Patient Active Problem List   Diagnosis     Headache     Umbilical hernia without obstruction and without gangrene     Morbid obesity (H)      Diabetes: No   HbA1c:  No results found for: HGBA1C      Nutrition History  Food  allergies/intolerances/cultural or religous food customs: No     Vitamins/Mineral currently taking: None     Socioeconomic Status  Who does the grocery shopping for your household? Self     Who prepares your meals at home? Self     Diet Recall/Time  Breakfast: skipped   Lunch: quick and easy-2-3 sandwiches or mac and cheese or ravioli   Dinner: pork chop and rice or mashed potatoes, frozen corn or green beans    Typical Snacks: chips or cookies     Overnight eating: No      Beverages  Water (at least 12-13 bottles/day), trying to avoid pop consumption     Exercise  No set regimen, some walking, will start Physical Therapy this week     Nutrition Diagnosis (PES statement)  (NI-1.3) Excessive energy intake related to Food and nutrition related knowledge deficit concerning excessive energy/oral intake as evidenced by Intake of high caloric density foods at meals and/or snacks; large portions;  Estimated intake that exceeds estimated daily energy intake; skipping meals; and BMI of 54.1 kg/m2.      Intervention    Nutrition Education:   1. Provided general overview of diet and lifestyle modifications needed to be a deemed a safe candidate for bariatric surgery.   2. Educated patient on how to read a food label: choosing foods with than 10 grams fat and 10 grams sugar per serving to avoid dumping syndrome.  3. Dumping Syndrome: Described the mechanisms of syndrome, symptoms, and prevention tools from a dietary perspective.   4. Vitamins: Educated on post-op vitamin regimen including MVI+ 18 mg Fe two times a day, calcium citrate 400-600 mg two times a day, 4765-0967 mcg sublingual B12 daily, 5000 IU vitamin D3 daily.     Food/Nutrient Delivery:  5. Educated patient on eating three meals, with cutting out snacking.  6. Bariatric Plate: Patient and I discussed the importance of including a lean protein source (20-30 grams/meal), vegetables (included at lunch and dinner), one serving (15g) of carbohydrate, and limited added  fat (1 tb/day) at each meal.   7. Educated patient on using a protein powder drink as a meal replacement and/or supplement after bariatric surgery.   8. Discussed importance of adequate hydration after surgery, with goal of at least 64 oz of fluids/day.  9. Addressed avoiding all carbonated, caffeinated and sweetened drinks to prepare for bariatric surgery.     Nutrition Counseling:  10. Mindful eating techniques: Encouraged slow meal pace, chewing foods to applesauce consistency for 20-30 minutes/meal.   11. Discussed  fluids 30 minutes before, during, and after meal to prevent dumping syndrome and discomfort post bariatric surgery.     Instructions/Goals:     1. Start implementing bariatric surgery lifestyle modifications.      Handouts Provided:   [facility] Weight Management Patient Handbook  Protein supplement list    Monitor/Evaluation:  Pt. s target weight: weight loss to be determined at in person visit/no gain from initial visit, patient verbalized understanding.     Plan for next visit:   (Final Supervised Diet visit with RD) pre/post-op  diet progression, give review of surgery process.      Video-Visit Details    Type of service:  Video Visit    Video End Time:1:08 PM    Originating Location (pt. Location): Home    Distant Location (provider location):  [facility] SURGERY CLINIC AND BARIATRICS CARE [place]     Platform used for Video Visit: Marcela Yadav RD        Again, thank you for allowing me to participate in the care of your patient.        Sincerely,        Danette Yadav RD

## 2021-11-01 NOTE — PROGRESS NOTES
Ady Godinez is a 36 year old who is being evaluated via a billable video visit.        How would you like to obtain your AVS? MyChart  If the video visit is dropped, the invitation should be resent by: Send to e-mail at: [email or web address]  Will anyone else be joining your video visit? No      Video Start Time: 12:26 PM      Initial Structured Weight Loss Supervised Diet Evaluation     Assessment:  Ady is being seen today for initial RD nutritional evaluation. Patient has been unsuccessful with non-surgical weight loss methods and is interested in bariatric surgery. Today we reviewed current eating habits and level of physical activity, and instructed on the changes that are required for successful bariatric outcomes.    Surgery of interest per pt: LSG.    Workflow review:  Support Group: Not completed.  Psychology:Not completed.-scheduled for Nov 11th  Lab work:Not completed.  SWL:Yes 2nd Visit   Sleep Study Scheduled     Weight goal: to be determined at in person appt with Dr Vogt.  .    Anthropometrics:  Pt's weight is 398 lbs  Initial weight: 400 lbs   Weight change: 2 lbs down   BMI: There is no height or weight on file to calculate BMI.   Ideal body weight: 77.6 kg (171 lb 1.2 oz)  Adjusted ideal body weight: 119.1 kg (262 lb 10.3 oz)    Estimated RMR (Basile-St Jeor equation):  2776 kcals x 1.2 (sedentary) = 3331 kcals (for weight maintenance)    Medical History:  Patient Active Problem List   Diagnosis     Headache     Umbilical hernia without obstruction and without gangrene     Morbid obesity (H)      Diabetes: No   HbA1c:  No results found for: HGBA1C      Nutrition History  Food allergies/intolerances/cultural or religous food customs: No     Vitamins/Mineral currently taking: None     Socioeconomic Status  Who does the grocery shopping for your household? Self     Who prepares your meals at home? Self     Diet Recall/Time  Breakfast: skipped   Lunch: quick and easy-2-3 sandwiches or  mac and cheese or ravioli   Dinner: pork chop and rice or mashed potatoes, frozen corn or green beans    Typical Snacks: chips or cookies     Overnight eating: No      Beverages  Water (at least 12-13 bottles/day), trying to avoid pop consumption     Exercise  No set regimen, some walking, will start Physical Therapy this week     Nutrition Diagnosis (PES statement)  (NI-1.3) Excessive energy intake related to Food and nutrition related knowledge deficit concerning excessive energy/oral intake as evidenced by Intake of high caloric density foods at meals and/or snacks; large portions;  Estimated intake that exceeds estimated daily energy intake; skipping meals; and BMI of 54.1 kg/m2.      Intervention    Nutrition Education:   1. Provided general overview of diet and lifestyle modifications needed to be a deemed a safe candidate for bariatric surgery.   2. Educated patient on how to read a food label: choosing foods with than 10 grams fat and 10 grams sugar per serving to avoid dumping syndrome.  3. Dumping Syndrome: Described the mechanisms of syndrome, symptoms, and prevention tools from a dietary perspective.   4. Vitamins: Educated on post-op vitamin regimen including MVI+ 18 mg Fe two times a day, calcium citrate 400-600 mg two times a day, 9357-1340 mcg sublingual B12 daily, 5000 IU vitamin D3 daily.     Food/Nutrient Delivery:  5. Educated patient on eating three meals, with cutting out snacking.  6. Bariatric Plate: Patient and I discussed the importance of including a lean protein source (20-30 grams/meal), vegetables (included at lunch and dinner), one serving (15g) of carbohydrate, and limited added fat (1 tb/day) at each meal.   7. Educated patient on using a protein powder drink as a meal replacement and/or supplement after bariatric surgery.   8. Discussed importance of adequate hydration after surgery, with goal of at least 64 oz of fluids/day.  9. Addressed avoiding all carbonated, caffeinated and  sweetened drinks to prepare for bariatric surgery.     Nutrition Counseling:  10. Mindful eating techniques: Encouraged slow meal pace, chewing foods to applesauce consistency for 20-30 minutes/meal.   11. Discussed  fluids 30 minutes before, during, and after meal to prevent dumping syndrome and discomfort post bariatric surgery.     Instructions/Goals:     1. Start implementing bariatric surgery lifestyle modifications.      Handouts Provided:   [facility] Weight Management Patient Handbook  Protein supplement list    Monitor/Evaluation:  Pt. s target weight: weight loss to be determined at in person visit/no gain from initial visit, patient verbalized understanding.     Plan for next visit:   (Final Supervised Diet visit with RD) pre/post-op  diet progression, give review of surgery process.      Video-Visit Details    Type of service:  Video Visit    Video End Time:1:08 PM    Originating Location (pt. Location): Home    Distant Location (provider location):  [facility] SURGERY CLINIC AND BARIATRICS CARE [place]     Platform used for Video Visit: Marcela Yadav RD

## 2021-11-05 ASSESSMENT — SLEEP AND FATIGUE QUESTIONNAIRES
HOW LIKELY ARE YOU TO NOD OFF OR FALL ASLEEP WHILE LYING DOWN TO REST IN THE AFTERNOON WHEN CIRCUMSTANCES PERMIT: HIGH CHANCE OF DOZING
HOW LIKELY ARE YOU TO NOD OFF OR FALL ASLEEP WHILE SITTING INACTIVE IN A PUBLIC PLACE: MODERATE CHANCE OF DOZING
HOW LIKELY ARE YOU TO NOD OFF OR FALL ASLEEP WHILE SITTING AND READING: MODERATE CHANCE OF DOZING
HOW LIKELY ARE YOU TO NOD OFF OR FALL ASLEEP WHEN YOU ARE A PASSENGER IN A CAR FOR AN HOUR WITHOUT A BREAK: HIGH CHANCE OF DOZING
HOW LIKELY ARE YOU TO NOD OFF OR FALL ASLEEP WHILE SITTING QUIETLY AFTER LUNCH WITHOUT ALCOHOL: MODERATE CHANCE OF DOZING
HOW LIKELY ARE YOU TO NOD OFF OR FALL ASLEEP IN A CAR, WHILE STOPPED FOR A FEW MINUTES IN TRAFFIC: SLIGHT CHANCE OF DOZING
HOW LIKELY ARE YOU TO NOD OFF OR FALL ASLEEP WHILE SITTING AND TALKING TO SOMEONE: SLIGHT CHANCE OF DOZING
HOW LIKELY ARE YOU TO NOD OFF OR FALL ASLEEP WHILE WATCHING TV: MODERATE CHANCE OF DOZING

## 2021-11-08 ENCOUNTER — VIRTUAL VISIT (OUTPATIENT)
Dept: SLEEP MEDICINE | Facility: CLINIC | Age: 36
End: 2021-11-08
Attending: HOSPITALIST
Payer: COMMERCIAL

## 2021-11-08 VITALS — HEIGHT: 72 IN | WEIGHT: 315 LBS | BODY MASS INDEX: 42.66 KG/M2

## 2021-11-08 DIAGNOSIS — R06.83 SNORING: ICD-10-CM

## 2021-11-08 DIAGNOSIS — E66.01 MORBID OBESITY (H): ICD-10-CM

## 2021-11-08 DIAGNOSIS — G47.34 SLEEP RELATED HYPOXIA: ICD-10-CM

## 2021-11-08 DIAGNOSIS — G47.8 NON-RESTORATIVE SLEEP: Primary | ICD-10-CM

## 2021-11-08 PROCEDURE — 99203 OFFICE O/P NEW LOW 30 MIN: CPT | Mod: 95 | Performed by: INTERNAL MEDICINE

## 2021-11-08 ASSESSMENT — MIFFLIN-ST. JEOR: SCORE: 2759.71

## 2021-11-08 NOTE — PATIENT INSTRUCTIONS
Your BMI is Body mass index is 53.57 kg/m .  Weight management is a personal decision.  If you are interested in exploring weight loss strategies, the following discussion covers the approaches that may be successful. Body mass index (BMI) is one way to tell whether you are at a healthy weight, overweight, or obese. It measures your weight in relation to your height.  A BMI of 18.5 to 24.9 is in the healthy range. A person with a BMI of 25 to 29.9 is considered overweight, and someone with a BMI of 30 or greater is considered obese. More than two-thirds of American adults are considered overweight or obese.  Being overweight or obese increases the risk for further weight gain. Excess weight may lead to heart disease and diabetes.  Creating and following plans for healthy eating and physical activity may help you improve your health.  Weight control is part of healthy lifestyle and includes exercise, emotional health, and healthy eating habits. Careful eating habits lifelong are the mainstay of weight control. Though there are significant health benefits from weight loss, long-term weight loss with diet alone may be very difficult to achieve- studies show long-term success with dietary management in less than 10% of people. Attaining a healthy weight may be especially difficult to achieve in those with severe obesity. In some cases, medications, devices and surgical management might be considered.  What can you do?  If you are overweight or obese and are interested in methods for weight loss, you should discuss this with your provider.     Consider reducing daily calorie intake by 500 calories.     Keep a food journal.     Avoiding skipping meals, consider cutting portions instead.    Diet combined with exercise helps maintain muscle while optimizing fat loss. Strength training is particularly important for building and maintaining muscle mass. Exercise helps reduce stress, increase energy, and improves fitness.  Increasing exercise without diet control, however, may not burn enough calories to loose weight.       Start walking three days a week 10-20 minutes at a time    Work towards walking thirty minutes five days a week     Eventually, increase the speed of your walking for 1-2 minutes at time    In addition, we recommend that you review healthy lifestyles and methods for weight loss available through the National Institutes of Health patient information sites:  http://win.niddk.nih.gov/publications/index.htm    And look into health and wellness programs that may be available through your health insurance provider, employer, local community center, or [employer].    Weight management plan: Discussed healthy diet and exercise guidelines  Patient education: What is a sleep study?     What is a sleep study? -- A sleep study is a test that measures how well you sleep and checks for sleep problems. For some sleep studies, you stay overnight in a sleep lab at a hospital or sleep center.     What happens during a sleep study? -- Before you go to sleep, a technician attaches small, sticky patches called  electrodes  to your head, chest, and legs. He or she will also place a small tube beneath your nose and might wrap 1 or 2 belts around your chest.   Each of these items has wires that connect to monitors. The monitors record your movement, brain activity, breathing, and other body functions while you sleep.  If you have a history of trouble falling asleep, your doctor might prescribe a medicine to help you fall asleep in the lab. If you have never taken the medicine before, your doctor might ask you take it on a night before your sleep study to see how it affects you.   Why might my doctor order a sleep study? -- Your doctor will order a sleep study if he or she thinks you have sleep apnea or a different condition that makes you:   ?Have sudden jerking leg movements while you sleep, called  periodic limb movements.    ?Feel very  sleepy during the day and fall asleep all of a sudden, called  narcolepsy.    ?Have trouble falling asleep or staying asleep over a long period of time, called  chronic insomnia.    ?Do odd things while you sleep, such as walking.  How should I prepare for a sleep study? -- On the day of your sleep study, you should:   ?Avoid alcohol   ?Avoid drinking coffee, tea, sodas, and other drinks that have caffeine in the afternoon and evening   ?Take all of your regular medicines     The cost of care estimate line is [phone]. They are able to give the patient an estimate of the charges and also an estimate of their insurance coverage/patient responsibility.   After your sleep study is performed, please call us at [phone] or [phone]  to schedule for a follow up to review the results of the sleep study.    Please bring one tab of low dose melatonin 3 mg or less to the night of the study.    Melatonin intake is completely voluntary.    You may take own melatonin after arrival to sleep center. Do not drive or operate machinery after intake of melatonin.

## 2021-11-08 NOTE — PROGRESS NOTES
Will patient be in the state of [place] at time of video visit: YES     Earnest is a 36 year old who is being evaluated via a billable video visit.      How would you like to obtain your AVS? Renardhart  If the video visit is dropped, the invitation should be resent by: Text to cell phone: [phone]  Will anyone else be joining your video visit? No     Does patient have any form of state insurance? NO    Do you have wifi? YES  Do you have a smart phone? YES  Can you download an mario on your phone comfortably with out assistance? YES  Can you watch a [employer]self video? YES    Jackie GRIDER CMA, SLEEP MEDICINE, 11/8/2021 10:34 AM    Video Start Time: 1:28 PM  Video-Visit Details    Type of service:  Video Visit    Video End Time:1:44 PM    Originating Location (pt. Location): Home    Distant Location (provider location):  [facility]     Platform used for Video Visit: [employer]     Answers for HPI/ROS submitted by the patient on 11/5/2021  General Symptoms: No  Skin Symptoms: No  HENT Symptoms: No  EYE SYMPTOMS: No  HEART SYMPTOMS: No  LUNG SYMPTOMS: No  INTESTINAL SYMPTOMS: No  URINARY SYMPTOMS: No  REPRODUCTIVE SYMPTOMS: No  SKELETAL SYMPTOMS: No  BLOOD SYMPTOMS: No  NERVOUS SYSTEM SYMPTOMS: No  MENTAL HEALTH SYMPTOMS: Yes    Additional 15 minutes on the date of service was spent performing the following:    -Preparing to see the patient  -Obtaining and/or reviewing separately obtained history   -Ordering medications, tests, or procedures   -Documenting clinical information in the electronic or other health record     Thank you for the opportunity to participate in the care of  Ady Godinez.    Assessment and Plan:    In summary Ady Godinez is a 36 year old year old male here for sleep disturbance.  1. Non-restorative sleep/Sleep related hypoxia/Snoring/Morbid Obesity   Ady Godinez has high risk for obstructive sleep apnea based on the history of non-restorative sleep, sleep related  hypoxia, and a crowded airway. I educated the patient on the underlying pathophysiology of obstructive sleep apnea. We reviewed the risks associated with sleep apnea, including increased cardiovascular risk and overall death. We talked about treatments briefly. I recommend getting an baseline nocturnal polysomnography with TCM. The patient should return to the clinic to discuss results and treatment option in a patient-centered approach.    History of present illness:    He is a 36 year old male who comes to the [facility] clinic with a chief complaint of sleep-related hypoxia.  The patient was hospitalized around September for abdominal hernia.  During a hospitalization, he was found to have sleep-related hypoxia via nocturnal oximetry and strongly advised to get ruled out for obstructive sleep apnea.  While the patient denies any episodes of witnessed apnea, he has been told that he does have loud snoring during sleep.  The patient also describes his sleep quality is nonrestorative in nature.  The patient also admits that he is morbidly obese.     Ideal Sleep-Wake Cycle(devoid of societal pressure):    Patient would try to initiate sleep at around 10 PM with a sleep latency of 20-25 minutes. The patient would have 3-4 awakenings. Final wake up time is around 10:30-11 AM.    JANICE:  JANICE Total Score: 22  Total score - [place]: 12 (11/8/2021 10:08 AM)    Patient told to return in one week after the sleep study is interpreted.    Patient Active Problem List   Diagnosis     Headache     Umbilical hernia without obstruction and without gangrene     Morbid obesity (H)     Past Medical History:   Diagnosis Date     Abdominal hernia      Cellulitis 05/2016    right index finger     Migraines      Obesity      Pure hypercholesterolemia      Past Surgical History:   Procedure Laterality Date     APPENDECTOMY      childhood.     IR CAROTID CEREBRAL ANGIOGRAM BILATERAL  4/15/2021     LUMBAR PUNCTURE FLUORO GUIDED DIAGNOSTIC   4/13/2021     Current Outpatient Medications   Medication Sig Dispense Refill     acetaminophen (TYLENOL) 325 MG tablet Take 2 tablets (650 mg) by mouth every 6 hours as needed for mild pain 90 tablet 0     amitriptyline (ELAVIL) 10 MG tablet Take 10 mg by mouth At Bedtime       aspirin-acetaminophen-caffeine (EXCEDRIN MIGRAINE) 250-250-65 MG tablet Take 2 tablets by mouth every 6 hours as needed       cyclobenzaprine (FLEXERIL) 5 MG tablet Take 5-10 mg by mouth        ondansetron (ZOFRAN-ODT) 4 MG ODT tab Place 4 mg under the tongue        Patient has no known allergies.  Social History     Socioeconomic History     Marital status:      Spouse name: Not on file     Number of children: Not on file     Years of education: Not on file     Highest education level: Not on file   Occupational History     Not on file   Tobacco Use     Smoking status: Never Smoker     Smokeless tobacco: Never Used   Substance and Sexual Activity     Alcohol use: Not Currently     Comment: Very rare     Drug use: Never     Sexual activity: Yes     Partners: Female   Other Topics Concern     Not on file   Social History Narrative     Not on file     Social Determinants of Health     Financial Resource Strain: Not on file   Food Insecurity: Not on file   Transportation Needs: Not on file   Physical Activity: Not on file   Stress: Not on file   Social Connections: Not on file   Intimate Partner Violence: Not on file   Housing Stability: Not on file     History reviewed. No pertinent family history.     Physical Exam:  GEN: NAD,   Head: Normocephalic.  EYES: EOMI  ENT: Oropharynx is clear, Fragoso class 4+ airway.   Psych: normal mood, normal affect  Snore test:(+)     Labs/Studies:     No results found for: PH, PHARTERIAL, PO2, [id number], SAT, PCO2, HCO3, BASEEXCESS, JS, BEB  Lab Results   Component Value Date    TSH 3.15 02/14/2010     Lab Results   Component Value Date    GLC 96 09/11/2021     09/11/2021     Lab Results    Component Value Date    HGB 13.5 09/11/2021    HGB 14.3 09/10/2021     Lab Results   Component Value Date    BUN 19 09/11/2021    BUN 19 09/10/2021    CR 0.80 09/11/2021    CR 0.81 09/10/2021     Lab Results   Component Value Date    AST 37 09/10/2021    AST 21 07/16/2018    ALT 40 09/10/2021    ALT 43 07/16/2018    ALKPHOS 103 09/10/2021    ALKPHOS 134 (H) 07/16/2018    BILITOTAL 0.3 09/10/2021    BILITOTAL 0.2 07/16/2018     Lab Results   Component Value Date    UAMP Negative 04/14/2021    UBARB Negative 04/14/2021    BENZODIAZEUR Negative 04/14/2021    UCANN Negative 04/14/2021    UCOC Negative 04/14/2021    OPIT Negative 04/14/2021    UPCP Negative 04/14/2021       Recent Labs   Lab Test 09/11/21  1209 09/11/21  0949 09/10/21  1808 09/10/21  0533   NA  --  142  --  140   POTASSIUM  --  4.6  --  4.7   CHLORIDE  --  103  --  102   CO2  --  30  --  30   ANIONGAP  --  9  --  8   GLC 96 113   < > 156*   BUN  --  19  --  19   CR  --  0.80  --  0.81   JEFRY  --  9.4  --  9.9    < > = values in this interval not displayed.       No results found for: JAK Gonzalez DO  Board Certified in Internal Medicine and Sleep Medicine    (Note created with Dragon voice recognition and unintended spelling errors and word substitutions may occur)

## 2021-11-10 ENCOUNTER — TELEPHONE (OUTPATIENT)
Dept: SLEEP MEDICINE | Facility: CLINIC | Age: 36
End: 2021-11-10
Payer: COMMERCIAL

## 2021-11-10 NOTE — TELEPHONE ENCOUNTER
----- Message from Teresa Bean CMA sent at 11/8/2021  3:31 PM CST -----  Regarding: schedule PSG w/TCM (oxygen) return visit Dr. Gonzalez  Insurance: [facility]

## 2021-11-10 NOTE — TELEPHONE ENCOUNTER
Attempted to reach Earnest today to schedule PSG and 2 week follow up visit with Dr. Gonzalez for test results. No answer. [facility]      Jackie GRIDER CMA, [facility], 11/10/2021 1:47 PM

## 2021-11-11 ENCOUNTER — VIRTUAL VISIT (OUTPATIENT)
Dept: SURGERY | Facility: CLINIC | Age: 36
End: 2021-11-11
Payer: COMMERCIAL

## 2021-11-11 DIAGNOSIS — E66.01 MORBID OBESITY (H): Primary | ICD-10-CM
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
## 2021-11-29 ENCOUNTER — TELEPHONE (OUTPATIENT)
Dept: SURGERY | Facility: CLINIC | Age: 36
End: 2021-11-29

## 2021-11-29 ENCOUNTER — OFFICE VISIT (OUTPATIENT)
Dept: SURGERY | Facility: CLINIC | Age: 36
End: 2021-11-29
Payer: COMMERCIAL

## 2021-11-29 ENCOUNTER — LAB (OUTPATIENT)
Dept: LAB | Facility: CLINIC | Age: 36
End: 2021-11-29

## 2021-11-29 VITALS
BODY MASS INDEX: 42.66 KG/M2 | SYSTOLIC BLOOD PRESSURE: 142 MMHG | WEIGHT: 315 LBS | DIASTOLIC BLOOD PRESSURE: 82 MMHG | HEIGHT: 72 IN

## 2021-11-29 DIAGNOSIS — Z87.898 HISTORY OF PREDIABETES: ICD-10-CM

## 2021-11-29 DIAGNOSIS — E66.01 CLASS 3 SEVERE OBESITY DUE TO EXCESS CALORIES WITH SERIOUS COMORBIDITY AND BODY MASS INDEX (BMI) OF 50.0 TO 59.9 IN ADULT (H): ICD-10-CM

## 2021-11-29 DIAGNOSIS — E66.813 CLASS 3 SEVERE OBESITY DUE TO EXCESS CALORIES WITH SERIOUS COMORBIDITY AND BODY MASS INDEX (BMI) OF 50.0 TO 59.9 IN ADULT (H): ICD-10-CM

## 2021-11-29 DIAGNOSIS — B37.2 CANDIDAL INTERTRIGO: Primary | ICD-10-CM

## 2021-11-29 DIAGNOSIS — E66.01 MORBID OBESITY WITH BMI OF 50.0-59.9, ADULT (H): ICD-10-CM

## 2021-11-29 LAB
ALBUMIN SERPL-MCNC: 3.7 G/DL (ref 3.5–5)
ALP SERPL-CCNC: 106 U/L (ref 45–120)
ALT SERPL W P-5'-P-CCNC: 43 U/L (ref 0–45)
ANION GAP SERPL CALCULATED.3IONS-SCNC: 14 MMOL/L (ref 5–18)
AST SERPL W P-5'-P-CCNC: 53 U/L (ref 0–40)
BILIRUB SERPL-MCNC: 0.4 MG/DL (ref 0–1)
BUN SERPL-MCNC: 12 MG/DL (ref 8–22)
CALCIUM SERPL-MCNC: 9.5 MG/DL (ref 8.5–10.5)
CHLORIDE BLD-SCNC: 104 MMOL/L (ref 98–107)
CHOLEST SERPL-MCNC: 192 MG/DL
CO2 SERPL-SCNC: 24 MMOL/L (ref 22–31)
CREAT SERPL-MCNC: 0.76 MG/DL (ref 0.7–1.3)
ERYTHROCYTE [DISTWIDTH] IN BLOOD BY AUTOMATED COUNT: 13.9 % (ref 10–15)
FASTING STATUS PATIENT QL REPORTED: ABNORMAL
FERRITIN SERPL-MCNC: 129 NG/ML (ref 27–300)
GFR SERPL CREATININE-BSD FRML MDRD: >90 ML/MIN/1.73M2
GLUCOSE BLD-MCNC: 96 MG/DL (ref 70–125)
HCT VFR BLD AUTO: 46.7 % (ref 40–53)
HDLC SERPL-MCNC: 37 MG/DL
HGB BLD-MCNC: 14.5 G/DL (ref 13.3–17.7)
LDLC SERPL CALC-MCNC: 126 MG/DL
MCH RBC QN AUTO: 28.2 PG (ref 26.5–33)
MCHC RBC AUTO-ENTMCNC: 31 G/DL (ref 31.5–36.5)
MCV RBC AUTO: 91 FL (ref 78–100)
PLATELET # BLD AUTO: 266 10E3/UL (ref 150–450)
POTASSIUM BLD-SCNC: 4.4 MMOL/L (ref 3.5–5)
PROLACTIN SERPL-MCNC: 14.2 NG/ML (ref 0–15)
PROT SERPL-MCNC: 7.7 G/DL (ref 6–8)
PTH-INTACT SERPL-MCNC: 83 PG/ML (ref 10–86)
RBC # BLD AUTO: 5.15 10E6/UL (ref 4.4–5.9)
SODIUM SERPL-SCNC: 142 MMOL/L (ref 136–145)
TRIGL SERPL-MCNC: 143 MG/DL
TSH SERPL DL<=0.005 MIU/L-ACNC: 4.14 UIU/ML (ref 0.3–5)
VIT B12 SERPL-MCNC: 702 PG/ML (ref 213–816)
WBC # BLD AUTO: 11.5 10E3/UL (ref 4–11)

## 2021-11-29 PROCEDURE — 84443 ASSAY THYROID STIM HORMONE: CPT

## 2021-11-29 PROCEDURE — 84425 ASSAY OF VITAMIN B-1: CPT | Mod: 90

## 2021-11-29 PROCEDURE — 99215 OFFICE O/P EST HI 40 MIN: CPT | Performed by: EMERGENCY MEDICINE

## 2021-11-29 PROCEDURE — 82728 ASSAY OF FERRITIN: CPT

## 2021-11-29 PROCEDURE — 82306 VITAMIN D 25 HYDROXY: CPT

## 2021-11-29 PROCEDURE — 99000 SPECIMEN HANDLING OFFICE-LAB: CPT

## 2021-11-29 PROCEDURE — 80061 LIPID PANEL: CPT

## 2021-11-29 PROCEDURE — 85027 COMPLETE CBC AUTOMATED: CPT

## 2021-11-29 PROCEDURE — 82607 VITAMIN B-12: CPT

## 2021-11-29 PROCEDURE — 84630 ASSAY OF ZINC: CPT | Mod: 90

## 2021-11-29 PROCEDURE — 84146 ASSAY OF PROLACTIN: CPT

## 2021-11-29 PROCEDURE — 36415 COLL VENOUS BLD VENIPUNCTURE: CPT

## 2021-11-29 PROCEDURE — 83970 ASSAY OF PARATHORMONE: CPT

## 2021-11-29 PROCEDURE — 84590 ASSAY OF VITAMIN A: CPT | Mod: 90

## 2021-11-29 PROCEDURE — 80053 COMPREHEN METABOLIC PANEL: CPT

## 2021-11-29 RX ORDER — NYSTATIN AND TRIAMCINOLONE ACETONIDE 100000; 1 [USP'U]/G; MG/G
OINTMENT TOPICAL 2 TIMES DAILY
Qty: 30 G | Refills: 1 | Status: SHIPPED | OUTPATIENT
Start: 2021-11-29 | End: 2021-12-09

## 2021-11-29 RX ORDER — LIRAGLUTIDE 6 MG/ML
INJECTION, SOLUTION SUBCUTANEOUS
Qty: 3 ML | Refills: 5 | Status: SHIPPED | OUTPATIENT
Start: 2021-11-29 | End: 2023-11-13

## 2021-11-29 ASSESSMENT — MIFFLIN-ST. JEOR: SCORE: 2786.92

## 2021-11-29 NOTE — TELEPHONE ENCOUNTER
Prior Authorization Retail Medication Request    Medication/Dose: Saxenda 18mg/3ml  ICD code (if different than what is on RX):    Previously Tried and Failed:    Rationale:      Insurance Name:  [employer]  Insurance ID:  [id number]    Key: [id number]    Pharmacy Information (if different than what is on RX)  Name:  Carmela Hawkins  Phone:  [phone]

## 2021-11-29 NOTE — PATIENT INSTRUCTIONS
Plan   Weight will need to be under 380 lbs prior to the 2 week pre-operative diet given waist over 70 inches. Get off soda pop and sugary treats.  Heparin Protocol: standard  CPAP: follow up sleep study as planned this month.  Actigall: anticipate using to reduce gallstone risk after sleeve gastrectomy.  Exercise Plan: start walking 10 minutes daily.  Contraception Plan: n/a  Agrees to take vitamins for Life: yes  Agrees to follow up for life: yes  Medication Management: n/a  Preop lab evaluation has been ordered, will get today.  Start Saxenda trial: 0.6mg injected daily for 1-2 weeks then increase each week or two by 0.6m.6 to 1.2 to 1.8 to 2.4 then 3.0mg maximum dose after 6-8 weeks and stay on that dose if tolerated. If upset stomach is severe, mid abdominal pain or vomiting, stop the medicine and we'll consider use of bupropion/naltrexone therapy as a backup.  Use nystating/triamcinolone ointment under abdominal skin folds the next 10 days to clear up yeast infection. Lower sugar diet high in protein and vegetables will help reduce recurrence risk. Once healed, consider some body powder (Gold Quinonez works well) daily to prevent chaffing.          LEAN PROTEIN SOURCES  Getting 20-30 grams of protein, 3 meals daily, is appropriate for most people, some need more but more than about 40 grams per meal is not useful.  General rule is drinking one ounce of water per gram of protein eaten over the course of the day:  70 grams of protein each day, drink 70 oz of water.  Protein Source Portion Calories Grams of Protein                           Nonfat, plain Greek yogurt    (10 grams sugar or less) 3/4 cup (6 oz)  12-17   Light Yogurt (10 grams sugar or less) 3/4 cup (6 oz)  6-8   Protein Shake 1 shake 110-180 15-30   Skim/1% Milk or lactose-free milk 1 cup ( 8 oz)  8   Plain or light, flavored soymilk 1 cup  7-8   Plain or light, hemp milk 1 cup 110 6   Fat Free or 1% Cottage Cheese 1/2 cup  90 15   Part skim ricotta cheese 1/2 cup 100 14   Part skim or reduced fat cheese slices 1 ounce 65-80 8     Mozzarella String Cheese 1 80 8   Canned tuna, chicken, crab or salmon  (canned in water)  1/2 cup 100 15-20   White fish (broiled, grilled, baked) 3 ounces 100 21   [place]/Tuna (broiled, grilled, baked) 3 ounces 150-180 21   Shrimp, Scallops, Lobster, Crab 3 ounces 100 21   Pork loin, Pork Tenderloin 3 ounces 150 21   Boneless, skinless chicken /turkey breast                          (broiled, grilled, baked) 3 ounces 120 21   [place], [place], Haakon, and Venison 3 ounces 120 21   Lean cuts of red meat and pork (sirloin,   round, tenderloin, flank, ground 93%-96%) 3 ounces 170 21   Lean or Extra Lean Ground Turkey 1/2 cup 150 20   90-95% Lean [place] Burger 1 noelle 140-180 21   Low-fat casserole with lean meat 3/4 cup 200 17   Luncheon Meats                                                        (turkey, lean ham, roast beef, chicken) 3 ounces 100 21   Egg (boiled, poached, scrambled) 1 Egg 60 7   Egg Substitute 1/2 cup 70 10   Nuts (limit to 1 serving per day)  3 Tbsp. 150 7   Nut Pringle (peanut, almond)  Limit to 1 serving or less daily 1 Tbsp. 90 4   Soy Burger (varies) 1  15   Garbanzo, Black, Vasquez Beans 1/2 cup 110 7   Refried Beans 1/2 cup 100 7   Kidney and Lima beans 1/2 cup 110 7   Tempeh 3 oz 175 18   Vegan crumbles 1/2 cup 100 14   Tofu 1/2 cup 110 14   Chili (beans and extra lean beef or turkey) 1 cup 200 23   Lentil Stew/Soup 1 cup 150 12   Black Bean Soup 1 cup 175 12         Support and accountability is an important part of your weight loss journey and maintenance once you reach your health goals.  Because of this, we require that you attend at least one of our support groups before you meet with the surgeon so you get a feel for what they are like and hopefully establish a connection to others who are going through a similar process or have had surgery before so you can ask your  questions.    We currently have two options for support group but they are in the virtual format only at this time.  Both groups are using Microsoft Teams for their platform and you can access it through the web or an mario that can be downloaded to a smart phone if you have one.      The Pre- and Post-op Connections Group is on the second Tuesday of the month from 6:30-8pm and is hosted by Bro Denton, PhD.  If you are interested in this group, you will need to email him at [email or web address] each month and then he will in turn send you the invitation to join.      We also have a Post-op focused Connections Group the 4th Wednesday of the month from 11am-12pm that is mostly geared toward post-operative patients who are past three months post-op.  This group is hosted by OLLIE Mike, CBN, CIC and you can email her to join the group at [email or web address] each month and she will send you an invite similar to Bro's group.  If you decide you would like to be a regular attender at this group, we can add you to an automatic invitation list of people.    You can see more information about available support groups that the [facility] offers to our patients as well by following the link:    [email or web address]      Please let us know if you have any questions.     Thank you,   [facility]

## 2021-11-29 NOTE — LETTER
11/29/2021         RE: Ady Godinez  [address]  [place] [address]        Dear Colleague,    Thank you for referring your patient, Ady Godinez, to the [facility] [place]. Please see a copy of my visit note below.    Outpatient Bariatric Medicine Progress Note-Structured Weight Loss   Indication: Medical bariatric therapy to precede bariatric surgery    Surgery:  Sleeve Gastrectomy    Surgeon: TON    Impression     36 year old male with There is no height or weight on file to calculate BMI. in the setting of morbid obesity which satisfies the NIH criteria for bariatric surgery. He is coming in today for examination/measurements as he makes progress towards bariatric surgery. His intake was done on 10/14/21 via video and he's motivated for weight loss due to hernia that would likely recur without dramatic weight reduction. Weight today confirmed at 401 lbs and waist over 70 inches. Labs show prediabetes and has hx of hepatic steatosis such that some preop weight loss was recommended and we'd like to get his weight under 380lbs before meeting with the surgeon. We discussed medication options today and will start saxenda if covered/tolerated..     As far as progress in the program, the seminar was viewed but quiz needed.. Dietician visits have started. Psychology visits are starging. Anticipate actigall use after surgery to reduce gallstone risks. Support group still needed. ..      Comorbidities:  Patient Active Problem List   Diagnosis     Headache     Umbilical hernia without obstruction and without gangrene     Morbid obesity (H)       Plan   Weight will need to be under 380 lbs prior to the 2 week pre-operative diet given waist over 70 inches. Get off soda pop and sugary treats.  Heparin Protocol: standard  CPAP: follow up sleep study as planned this month. Likely severe RODNEY given anatomy and headaches. If found positive will need to  initiate therapy ASAP and bring unit to surgery.  Actigall: anticipate using to reduce gallstone risk after sleeve gastrectomy.  Exercise Plan: start walking 10 minutes daily.  Contraception Plan: n/a  Agrees to take vitamins for Life: yes  Agrees to follow up for life: yes  Medication Management: n/a  Preop lab evaluation has been ordered, will get today.  Start Saxenda trial: 0.6mg injected daily for 1-2 weeks then increase each week or two by 0.6m.6 to 1.2 to 1.8 to 2.4 then 3.0mg maximum dose after 6-8 weeks and stay on that dose if tolerated. If upset stomach is severe, mid abdominal pain or vomiting, stop the medicine and we'll consider use of bupropion/naltrexone therapy as a backup.    .  Past Surgical History        Past Surgical History:   Procedure Laterality Date     APPENDECTOMY      childhood.     IR CAROTID CEREBRAL ANGIOGRAM BILATERAL  4/15/2021     LUMBAR PUNCTURE FLUORO GUIDED DIAGNOSTIC  2021       Family History, Social History   Reviewed as documented. See time and date confirmation.    Interim History/Pre-op needs list    Initial Labs Complete and Reviewed: will get today  Dietitian Visits Initiated/cleared: started  Weight Change since initial weight: measured today at 401lbs,   Psychologist visits initiated/cleared: started  HCM UTD:    Pap: n/a   Mammogram: n/a   Colonscopy:  n/a   Other: NA  Sugars Well Controlled: n/a  Cardiac: no   Pulmonary: /no   Hormone use: no  Birth control n/a.  Other: PSG pending..  Medications and Allergies      Current Outpatient Medications   Medication Sig Dispense Refill     acetaminophen (TYLENOL) 325 MG tablet Take 2 tablets (650 mg) by mouth every 6 hours as needed for mild pain 90 tablet 0     amitriptyline (ELAVIL) 10 MG tablet Take 10 mg by mouth At Bedtime       aspirin-acetaminophen-caffeine (EXCEDRIN MIGRAINE) 250-250-65 MG tablet Take 2 tablets by mouth every 6 hours as needed       cyclobenzaprine (FLEXERIL) 5 MG tablet Take 5-10 mg by  mouth        ondansetron (ZOFRAN-ODT) 4 MG ODT tab Place 4 mg under the tongue        Allergies: Patient has no known allergies.    Habits   Tobacco Use: no  Alcohol Use: no  NSAIDS: rare excedrin.  Caffeine: some  SLEEP: psg pending.  CPAP: likely needed, Mallampati IV airway.  PHYSICAL ACTIVITY PATTERNS:  Cardiovascular: walking contemplated  Strength Training: no  Dietary History   Reviewed proper bariatric method again today.  The patient has been working with our bariatric dieticians and has started.    As far as distracted eating/grazing, getting 3 meals daily, avoiding empty calories and optimizing their protein intake, he's working on avoidance of soda and learning about protein goals..    Review of Systems     GENERAL/CONSTITUTIONAL:  Fatigue: yes. High risk for RODNEY w/ pending PSG  HEENT:  Dental Pain: n/a  Trouble Swallowing: no  CARDIOVASCULAR:  Chest pain with exertion: no  Syncope: no.  PULMONARY:  CPAP Use: no, but likely will be needed after PSG based on risks/anatomy/headaches.  Asthma Controlled: n/a  GASTROINTESTINAL:  GERD/Heartburn: no  Gallbladder: intact  UROLOGIC:  Urinary Symptoms: no  NEUROLOGIC:  Headaches: yes  Paresthesias: no  PSYCHIATRIC:  Moods: stable  MUSCULOSKELETAL/RHEUMATOLOGIC  Arthralgias: stable  Myalgias: no  ENDOCRINE:  Monitoring Blood Sugars: no  Sugars Well Controlled: yes  DERMATOLOGIC:  Rashes: under pannus of abdomen.  Physical Exam   Vitals: There were no vitals taken for this visit.  There is no height or weight on file to calculate BMI.  GENERAL: appears his stated age. Ambulation is independent. Central adiposity w/ waist of 73 inches today..  NECK:thick. mallampati IV airway.  HEART: Rhythm regular, rate regular, no murmur   LUNGS: Clear   ABDOMEN: soft, non-tender, obese,no rashes, surgical scars absent. Small fat containting periumbilical hernia. Yeast intertrigo under pannus central/left aspect of fold.  MUSCULOSKELETAL: no obvious deformities  VASCULAR:  palpable peripheral pulses, no  lower extremity edema, no color changes of venous stasis, no ulcerations  SKIN: Rashes: see above regarding yeast intertrigo abdominal pannus.     Counseling     We discussed the National Weight Control Registry and Healthy Weight Maintenance Strategies.   We discussed ways to optimize his metabolism.  We discussed specific exercise goals and dietary habits conducive to a healthy weight.  We discussed the importance of lifelong vitamin supplementation and the potential medical complications of vitamin non-compliance.  We discussed the importance of restorative sleep and stress management in maintaining a healthy weight.  I reminded him to avoid alcohol for 1 year post-operatively, to avoid NSAIDS for life unless specifically indicated and used with a concomitant PPI, to avoid caffeine in excess, and explained the importance of lifelong tobacco abstinence.  Medical Decision Makin minutes spent on the date of the encounter doing chart review, history and exam, documentation and further activities per the note      Ry Vogt MD  [facility]  2021  10:49 AM      Again, thank you for allowing me to participate in the care of your patient.        Sincerely,        Ry Vogt MD

## 2021-11-29 NOTE — PROGRESS NOTES
Outpatient Bariatric Medicine Progress Note-Structured Weight Loss   Indication: Medical bariatric therapy to precede bariatric surgery    Surgery:  Sleeve Gastrectomy    Surgeon: TON    Impression     36 year old male with There is no height or weight on file to calculate BMI. in the setting of morbid obesity which satisfies the NIH criteria for bariatric surgery. He is coming in today for examination/measurements as he makes progress towards bariatric surgery. His intake was done on 10/14/21 via video and he's motivated for weight loss due to hernia that would likely recur without dramatic weight reduction. Weight today confirmed at 401 lbs and waist over 70 inches. Labs show prediabetes and has hx of hepatic steatosis such that some preop weight loss was recommended and we'd like to get his weight under 380lbs before meeting with the surgeon. We discussed medication options today and will start saxenda if covered/tolerated..     As far as progress in the program, the seminar was viewed but quiz needed.. Dietician visits have started. Psychology visits are starging. Anticipate actigall use after surgery to reduce gallstone risks. Support group still needed. ..      Comorbidities:  Patient Active Problem List   Diagnosis     Headache     Umbilical hernia without obstruction and without gangrene     Morbid obesity (H)       Plan   Weight will need to be under 380 lbs prior to the 2 week pre-operative diet given waist over 70 inches. Get off soda pop and sugary treats.  Heparin Protocol: standard  CPAP: follow up sleep study as planned this month. Likely severe RODNEY given anatomy and headaches. If found positive will need to initiate therapy ASAP and bring unit to surgery.  Actigall: anticipate using to reduce gallstone risk after sleeve gastrectomy.  Exercise Plan: start walking 10 minutes daily.  Contraception Plan: n/a  Agrees to take vitamins for Life: yes  Agrees to follow up for life: yes  Medication Management:  n/a  Preop lab evaluation has been ordered, will get today.  Start Saxenda trial: 0.6mg injected daily for 1-2 weeks then increase each week or two by 0.6m.6 to 1.2 to 1.8 to 2.4 then 3.0mg maximum dose after 6-8 weeks and stay on that dose if tolerated. If upset stomach is severe, mid abdominal pain or vomiting, stop the medicine and we'll consider use of bupropion/naltrexone therapy as a backup.    .  Past Surgical History        Past Surgical History:   Procedure Laterality Date     APPENDECTOMY      childhood.     IR CAROTID CEREBRAL ANGIOGRAM BILATERAL  4/15/2021     LUMBAR PUNCTURE FLUORO GUIDED DIAGNOSTIC  2021       Family History, Social History   Reviewed as documented. See time and date confirmation.    Interim History/Pre-op needs list    Initial Labs Complete and Reviewed: will get today  Dietitian Visits Initiated/cleared: started  Weight Change since initial weight: measured today at 401lbs,   Psychologist visits initiated/cleared: started  HCM UTD:    Pap: n/a   Mammogram: n/a   Colonscopy:  n/a   Other: NA  Sugars Well Controlled: n/a  Cardiac: no   Pulmonary: /no   Hormone use: no  Birth control n/a.  Other: PSG pending..  Medications and Allergies      Current Outpatient Medications   Medication Sig Dispense Refill     acetaminophen (TYLENOL) 325 MG tablet Take 2 tablets (650 mg) by mouth every 6 hours as needed for mild pain 90 tablet 0     amitriptyline (ELAVIL) 10 MG tablet Take 10 mg by mouth At Bedtime       aspirin-acetaminophen-caffeine (EXCEDRIN MIGRAINE) 250-250-65 MG tablet Take 2 tablets by mouth every 6 hours as needed       cyclobenzaprine (FLEXERIL) 5 MG tablet Take 5-10 mg by mouth        ondansetron (ZOFRAN-ODT) 4 MG ODT tab Place 4 mg under the tongue        Allergies: Patient has no known allergies.    Habits   Tobacco Use: no  Alcohol Use: no  NSAIDS: rare excedrin.  Caffeine: some  SLEEP: psg pending.  CPAP: likely needed, Mallampati IV airway.  PHYSICAL ACTIVITY  PATTERNS:  Cardiovascular: walking contemplated  Strength Training: no  Dietary History   Reviewed proper bariatric method again today.  The patient has been working with our bariatric dieticians and has started.    As far as distracted eating/grazing, getting 3 meals daily, avoiding empty calories and optimizing their protein intake, he's working on avoidance of soda and learning about protein goals..    Review of Systems     GENERAL/CONSTITUTIONAL:  Fatigue: yes. High risk for RODNEY w/ pending PSG  HEENT:  Dental Pain: n/a  Trouble Swallowing: no  CARDIOVASCULAR:  Chest pain with exertion: no  Syncope: no.  PULMONARY:  CPAP Use: no, but likely will be needed after PSG based on risks/anatomy/headaches.  Asthma Controlled: n/a  GASTROINTESTINAL:  GERD/Heartburn: no  Gallbladder: intact  UROLOGIC:  Urinary Symptoms: no  NEUROLOGIC:  Headaches: yes  Paresthesias: no  PSYCHIATRIC:  Moods: stable  MUSCULOSKELETAL/RHEUMATOLOGIC  Arthralgias: stable  Myalgias: no  ENDOCRINE:  Monitoring Blood Sugars: no  Sugars Well Controlled: yes  DERMATOLOGIC:  Rashes: under pannus of abdomen.  Physical Exam   Vitals: There were no vitals taken for this visit.  There is no height or weight on file to calculate BMI.  GENERAL: appears his stated age. Ambulation is independent. Central adiposity w/ waist of 73 inches today..  NECK:thick. mallampati IV airway.  HEART: Rhythm regular, rate regular, no murmur   LUNGS: Clear   ABDOMEN: soft, non-tender, obese,no rashes, surgical scars absent. Small fat containting periumbilical hernia. Yeast intertrigo under pannus central/left aspect of fold.  MUSCULOSKELETAL: no obvious deformities  VASCULAR: palpable peripheral pulses, no  lower extremity edema, no color changes of venous stasis, no ulcerations  SKIN: Rashes: see above regarding yeast intertrigo abdominal pannus.     Counseling     We discussed the National Weight Control Registry and Healthy Weight Maintenance Strategies.   We discussed  ways to optimize his metabolism.  We discussed specific exercise goals and dietary habits conducive to a healthy weight.  We discussed the importance of lifelong vitamin supplementation and the potential medical complications of vitamin non-compliance.  We discussed the importance of restorative sleep and stress management in maintaining a healthy weight.  I reminded him to avoid alcohol for 1 year post-operatively, to avoid NSAIDS for life unless specifically indicated and used with a concomitant PPI, to avoid caffeine in excess, and explained the importance of lifelong tobacco abstinence.  Medical Decision Makin minutes spent on the date of the encounter doing chart review, history and exam, documentation and further activities per the note      Ry Vogt MD  [facility]  2021  10:49 AM

## 2021-11-30 LAB
DEPRECATED CALCIDIOL+CALCIFEROL SERPL-MC: 8 UG/L (ref 30–80)
ZINC SERPL-MCNC: 78.4 UG/DL

## 2021-11-30 NOTE — TELEPHONE ENCOUNTER
Prior Authorization Approval    Authorization Effective Date: 11/30/2021  Authorization Expiration Date: 5/30/2022  Medication: Saxenda-APPROVED  Approved Dose/Quantity:   Reference #:     Insurance Company: [employer] MANUEL ([facility]) - Phone [phone] Fax [phone]  Expected CoPay:       CoPay Card Available:      Foundation Assistance Needed:    Which Pharmacy is filling the prescription (Not needed for infusion/clinic administered): [facility] [place] [address] [place] [address]  Pharmacy Notified: Yes  Patient Notified: No    Pharmacy will notify patient when medication is ready.

## 2021-11-30 NOTE — TELEPHONE ENCOUNTER
Central Prior Authorization Team   Phone: [phone]      PA Initiation    Medication: Saxenda-Initiated  Insurance Company: [employer] ([facility]) - Phone [phone] Fax [phone]  Pharmacy Filling the Rx: [facility] [place] - [address]  Filling Pharmacy Phone: [phone]  Filling Pharmacy Fax:    Start Date: 11/30/2021

## 2021-12-01 LAB — VIT B1 PYROPHOSHATE BLD-SCNC: 176 NMOL/L

## 2021-12-02 DIAGNOSIS — E55.9 VITAMIN D DEFICIENCY: Primary | ICD-10-CM

## 2021-12-02 LAB
ANNOTATION COMMENT IMP: NORMAL
RETINYL PALMITATE SERPL-MCNC: <0.02 MG/L
VIT A SERPL-MCNC: 0.51 MG/L

## 2021-12-02 NOTE — SIGNIFICANT EVENT
Sepsis Evaluation Progress Note    I was called to see Ady Godinez due to abnormal vital signs triggering the Sepsis SIRS screening alert. He is not known to have an infection.     Physical Exam   Vital Signs:  Temp: 98.2  F (36.8  C) Temp src: Oral BP: 134/76 Pulse: 95   Resp: 14 SpO2: 93 % O2 Device: None (Room air) Oxygen Delivery: 3 LPM     General: in no acute distress, uncomfortable due to NG tube  Mental Status: AAOx4.     Remainder of physical exam is significant for: NGT noted, abdomen obese, no significant tenderness     Data   Lactic Acid   Date Value Ref Range Status   09/11/2021 2.6 (H) 0.7 - 2.0 mmol/L Final       Assessment & Plan   NO EVIDENCE OF SEPSIS at this time.  Vital sign, physical exam, and lab findings are due to hypovolemia .    Disposition: The patient will remain on the current unit. We will continue to monitor this patient closely..  SBFT shows contrast throughout the colon.  Patient did have a BM.  He has had about 150 mL emesis, however feels it is from NG tube irritating his throat.  No abdominal pain.  Insisting on removing NG tube acknowledging that if he has recurrent abdominal pain or nausea it might need to be replaced.    Discussed with bedside RN      Maxine Crandall MD    Sepsis Criteria   Sepsis: 2+ SIRS criteria due to infection  Severe Sepsis: Sepsis AND 1+ new sign of acute organ dysfunction (Note: lactate >2 or acute encephalopathy each qualify as organ dysfunction)  Septic Shock: Sepsis AND hypotension despite volume resuscitation with 30 ml/kg crystalloid or lactate >=4  Note: HYPOTENSION is defined as 2 BP readings measured 3 hrs apart that have a SBP <90, MAP <65, or decrease >40 mmHg, occurring 6 hrs before or after t-zero        
<<-----Click here for Discharge Medication Review

## 2021-12-06 ENCOUNTER — DOCUMENTATION ONLY (OUTPATIENT)
Dept: SURGERY | Facility: CLINIC | Age: 36
End: 2021-12-06

## 2021-12-06 ENCOUNTER — VIRTUAL VISIT (OUTPATIENT)
Dept: SURGERY | Facility: CLINIC | Age: 36
End: 2021-12-06
Payer: COMMERCIAL

## 2021-12-06 DIAGNOSIS — E66.01 MORBID OBESITY (H): Primary | ICD-10-CM

## 2021-12-06 NOTE — PROGRESS NOTES
I spoke with Earnest today about his need for a letter of support for his [employer] insurance.  He states he has no PCP at this time.  I did let him know that it would be a good idea to establish care with one since he will also need one for his pre op physical and after care.  He said that he will look into this and let me know.

## 2021-12-06 NOTE — LETTER
12/6/2021         RE: Ady Godinez  [address]  [facility] [address]        Dear Colleague,    Thank you for referring your patient, Ady Godinez, to the [facility] SURGERY CLINIC AND BARIATRICS CARE [place]. Please see a copy of my visit note below.    Video-Visit Details    Type of service:  Video Visit    Video Start Time (time video started): 10:25 AM    Video End Time (time video stopped): 11:03 AM    Originating Location (pt. Location): Home    Distant Location (provider location):  Home office    Mode of Communication:  Video Conference via [facility]    Physician has received verbal consent for a Video Visit from the patient? Yes    Earnest has started to make some changes in his eating and lifestyle, but still needs to be more mindful about his eating. He will follow up with a list of activities and mindful eating strategies. He will also connect with Logan Duvall Psy.D., to inquire about individual therapy. 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        Sincerely,        Bro Denton, PhD

## 2021-12-06 NOTE — PROGRESS NOTES
Video-Visit Details    Type of service:  Video Visit    Video Start Time (time video started): 10:25 AM    Video End Time (time video stopped): 11:03 AM    Originating Location (pt. Location): Home    Distant Location (provider location):  Home office    Mode of Communication:  Video Conference via [facility]    Physician has received verbal consent for a Video Visit from the patient? Yes    Earnest has started to make some changes in his eating and lifestyle, but still needs to be more mindful about his eating. He will follow up with a list of activities and mindful eating strategies. He will also connect with Logan Duvall Psy.D., to inquire about individual therapy. F32.9; F41.9; E66.01    Bro Denton, PhD

## 2021-12-13 ENCOUNTER — TELEPHONE (OUTPATIENT)
Dept: SURGERY | Facility: CLINIC | Age: 36
End: 2021-12-13
Payer: COMMERCIAL

## 2021-12-13 NOTE — TELEPHONE ENCOUNTER
Attempts made to contact patient in regards to appointment that was scheduled for today at 12:30 pm with this writer via video visit.  Patient did not link onto video visit nor answer the phone with attempts made, therefore left patient a detailed message including call center contact information to call and reschedule this appointment at his earliest convenience.

## 2021-12-22 ENCOUNTER — VIRTUAL VISIT (OUTPATIENT)
Dept: SURGERY | Facility: CLINIC | Age: 36
End: 2021-12-22
Payer: COMMERCIAL

## 2021-12-22 DIAGNOSIS — E66.01 MORBID OBESITY (H): Primary | ICD-10-CM

## 2021-12-22 NOTE — PROGRESS NOTES
"The patient has been notified of the following:      \"We have found that certain health care needs can be provided without the need for a face to face visit.  This service lets us provide the care you need with a phone conversation.       I will have full access to your [facility] medical record during this entire phone call.   I will be taking notes for your medical record.      Since this is like an office visit, we will bill your insurance company for this service.       There are potential benefits and risks of telephone visits (e.g. limits to patient confidentiality) that differ from in-person visits.?  Confidentiality still applies for telephone services, and nobody will record the visit.  It is important to be in a quiet, private space that is free of distractions (including cell phone or other devices) during the visit.??      If during the course of the call I believe a telephone visit is not appropriate, you will not be charged for this service\"     Consent has been obtained for this service by care team member: Yes     Earnest could not connect to the telehealth platform. He noted that he has been struggling because he cannot find a job, the family has been sick and there have been financial concerns. He still needs to send the packet of questionnaires and contact the psychotherapist I recommended to him in the last session. I will see him after his therapist and dietitian feel he is in a better position to proceed. 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

## 2021-12-22 NOTE — Clinical Note
Earnest will need to work with a psychotherapist and focus on some of the fundamental life issues that are occurring now including finding a job and improving his financial situation. When Danette and the future therapist feel he is in a better position to proceed, that is when I will see him again.

## 2021-12-22 NOTE — LETTER
"    12/22/2021         RE: Ady Godinez  [address]  [facility] [address]        Dear Colleague,    Thank you for referring your patient, Ady Godinez, to the [facility] SURGERY CLINIC AND BARIATRICS CARE [place]. Please see a copy of my visit note below.    The patient has been notified of the following:      \"We have found that certain health care needs can be provided without the need for a face to face visit.  This service lets us provide the care you need with a phone conversation.       I will have full access to your [facility] medical record during this entire phone call.   I will be taking notes for your medical record.      Since this is like an office visit, we will bill your insurance company for this service.       There are potential benefits and risks of telephone visits (e.g. limits to patient confidentiality) that differ from in-person visits.?  Confidentiality still applies for telephone services, and nobody will record the visit.  It is important to be in a quiet, private space that is free of distractions (including cell phone or other devices) during the visit.??      If during the course of the call I believe a telephone visit is not appropriate, you will not be charged for this service\"     Consent has been obtained for this service by care team member: Yes     Earnest could not connect to the telehealth platform. He noted that he has been struggling because he cannot find a job, the family has been sick and there have been financial concerns. He still needs to send the packet of questionnaires and contact the psychotherapist I recommended to him in the last session. I will see him after his therapist and dietitian feel he is in a better position to proceed. 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        Sincerely,        Bro Denton, PhD    "

## 2021-12-27 ENCOUNTER — TELEPHONE (OUTPATIENT)
Dept: SURGERY | Facility: CLINIC | Age: 36
End: 2021-12-27

## 2021-12-27 NOTE — TELEPHONE ENCOUNTER
Attempts made to contact patient in regards to appointment that was scheduled for today with this writer at 1:00 pm via video visit.  Patient did not link onto video visit nor answer the phone with attempts made, therefore left patient a detailed message including call center contact information to call and reschedule this appointment at his earliest convenience.

## 2021-12-29 ENCOUNTER — VIRTUAL VISIT (OUTPATIENT)
Dept: SURGERY | Facility: CLINIC | Age: 36
End: 2021-12-29
Payer: COMMERCIAL

## 2021-12-29 DIAGNOSIS — E66.813 CLASS 3 SEVERE OBESITY DUE TO EXCESS CALORIES WITH SERIOUS COMORBIDITY AND BODY MASS INDEX (BMI) OF 50.0 TO 59.9 IN ADULT (H): Primary | ICD-10-CM

## 2021-12-29 DIAGNOSIS — E66.01 CLASS 3 SEVERE OBESITY DUE TO EXCESS CALORIES WITH SERIOUS COMORBIDITY AND BODY MASS INDEX (BMI) OF 50.0 TO 59.9 IN ADULT (H): Primary | ICD-10-CM

## 2021-12-29 DIAGNOSIS — R10.13 DYSPEPSIA: ICD-10-CM

## 2021-12-29 DIAGNOSIS — Z71.3 NUTRITIONAL COUNSELING: ICD-10-CM

## 2021-12-29 PROCEDURE — 97803 MED NUTRITION INDIV SUBSEQ: CPT | Mod: GT | Performed by: DIETITIAN, REGISTERED

## 2021-12-29 NOTE — LETTER
12/29/2021         RE: Ady Godinez  [address]  [facility] [address]        Dear Colleague,    Thank you for referring your patient, Ady Godinez, to the [facility] SURGERY CLINIC AND BARIATRICS CARE [place]. Please see a copy of my visit note below.    Ady Godinez is a 36 year old who is being evaluated via a billable video visit.      How would you like to obtain your AVS? MyChart  If the video visit is dropped, the invitation should be resent by: Send to e-mail at: [email or web address]  Will anyone else be joining your video visit? No      Video Start Time: 9:41 AM      Follow Up Surgical Weight Loss Supervised Diet Evaluation    Assessment:  Pt. is being seen today for a follow up RD nutritional evaluation. Pt. has been unsuccessful with non-surgical weight loss methods and is interested in bariatric surgery. Today we reviewed current eating habits and level of physical activity, and instructed on the changes that are required for successful bariatric outcomes.  Was started on Saxenda to help with his weight loss journey.     Surgery of interest per pt: LSG.    Workflow review:  Support Group: Not completed.  Psychology:In progress.  Lab work:Completed.  SWL:Yes 3rd Visit   Sleep Study    Weight goal: 380 lbs or less .    Anthropometrics:  Pt's weight is 392 lbs   Initial weight: 400 lbs   Weight change: 8 lbs (down)   BMI: There is no height or weight on file to calculate BMI.   Ideal body weight: 77.6 kg (171 lb 1.2 oz)  Adjusted ideal body weight: 119.3 kg (263 lb 0.7 oz)    Estimated RMR ([place]-St Jeor equation):  2749 kcals x 1.3 (light active) = 3574 kcals (for weight maintenance)    Medical History:  Patient Active Problem List   Diagnosis     Headache     Umbilical hernia without obstruction and without gangrene     Morbid obesity (H)      Diabetes: No   HbA1c:  No results found for: HGBA1C    Progress over past month: Patient reports that he has been  working on being more consistent with eating 3 meals/day. Patient notes that he has been working on reducing/eliminating pop from his diet, noting he has had 1 over the past month.  Patient continues to take a Vitamin D supplement on a regular basis.     Diet Recall/Time  Breakfast: eggs with sausage or bach, 2 slices of toast   Lunch: 1 sandwich (reduced from 3 sandwiches), occasional chips or carrots or fruit   Dinner: meat, potato or starch, vegetable or fruit     Typical Snacks: on occasion-chips (trying to reduce/avoid)     Meal duration: has been working on, needs to be more consistent with     fluids by 30 minutes before, during meal, and waiting 30 minutes after meal before drinking fluids: Yes-really trying to be consistent with.     Beverages  Water, Powerade Zero    Exercise  Trying to increase walking, aiming for daily 15-60 minutes     PES statement:   Overweight/Obesity (NC 3.3) related to overeating and poor lifestyle habits as evidenced by patient's subjective statements (excessive energy intake, skipping meals) and BMI of 53.3 kg/m2.     Intervention    Nutrition Education:   1. Provided general overview of diet and lifestyle modifications needed to be a deemed a safe candidate for bariatric surgery.     Food/Nutrient Delivery:  2. Educated patient on eating three meals, with cutting out snacking.  3. Bariatric Plate: Patient and I discussed the importance of including a lean protein source (20-30 grams/meal), vegetables (included at lunch and dinner), one serving (15g) of carbohydrate, and limited added fat (1 tb/day) at each meal.   4. Discussed importance of adequate hydration after surgery, with goal of at least 64 oz of fluids/day.  5. Addressed avoiding all carbonated, caffeinated and sweetened drinks to prepare for bariatric surgery.     Nutrition Counselin. Mindful eating techniques: Encouraged slow meal pace, chewing foods to applesauce consistency for 20-30 minutes/meal.    7. Discussed  fluids 30 minutes before, during, and after meal to prevent dumping syndrome and discomfort post bariatric surgery.     Instructions/Goals:     1. Continue implementing bariatric surgery lifestyle modifications.    Handouts Provided:   [facility] Weight Management Patient Handbook      Monitor/Evaluation:  Pt. s target weight:  weight loss to 380 lbs or less/no gain from initial visit, pt. verbalized understanding.     Plan for next visit:     (Final supervised diet visit with RD) pre/post-op  diet progression, give review of surgery process.        Video-Visit Details    Type of service:  Video Visit    Video End Time:10:00 AM    Originating Location (pt. Location): Home    Distant Location (provider location):  [facility] SURGERY CLINIC AND BARIATRICS CARE [place]     Platform used for Video Visit: Marcela Yadav RD          Again, thank you for allowing me to participate in the care of your patient.        Sincerely,        Danette Yadav RD

## 2021-12-29 NOTE — PROGRESS NOTES
Ady Godinez is a 36 year old who is being evaluated via a billable video visit.      How would you like to obtain your AVS? MyChart  If the video visit is dropped, the invitation should be resent by: Send to e-mail at: [email or web address]  Will anyone else be joining your video visit? No      Video Start Time: 9:41 AM      Follow Up Surgical Weight Loss Supervised Diet Evaluation    Assessment:  Pt. is being seen today for a follow up RD nutritional evaluation. Pt. has been unsuccessful with non-surgical weight loss methods and is interested in bariatric surgery. Today we reviewed current eating habits and level of physical activity, and instructed on the changes that are required for successful bariatric outcomes.  Was started on Saxenda to help with his weight loss journey.     Surgery of interest per pt: LSG.    Workflow review:  Support Group: Not completed.  Psychology:In progress.  Lab work:Completed.  SWL:Yes 3rd Visit   Sleep Study    Weight goal: 380 lbs or less .    Anthropometrics:  Pt's weight is 392 lbs   Initial weight: 400 lbs   Weight change: 8 lbs (down)   BMI: There is no height or weight on file to calculate BMI.   Ideal body weight: 77.6 kg (171 lb 1.2 oz)  Adjusted ideal body weight: 119.3 kg (263 lb 0.7 oz)    Estimated RMR ([place]-St Jeor equation):  2749 kcals x 1.3 (light active) = 3574 kcals (for weight maintenance)    Medical History:  Patient Active Problem List   Diagnosis     Headache     Umbilical hernia without obstruction and without gangrene     Morbid obesity (H)      Diabetes: No   HbA1c:  No results found for: HGBA1C    Progress over past month: Patient reports that he has been working on being more consistent with eating 3 meals/day. Patient notes that he has been working on reducing/eliminating pop from his diet, noting he has had 1 over the past month.  Patient continues to take a Vitamin D supplement on a regular basis.     Diet Recall/Time  Breakfast: eggs with  sausage or bach, 2 slices of toast   Lunch: 1 sandwich (reduced from 3 sandwiches), occasional chips or carrots or fruit   Dinner: meat, potato or starch, vegetable or fruit     Typical Snacks: on occasion-chips (trying to reduce/avoid)     Meal duration: has been working on, needs to be more consistent with     fluids by 30 minutes before, during meal, and waiting 30 minutes after meal before drinking fluids: Yes-really trying to be consistent with.     Beverages  Water, Powerade Zero    Exercise  Trying to increase walking, aiming for daily 15-60 minutes     PES statement:   Overweight/Obesity (NC 3.3) related to overeating and poor lifestyle habits as evidenced by patient's subjective statements (excessive energy intake, skipping meals) and BMI of 53.3 kg/m2.     Intervention    Nutrition Education:   1. Provided general overview of diet and lifestyle modifications needed to be a deemed a safe candidate for bariatric surgery.     Food/Nutrient Delivery:  2. Educated patient on eating three meals, with cutting out snacking.  3. Bariatric Plate: Patient and I discussed the importance of including a lean protein source (20-30 grams/meal), vegetables (included at lunch and dinner), one serving (15g) of carbohydrate, and limited added fat (1 tb/day) at each meal.   4. Discussed importance of adequate hydration after surgery, with goal of at least 64 oz of fluids/day.  5. Addressed avoiding all carbonated, caffeinated and sweetened drinks to prepare for bariatric surgery.     Nutrition Counselin. Mindful eating techniques: Encouraged slow meal pace, chewing foods to applesauce consistency for 20-30 minutes/meal.   7. Discussed  fluids 30 minutes before, during, and after meal to prevent dumping syndrome and discomfort post bariatric surgery.     Instructions/Goals:     1. Continue implementing bariatric surgery lifestyle modifications.    Handouts Provided:    [facility] Weight  Management Patient Handbook      Monitor/Evaluation:  Pt. s target weight:  weight loss to 380 lbs or less/no gain from initial visit, pt. verbalized understanding.     Plan for next visit:     (Final supervised diet visit with RD) pre/post-op  diet progression, give review of surgery process.        Video-Visit Details    Type of service:  Video Visit    Video End Time:10:00 AM    Originating Location (pt. Location): Home    Distant Location (provider location):  [facility] SURGERY CLINIC AND BARIATRICS [facility]     Platform used for Video Visit: Marcela Yadav RD

## 2022-01-31 ENCOUNTER — VIRTUAL VISIT (OUTPATIENT)
Dept: SURGERY | Facility: CLINIC | Age: 37
End: 2022-01-31
Payer: COMMERCIAL

## 2022-01-31 DIAGNOSIS — Z71.3 NUTRITIONAL COUNSELING: ICD-10-CM

## 2022-01-31 DIAGNOSIS — E66.813 CLASS 3 SEVERE OBESITY DUE TO EXCESS CALORIES WITH SERIOUS COMORBIDITY AND BODY MASS INDEX (BMI) OF 50.0 TO 59.9 IN ADULT (H): Primary | ICD-10-CM

## 2022-01-31 DIAGNOSIS — E66.01 CLASS 3 SEVERE OBESITY DUE TO EXCESS CALORIES WITH SERIOUS COMORBIDITY AND BODY MASS INDEX (BMI) OF 50.0 TO 59.9 IN ADULT (H): Primary | ICD-10-CM

## 2022-01-31 DIAGNOSIS — R10.13 DYSPEPSIA: ICD-10-CM

## 2022-01-31 PROCEDURE — 97803 MED NUTRITION INDIV SUBSEQ: CPT | Mod: GT | Performed by: DIETITIAN, REGISTERED

## 2022-01-31 NOTE — PROGRESS NOTES
Ady Godinez is a 36 year old who is being evaluated via a billable video visit.      How would you like to obtain your AVS? MyChart  If the video visit is dropped, the invitation should be resent by: Send to e-mail at: [email or web address]  Will anyone else be joining your video visit? No      Video Start Time: 10:47 AM      Follow Up Surgical Weight Loss Supervised Diet Evaluation    Assessment:  Pt. is being seen today for a follow up RD nutritional evaluation. Pt. has been unsuccessful with non-surgical weight loss methods and is interested in bariatric surgery. Today we reviewed current eating habits and level of physical activity, and instructed on the changes that are required for successful bariatric outcomes.  Has been taking the Saxenda in regards to assistance with weight loss.      Surgery of interest per pt: LSG.    Workflow review:  Support Group: Not completed.  Psychology:In progress.  Lab work:Completed.  SWL:Yes 4th Visit   Sleep Study-needs to call in regards to getting one step up    Weight goal: 380 lbs or below.    Anthropometrics:  Pt's weight is 394 lbs   Initial weight: 400 lbs  Weight change: 6 lbs (down)   BMI: There is no height or weight on file to calculate BMI.   Ideal body weight: 77.6 kg (171 lb 1.2 oz)  Adjusted ideal body weight: 119.3 kg (263 lb 0.7 oz)    Estimated RMR ([place]-St Jeor equation):  2758 kcals x 1.3 (light active) = 3585 kcals (for weight maintenance)    Medical History:  Patient Active Problem List   Diagnosis     Headache     Umbilical hernia without obstruction and without gangrene     Morbid obesity (H)      Diabetes: No   HbA1c:  No results found for: HGBA1C    Progress over past month: has been dealing with a lot of stressors lately, noting that he tends to eat out of stress. Patient reports that he feels that he is doing well in terms of adequate protein consumption.  Patient reports that he also needs to work on increased vegetable consumption as  well.     Diet Recall/Time  Breakfast: eggs with toast or bowl of cereal   Lunch: sandwich (meat and cheese) with handful of chips or banana  Dinner: spaghetti or lasagna or pork chop with veggies (corn or green beans), potato or pizza     Typical Snacks: chips     Meal duration: continues to work on, somedays are definitely better than other.      fluids by 30 minutes before, during meal, and waiting 30 minutes after meal before drinking fluids: No-notes that he continues to work on and struggles with    Beverages  Water-at least 64 oz/day, Pop-trying to reduce consumption, Powerade Zero      Exercise  Trying to increase walking as able/tolerated, aiming for 15-60 minutes daily  Some household chores     PES statement:   Overweight/Obesity (NC 3.3) related to overeating and poor lifestyle habits as evidenced by patient's subjective statements (excessive energy intake) and BMI of 53.5 kg/m2.     Intervention    Nutrition Education:   1. Provided general overview of diet and lifestyle modifications needed to be a deemed a safe candidate for bariatric surgery.     Food/Nutrient Delivery:  2. Educated patient on eating three meals, with cutting out snacking.  3. Bariatric Plate: Patient and I discussed the importance of including a lean protein source (20-30 grams/meal), vegetables (included at lunch and dinner), one serving (15g) of carbohydrate, and limited added fat (1 tb/day) at each meal.   4. Discussed importance of adequate hydration after surgery, with goal of at least 64 oz of fluids/day.  5. Addressed avoiding all carbonated, caffeinated and sweetened drinks to prepare for bariatric surgery.     Nutrition Counselin. Mindful eating techniques: Encouraged slow meal pace, chewing foods to applesauce consistency for 20-30 minutes/meal.   7. Discussed  fluids 30 minutes before, during, and after meal to prevent dumping syndrome and discomfort post bariatric surgery.       Instructions/Goals:     1. Continue implementing bariatric surgery lifestyle modifications.  2. Comply a list of activities that could be done in replace of stress related eating behaviors.   Handouts Provided:   [facility] Bariatric Surgery Nutrition Info    Monitor/Evaluation:  Pt. s target weight:  weight loss to 380 lbs or less/no gain from initial visit, pt. verbalized understanding.     Plan for next visit:     (Final supervised diet visit with RD) pre/post-op  diet progression, give review of surgery process.        Video-Visit Details    Type of service:  Video Visit    Video End Time:11:08 AM    Originating Location (pt. Location): Home    Distant Location (provider location):  [facility] SURGERY CLINIC AND BARIATRICS CARE [place]     Platform used for Video Visit: Marcela Yadav, WILLIE

## 2022-01-31 NOTE — LETTER
1/31/2022         RE: Ady Godinez  [address]  [facility] [address]        Dear Colleague,    Thank you for referring your patient, Ady Godinez, to the [facility] SURGERY CLINIC AND BARIATRICS CARE [place]. Please see a copy of my visit note below.    Ady Godinez is a 36 year old who is being evaluated via a billable video visit.      How would you like to obtain your AVS? MyChart  If the video visit is dropped, the invitation should be resent by: Send to e-mail at: [email or web address]  Will anyone else be joining your video visit? No      Video Start Time: 10:47 AM      Follow Up Surgical Weight Loss Supervised Diet Evaluation    Assessment:  Pt. is being seen today for a follow up RD nutritional evaluation. Pt. has been unsuccessful with non-surgical weight loss methods and is interested in bariatric surgery. Today we reviewed current eating habits and level of physical activity, and instructed on the changes that are required for successful bariatric outcomes.  Has been taking the Saxenda in regards to assistance with weight loss.      Surgery of interest per pt: LSG.    Workflow review:  Support Group: Not completed.  Psychology:In progress.  Lab work:Completed.  SWL:Yes 4th Visit   Sleep Study-needs to call in regards to getting one step up    Weight goal: 380 lbs or below.    Anthropometrics:  Pt's weight is 394 lbs   Initial weight: 400 lbs  Weight change: 6 lbs (down)   BMI: There is no height or weight on file to calculate BMI.   Ideal body weight: 77.6 kg (171 lb 1.2 oz)  Adjusted ideal body weight: 119.3 kg (263 lb 0.7 oz)    Estimated RMR ([place]-St Jeor equation):  2758 kcals x 1.3 (light active) = 3585 kcals (for weight maintenance)    Medical History:  Patient Active Problem List   Diagnosis     Headache     Umbilical hernia without obstruction and without gangrene     Morbid obesity (H)      Diabetes: No   HbA1c:  No results found for:  HGBA1C    Progress over past month: has been dealing with a lot of stressors lately, noting that he tends to eat out of stress. Patient reports that he feels that he is doing well in terms of adequate protein consumption.  Patient reports that he also needs to work on increased vegetable consumption as well.     Diet Recall/Time  Breakfast: eggs with toast or bowl of cereal   Lunch: sandwich (meat and cheese) with handful of chips or banana  Dinner: spaghetti or lasagna or pork chop with veggies (corn or green beans), potato or pizza     Typical Snacks: chips     Meal duration: continues to work on, somedays are definitely better than other.      fluids by 30 minutes before, during meal, and waiting 30 minutes after meal before drinking fluids: No-notes that he continues to work on and struggles with    Beverages  Water-at least 64 oz/day, Pop-trying to reduce consumption, Powerade Zero      Exercise  Trying to increase walking as able/tolerated, aiming for 15-60 minutes daily  Some household chores     PES statement:   Overweight/Obesity (NC 3.3) related to overeating and poor lifestyle habits as evidenced by patient's subjective statements (excessive energy intake) and BMI of 53.5 kg/m2.     Intervention    Nutrition Education:   1. Provided general overview of diet and lifestyle modifications needed to be a deemed a safe candidate for bariatric surgery.     Food/Nutrient Delivery:  2. Educated patient on eating three meals, with cutting out snacking.  3. Bariatric Plate: Patient and I discussed the importance of including a lean protein source (20-30 grams/meal), vegetables (included at lunch and dinner), one serving (15g) of carbohydrate, and limited added fat (1 tb/day) at each meal.   4. Discussed importance of adequate hydration after surgery, with goal of at least 64 oz of fluids/day.  5. Addressed avoiding all carbonated, caffeinated and sweetened drinks to prepare for bariatric surgery.      Nutrition Counselin. Mindful eating techniques: Encouraged slow meal pace, chewing foods to applesauce consistency for 20-30 minutes/meal.   7. Discussed  fluids 30 minutes before, during, and after meal to prevent dumping syndrome and discomfort post bariatric surgery.      Instructions/Goals:     1. Continue implementing bariatric surgery lifestyle modifications.  2. Comply a list of activities that could be done in replace of stress related eating behaviors.   Handouts Provided:   [facility] Bariatric Surgery Nutrition Info    Monitor/Evaluation:  Pt. s target weight:  weight loss to 380 lbs or less/no gain from initial visit, pt. verbalized understanding.     Plan for next visit:     (Final supervised diet visit with RD) pre/post-op  diet progression, give review of surgery process.        Video-Visit Details    Type of service:  Video Visit    Video End Time:11:08 AM    Originating Location (pt. Location): Home    Distant Location (provider location):  [facility] SURGERY CLINIC AND BARIATRICS CARE [place]     Platform used for Video Visit: Marcela Yadav RD          Again, thank you for allowing me to participate in the care of your patient.        Sincerely,        Danette Yadav RD

## 2022-03-07 ENCOUNTER — VIRTUAL VISIT (OUTPATIENT)
Dept: SURGERY | Facility: CLINIC | Age: 37
End: 2022-03-07
Payer: COMMERCIAL

## 2022-03-07 DIAGNOSIS — R10.13 DYSPEPSIA: ICD-10-CM

## 2022-03-07 DIAGNOSIS — E66.813 CLASS 3 SEVERE OBESITY DUE TO EXCESS CALORIES WITH SERIOUS COMORBIDITY AND BODY MASS INDEX (BMI) OF 50.0 TO 59.9 IN ADULT (H): Primary | ICD-10-CM

## 2022-03-07 DIAGNOSIS — Z71.3 NUTRITIONAL COUNSELING: ICD-10-CM

## 2022-03-07 DIAGNOSIS — E66.01 CLASS 3 SEVERE OBESITY DUE TO EXCESS CALORIES WITH SERIOUS COMORBIDITY AND BODY MASS INDEX (BMI) OF 50.0 TO 59.9 IN ADULT (H): Primary | ICD-10-CM

## 2022-03-07 PROCEDURE — 97803 MED NUTRITION INDIV SUBSEQ: CPT | Mod: GT | Performed by: DIETITIAN, REGISTERED

## 2022-03-07 NOTE — LETTER
3/7/2022         RE: Ady Godinez  [address]  [facility] [address]        Dear Colleague,    Thank you for referring your patient, Ady Godinez, to the [facility] SURGERY CLINIC AND BARIATRICS CARE [place]. Please see a copy of my visit note below.    Ady Godinez is a 36 year old who is being evaluated via a billable video visit.      How would you like to obtain your AVS? MyChart  If the video visit is dropped, the invitation should be resent by: Send to e-mail at: [email or web address]  Will anyone else be joining your video visit? No      Video Start Time: 1:43 pm      Follow Up Surgical Weight Loss Supervised Diet Evaluation    Assessment:  Pt. is being seen today for a follow up RD nutritional evaluation. Pt. has been unsuccessful with non-surgical weight loss methods and is interested in bariatric surgery. Today we reviewed current eating habits and level of physical activity, and instructed on the changes that are required for successful bariatric outcomes. Patient reports that he continues to take the Saxenda.      Surgery of interest per pt: LSG.    Workflow review:  Support Group: Not completed.-planning on attending tomorrow night (3/8)  Psychology:In progress.  Lab work:Completed.  SWL:Yes 5th Visit   Sleep Study-needs to schedule still     Weight goal: 380 lbs or below.    Anthropometrics:  Pt's weight is 394 lbs-last weight recorded  Initial weight: 400 lbs   Weight change: 6 lbs (down)   BMI: There is no height or weight on file to calculate BMI.   Patient weight not recorded    Estimated RMR ([place]-St Jeor equation):  2758 kcals x 1.3 (light active) = 3585 kcals (for weight maintenance)    Medical History:  Patient Active Problem List   Diagnosis     Headache     Umbilical hernia without obstruction and without gangrene     Morbid obesity (H)      Diabetes: No   HbA1c:  No results found for: HGBA1C    Progress over past month: Patient reports that  he has been eating out of stress more recently, noting that he is just eating and drinking the wrong things.  Patient has been under a lot of stress due to still being unemployed.  Patient reports that he has admitted going back on his pop and chip consumption, noting that he knows he needs to work on reducing it again.  Patient reports that he continues to focus on protein first at meals along with increased vegetables.       Meal duration: continues to work on, trying to be consistent with.      fluids by 30 minutes before, during meal, and waiting 30 minutes after meal before drinking fluids: Yes-however struggle every once in awhile    Beverages  Water-at least 64 oz/day, Pop-increased more recently, occasional Powerade Zero     Exercise  No set regimen-however did get the OK to use the treadmill at PT (starting with 5-6 minutes/day), some walking indoors at [employer]    PES statement:   Overweight/Obesity (NC 3.3) related to overeating and poor lifestyle habits as evidenced by patient's subjective statements (excessive energy intake) and BMI of 53.5 kg/m2.   Intervention    Nutrition Education:   1. Provided general overview of diet and lifestyle modifications needed to be a deemed a safe candidate for bariatric surgery.     Food/Nutrient Delivery:  2. Educated patient on eating three meals, with cutting out snacking.  3. Bariatric Plate: Patient and I discussed the importance of including a lean protein source (20-30 grams/meal), vegetables (included at lunch and dinner), one serving (15g) of carbohydrate, and limited added fat (1 tb/day) at each meal.   4. Discussed importance of adequate hydration after surgery, with goal of at least 64 oz of fluids/day.  5. Addressed avoiding all carbonated, caffeinated and sweetened drinks to prepare for bariatric surgery.     Nutrition Counselin. Mindful eating techniques: Encouraged slow meal pace, chewing foods to applesauce consistency for 20-30  minutes/meal.   7. Discussed  fluids 30 minutes before, during, and after meal to prevent dumping syndrome and discomfort post bariatric surgery.     Instructions/Goals:     1. Continue implementing bariatric surgery lifestyle modifications.  2. Work on reducing pop and chip consumption, try an activity instead of food to help cope with stress.     Handouts Provided:   [facility] Bariatric Surgery Nutrition Info    Monitor/Evaluation:  Pt. s target weight:  weight loss to 380 lbs or less/no gain from initial visit, pt. verbalized understanding.     Plan for next visit:     (Final supervised diet visit with RD) pre/post-op  diet progression, give review of surgery process.      Video-Visit Details    Type of service:  Video Visit    Video End Time:1:58 PM    Originating Location (pt. Location): Home    Distant Location (provider location):  [facility] SURGERY CLINIC AND BARIATRICS CARE [place]     Platform used for Video Visit: Marcela Yadav RD          Again, thank you for allowing me to participate in the care of your patient.        Sincerely,        Danette Yadav RD

## 2022-03-07 NOTE — PROGRESS NOTES
Ady Godinez is a 36 year old who is being evaluated via a billable video visit.      How would you like to obtain your AVS? MyChart  If the video visit is dropped, the invitation should be resent by: Send to e-mail at: [email or web address]  Will anyone else be joining your video visit? No      Video Start Time: 1:43 pm      Follow Up Surgical Weight Loss Supervised Diet Evaluation    Assessment:  Pt. is being seen today for a follow up RD nutritional evaluation. Pt. has been unsuccessful with non-surgical weight loss methods and is interested in bariatric surgery. Today we reviewed current eating habits and level of physical activity, and instructed on the changes that are required for successful bariatric outcomes. Patient reports that he continues to take the Saxenda.      Surgery of interest per pt: LSG.    Workflow review:  Support Group: Not completed.-planning on attending tomorrow night (3/8)  Psychology:In progress.  Lab work:Completed.  SWL:Yes 5th Visit   Sleep Study-needs to schedule still     Weight goal: 380 lbs or below.    Anthropometrics:  Pt's weight is 394 lbs-last weight recorded  Initial weight: 400 lbs   Weight change: 6 lbs (down)   BMI: There is no height or weight on file to calculate BMI.   Patient weight not recorded    Estimated RMR ([place]-St Jeor equation):  2758 kcals x 1.3 (light active) = 3585 kcals (for weight maintenance)    Medical History:  Patient Active Problem List   Diagnosis     Headache     Umbilical hernia without obstruction and without gangrene     Morbid obesity (H)      Diabetes: No   HbA1c:  No results found for: HGBA1C    Progress over past month: Patient reports that he has been eating out of stress more recently, noting that he is just eating and drinking the wrong things.  Patient has been under a lot of stress due to still being unemployed.  Patient reports that he has admitted going back on his pop and chip consumption, noting that he knows he needs  to work on reducing it again.  Patient reports that he continues to focus on protein first at meals along with increased vegetables.       Meal duration: continues to work on, trying to be consistent with.      fluids by 30 minutes before, during meal, and waiting 30 minutes after meal before drinking fluids: Yes-however struggle every once in awhile    Beverages  Water-at least 64 oz/day, Pop-increased more recently, occasional Powerade Zero     Exercise  No set regimen-however did get the OK to use the treadmill at PT (starting with 5-6 minutes/day), some walking indoors at [employer]    PES statement:   Overweight/Obesity (NC 3.3) related to overeating and poor lifestyle habits as evidenced by patient's subjective statements (excessive energy intake) and BMI of 53.5 kg/m2.   Intervention    Nutrition Education:   1. Provided general overview of diet and lifestyle modifications needed to be a deemed a safe candidate for bariatric surgery.     Food/Nutrient Delivery:  2. Educated patient on eating three meals, with cutting out snacking.  3. Bariatric Plate: Patient and I discussed the importance of including a lean protein source (20-30 grams/meal), vegetables (included at lunch and dinner), one serving (15g) of carbohydrate, and limited added fat (1 tb/day) at each meal.   4. Discussed importance of adequate hydration after surgery, with goal of at least 64 oz of fluids/day.  5. Addressed avoiding all carbonated, caffeinated and sweetened drinks to prepare for bariatric surgery.     Nutrition Counselin. Mindful eating techniques: Encouraged slow meal pace, chewing foods to applesauce consistency for 20-30 minutes/meal.   7. Discussed  fluids 30 minutes before, during, and after meal to prevent dumping syndrome and discomfort post bariatric surgery.     Instructions/Goals:     1. Continue implementing bariatric surgery lifestyle modifications.  2. Work on reducing pop and chip consumption, try  an activity instead of food to help cope with stress.     Handouts Provided:   [facility] Bariatric Surgery Nutrition Info    Monitor/Evaluation:  Pt. s target weight:  weight loss to 380 lbs or less/no gain from initial visit, pt. verbalized understanding.     Plan for next visit:     (Final supervised diet visit with RD) pre/post-op  diet progression, give review of surgery process.      Video-Visit Details    Type of service:  Video Visit    Video End Time:1:58 PM    Originating Location (pt. Location): Home    Distant Location (provider location):  [facility] SURGERY CLINIC AND BARIATRICS CARE [place]     Platform used for Video Visit: Marcela Yadav RD

## 2022-03-18 NOTE — PLAN OF CARE
PRIMARY DIAGNOSIS: Umbilical hernia   OUTPATIENT/OBSERVATION GOALS TO BE MET BEFORE DISCHARGE:  ADLs back to baseline: No    Activity and level of assistance: Up with standby assistance.    Pain status: Pain free.    Return to near baseline physical activity: No     Discharge Planner Nurse   Safe discharge environment identified: Yes  Barriers to discharge: Yes       Entered by: Carol Ernst 09/10/2021 11:41 PM   Care plan explained then NG tube put in place; patient verbalized understanding. US abdomen taken to ensure NG is in place. Patient ambulated to toilet with SBA. Lactated Ringers infusing.   Please review provider order for any additional goals.   Nurse to notify provider when observation goals have been met and patient is ready for discharge.   Patient refusing AM meds. Patient states he "wants to wait for daughter to see if any adjustments need to be made".

## 2022-03-21 ENCOUNTER — OFFICE VISIT (OUTPATIENT)
Dept: SURGERY | Facility: CLINIC | Age: 37
End: 2022-03-21
Payer: COMMERCIAL

## 2022-03-21 ENCOUNTER — LAB (OUTPATIENT)
Dept: LAB | Facility: CLINIC | Age: 37
End: 2022-03-21
Payer: COMMERCIAL

## 2022-03-21 VITALS
DIASTOLIC BLOOD PRESSURE: 88 MMHG | SYSTOLIC BLOOD PRESSURE: 142 MMHG | WEIGHT: 315 LBS | BODY MASS INDEX: 42.66 KG/M2 | HEIGHT: 72 IN

## 2022-03-21 DIAGNOSIS — E55.9 VITAMIN D DEFICIENCY: ICD-10-CM

## 2022-03-21 DIAGNOSIS — R74.01 ELEVATED AST (SGOT): ICD-10-CM

## 2022-03-21 DIAGNOSIS — E66.813 CLASS 3 SEVERE OBESITY DUE TO EXCESS CALORIES WITH SERIOUS COMORBIDITY AND BODY MASS INDEX (BMI) OF 50.0 TO 59.9 IN ADULT (H): ICD-10-CM

## 2022-03-21 DIAGNOSIS — E55.9 VITAMIN D DEFICIENCY: Primary | ICD-10-CM

## 2022-03-21 DIAGNOSIS — K76.0 HEPATIC STEATOSIS: ICD-10-CM

## 2022-03-21 DIAGNOSIS — E66.01 CLASS 3 SEVERE OBESITY DUE TO EXCESS CALORIES WITH SERIOUS COMORBIDITY AND BODY MASS INDEX (BMI) OF 50.0 TO 59.9 IN ADULT (H): ICD-10-CM

## 2022-03-21 DIAGNOSIS — K42.9 UMBILICAL HERNIA WITHOUT OBSTRUCTION AND WITHOUT GANGRENE: ICD-10-CM

## 2022-03-21 LAB
ALBUMIN SERPL-MCNC: 3.6 G/DL (ref 3.5–5)
ALP SERPL-CCNC: 124 U/L (ref 45–120)
ALT SERPL W P-5'-P-CCNC: 37 U/L (ref 0–45)
ANION GAP SERPL CALCULATED.3IONS-SCNC: 12 MMOL/L (ref 5–18)
AST SERPL W P-5'-P-CCNC: 30 U/L (ref 0–40)
BILIRUB SERPL-MCNC: 0.2 MG/DL (ref 0–1)
BUN SERPL-MCNC: 13 MG/DL (ref 8–22)
CALCIUM SERPL-MCNC: 9.5 MG/DL (ref 8.5–10.5)
CHLORIDE BLD-SCNC: 102 MMOL/L (ref 98–107)
CO2 SERPL-SCNC: 26 MMOL/L (ref 22–31)
CREAT SERPL-MCNC: 0.74 MG/DL (ref 0.7–1.3)
GFR SERPL CREATININE-BSD FRML MDRD: >90 ML/MIN/1.73M2
GLUCOSE BLD-MCNC: 153 MG/DL (ref 70–125)
POTASSIUM BLD-SCNC: 4.5 MMOL/L (ref 3.5–5)
PROT SERPL-MCNC: 7.6 G/DL (ref 6–8)
SODIUM SERPL-SCNC: 140 MMOL/L (ref 136–145)

## 2022-03-21 PROCEDURE — 82306 VITAMIN D 25 HYDROXY: CPT

## 2022-03-21 PROCEDURE — 99214 OFFICE O/P EST MOD 30 MIN: CPT | Performed by: EMERGENCY MEDICINE

## 2022-03-21 PROCEDURE — 36415 COLL VENOUS BLD VENIPUNCTURE: CPT

## 2022-03-21 PROCEDURE — 80053 COMPREHEN METABOLIC PANEL: CPT

## 2022-03-21 RX ORDER — CHOLECALCIFEROL (VITAMIN D3) 125 MCG
1 CAPSULE ORAL DAILY
Qty: 180 CAPSULE | Refills: 0 | Status: SHIPPED | OUTPATIENT
Start: 2022-03-21 | End: 2022-09-17

## 2022-03-21 RX ORDER — NYSTATIN AND TRIAMCINOLONE ACETONIDE 100000; 1 [USP'U]/G; MG/G
OINTMENT TOPICAL
COMMUNITY
Start: 2021-11-30 | End: 2023-11-13

## 2022-03-21 NOTE — PROGRESS NOTES
Bariatric Clinic Follow-Up Visit:    Ady Godinez is a 36 year old  male with Body mass index is 54.1 kg/m .  presenting here today for follow-up on non-surgical efforts for weight loss. Original Intake visit occurred on 11/29/21 with a weight of 401lbs and BMI of 54 w/ co morbid hernia that he was looking to improve repair risks with weight loss. He didn't clear psychological evaluation due to stress eating and is working w/ therapist now and we'll reassess suitabillity for surgery in the future depending on habit change/coping mechanisms and recommendations from his therapist and dietician..  Along with diet and behavior changes, he has been using saxenda but struggling weight consistency, we'll reramp to assist his weight loss goals and consider once weekly options if struggling.  See his intake visit notes for details on identified contributors to weight gain in the past. Chart review shows Dietician calculated RMR of 2758kcal/ and protein intake goal of 100g/day.    Weight:   Wt Readings from Last 3 Encounters:   03/21/22 (!) 180.9 kg (398 lb 14.4 oz)   11/29/21 (!) 181.9 kg (401 lb)   11/08/21 (!) 179.2 kg (395 lb)    pounds      Comorbidities:  Patient Active Problem List   Diagnosis     Headache     Umbilical hernia without obstruction and without gangrene     Morbid obesity (H)       Current Outpatient Medications:      acetaminophen (TYLENOL) 325 MG tablet, Take 2 tablets (650 mg) by mouth every 6 hours as needed for mild pain, Disp: 90 tablet, Rfl: 0     amitriptyline (ELAVIL) 10 MG tablet, Take 10 mg by mouth At Bedtime, Disp: , Rfl:      aspirin-acetaminophen-caffeine (EXCEDRIN MIGRAINE) 250-250-65 MG tablet, Take 2 tablets by mouth every 6 hours as needed , Disp: , Rfl:      cyclobenzaprine (FLEXERIL) 5 MG tablet, Take 5-10 mg by mouth , Disp: , Rfl:      insulin pen needle (31G X 6 MM) 31G X 6 MM miscellaneous, Use new pen needle for each autoinjection., Disp: 90 each, Rfl: 1     liraglutide -  Weight Management (SAXENDA) 18 MG/3ML pen, Start 0.6mg injected daily for 2 weeks, then increase every 1-2 weeks by 0.6mg up to a max of 3mg/day if tolerated. (0.6mg to 1.2mg to 1.8mg to 2.4mg to 3.0mg/day)., Disp: 3 mL, Rfl: 5     nystatin-triamcinolone (MYCOLOG) 686076-6.1 UNIT/GM-% external ointment, APPLY TOPICALLY TWO TIMES A DAY FOR 7 TO 10 DAYS, Disp: , Rfl:      ondansetron (ZOFRAN-ODT) 4 MG ODT tab, Place 4 mg under the tongue , Disp: , Rfl:       Interim: Since our last visit, he has been seeing therapist through [facility] and working on his stress. Just got a job at [employer] this Spring and will be starting up at the [place] location. Wife is struggling as well w/ stress and is looking forward to this year. We'll focus on non surgical methods. Still stress snacking a lot. More chips/soda reliance.     Plan:   1.  Diet: Aiming for about 2000-2200kcal a day with 80-100grams of protein a day. Essentially, getting 3 meals every 5-6 hours with 600kcal per meal and 25-35grams of lean protein would be idea.  2. Exercise: start walking 10 minutes daily.  3. Medication: restart Saxenda and ramp up every 2 weeks until you're back up to the 3.0mg/day dose.   4.  We'll proceed with non surgical weight loss these next 6-12 months as your stress coping/therapy improvements continue.  5. Goals: getting weight under 375 lbs will help hernia strain.  6. Take 5000IUs of D3 daily we'll recheck levels again today      We discussed [facility] Bariatric Basics including:  -eating 3 meals daily  -reviewed metabolic needs for weight loss based on Resting Metabolic Rate  -protein goals supportive of healthy weight loss  -avoiding/limiting calorie containing beverages  -We discussed the importance of restorative sleep and stress management in maintaining a healthy weight.  -We discussed the National Weight Control Registry healthy weight maintenance strategies and ways to optimize metabolism.  -We discussed the importance of physical  activity including cardiovascular and strength training in maintaining a healthier weight and explored viable options.    Most recent labs:  Lab Results   Component Value Date    WBC 11.5 (H) 11/29/2021    HGB 14.5 11/29/2021    HCT 46.7 11/29/2021    MCV 91 11/29/2021     11/29/2021     Lab Results   Component Value Date    CHOL 192 11/29/2021     Lab Results   Component Value Date    HDL 37 (L) 11/29/2021     No components found for: LDLCALC  Lab Results   Component Value Date    TRIG 143 11/29/2021     No results found for: CHOLHDL  Lab Results   Component Value Date    ALT 43 11/29/2021    AST 53 (H) 11/29/2021    ALKPHOS 106 11/29/2021     No results found for: HGBA1C  No components found for: [id number]  No components found for: JNACWHXP88ES  No components found for: FERRITIN  No components found for: PTH  No components found for: 86386  No components found for: 7597  Lab Results   Component Value Date    TSH 4.14 11/29/2021     No results found for: TESTOSTERONE    DIETARY HISTORY    Positive Changes Since Last Visit: working now  Struggling With: stress eating    Knowledgeable in [employer]: reviewed protein sheet. He's encouraged by options like cottage cheese  Getting Adequate Protein: no  Sleeping 7-8 hours/day yes  Stress management poor, reviewed options and thought clifford w/ wife was an option. Will try to get walking.    PHYSICAL ACTIVITY PATTERNS:  Cardiovascular: limited  Strength Training: limited    REVIEW OF SYSTEMS  Hernia stable. nontender today..  PHYSICAL EXAM:  Vitals: BP (!) 142/88   Ht 1.829 m (6')   Wt (!) 180.9 kg (398 lb 14.4 oz)   BMI 54.10 kg/m    Weight:   Wt Readings from Last 3 Encounters:   03/21/22 (!) 180.9 kg (398 lb 14.4 oz)   11/29/21 (!) 181.9 kg (401 lb)   11/08/21 (!) 179.2 kg (395 lb)         GEN: Pleasant, well groomed, in no acute distress  HEENT:   .  NECK: No swelling.  HEART: RRR.  LUNGS: No respiratory difficulty noted. No cough. .  ABDOMEN:  periumbilical hernia is reducible but probminent. .  EXTREMITIES: No tremor. Ambulation is indepdendent. No edema...  NEURO: Alert and Oriented X3, fluent speech. .  SKIN: No visible rashes.     Medical Decision Making:  Interim study results: low.      30 minutes spent on the date of the encounter doing chart review, history and exam, documentation and further activities per the note   Ry Vogt MD  [facility]  9:49 AM  3/21/2022

## 2022-03-21 NOTE — LETTER
3/21/2022         RE: Ady Godinez  [address]  [facility] [id number]        Dear Colleague,    Thank you for referring your patient, Ady Godinez, to the [facility] SUR[facility] AND BARIATRICS CARE [place]. Please see a copy of my visit note below.    Bariatric Clinic Follow-Up Visit:    Ady Godinez is a 36 year old  male with Body mass index is 54.1 kg/m .  presenting here today for follow-up on non-surgical efforts for weight loss. Original Intake visit occurred on 11/29/21 with a weight of 401lbs and BMI of 54 w/ co morbid hernia that he was looking to improve repair risks with weight loss. He didn't clear psychological evaluation due to stress eating and is working w/ therapist now and we'll reassess suitabillity for surgery in the future depending on habit change/coping mechanisms and recommendations from his therapist and dietician..  Along with diet and behavior changes, he has been using saxenda but struggling weight consistency, we'll reramp to assist his weight loss goals and consider once weekly options if struggling.  See his intake visit notes for details on identified contributors to weight gain in the past. Chart review shows Dietician calculated RMR of 2758kcal/ and protein intake goal of 100g/day.    Weight:   Wt Readings from Last 3 Encounters:   03/21/22 (!) 180.9 kg (398 lb 14.4 oz)   11/29/21 (!) 181.9 kg (401 lb)   11/08/21 (!) 179.2 kg (395 lb)    pounds      Comorbidities:  Patient Active Problem List   Diagnosis     Headache     Umbilical hernia without obstruction and without gangrene     Morbid obesity (H)       Current Outpatient Medications:      acetaminophen (TYLENOL) 325 MG tablet, Take 2 tablets (650 mg) by mouth every 6 hours as needed for mild pain, Disp: 90 tablet, Rfl: 0     amitriptyline (ELAVIL) 10 MG tablet, Take 10 mg by mouth At Bedtime, Disp: , Rfl:      aspirin-acetaminophen-caffeine (EXCEDRIN MIGRAINE) 250-250-65 MG tablet,  Take 2 tablets by mouth every 6 hours as needed , Disp: , Rfl:      cyclobenzaprine (FLEXERIL) 5 MG tablet, Take 5-10 mg by mouth , Disp: , Rfl:      insulin pen needle (31G X 6 MM) 31G X 6 MM miscellaneous, Use new pen needle for each autoinjection., Disp: 90 each, Rfl: 1     liraglutide - Weight Management (SAXENDA) 18 MG/3ML pen, Start 0.6mg injected daily for 2 weeks, then increase every 1-2 weeks by 0.6mg up to a max of 3mg/day if tolerated. (0.6mg to 1.2mg to 1.8mg to 2.4mg to 3.0mg/day)., Disp: 3 mL, Rfl: 5     nystatin-triamcinolone (MYCOLOG) 116231-9.1 UNIT/GM-% external ointment, APPLY TOPICALLY TWO TIMES A DAY FOR 7 TO 10 DAYS, Disp: , Rfl:      ondansetron (ZOFRAN-ODT) 4 MG ODT tab, Place 4 mg under the tongue , Disp: , Rfl:       Interim: Since our last visit, he has been seeing therapist through [facility] and working on his stress. Just got a job at [employer] this Spring and will be starting up at the [place] location. Wife is struggling as well w/ stress and is looking forward to this year. We'll focus on non surgical methods. Still stress snacking a lot. More chips/soda reliance.     Plan:   1.  Diet: Aiming for about 2000-2200kcal a day with 80-100grams of protein a day. Essentially, getting 3 meals every 5-6 hours with 600kcal per meal and 25-35grams of lean protein would be idea.  2. Exercise: start walking 10 minutes daily.  3. Medication: restart Saxenda and ramp up every 2 weeks until you're back up to the 3.0mg/day dose.   4.  We'll proceed with non surgical weight loss these next 6-12 months as your stress coping/therapy improvements continue.  5. Goals: getting weight under 375 lbs will help hernia strain.  6. Take 5000IUs of D3 daily we'll recheck levels again today      We discussed [facility] Bariatric Basics including:  -eating 3 meals daily  -reviewed metabolic needs for weight loss based on Resting Metabolic Rate  -protein goals supportive of healthy weight loss  -avoiding/limiting calorie  containing beverages  -We discussed the importance of restorative sleep and stress management in maintaining a healthy weight.  -We discussed the National Weight Control Registry healthy weight maintenance strategies and ways to optimize metabolism.  -We discussed the importance of physical activity including cardiovascular and strength training in maintaining a healthier weight and explored viable options.    Most recent labs:  Lab Results   Component Value Date    WBC 11.5 (H) 11/29/2021    HGB 14.5 11/29/2021    HCT 46.7 11/29/2021    MCV 91 11/29/2021     11/29/2021     Lab Results   Component Value Date    CHOL 192 11/29/2021     Lab Results   Component Value Date    HDL 37 (L) 11/29/2021     No components found for: LDLCALC  Lab Results   Component Value Date    TRIG 143 11/29/2021     No results found for: CHOLHDL  Lab Results   Component Value Date    ALT 43 11/29/2021    AST 53 (H) 11/29/2021    ALKPHOS 106 11/29/2021     No results found for: HGBA1C  No components found for: LLXMAGCK11  No components found for: [id number]  No components found for: FERRITIN  No components found for: PTH  No components found for: 92522  No components found for: 7597  Lab Results   Component Value Date    TSH 4.14 11/29/2021     No results found for: TESTOSTERONE    DIETARY HISTORY    Positive Changes Since Last Visit: working now  Struggling With: stress eating    Knowledgeable in [place] Food Labels: reviewed protein sheet. He's encouraged by options like cottage cheese  Getting Adequate Protein: no  Sleeping 7-8 hours/day yes  Stress management poor, reviewed options and thought clifford w/ wife was an option. Will try to get walking.    PHYSICAL ACTIVITY PATTERNS:  Cardiovascular: limited  Strength Training: limited    REVIEW OF SYSTEMS  Hernia stable. nontender today..  PHYSICAL EXAM:  Vitals: BP (!) 142/88   Ht 1.829 m (6')   Wt (!) 180.9 kg (398 lb 14.4 oz)   BMI 54.10 kg/m    Weight:   Wt Readings from Last  3 Encounters:   03/21/22 (!) 180.9 kg (398 lb 14.4 oz)   11/29/21 (!) 181.9 kg (401 lb)   11/08/21 (!) 179.2 kg (395 lb)         GEN: Pleasant, well groomed, in no acute distress  HEENT:   .  NECK: No swelling.  HEART: RRR.  LUNGS: No respiratory difficulty noted. No cough. .  ABDOMEN: periumbilical hernia is reducible but probminent. .  EXTREMITIES: No tremor. Ambulation is indepdendent. No edema...  NEURO: Alert and Oriented X3, fluent speech. .  SKIN: No visible rashes.     Medical Decision Making:  Interim study results: low.      30 minutes spent on the date of the encounter doing chart review, history and exam, documentation and further activities per the note   Ry Vogt MD  [facility]  9:49 AM  3/21/2022      Again, thank you for allowing me to participate in the care of your patient.        Sincerely,        Ry Vogt MD

## 2022-03-21 NOTE — PATIENT INSTRUCTIONS
Plan:   1.  Diet: Aiming for about 2000-2200kcal a day with 80-100grams of protein a day. Essentially, getting 3 meals every 5-6 hours with 600kcal per meal and 25-35grams of lean protein would be idea.  2. Exercise: start walking 10 minutes daily.  3. Medication: restart Saxenda and ramp up every 2 weeks until you're back up to the 3.0mg/day dose.   4.  We'll proceed with non surgical weight loss these next 6-12 months as your stress coping/therapy improvements continue.  5. Goals: getting weight under 375 lbs will help hernia strain.  6. Take 5000IUs of D3 daily we'll recheck levels again today        LEAN PROTEIN SOURCES  Getting 20-30 grams of protein, 3 meals daily, is appropriate for most people, some need more but more than about 40 grams per meal is not useful.  General rule is drinking one ounce of water per gram of protein eaten over the course of the day:  70 grams of protein each day, drink 70 oz of water.  Protein Source Portion Calories Grams of Protein                           Nonfat, plain Greek yogurt    (10 grams sugar or less) 3/4 cup (6 oz)  12-17   Light Yogurt (10 grams sugar or less) 3/4 cup (6 oz)  6-8   Protein Shake 1 shake 110-180 15-30   Skim/1% Milk or lactose-free milk 1 cup ( 8 oz)  8   Plain or light, flavored soymilk 1 cup  7-8   Plain or light, hemp milk 1 cup 110 6   Fat Free or 1% Cottage Cheese 1/2 cup 90 15   Part skim ricotta cheese 1/2 cup 100 14   Part skim or reduced fat cheese slices 1 ounce 65-80 8     Mozzarella String Cheese 1 80 8   Canned tuna, chicken, crab or salmon  (canned in water)  1/2 cup 100 15-20   White fish (broiled, grilled, baked) 3 ounces 100 21   [place]/Tuna (broiled, grilled, baked) 3 ounces 150-180 21   Shrimp, Scallops, Lobster, Crab 3 ounces 100 21   Pork loin, Pork Tenderloin 3 ounces 150 21   Boneless, skinless chicken /turkey breast                          (broiled, grilled, baked) 3 ounces 120 21   [place], Sandra,  and Venison 3 ounces 120 21   Lean cuts of red meat and pork (sirloin,   round, tenderloin, flank, ground 93%-96%) 3 ounces 170 21   Lean or Extra Lean Ground Turkey 1/2 cup 150 20   90-95% Lean [place] Burger 1 noelle 140-180 21   Low-fat casserole with lean meat 3/4 cup 200 17   Luncheon Meats                                                        (turkey, lean ham, roast beef, chicken) 3 ounces 100 21   Egg (boiled, poached, scrambled) 1 Egg 60 7   Egg Substitute 1/2 cup 70 10   Nuts (limit to 1 serving per day)  3 Tbsp. 150 7   Nut [place] (peanut, almond)  Limit to 1 serving or less daily 1 Tbsp. 90 4   Soy Burger (varies) 1  15   Garbanzo, Black, [place] Beans 1/2 cup 110 7   Refried Beans 1/2 cup 100 7   Kidney and Lima beans 1/2 cup 110 7   Tempeh 3 oz 175 18   Vegan crumbles 1/2 cup 100 14   Tofu 1/2 cup 110 14   Chili (beans and extra lean beef or turkey) 1 cup 200 23   Lentil Stew/Soup 1 cup 150 12   Black Bean Soup 1 cup 175 12             Example Meal Plan for a 7198-0454 Calorie Diet:  Earnest, increase portions by 30% aiming for 2000-2200kcal/day and 100g of lean protein for now.    In order to fuel your weight loss properly and avoid hunger-induced overeating later in the day, for your height and weight, you will enjoy the most success by following the diet below or similar with adjustments based on your particular tastes and preferences.  Exercise may influence speed, amount of weight loss further.     I recommend getting into a meal routine and keeping it similar day to day in the beginning so you don t have to think too hard about what you re going to make/eat.  Keep snacks healthy, ideally containing protein and some vegetables.  Non-processed food is preferable to packaged items.  Eat at least a few crunchy green vegetables if having a snack, which should be 2-3 hours after your mealtimes(prepare these ahead of time for ease of use).  Drink 64 oz -80 oz of water daily for most, some of you will  need more and we'll discuss it at your visit if that is the case.      When changing our diet,  we can often mistake thirst for hunger or just have some distracted eating habits that we need to break free from ('bored/mindless eating', screen time,work, driving,etc).  A glass of water and reconsideration of our hunger is often all that is needed.  Having the urge is not the problem, but watching it pass by without acting on it is the goal.    If you re having hunger problems, add a protein drink/snack to your morning hours or afternoon snack with at least 20grams of protein and not too much sugar (under 10g).  A carton of higher protein/low sugar yogurt can work as well.  If the urge to snack is overwhelming and not satiated, try going for a 10 minute walk/exercise, come home and drink a glass of water and if still hungry, have a  calorie snack (handful of raw/sprouted nuts, veggies and string cheese, protein bar, etc).  Savor it.    It is better to have a large breakfast, a moderate lunch and a smaller dinner to fuel your day.  People lose 10-15% more weight during their weight loss season with this strategy. Optimizing your protein intake at each meal will further keep you more satisfied while eating less food overall.  Getting exercise in early has also been shown to offer the best results (before breakfast ideally but anytime is the right time to exercise if that is not an option for you).    To make sure you re getting adequate vitamins and minerals during weight loss, I recommend one complete multivitamin a day of your choice.  Consider a probiotic and taking some vitamin D 2000 IU daily.    Let supper be your last meal of the day and ideally try to have at least 12 hours between supper and breakfast the next day to tap into some beneficial overnight fasting dynamics.  Midnight snacks need to go away. Water in the evening is fine, unsweetened, non caffeinated herbal tea is helpful as well.  Consolidating  your meals within a 8-12 hour period of your day will help tap into these additional metabolic benefits and tends to keep your appetite up for breakfast, further helping to stay on track.  For most of my patients, I don't recommend an intermittent fasting style diet (many find it hard to fit in their lifestyle) but an overnight fast is very doable for most patients and helps regulate our hunger drives a little better.  This makes it very important to nail good intake at all three meals to feel satisfied/energized and still lose weight.      If evening snacking desires are high, consider a glass of fiber supplement for some additional fullness (metamucil or similar). Most of us don't get the 25-30 grams per day of fiber that promotes good gut health/satiety.  Benefiber, metamucil, citrucel are reasonable/affordable options for most people.  Inulin, chicory, psyllium husk are reasonable options but start slow and low in the dose to avoid gas/bloating until your gut gets acclimated (ramping up to 5-10 grams per day of supplemental fiber after 3-4 weeks if needed).      Example Meal Plan:  Breakfast: 450-475 Calories  1 egg cooked on low in olive oil:   calories.  5oz Greek Yogurt (Fage plain classic: ~150 javon)  Handful of Berries of your choice (about a calorie per berry or 20-40cal per handful)    cup(cooked) of  old fashioned oatmeal or 1/2 cup(cooked) steel cut oats. (150 javon)  Sprinkle amount of brown sugar and a pat of butter. (40 javon)  Glass of  Water  Black coffee or unsweetened Tea (0calories).      2-3 hours Later Snack: (195 calories).  Glass of water  One string Cheese (80 calories) or 4 oz creamed cottage cheese (115 calories) with  Crunchy Celery sticks (less than 10 calories per large stalk) 2 stalks. (20 calories)    of a  Large Banana or   of a Large Apple (60 calories):  eat second half at lunch or afternoon snack.     Lunch:300 -350 calories   Chicken Breast  (baked/broiled/roasted/grilled)  4-6  oz.  (125-180 javon), BBQ sauce/hot sauce/mustard/seasoning is free. Just use a reasonable amount. Or a can of tuna with 1 tablespoon mayonnaise.  Salad: lettuce, any other veggies (cucumbers, green peppers/celery you like and a small drizzle of dressing to just flavor.  Go as big on the veggies as you like,  as they are practically calorie free.   A whole, 8 inch cucumber is 45 calories, a whole green pepper is 23 calories, a stalk of celery is 9 calories.  [place] Dressing is 60 calories per tablespoon..so moderate your desired dressing or do a drizzle of olive oil and splash of balsamic vinegar on top,  Total calories unlikely to be over 150 even with dressing.  Glass of Water.    Option for lunch is meal replacement protein drink/smoothie.  Need at least 20 grams of protein and eat the rest of your apple/banana from the morning snack.      Afternoon Snack: 150-200 calories   Cheese Stick or cottage cheese again  and a fresh fruit OR  Granola Bar (protein Bar acceptable if under 200 calories OR  Homemade smoothies:  8oz skim milk,  a handful of berries (fresh or frozen and a serving of protein powder such as BiPro or Ekaterina sWhey for example.  If you don't like dairy, make with 8oz water, one small banana, handful of berries and the protein powder, add any veggies you want as well:  roughly 200 calories.   Glass of Water    Dinner: 325 calories  4oz of fresh, [place] salmon.  Broiled (salt/pepper/dill) for about 8-8.5 minutes (200calories) or  4oz filet mignon steak or sirloin steak  Salad or vegetable sautéed lightly in olive oil or   Broccoli 1.5  cups chopped and steamed  or micro-waved in a little water (75 calories)  Glass of Water,    Cup of herbal tea (unsweetened, caffeine free)      Herbs and seasonings are encouraged to flavor your foods/vegetables.  Make your food delicious.      Tips for Success:  1.  Prepare proteins ahead of time (broil chicken breasts in bulk so you can grab and go), steel cut  "oats/lentils can be stored in casserole dish/bowl in the fridge for quick scoop in the morning and rewarm in microwave, make use of crock pot recipes (watch salt content).  Making meals that cover 3-4 future meals is an easy way to stay on track.  2.  Drink a 8-12 oz glass of water every 2-3 hours when awake.  We often mistake hunger for thirst, especially when losing weight.  3. Remember your Reward and Motivation when things get hard.  4.  Weigh yourself every morning and record, you'll stay on track better and learn how our biorhythms, diet and elimination patterns show up on the scale. Don't worry about 1 or 2 day patterns, but when on track you'll notice good trend downward of weight over 3-4 day segments.  Plateaus tend to resolve after 4-8 days in most cases if you stay consistent with your plan.  These are natural and part of weight loss, even if you're perfect with your plan execution.  5. Call if problems/concerns.  Astley Clarke is a great tool to stay in touch and provide weekly outside accountability. Check in with questions or if you want to brag.  6.  Find a handful of meals/foods that keep you on track and feeling good and get into a routine that is sustainable for you.  It's OK to have a routine that works for you.  7.  Consider taking a complete multivitamin just to make sure all micronutrients are adequate during weight loss.  8. If losing hair/brittle nails it usually means you are not taking enough protein.  Minimum goal is 60 grams daily of protein for smaller women, 80 grams a day for men. Consider taking Biotin as supplement or a \"Hair and Nail\" multivitamin.      "

## 2022-03-22 LAB — DEPRECATED CALCIDIOL+CALCIFEROL SERPL-MC: 10 UG/L (ref 30–80)

## 2022-06-19 ENCOUNTER — HOSPITAL ENCOUNTER (EMERGENCY)
Facility: HOSPITAL | Age: 37
Discharge: HOME | End: 2022-06-19
Attending: EMERGENCY MEDICINE | Admitting: EMERGENCY MEDICINE

## 2022-06-19 VITALS
HEART RATE: 101 BPM | OXYGEN SATURATION: 96 % | DIASTOLIC BLOOD PRESSURE: 86 MMHG | WEIGHT: 315 LBS | BODY MASS INDEX: 42.66 KG/M2 | TEMPERATURE: 97.2 F | SYSTOLIC BLOOD PRESSURE: 188 MMHG | HEIGHT: 72 IN | RESPIRATION RATE: 16 BRPM

## 2022-06-19 DIAGNOSIS — M25.562 ACUTE PAIN OF LEFT KNEE: ICD-10-CM

## 2022-06-19 PROCEDURE — 99281 EMR DPT VST MAYX REQ PHY/QHP: CPT

## 2022-06-19 ASSESSMENT — ENCOUNTER SYMPTOMS
HEMATURIA: 0
JOINT SWELLING: 0
CONFUSION: 0
NAUSEA: 0
DIZZINESS: 0
SORE THROAT: 0
DIARRHEA: 0
FEVER: 0
ABDOMINAL PAIN: 0
VOMITING: 0
CHILLS: 0
DYSURIA: 0
SHORTNESS OF BREATH: 0

## 2022-06-19 NOTE — ED PROVIDER NOTES
Emergency Department Encounter     Evaluation Date & Time:   No admission date for patient encounter.    CHIEF COMPLAINT:  Knee Pain      Triage Note:Pt comes in with L knee pain x1 week. Does not remember any specific injury to the area. Pt has been working more. Pain does extend into L calf at times.              ED COURSE & MEDICAL DECISION MAKING:        Pt presenting for left knee pain over the past 1 week.  Pain is primarily along medial side of knee only.  No effusion/erythema/warmth and no actual calf tenderness/swelling.  Nothing to really suggest DVT, septic arthritis or more nefarious process.  Pt is morbidly obese, and I suspect this is meniscus related, which we discussed.  Encouraged nsaids, RICE and referral to [facility] - pt will call to make appointment.  No actual trauma/injury that warrants imaging.      6:37 PM I met with patient for initial interview and encounter. PPE worn includes N95 mask and gloves.    At the conclusion of the encounter I discussed the results of all the tests and the disposition. The questions were answered. The patient or family acknowledged understanding and was agreeable with the care plan.      MEDICATIONS GIVEN IN THE EMERGENCY DEPARTMENT:  Medications - No data to display    NEW PRESCRIPTIONS STARTED AT TODAY'S ED VISIT:  Discharge Medication List as of 6/19/2022  6:59 PM          HPI   HPI     Ady Godinez is a 36 year old male with a pertinent history of obesity,and abdominal hernia who presents to this ED via walk-in for evaluation of knee pain.    Patient reports ongoing left knee pain for the past week. He does not recall any injury or trauma that may have caused this. He claims it has been progressively getting worse. The pain is mainly located in the inside of his left knee but it radiates to the whole knee and down to his calf. The pain worsens with movement. Patient denies any other complaints.    REVIEW OF SYSTEMS:  Review of Systems   Constitutional:  Negative for chills and fever.   HENT: Negative for sore throat.    Eyes: Negative for visual disturbance.   Respiratory: Negative for shortness of breath.    Cardiovascular: Negative for chest pain.   Gastrointestinal: Negative for abdominal pain, diarrhea, nausea and vomiting.   Endocrine: Negative for polyuria.   Genitourinary: Negative for dysuria and hematuria.        - urinary changes     Musculoskeletal: Negative for joint swelling.        Positive for left knee pain.   Skin: Negative for rash.   Neurological: Negative for dizziness.   Psychiatric/Behavioral: Negative for confusion.   All other systems reviewed and are negative.        Medical History     Past Medical History:   Diagnosis Date     Abdominal hernia      Cellulitis 05/2016     Migraines      Obesity      Pure hypercholesterolemia        Past Surgical History:   Procedure Laterality Date     APPENDECTOMY      childhood.     IR CAROTID CEREBRAL ANGIOGRAM BILATERAL  4/15/2021     LUMBAR PUNCTURE FLUORO GUIDED DIAGNOSTIC  4/13/2021       Family History   Problem Relation Age of Onset     Snoring Mother      Snoring Father        Social History     Tobacco Use     Smoking status: Never Smoker     Smokeless tobacco: Never Used   Substance Use Topics     Alcohol use: Not Currently     Comment: Very rare     Drug use: Never       acetaminophen (TYLENOL) 325 MG tablet  amitriptyline (ELAVIL) 10 MG tablet  aspirin-acetaminophen-caffeine (EXCEDRIN MIGRAINE) 250-250-65 MG tablet  cholecalciferol (D-3-5) 125 MCG (5000 UT) CAPS  cyclobenzaprine (FLEXERIL) 5 MG tablet  insulin pen needle (31G X 6 MM) 31G X 6 MM miscellaneous  liraglutide - Weight Management (SAXENDA) 18 MG/3ML pen  nystatin-triamcinolone (MYCOLOG) 659385-9.1 UNIT/GM-% external ointment  ondansetron (ZOFRAN-ODT) 4 MG ODT tab        Physical Exam     Vitals:  BP (!) 188/86   Pulse 101   Temp 97.2  F (36.2  C) (Oral)   Resp 16   Ht 1.829 m (6')   Wt (!) 179.2 kg (395 lb)   SpO2 96%   BMI  53.57 kg/m      PHYSICAL EXAM:   Physical Exam  Vitals and nursing note reviewed.   Constitutional:       General: He is not in acute distress.     Appearance: Normal appearance.      Comments: Morbidly obese.   HENT:      Head: Normocephalic and atraumatic.      Nose: Nose normal.      Mouth/Throat:      Mouth: Mucous membranes are moist.   Eyes:      Extraocular Movements: Extraocular movements intact.   Cardiovascular:      Rate and Rhythm: Normal rate and regular rhythm.      Pulses: Normal pulses.           Radial pulses are 2+ on the right side and 2+ on the left side.        Dorsalis pedis pulses are 2+ on the right side and 2+ on the left side.   Pulmonary:      Effort: Pulmonary effort is normal.   Musculoskeletal:      Comments: Left knee minimal medial tenderness. No erythema, warmth, or effusion. No calf tenderness or swelling. 2+ P T pulse.   Skin:     Findings: No rash.   Neurological:      General: No focal deficit present.      Mental Status: He is alert. Mental status is at baseline.      Comments: Fluent speech   Psychiatric:         Mood and Affect: Mood normal.         Behavior: Behavior normal.         Results     LAB:  All pertinent labs reviewed and interpreted  Labs Ordered and Resulted from Time of ED Arrival to Time of ED Departure - No data to display    RADIOLOGY:  No orders to display             FINAL IMPRESSION:    ICD-10-CM    1. Acute pain of left knee  M25.562        0 minutes of critical care time      I, Urban Fountain, am serving as a scribe to document services personally performed by Dr. Carlin Washington, based on my observations and the provider's statements to me. ICarlin, DO attest that Urban Fountain is acting in a scribe capacity, has observed my performance of the services and has documented them in accordance with my direction.      Carlin Washington DO  Emergency Medicine  [facility] EMERGENCY DEPARTMENT  6/19/2022  6:33 PM         Carlin Washington MD  06/19/22 1040

## 2022-06-19 NOTE — DISCHARGE INSTRUCTIONS
Follow up with [facility] - call for appointment.  Take ibuprofen 600mg 3 times a day with food for next 7-10 days and lightly stretch/range knee. Apply ace wrap or knee wrap from [facility] to help with compression. I suspect this is meniscus related. Return for new swelling to leg/redness or other changes.

## 2022-06-19 NOTE — ED TRIAGE NOTES
Pt comes in with L knee pain x1 week. Does not remember any specific injury to the area. Pt has been working more. Pain does extend into L calf at times.

## 2022-06-22 ENCOUNTER — TRANSFERRED RECORDS (OUTPATIENT)
Dept: HEALTH INFORMATION MANAGEMENT | Facility: CLINIC | Age: 37
End: 2022-06-22

## 2022-09-09 ENCOUNTER — LAB REQUISITION (OUTPATIENT)
Dept: LAB | Facility: CLINIC | Age: 37
End: 2022-09-09
Payer: COMMERCIAL

## 2022-09-09 DIAGNOSIS — Z13.220 ENCOUNTER FOR SCREENING FOR LIPOID DISORDERS: ICD-10-CM

## 2022-09-09 DIAGNOSIS — Z03.818 ENCOUNTER FOR OBSERVATION FOR SUSPECTED EXPOSURE TO OTHER BIOLOGICAL AGENTS RULED OUT: ICD-10-CM

## 2022-09-09 LAB
CHOLEST SERPL-MCNC: 185 MG/DL
HDLC SERPL-MCNC: 39 MG/DL
LDLC SERPL CALC-MCNC: 120 MG/DL
NONHDLC SERPL-MCNC: 146 MG/DL
TRIGL SERPL-MCNC: 129 MG/DL

## 2022-09-09 PROCEDURE — U0003 INFECTIOUS AGENT DETECTION BY NUCLEIC ACID (DNA OR RNA); SEVERE ACUTE RESPIRATORY SYNDROME CORONAVIRUS 2 (SARS-COV-2) (CORONAVIRUS DISEASE [COVID-19]), AMPLIFIED PROBE TECHNIQUE, MAKING USE OF HIGH THROUGHPUT TECHNOLOGIES AS DESCRIBED BY CMS-2020-01-R: HCPCS | Performed by: PHYSICIAN ASSISTANT

## 2022-09-09 PROCEDURE — 80061 LIPID PANEL: CPT | Performed by: PHYSICIAN ASSISTANT

## 2022-09-10 LAB — SARS-COV-2 RNA RESP QL NAA+PROBE: NEGATIVE

## 2022-09-15 NOTE — PROGRESS NOTES
"[facility]    Stroke Progress Note    Interval Events  No acute events overnight.  Vitals stable.  Neuro exam remains unchanged.      Impression  Post-Coital Headache without angiographic evidence of RCVS   Risk factors: Morbid obesity, energy drink consumption.  Urine toxicology was negative when checked on hospital day 3    Plan  [] headache management: The patient should complete a 7-day course of indomethacin 25mg TID.  Following that, he should take indomethacin 25 or 50 mg orally approximately 30-60 minutes prior to intercourse.  This was discussed with the patient on multiple occasions.  [] Follow-up with [facility] neurology within 4-8 weeks of discharge.    No further stroke work-up indicated at this point.  Vascular neurology team will sign off.  Please call with questions    The Stroke Staff is Dr. Padgett.    RAJAN RODRIGUEZ MD  Vascular Neurology Fellow  To page me or covering stroke neurology team member, click here: [facility]COM   Choose \"On Call\" tab at top, then search dropdown box for \"Neurology Adult\", select location, press Enter, then look for stroke/neuro ICU/telestroke.    ______________________________________________________    Medications   Home Meds  Prior to Admission medications    Medication Sig Start Date End Date Taking? Authorizing Provider   aspirin-acetaminophen-caffeine (EXCEDRIN EXTRA STRENGTH) 250-250-65 MG tablet Take 2 tablets by mouth every 8 hours as needed for headaches   Yes Unknown, Entered By History       Scheduled Meds    acetaminophen  650 mg Oral Q6H     indomethacin  25 mg Oral TID w/meals         PRN Meds  hydrALAZINE, labetalol, lidocaine 4%, lidocaine (buffered or not buffered), LORazepam, melatonin, naloxone **OR** naloxone **OR** naloxone **OR** naloxone, ondansetron **OR** ondansetron, oxyCODONE, prochlorperazine **OR** prochlorperazine **OR** prochlorperazine, sodium chloride (PF)       PHYSICAL EXAMINATION  Temp:  [97.4  F (36.3  C)-97.7  F " (36.5  C)] 97.5  F (36.4  C)  Pulse:  [59-96] 69  Resp:  [10-18] 16  BP: (112-191)/() 127/76  SpO2:  [92 %-99 %] 95 %     Neurologic  Mental Status:  alert, oriented x 3, follows commands, speech clear and fluent, naming and repetition normal  Cranial Nerves:  visual fields intact, PERRL, EOMI with normal smooth pursuit, facial sensation intact and symmetric, facial movements symmetric, hearing not formally tested but intact to conversation, palate elevation symmetric and uvula midline, no dysarthria, shoulder shrug strong bilaterally, tongue protrusion midline  Motor:  normal muscle tone and bulk, no abnormal movements, able to move all limbs spontaneously, strength 5/5 throughout upper and lower extremities, no pronator drift  Reflexes:  toes down-going  Sensory:  light touch sensation intact and symmetric throughout upper and lower extremities, no extinction on double simultaneous stimulation   Coordination:  normal finger-to-nose and heel-to-shin bilaterally without dysmetria, rapid alternating movements symmetric  Station/Gait:  deferred     Dysphagia Screen  Per Nursing     Stroke Scales     NIHSS  Interval baseline (04/13/21 1400)   Interval Comments    1a. Level of Consciousness 0-->Alert, keenly responsive   1b. LOC Questions 0-->Answers both questions correctly   1c. LOC Commands 0-->Performs both tasks correctly   2.   Best Gaze 0-->Normal   3.   Visual 0-->No visual loss   4.   Facial Palsy 0-->Normal symmetrical movements   5a. Motor Arm, Left 0-->No drift, limb holds 90 (or 45) degrees for full 10 secs   5b. Motor Arm, Right 0-->No drift, limb holds 90 (or 45) degrees for full 10 secs   6a. Motor Leg, Left 0-->No drift, leg holds 30 degree position for full 5 secs   6b. Motor Leg, right 0-->No drift, leg holds 30 degree position for full 5 secs   7.   Limb Ataxia 0-->Absent   8.   Sensory 0-->Normal, no sensory loss   9.   Best Language 0-->No aphasia, normal   10. Dysarthria 0-->Normal   11.  Extinction and Inattention  0-->No abnormality   Total 0 (04/13/21 1400)             Imaging  I personally reviewed all imaging; relevant findings per HPI.     Lab Results Data   CBC  Recent Labs   Lab 04/14/21  0735   WBC 8.5   RBC 4.75   HGB 13.5   HCT 42.8        Basic Metabolic Panel    Recent Labs   Lab 04/14/21  0735      POTASSIUM 3.9   CHLORIDE 107   CO2 30   BUN 16   CR 0.86   *   JEFRY 8.6     Liver Panel  No results for input(s): PROTTOTAL, ALBUMIN, BILITOTAL, ALKPHOS, AST, ALT, BILIDIRECT in the last 168 hours.  INR    Recent Labs   Lab Test 04/14/21  0735   INR 0.98      Lipid Profile  No lab results found.  A1C  No lab results found.  Troponin I  No results for input(s): TROPI in the last 168 hours.      9

## 2022-10-02 ENCOUNTER — HEALTH MAINTENANCE LETTER (OUTPATIENT)
Age: 37
End: 2022-10-02

## 2023-02-11 ENCOUNTER — HEALTH MAINTENANCE LETTER (OUTPATIENT)
Age: 38
End: 2023-02-11

## 2023-11-13 ENCOUNTER — APPOINTMENT (OUTPATIENT)
Dept: CT IMAGING | Facility: HOSPITAL | Age: 38
DRG: 336 | End: 2023-11-13
Attending: EMERGENCY MEDICINE
Payer: COMMERCIAL

## 2023-11-13 ENCOUNTER — HOSPITAL ENCOUNTER (INPATIENT)
Facility: HOSPITAL | Age: 38
LOS: 5 days | Discharge: HOME HEALTH | DRG: 336 | End: 2023-11-18
Attending: EMERGENCY MEDICINE | Admitting: FAMILY MEDICINE
Payer: COMMERCIAL

## 2023-11-13 DIAGNOSIS — K43.9 HERNIA OF ANTERIOR ABDOMINAL WALL: ICD-10-CM

## 2023-11-13 DIAGNOSIS — R11.2 NAUSEA AND VOMITING, UNSPECIFIED VOMITING TYPE: ICD-10-CM

## 2023-11-13 DIAGNOSIS — E66.01 MORBID OBESITY (H): Primary | ICD-10-CM

## 2023-11-13 LAB
ANION GAP SERPL CALCULATED.3IONS-SCNC: 11 MMOL/L (ref 7–15)
BASOPHILS # BLD AUTO: 0 10E3/UL (ref 0–0.2)
BASOPHILS NFR BLD AUTO: 0 %
BUN SERPL-MCNC: 12.9 MG/DL (ref 6–20)
CALCIUM SERPL-MCNC: 10.3 MG/DL (ref 8.6–10)
CHLORIDE SERPL-SCNC: 97 MMOL/L (ref 98–107)
CREAT SERPL-MCNC: 0.76 MG/DL (ref 0.67–1.17)
CRP SERPL-MCNC: 16.3 MG/L
DEPRECATED HCO3 PLAS-SCNC: 28 MMOL/L (ref 22–29)
EGFRCR SERPLBLD CKD-EPI 2021: >90 ML/MIN/1.73M2
EOSINOPHIL # BLD AUTO: 0.1 10E3/UL (ref 0–0.7)
EOSINOPHIL NFR BLD AUTO: 0 %
ERYTHROCYTE [DISTWIDTH] IN BLOOD BY AUTOMATED COUNT: 14.1 % (ref 10–15)
GLUCOSE SERPL-MCNC: 146 MG/DL (ref 70–99)
HCT VFR BLD AUTO: 45.6 % (ref 40–53)
HGB BLD-MCNC: 14.8 G/DL (ref 13.3–17.7)
IMM GRANULOCYTES # BLD: 0.1 10E3/UL
IMM GRANULOCYTES NFR BLD: 0 %
LACTATE SERPL-SCNC: 1.4 MMOL/L (ref 0.7–2)
LYMPHOCYTES # BLD AUTO: 2.2 10E3/UL (ref 0.8–5.3)
LYMPHOCYTES NFR BLD AUTO: 15 %
MCH RBC QN AUTO: 28.5 PG (ref 26.5–33)
MCHC RBC AUTO-ENTMCNC: 32.5 G/DL (ref 31.5–36.5)
MCV RBC AUTO: 88 FL (ref 78–100)
MONOCYTES # BLD AUTO: 0.6 10E3/UL (ref 0–1.3)
MONOCYTES NFR BLD AUTO: 4 %
NEUTROPHILS # BLD AUTO: 11.4 10E3/UL (ref 1.6–8.3)
NEUTROPHILS NFR BLD AUTO: 81 %
NRBC # BLD AUTO: 0 10E3/UL
NRBC BLD AUTO-RTO: 0 /100
PLATELET # BLD AUTO: 304 10E3/UL (ref 150–450)
POTASSIUM SERPL-SCNC: 4.7 MMOL/L (ref 3.4–5.3)
RBC # BLD AUTO: 5.19 10E6/UL (ref 4.4–5.9)
SODIUM SERPL-SCNC: 136 MMOL/L (ref 135–145)
WBC # BLD AUTO: 14.2 10E3/UL (ref 4–11)

## 2023-11-13 PROCEDURE — 250N000011 HC RX IP 250 OP 636: Mod: JZ | Performed by: EMERGENCY MEDICINE

## 2023-11-13 PROCEDURE — 36415 COLL VENOUS BLD VENIPUNCTURE: CPT | Performed by: EMERGENCY MEDICINE

## 2023-11-13 PROCEDURE — 96374 THER/PROPH/DIAG INJ IV PUSH: CPT | Mod: 59

## 2023-11-13 PROCEDURE — 120N000001 HC R&B MED SURG/OB

## 2023-11-13 PROCEDURE — 99223 1ST HOSP IP/OBS HIGH 75: CPT | Performed by: FAMILY MEDICINE

## 2023-11-13 PROCEDURE — 96361 HYDRATE IV INFUSION ADD-ON: CPT

## 2023-11-13 PROCEDURE — 96375 TX/PRO/DX INJ NEW DRUG ADDON: CPT

## 2023-11-13 PROCEDURE — 258N000003 HC RX IP 258 OP 636: Performed by: EMERGENCY MEDICINE

## 2023-11-13 PROCEDURE — 99285 EMERGENCY DEPT VISIT HI MDM: CPT | Mod: 25

## 2023-11-13 PROCEDURE — 86140 C-REACTIVE PROTEIN: CPT | Performed by: EMERGENCY MEDICINE

## 2023-11-13 PROCEDURE — 83605 ASSAY OF LACTIC ACID: CPT | Performed by: EMERGENCY MEDICINE

## 2023-11-13 PROCEDURE — 85025 COMPLETE CBC W/AUTO DIFF WBC: CPT | Performed by: EMERGENCY MEDICINE

## 2023-11-13 PROCEDURE — 80048 BASIC METABOLIC PNL TOTAL CA: CPT | Performed by: EMERGENCY MEDICINE

## 2023-11-13 PROCEDURE — 74176 CT ABD & PELVIS W/O CONTRAST: CPT

## 2023-11-13 PROCEDURE — 258N000003 HC RX IP 258 OP 636: Performed by: FAMILY MEDICINE

## 2023-11-13 RX ORDER — ONDANSETRON 2 MG/ML
4 INJECTION INTRAMUSCULAR; INTRAVENOUS EVERY 6 HOURS PRN
Status: DISCONTINUED | OUTPATIENT
Start: 2023-11-13 | End: 2023-11-17

## 2023-11-13 RX ORDER — KETOROLAC TROMETHAMINE 15 MG/ML
15 INJECTION, SOLUTION INTRAMUSCULAR; INTRAVENOUS ONCE
Status: COMPLETED | OUTPATIENT
Start: 2023-11-13 | End: 2023-11-13

## 2023-11-13 RX ORDER — ONDANSETRON 4 MG/1
4 TABLET, ORALLY DISINTEGRATING ORAL EVERY 6 HOURS PRN
Status: DISCONTINUED | OUTPATIENT
Start: 2023-11-13 | End: 2023-11-17

## 2023-11-13 RX ORDER — PIPERACILLIN SODIUM, TAZOBACTAM SODIUM 3; .375 G/15ML; G/15ML
3.38 INJECTION, POWDER, LYOPHILIZED, FOR SOLUTION INTRAVENOUS EVERY 8 HOURS
Status: DISCONTINUED | OUTPATIENT
Start: 2023-11-14 | End: 2023-11-15

## 2023-11-13 RX ORDER — PIPERACILLIN SODIUM, TAZOBACTAM SODIUM 3; .375 G/15ML; G/15ML
3.38 INJECTION, POWDER, LYOPHILIZED, FOR SOLUTION INTRAVENOUS ONCE
Status: COMPLETED | OUTPATIENT
Start: 2023-11-13 | End: 2023-11-13

## 2023-11-13 RX ORDER — HYDROMORPHONE HYDROCHLORIDE 1 MG/ML
0.5 INJECTION, SOLUTION INTRAMUSCULAR; INTRAVENOUS; SUBCUTANEOUS
Status: DISCONTINUED | OUTPATIENT
Start: 2023-11-13 | End: 2023-11-14

## 2023-11-13 RX ORDER — SODIUM CHLORIDE 9 MG/ML
INJECTION, SOLUTION INTRAVENOUS CONTINUOUS
Status: DISCONTINUED | OUTPATIENT
Start: 2023-11-13 | End: 2023-11-14

## 2023-11-13 RX ORDER — HYDRALAZINE HYDROCHLORIDE 20 MG/ML
10 INJECTION INTRAMUSCULAR; INTRAVENOUS
Status: DISCONTINUED | OUTPATIENT
Start: 2023-11-13 | End: 2023-11-18 | Stop reason: HOSPADM

## 2023-11-13 RX ORDER — ONDANSETRON 2 MG/ML
4 INJECTION INTRAMUSCULAR; INTRAVENOUS ONCE
Status: COMPLETED | OUTPATIENT
Start: 2023-11-13 | End: 2023-11-13

## 2023-11-13 RX ADMIN — HYDROMORPHONE HYDROCHLORIDE 1 MG: 1 INJECTION, SOLUTION INTRAMUSCULAR; INTRAVENOUS; SUBCUTANEOUS at 21:57

## 2023-11-13 RX ADMIN — KETOROLAC TROMETHAMINE 15 MG: 15 INJECTION, SOLUTION INTRAMUSCULAR; INTRAVENOUS at 19:38

## 2023-11-13 RX ADMIN — SODIUM CHLORIDE: 900 INJECTION INTRAVENOUS at 23:21

## 2023-11-13 RX ADMIN — PIPERACILLIN AND TAZOBACTAM 3.38 G: 3; .375 INJECTION, POWDER, FOR SOLUTION INTRAVENOUS at 22:52

## 2023-11-13 RX ADMIN — ONDANSETRON 4 MG: 2 INJECTION INTRAMUSCULAR; INTRAVENOUS at 19:36

## 2023-11-13 RX ADMIN — SODIUM CHLORIDE 1000 ML: 9 INJECTION, SOLUTION INTRAVENOUS at 19:35

## 2023-11-13 ASSESSMENT — ACTIVITIES OF DAILY LIVING (ADL): ADLS_ACUITY_SCORE: 35

## 2023-11-13 ASSESSMENT — ENCOUNTER SYMPTOMS
ABDOMINAL PAIN: 1
NAUSEA: 1
VOMITING: 1

## 2023-11-13 NOTE — LETTER
[place] [address] [facility] [address]  Phone: [phone]  Fax: [phone]    November 18, 2023        Ady Godinez  [address]  [facility] [address]          To whom it may concern:    RE: Ady Godinez was hospitalized from 11/13/23-11/18/23.  He can return to work 11/20/23 with restriction: No significant lifting of heavy weights (10 lbs) or pushing or pulling for at least 6 weeks.     Please contact me for questions or concerns.      Sincerely,    Olu Tristan MD

## 2023-11-13 NOTE — LETTER
[address] [address]  Phone: [phone]  Fax: [phone]    November 18, 2023        Ady Godinez  [address] [facility] [address]          To whom it may concern:    RE: Ady Godinez    {[id number]}    Please contact me for questions or concerns.      Sincerely,        No name on file

## 2023-11-13 NOTE — LETTER
November 18, 2023      Ady Godinez  [address]  [facility] [address]        To Whom It May Concern:    Ady Godinez was hospitalized from 11/13/23 - 11/18/23 and underwent surgery. He should be excused from work from 11/13/23 -  12/1/23.      Sincerely,        Woody Campbell. PRAKASH

## 2023-11-14 ENCOUNTER — HOSPITAL ENCOUNTER (INPATIENT)
Facility: HOSPITAL | Age: 38
Setting detail: SURGERY ADMIT
End: 2023-11-14
Attending: SURGERY | Admitting: SURGERY
Payer: COMMERCIAL

## 2023-11-14 ENCOUNTER — ANESTHESIA (OUTPATIENT)
Dept: SURGERY | Facility: HOSPITAL | Age: 38
DRG: 336 | End: 2023-11-14
Payer: COMMERCIAL

## 2023-11-14 ENCOUNTER — ANESTHESIA EVENT (OUTPATIENT)
Dept: SURGERY | Facility: HOSPITAL | Age: 38
DRG: 336 | End: 2023-11-14
Payer: COMMERCIAL

## 2023-11-14 LAB
ALBUMIN SERPL BCG-MCNC: 3.8 G/DL (ref 3.5–5.2)
ALP SERPL-CCNC: 89 U/L (ref 40–129)
ALT SERPL W P-5'-P-CCNC: 47 U/L (ref 0–70)
ANION GAP SERPL CALCULATED.3IONS-SCNC: 9 MMOL/L (ref 7–15)
AST SERPL W P-5'-P-CCNC: 53 U/L (ref 0–45)
BASOPHILS # BLD AUTO: 0 10E3/UL (ref 0–0.2)
BASOPHILS NFR BLD AUTO: 0 %
BILIRUB SERPL-MCNC: 0.3 MG/DL
BUN SERPL-MCNC: 12.5 MG/DL (ref 6–20)
CALCIUM SERPL-MCNC: 9.5 MG/DL (ref 8.6–10)
CALCIUM, IONIZED MEASURED: 1.14 MMOL/L (ref 1.11–1.3)
CHLORIDE SERPL-SCNC: 100 MMOL/L (ref 98–107)
CREAT SERPL-MCNC: 0.78 MG/DL (ref 0.67–1.17)
DEPRECATED HCO3 PLAS-SCNC: 29 MMOL/L (ref 22–29)
EGFRCR SERPLBLD CKD-EPI 2021: >90 ML/MIN/1.73M2
EOSINOPHIL # BLD AUTO: 0.1 10E3/UL (ref 0–0.7)
EOSINOPHIL NFR BLD AUTO: 1 %
ERYTHROCYTE [DISTWIDTH] IN BLOOD BY AUTOMATED COUNT: 14.1 % (ref 10–15)
GLUCOSE SERPL-MCNC: 152 MG/DL (ref 70–99)
HCT VFR BLD AUTO: 42.1 % (ref 40–53)
HGB BLD-MCNC: 13.3 G/DL (ref 13.3–17.7)
HOLD SPECIMEN: NORMAL
IMM GRANULOCYTES # BLD: 0 10E3/UL
IMM GRANULOCYTES NFR BLD: 0 %
ION CA PH 7.4: 1.06 MMOL/L (ref 1.11–1.3)
LYMPHOCYTES # BLD AUTO: 1.7 10E3/UL (ref 0.8–5.3)
LYMPHOCYTES NFR BLD AUTO: 18 %
MCH RBC QN AUTO: 28.7 PG (ref 26.5–33)
MCHC RBC AUTO-ENTMCNC: 31.6 G/DL (ref 31.5–36.5)
MCV RBC AUTO: 91 FL (ref 78–100)
MONOCYTES # BLD AUTO: 0.6 10E3/UL (ref 0–1.3)
MONOCYTES NFR BLD AUTO: 6 %
NEUTROPHILS # BLD AUTO: 7.3 10E3/UL (ref 1.6–8.3)
NEUTROPHILS NFR BLD AUTO: 75 %
NRBC # BLD AUTO: 0 10E3/UL
NRBC BLD AUTO-RTO: 0 /100
PH: 7.27 (ref 7.35–7.45)
PLATELET # BLD AUTO: 249 10E3/UL (ref 150–450)
PLATELET # BLD AUTO: 296 10E3/UL (ref 150–450)
POTASSIUM SERPL-SCNC: 4.1 MMOL/L (ref 3.4–5.3)
PROT SERPL-MCNC: 7.7 G/DL (ref 6.4–8.3)
RBC # BLD AUTO: 4.64 10E6/UL (ref 4.4–5.9)
SODIUM SERPL-SCNC: 138 MMOL/L (ref 135–145)
WBC # BLD AUTO: 9.8 10E3/UL (ref 4–11)

## 2023-11-14 PROCEDURE — 36415 COLL VENOUS BLD VENIPUNCTURE: CPT | Performed by: FAMILY MEDICINE

## 2023-11-14 PROCEDURE — 250N000011 HC RX IP 250 OP 636: Performed by: SURGERY

## 2023-11-14 PROCEDURE — 250N000011 HC RX IP 250 OP 636: Mod: JZ | Performed by: ANESTHESIOLOGY

## 2023-11-14 PROCEDURE — 250N000011 HC RX IP 250 OP 636: Mod: JZ | Performed by: SURGERY

## 2023-11-14 PROCEDURE — 258N000003 HC RX IP 258 OP 636: Performed by: ANESTHESIOLOGY

## 2023-11-14 PROCEDURE — 120N000001 HC R&B MED SURG/OB

## 2023-11-14 PROCEDURE — 8E0W4CZ ROBOTIC ASSISTED PROCEDURE OF TRUNK REGION, PERCUTANEOUS ENDOSCOPIC APPROACH: ICD-10-PCS | Performed by: SURGERY

## 2023-11-14 PROCEDURE — 999N000141 HC STATISTIC PRE-PROCEDURE NURSING ASSESSMENT: Performed by: SURGERY

## 2023-11-14 PROCEDURE — 0DN84ZZ RELEASE SMALL INTESTINE, PERCUTANEOUS ENDOSCOPIC APPROACH: ICD-10-PCS | Performed by: SURGERY

## 2023-11-14 PROCEDURE — 36415 COLL VENOUS BLD VENIPUNCTURE: CPT | Performed by: SURGERY

## 2023-11-14 PROCEDURE — 85049 AUTOMATED PLATELET COUNT: CPT | Performed by: SURGERY

## 2023-11-14 PROCEDURE — 85025 COMPLETE CBC W/AUTO DIFF WBC: CPT | Performed by: FAMILY MEDICINE

## 2023-11-14 PROCEDURE — 710N000009 HC RECOVERY PHASE 1, LEVEL 1, PER MIN: Performed by: SURGERY

## 2023-11-14 PROCEDURE — 250N000009 HC RX 250: Performed by: ANESTHESIOLOGY

## 2023-11-14 PROCEDURE — 99232 SBSQ HOSP IP/OBS MODERATE 35: CPT | Performed by: STUDENT IN AN ORGANIZED HEALTH CARE EDUCATION/TRAINING PROGRAM

## 2023-11-14 PROCEDURE — 99222 1ST HOSP IP/OBS MODERATE 55: CPT | Mod: 57 | Performed by: SURGERY

## 2023-11-14 PROCEDURE — 370N000017 HC ANESTHESIA TECHNICAL FEE, PER MIN: Performed by: SURGERY

## 2023-11-14 PROCEDURE — 250N000025 HC SEVOFLURANE, PER MIN: Performed by: SURGERY

## 2023-11-14 PROCEDURE — S2900 ROBOTIC SURGICAL SYSTEM: HCPCS | Performed by: SURGERY

## 2023-11-14 PROCEDURE — 82330 ASSAY OF CALCIUM: CPT | Performed by: FAMILY MEDICINE

## 2023-11-14 PROCEDURE — 0DNU4ZZ RELEASE OMENTUM, PERCUTANEOUS ENDOSCOPIC APPROACH: ICD-10-PCS | Performed by: SURGERY

## 2023-11-14 PROCEDURE — 250N000011 HC RX IP 250 OP 636: Mod: JZ | Performed by: FAMILY MEDICINE

## 2023-11-14 PROCEDURE — 250N000009 HC RX 250: Performed by: SURGERY

## 2023-11-14 PROCEDURE — 250N000011 HC RX IP 250 OP 636: Performed by: STUDENT IN AN ORGANIZED HEALTH CARE EDUCATION/TRAINING PROGRAM

## 2023-11-14 PROCEDURE — 250N000009 HC RX 250: Performed by: NURSE ANESTHETIST, CERTIFIED REGISTERED

## 2023-11-14 PROCEDURE — 0WUF4JZ SUPPLEMENT ABDOMINAL WALL WITH SYNTHETIC SUBSTITUTE, PERCUTANEOUS ENDOSCOPIC APPROACH: ICD-10-PCS | Performed by: SURGERY

## 2023-11-14 PROCEDURE — 278N000051 HC OR IMPLANT GENERAL: Performed by: SURGERY

## 2023-11-14 PROCEDURE — 360N000080 HC SURGERY LEVEL 7, PER MIN: Performed by: SURGERY

## 2023-11-14 PROCEDURE — 258N000003 HC RX IP 258 OP 636: Performed by: SURGERY

## 2023-11-14 PROCEDURE — 272N000001 HC OR GENERAL SUPPLY STERILE: Performed by: SURGERY

## 2023-11-14 PROCEDURE — 250N000011 HC RX IP 250 OP 636: Performed by: NURSE ANESTHETIST, CERTIFIED REGISTERED

## 2023-11-14 PROCEDURE — 49596 RPR AA HRN 1ST > 10 NCR/STRN: CPT | Mod: 22 | Performed by: SURGERY

## 2023-11-14 PROCEDURE — 80053 COMPREHEN METABOLIC PANEL: CPT | Performed by: FAMILY MEDICINE

## 2023-11-14 PROCEDURE — 258N000003 HC RX IP 258 OP 636: Performed by: FAMILY MEDICINE

## 2023-11-14 PROCEDURE — 250N000011 HC RX IP 250 OP 636: Performed by: ANESTHESIOLOGY

## 2023-11-14 DEVICE — IMPLANTABLE DEVICE: Type: IMPLANTABLE DEVICE | Site: ABDOMEN | Status: FUNCTIONAL

## 2023-11-14 RX ORDER — CEFAZOLIN SODIUM/WATER 3 G/30 ML
3 SYRINGE (ML) INTRAVENOUS SEE ADMIN INSTRUCTIONS
Status: DISCONTINUED | OUTPATIENT
Start: 2023-11-14 | End: 2023-11-15

## 2023-11-14 RX ORDER — PROCHLORPERAZINE MALEATE 10 MG
10 TABLET ORAL EVERY 6 HOURS PRN
Status: DISCONTINUED | OUTPATIENT
Start: 2023-11-14 | End: 2023-11-18 | Stop reason: HOSPADM

## 2023-11-14 RX ORDER — CEFAZOLIN SODIUM/WATER 3 G/30 ML
3 SYRINGE (ML) INTRAVENOUS
Status: COMPLETED | OUTPATIENT
Start: 2023-11-14 | End: 2023-11-14

## 2023-11-14 RX ORDER — HALOPERIDOL 5 MG/ML
INJECTION INTRAMUSCULAR PRN
Status: DISCONTINUED | OUTPATIENT
Start: 2023-11-14 | End: 2023-11-14

## 2023-11-14 RX ORDER — NALOXONE HYDROCHLORIDE 0.4 MG/ML
0.2 INJECTION, SOLUTION INTRAMUSCULAR; INTRAVENOUS; SUBCUTANEOUS
Status: DISCONTINUED | OUTPATIENT
Start: 2023-11-14 | End: 2023-11-18 | Stop reason: HOSPADM

## 2023-11-14 RX ORDER — SODIUM CHLORIDE, SODIUM LACTATE, POTASSIUM CHLORIDE, CALCIUM CHLORIDE 600; 310; 30; 20 MG/100ML; MG/100ML; MG/100ML; MG/100ML
INJECTION, SOLUTION INTRAVENOUS CONTINUOUS
Status: DISCONTINUED | OUTPATIENT
Start: 2023-11-14 | End: 2023-11-14 | Stop reason: HOSPADM

## 2023-11-14 RX ORDER — DEXAMETHASONE SODIUM PHOSPHATE 10 MG/ML
INJECTION, SOLUTION INTRAMUSCULAR; INTRAVENOUS PRN
Status: DISCONTINUED | OUTPATIENT
Start: 2023-11-14 | End: 2023-11-14

## 2023-11-14 RX ORDER — ONDANSETRON 4 MG/1
4 TABLET, ORALLY DISINTEGRATING ORAL EVERY 30 MIN PRN
Status: DISCONTINUED | OUTPATIENT
Start: 2023-11-14 | End: 2023-11-14 | Stop reason: HOSPADM

## 2023-11-14 RX ORDER — SODIUM CHLORIDE, SODIUM LACTATE, POTASSIUM CHLORIDE, CALCIUM CHLORIDE 600; 310; 30; 20 MG/100ML; MG/100ML; MG/100ML; MG/100ML
INJECTION, SOLUTION INTRAVENOUS CONTINUOUS
Status: DISCONTINUED | OUTPATIENT
Start: 2023-11-14 | End: 2023-11-14

## 2023-11-14 RX ORDER — ONDANSETRON 2 MG/ML
4 INJECTION INTRAMUSCULAR; INTRAVENOUS EVERY 6 HOURS PRN
Status: DISCONTINUED | OUTPATIENT
Start: 2023-11-14 | End: 2023-11-18 | Stop reason: HOSPADM

## 2023-11-14 RX ORDER — LIDOCAINE 40 MG/G
CREAM TOPICAL
Status: DISCONTINUED | OUTPATIENT
Start: 2023-11-14 | End: 2023-11-18 | Stop reason: HOSPADM

## 2023-11-14 RX ORDER — NALOXONE HYDROCHLORIDE 0.4 MG/ML
0.4 INJECTION, SOLUTION INTRAMUSCULAR; INTRAVENOUS; SUBCUTANEOUS
Status: DISCONTINUED | OUTPATIENT
Start: 2023-11-14 | End: 2023-11-18 | Stop reason: HOSPADM

## 2023-11-14 RX ORDER — ONDANSETRON 2 MG/ML
4 INJECTION INTRAMUSCULAR; INTRAVENOUS EVERY 30 MIN PRN
Status: DISCONTINUED | OUTPATIENT
Start: 2023-11-14 | End: 2023-11-14 | Stop reason: HOSPADM

## 2023-11-14 RX ORDER — KETAMINE HYDROCHLORIDE 10 MG/ML
INJECTION INTRAMUSCULAR; INTRAVENOUS PRN
Status: DISCONTINUED | OUTPATIENT
Start: 2023-11-14 | End: 2023-11-14

## 2023-11-14 RX ORDER — FENTANYL CITRATE 50 UG/ML
25 INJECTION, SOLUTION INTRAMUSCULAR; INTRAVENOUS EVERY 5 MIN PRN
Status: DISCONTINUED | OUTPATIENT
Start: 2023-11-14 | End: 2023-11-14

## 2023-11-14 RX ORDER — HYDROMORPHONE HCL IN WATER/PF 6 MG/30 ML
0.4 PATIENT CONTROLLED ANALGESIA SYRINGE INTRAVENOUS EVERY 5 MIN PRN
Status: DISCONTINUED | OUTPATIENT
Start: 2023-11-14 | End: 2023-11-14 | Stop reason: HOSPADM

## 2023-11-14 RX ORDER — LABETALOL HYDROCHLORIDE 5 MG/ML
INJECTION, SOLUTION INTRAVENOUS PRN
Status: DISCONTINUED | OUTPATIENT
Start: 2023-11-14 | End: 2023-11-14

## 2023-11-14 RX ORDER — OXYCODONE HYDROCHLORIDE 5 MG/1
10 TABLET ORAL EVERY 4 HOURS PRN
Status: DISCONTINUED | OUTPATIENT
Start: 2023-11-14 | End: 2023-11-18 | Stop reason: HOSPADM

## 2023-11-14 RX ORDER — HYDROMORPHONE HCL IN WATER/PF 6 MG/30 ML
0.2 PATIENT CONTROLLED ANALGESIA SYRINGE INTRAVENOUS
Status: DISCONTINUED | OUTPATIENT
Start: 2023-11-14 | End: 2023-11-18 | Stop reason: HOSPADM

## 2023-11-14 RX ORDER — HALOPERIDOL 5 MG/ML
INJECTION INTRAMUSCULAR
Status: COMPLETED
Start: 2023-11-14 | End: 2023-11-14

## 2023-11-14 RX ORDER — ONDANSETRON 2 MG/ML
INJECTION INTRAMUSCULAR; INTRAVENOUS PRN
Status: DISCONTINUED | OUTPATIENT
Start: 2023-11-14 | End: 2023-11-14

## 2023-11-14 RX ORDER — SODIUM CHLORIDE, SODIUM LACTATE, POTASSIUM CHLORIDE, AND CALCIUM CHLORIDE .6; .31; .03; .02 G/100ML; G/100ML; G/100ML; G/100ML
IRRIGANT IRRIGATION PRN
Status: DISCONTINUED | OUTPATIENT
Start: 2023-11-14 | End: 2023-11-14 | Stop reason: HOSPADM

## 2023-11-14 RX ORDER — HALOPERIDOL 5 MG/ML
5 INJECTION INTRAMUSCULAR
Status: DISCONTINUED | OUTPATIENT
Start: 2023-11-14 | End: 2023-11-18 | Stop reason: HOSPADM

## 2023-11-14 RX ORDER — HYDROMORPHONE HCL IN WATER/PF 6 MG/30 ML
0.4 PATIENT CONTROLLED ANALGESIA SYRINGE INTRAVENOUS
Status: DISCONTINUED | OUTPATIENT
Start: 2023-11-14 | End: 2023-11-18 | Stop reason: HOSPADM

## 2023-11-14 RX ORDER — DEXMEDETOMIDINE HYDROCHLORIDE 4 UG/ML
INJECTION, SOLUTION INTRAVENOUS
Status: DISCONTINUED
Start: 2023-11-14 | End: 2023-11-14 | Stop reason: HOSPADM

## 2023-11-14 RX ORDER — FENTANYL CITRATE 50 UG/ML
50 INJECTION, SOLUTION INTRAMUSCULAR; INTRAVENOUS EVERY 5 MIN PRN
Status: DISCONTINUED | OUTPATIENT
Start: 2023-11-14 | End: 2023-11-14

## 2023-11-14 RX ORDER — FENTANYL CITRATE 50 UG/ML
50 INJECTION, SOLUTION INTRAMUSCULAR; INTRAVENOUS ONCE
Status: COMPLETED | OUTPATIENT
Start: 2023-11-14 | End: 2023-11-14

## 2023-11-14 RX ORDER — POLYETHYLENE GLYCOL 3350 17 G/17G
17 POWDER, FOR SOLUTION ORAL DAILY
Status: DISCONTINUED | OUTPATIENT
Start: 2023-11-15 | End: 2023-11-18 | Stop reason: HOSPADM

## 2023-11-14 RX ORDER — HYDROMORPHONE HCL IN WATER/PF 6 MG/30 ML
0.2 PATIENT CONTROLLED ANALGESIA SYRINGE INTRAVENOUS EVERY 5 MIN PRN
Status: DISCONTINUED | OUTPATIENT
Start: 2023-11-14 | End: 2023-11-14 | Stop reason: HOSPADM

## 2023-11-14 RX ORDER — MAGNESIUM SULFATE 4 G/50ML
4 INJECTION INTRAVENOUS ONCE
Status: COMPLETED | OUTPATIENT
Start: 2023-11-14 | End: 2023-11-14

## 2023-11-14 RX ORDER — BISACODYL 10 MG
10 SUPPOSITORY, RECTAL RECTAL DAILY PRN
Status: DISCONTINUED | OUTPATIENT
Start: 2023-11-14 | End: 2023-11-18 | Stop reason: HOSPADM

## 2023-11-14 RX ORDER — HEPARIN SODIUM 5000 [USP'U]/.5ML
5000 INJECTION, SOLUTION INTRAVENOUS; SUBCUTANEOUS EVERY 8 HOURS
Status: DISCONTINUED | OUTPATIENT
Start: 2023-11-15 | End: 2023-11-18 | Stop reason: HOSPADM

## 2023-11-14 RX ORDER — FENTANYL CITRATE 50 UG/ML
INJECTION, SOLUTION INTRAMUSCULAR; INTRAVENOUS PRN
Status: DISCONTINUED | OUTPATIENT
Start: 2023-11-14 | End: 2023-11-14

## 2023-11-14 RX ORDER — ACETAMINOPHEN 325 MG/1
650 TABLET ORAL EVERY 4 HOURS PRN
Status: DISCONTINUED | OUTPATIENT
Start: 2023-11-17 | End: 2023-11-18 | Stop reason: HOSPADM

## 2023-11-14 RX ORDER — HEPARIN SODIUM 5000 [USP'U]/.5ML
5000 INJECTION, SOLUTION INTRAVENOUS; SUBCUTANEOUS
Status: COMPLETED | OUTPATIENT
Start: 2023-11-14 | End: 2023-11-14

## 2023-11-14 RX ORDER — OXYCODONE HYDROCHLORIDE 5 MG/1
5 TABLET ORAL EVERY 4 HOURS PRN
Status: DISCONTINUED | OUTPATIENT
Start: 2023-11-14 | End: 2023-11-18 | Stop reason: HOSPADM

## 2023-11-14 RX ORDER — LIDOCAINE HYDROCHLORIDE 10 MG/ML
INJECTION, SOLUTION INFILTRATION; PERINEURAL PRN
Status: DISCONTINUED | OUTPATIENT
Start: 2023-11-14 | End: 2023-11-14

## 2023-11-14 RX ORDER — PROPOFOL 10 MG/ML
INJECTION, EMULSION INTRAVENOUS PRN
Status: DISCONTINUED | OUTPATIENT
Start: 2023-11-14 | End: 2023-11-14

## 2023-11-14 RX ORDER — AMOXICILLIN 250 MG
1 CAPSULE ORAL 2 TIMES DAILY
Status: DISCONTINUED | OUTPATIENT
Start: 2023-11-14 | End: 2023-11-18 | Stop reason: HOSPADM

## 2023-11-14 RX ORDER — ONDANSETRON 4 MG/1
4 TABLET, ORALLY DISINTEGRATING ORAL EVERY 6 HOURS PRN
Status: DISCONTINUED | OUTPATIENT
Start: 2023-11-14 | End: 2023-11-18 | Stop reason: HOSPADM

## 2023-11-14 RX ORDER — OLANZAPINE 10 MG/2ML
2.5 INJECTION, POWDER, FOR SOLUTION INTRAMUSCULAR
Status: DISCONTINUED | OUTPATIENT
Start: 2023-11-14 | End: 2023-11-18 | Stop reason: HOSPADM

## 2023-11-14 RX ORDER — LIDOCAINE 40 MG/G
CREAM TOPICAL
Status: DISCONTINUED | OUTPATIENT
Start: 2023-11-14 | End: 2023-11-17

## 2023-11-14 RX ORDER — ACETAMINOPHEN 325 MG/1
975 TABLET ORAL EVERY 8 HOURS
Qty: 27 TABLET | Refills: 0 | Status: COMPLETED | OUTPATIENT
Start: 2023-11-14 | End: 2023-11-17

## 2023-11-14 RX ADMIN — KETAMINE HYDROCHLORIDE 50 MG: 10 INJECTION INTRAMUSCULAR; INTRAVENOUS at 12:00

## 2023-11-14 RX ADMIN — FENTANYL CITRATE 50 MCG: 50 INJECTION, SOLUTION INTRAMUSCULAR; INTRAVENOUS at 11:37

## 2023-11-14 RX ADMIN — HYDROMORPHONE HYDROCHLORIDE 0.2 MG: 0.2 INJECTION, SOLUTION INTRAMUSCULAR; INTRAVENOUS; SUBCUTANEOUS at 18:02

## 2023-11-14 RX ADMIN — Medication 200 MG: at 12:04

## 2023-11-14 RX ADMIN — DEXAMETHASONE SODIUM PHOSPHATE 10 MG: 10 INJECTION, SOLUTION INTRAMUSCULAR; INTRAVENOUS at 15:10

## 2023-11-14 RX ADMIN — PIPERACILLIN AND TAZOBACTAM 3.38 G: 3; .375 INJECTION, POWDER, FOR SOLUTION INTRAVENOUS at 20:55

## 2023-11-14 RX ADMIN — ONDANSETRON 4 MG: 2 INJECTION INTRAMUSCULAR; INTRAVENOUS at 15:10

## 2023-11-14 RX ADMIN — MAGNESIUM SULFATE HEPTAHYDRATE 4 G: 80 INJECTION, SOLUTION INTRAVENOUS at 11:10

## 2023-11-14 RX ADMIN — Medication 3 G: at 15:10

## 2023-11-14 RX ADMIN — SODIUM CHLORIDE: 900 INJECTION INTRAVENOUS at 06:54

## 2023-11-14 RX ADMIN — ROCURONIUM BROMIDE 20 MG: 50 INJECTION, SOLUTION INTRAVENOUS at 13:08

## 2023-11-14 RX ADMIN — PROPOFOL 200 MG: 10 INJECTION, EMULSION INTRAVENOUS at 12:03

## 2023-11-14 RX ADMIN — FENTANYL CITRATE 50 MCG: 50 INJECTION INTRAMUSCULAR; INTRAVENOUS at 14:46

## 2023-11-14 RX ADMIN — ROCURONIUM BROMIDE 50 MG: 50 INJECTION, SOLUTION INTRAVENOUS at 12:15

## 2023-11-14 RX ADMIN — HYDROMORPHONE HYDROCHLORIDE 0.5 MG: 1 INJECTION, SOLUTION INTRAMUSCULAR; INTRAVENOUS; SUBCUTANEOUS at 03:25

## 2023-11-14 RX ADMIN — HYDROMORPHONE HYDROCHLORIDE 0.4 MG: 1 INJECTION, SOLUTION INTRAMUSCULAR; INTRAVENOUS; SUBCUTANEOUS at 16:56

## 2023-11-14 RX ADMIN — SODIUM CHLORIDE, POTASSIUM CHLORIDE, SODIUM LACTATE AND CALCIUM CHLORIDE: 600; 310; 30; 20 INJECTION, SOLUTION INTRAVENOUS at 11:10

## 2023-11-14 RX ADMIN — HALOPERIDOL LACTATE 5 MG: 5 INJECTION, SOLUTION INTRAMUSCULAR at 16:15

## 2023-11-14 RX ADMIN — PROPOFOL 50 MG: 10 INJECTION, EMULSION INTRAVENOUS at 15:45

## 2023-11-14 RX ADMIN — FENTANYL CITRATE 50 MCG: 50 INJECTION INTRAMUSCULAR; INTRAVENOUS at 12:03

## 2023-11-14 RX ADMIN — ONDANSETRON 4 MG: 2 INJECTION INTRAMUSCULAR; INTRAVENOUS at 15:19

## 2023-11-14 RX ADMIN — MIDAZOLAM 2 MG: 1 INJECTION INTRAMUSCULAR; INTRAVENOUS at 12:00

## 2023-11-14 RX ADMIN — HYDROMORPHONE HYDROCHLORIDE 0.5 MG: 1 INJECTION, SOLUTION INTRAMUSCULAR; INTRAVENOUS; SUBCUTANEOUS at 08:55

## 2023-11-14 RX ADMIN — SODIUM CHLORIDE, POTASSIUM CHLORIDE, SODIUM LACTATE AND CALCIUM CHLORIDE 100 ML/HR: 600; 310; 30; 20 INJECTION, SOLUTION INTRAVENOUS at 17:19

## 2023-11-14 RX ADMIN — Medication 3 G: at 12:12

## 2023-11-14 RX ADMIN — ROCURONIUM BROMIDE 10 MG: 50 INJECTION, SOLUTION INTRAVENOUS at 14:18

## 2023-11-14 RX ADMIN — HEPARIN SODIUM 5000 UNITS: 10000 INJECTION, SOLUTION INTRAVENOUS; SUBCUTANEOUS at 11:10

## 2023-11-14 RX ADMIN — FENTANYL CITRATE 50 MCG: 50 INJECTION INTRAMUSCULAR; INTRAVENOUS at 12:00

## 2023-11-14 RX ADMIN — ROCURONIUM BROMIDE 20 MG: 50 INJECTION, SOLUTION INTRAVENOUS at 13:53

## 2023-11-14 RX ADMIN — PROPOFOL 50 MG: 10 INJECTION, EMULSION INTRAVENOUS at 15:47

## 2023-11-14 RX ADMIN — SUGAMMADEX 400 MG: 100 INJECTION, SOLUTION INTRAVENOUS at 15:56

## 2023-11-14 RX ADMIN — SODIUM CHLORIDE, POTASSIUM CHLORIDE, SODIUM LACTATE AND CALCIUM CHLORIDE: 600; 310; 30; 20 INJECTION, SOLUTION INTRAVENOUS at 11:50

## 2023-11-14 RX ADMIN — PROPOFOL 60 MG: 10 INJECTION, EMULSION INTRAVENOUS at 12:04

## 2023-11-14 RX ADMIN — FENTANYL CITRATE 50 MCG: 50 INJECTION INTRAMUSCULAR; INTRAVENOUS at 14:21

## 2023-11-14 RX ADMIN — PIPERACILLIN AND TAZOBACTAM 3.38 G: 3; .375 INJECTION, POWDER, FOR SOLUTION INTRAVENOUS at 06:54

## 2023-11-14 RX ADMIN — FENTANYL CITRATE 50 MCG: 50 INJECTION INTRAMUSCULAR; INTRAVENOUS at 12:56

## 2023-11-14 RX ADMIN — HYDROMORPHONE HYDROCHLORIDE 0.5 MG: 1 INJECTION, SOLUTION INTRAMUSCULAR; INTRAVENOUS; SUBCUTANEOUS at 06:53

## 2023-11-14 RX ADMIN — LIDOCAINE HYDROCHLORIDE 5 ML: 10 INJECTION, SOLUTION INFILTRATION; PERINEURAL at 12:03

## 2023-11-14 RX ADMIN — HYDROMORPHONE HYDROCHLORIDE 1 MG: 1 INJECTION, SOLUTION INTRAMUSCULAR; INTRAVENOUS; SUBCUTANEOUS at 21:20

## 2023-11-14 RX ADMIN — SODIUM CHLORIDE, POTASSIUM CHLORIDE, SODIUM LACTATE AND CALCIUM CHLORIDE: 600; 310; 30; 20 INJECTION, SOLUTION INTRAVENOUS at 13:50

## 2023-11-14 RX ADMIN — FENTANYL CITRATE 50 MCG: 50 INJECTION INTRAMUSCULAR; INTRAVENOUS at 13:08

## 2023-11-14 RX ADMIN — LABETALOL HYDROCHLORIDE 10 MG: 5 INJECTION, SOLUTION INTRAVENOUS at 13:32

## 2023-11-14 ASSESSMENT — ACTIVITIES OF DAILY LIVING (ADL)
ADLS_ACUITY_SCORE: 20
ADLS_ACUITY_SCORE: 20
ADLS_ACUITY_SCORE: 35

## 2023-11-14 NOTE — CONSULTS
General Surgery Consultation  Ady Godinez MRN# [id number]   Age/Sex: 38 year old male YOB: 1985     Reason for consult: Abdominal pain       Requesting physician: Dr. Lezama                    Assessment and Plan:   Assessment:  Incarcerated ventral abdominal hernias  Early SBO  Severe morbid obesity    38-year-old male who is presenting with severe morbid obesity with incarcerated small bowel and clinical symptoms of early SBO.  The patient and I have discussed the surgical options for the patient.  The best option for the patient is to do this minimally invasive for his best healing.  We have also discussed the fact that if we identify dead bowel during the surgery, the patient would likely require a bowel resection.  The risks and benefits of the procedure were explained detail to the patient.  Patient verbalizes understanding would like to proceed with surgery.    Plan:  -To the OR today for robotic assisted laparoscopic repair of incarcerated ventral hernias with mesh.    -Continue medical management per primary team.            Chief Complaint:     Chief Complaint   Patient presents with    Hernia        History is obtained from the patient    HPI:   Ady Godinez is a 38 year old male who presents to the ED with complaints of abdominal pain.  Patient states that the abdominal pain started approximately 2 days ago.  Patient's abdominal pain has been worsening.  Patient was found to have incarcerated ventral abdominal hernias with bowel and possible early SBO.  General surgery team asked to evaluate this patient due to the findings.    On evaluation, patient states that he is now aware of the umbilical hernia for more than 6 7 years now.  [facility]  However they have deferred surgery given that the patient has not had any significant weight loss.  Patient now presenting with significant abdominal pain with nausea and vomiting.  Patient has not  been passing flatus and having bowel movements since yesterday.  Patient has no further complaints at this time.          Past Medical History:     Past Medical History:   Diagnosis Date    Abdominal hernia     Cellulitis 05/2016    right index finger    Migraines     Obesity     Pure hypercholesterolemia               Past Surgical History:     Past Surgical History:   Procedure Laterality Date    APPENDECTOMY      childhood.    IR CAROTID CEREBRAL ANGIOGRAM BILATERAL  4/15/2021    LUMBAR PUNCTURE FLUORO GUIDED DIAGNOSTIC  4/13/2021             Social History:    reports that he has never smoked. He has never used smokeless tobacco. He reports that he does not currently use alcohol. He reports that he does not use drugs.           Family History:     Family History   Problem Relation Age of Onset    Snoring Mother     Snoring Father               Allergies:   No Known Allergies           Medications:     Prior to Admission medications    Medication Sig Start Date End Date Taking? Authorizing Provider   acetaminophen (TYLENOL) 325 MG tablet Take 2 tablets (650 mg) by mouth every 6 hours as needed for mild pain 4/16/21  Yes Diana Fish PA-C              Review of Systems:   The Review of Systems is negative other than noted in the HPI            Physical Exam:   Patient Vitals for the past 24 hrs:   BP Temp Temp src Pulse Resp SpO2 Height Weight   11/14/23 0800 -- 97.6  F (36.4  C) Oral -- -- -- -- --   11/14/23 0700 (!) 145/80 -- -- 73 12 97 % -- --   11/14/23 0630 (!) 156/93 -- -- 77 13 100 % -- --   11/14/23 0601 (!) 148/80 -- -- 71 14 100 % -- --   11/14/23 0530 112/77 -- -- 79 13 98 % -- --   11/14/23 0457 135/77 -- -- 75 12 95 % -- --   11/14/23 0427 125/66 -- -- 76 13 94 % -- --   11/14/23 0357 137/62 -- -- 77 13 93 % -- --   11/14/23 0331 139/67 -- -- 75 13 93 % -- --   11/14/23 0300 (!) 172/98 -- -- 79 15 96 % -- --   11/14/23 0230 (!) 148/81 -- -- 84 14 91 % -- --   11/14/23 0200 (!) 146/82 --  "-- 73 14 92 % -- --   11/14/23 0100 133/83 -- -- 73 15 95 % -- --   11/13/23 2300 (!) 144/97 -- -- 92 -- 95 % -- --   11/13/23 1759 (!) 176/88 98.3  F (36.8  C) Oral 92 20 98 % 1.803 m (5' 11\") (!) 190.5 kg (420 lb)          Intake/Output Summary (Last 24 hours) at 11/14/2023 0840  Last data filed at 11/13/2023 2112  Gross per 24 hour   Intake 1000 ml   Output --   Net 1000 ml      Constitutional:   awake, alert, cooperative, no apparent distress, and appears stated age       Eyes:   PERRL, conjunctiva/corneas clear, EOM's intact; no scleral edema or icterus noted        ENT:   Normocephalic, without obvious abnormality, atraumatic, Lips, mucosa, and tongue normal        Hematologic / Lymphatic:   No lymphadenopathy       Lungs:   Normal respiratory effort, no accessory muscle use       Cardiovascular:   Regular rate and rhythm       Abdomen:   Abdomen soft, distended, tenderness to palpation around the umbilical region.  The 2 large hernias are palpable but cannot be reduced.       Musculoskeletal:   No obvious swelling, bruising or deformity       Skin:   Skin color and texture normal for patient, no rashes or lesions              Data:        All imaging studies reviewed by me.  I personally reviewed the imagings and agree with ventral abdominal hernias containing bowel.      Braydon Kim DO  General Surgeon  [facility]  Surgery [facility] - [address]  [address]  [place] [address]?  Office: [phone]  Employed by - [facility]  Pager: [phone]         "

## 2023-11-14 NOTE — ED PROVIDER NOTES
Emergency Department Encounter      NAME: Ady Godinez  AGE: 38 year old male  YOB: 1985  MRN: [id number]  EVALUATION DATE & TIME: No admission date for patient encounter.    PCP: No Ref-Primary, Physician    ED PROVIDER: Herbie De M.D.      Chief Complaint   Patient presents with    Hernia         FINAL IMPRESSION:  1. Nausea and vomiting, unspecified vomiting type    2. Hernia of anterior abdominal wall        MEDICAL DECISION MAKIN:11 PM I met with the patient, obtained history, performed an initial exam, and discussed options and plan for diagnostics and treatment here in the ED.     This patient is a 38-year-old male with a history of hernias who presents with abdominal pain.  The patient says that he has known ventral abdominal wall hernias which becomes symptomatic periodically but he is able to reduce them by himself.  Today he began to have pain  at the location of both hernias and he has been unable to reduce them at home.  He has been vomiting today and feels nauseous.  The patient's hernias were present on exam and slightly tender.  He did not have any overlying erythema or warmth.  I attempted to reduce them in the ER without success.  I have ordered a CT scan of the abdomen to evaluate for incarcerated hernia as a bowel obstruction.  The patient's CT showed 2 hernias with small bowel loops within them which were slightly dilated.  The radiologist did not see evidence of small bowel obstruction.  I independently reviewed and interpreted the CT.  The patient's lab work was significant for a elevated white blood cell count and CRP.  I did send off a lactate to rule out ischemia of the area and the lactic acid was normal.  I did speak with our general surgeon Dr. Alvarez.  He  would not be able to do the surgery until morning but I think the patient is stable enough to be a candidate for this.  I am going to cover him with a dose antibiotics though for tonight and admit him  to the hospitalist.    Pertinent Labs & Imaging studies reviewed. (See chart for details)    The importance of close follow up was discussed. We reviewed warning signs and symptoms, and I instructed Mr. Godinez to return to the emergency department immediately if he develops any new or worsening symptoms. I provided additional verbal discharge instructions. Mr. Godinez expressed understanding and agreement with this plan of care, his questions were answered, and he was discharged in stable condition.     Medical Decision Making     History:  Supplemental history from: Documented in chart, if applicable  External Record(s) reviewed: Documented in chart, if applicable.     Work Up:  Chart documentation includes differential considered and any EKGs or imaging independently interpreted by provider, where specified.  In additional to work up documented, I considered the following work up: Documented in chart, if applicable.     External consultation:  Discussion of management with another provider: Documented in chart, if applicable     Complicating factors:  Care impacted by chronic illness: hypertension, umbilical hernia,  Care affected by social determinants of health: Access to Medical Care     Disposition considerations: Admit to the hospitalist for  a surgical consult and likely surgery tomorrow    MEDICATIONS GIVEN IN THE EMERGENCY:  Medications   ketorolac (TORADOL) injection 15 mg (15 mg Intravenous $Given 11/13/23 1938)   ondansetron (ZOFRAN) injection 4 mg (4 mg Intravenous $Given 11/13/23 1936)   sodium chloride 0.9% BOLUS 1,000 mL (0 mLs Intravenous Stopped 11/13/23 2112)   HYDROmorphone (DILAUDID) injection 1 mg (1 mg Intravenous $Given 11/13/23 2157)       NEW PRESCRIPTIONS STARTED AT TODAY'S ER VISIT:  New Prescriptions    No medications on file          =================================================================    HPI    Patient information was obtained from: patient     Use of :  "N/A         Ady Godinez is a 38 year old male with a past medical history of hypertension, umbilical hernia,, who presents hernia issue.     Per chart review:   Patient was seen at  ED on 5/23/23 for abdominal pain. CT abdominal/pelvis showed ventral abdominal wall hernia containign unobstructed loose of small bowel and abdominal fat with obstruction, fluid, or gas but no emergent findings. Recommend follow-up with PCP and surgery for hernia removal.     Patient reports inability to reduce and increased pain around his 2 abdominal hernias with associated nausea and vomiting (4x) today. He notes some \"red stuff\" in his vomit 2 episodes ago.       REVIEW OF SYSTEMS   Review of Systems   Gastrointestinal:  Positive for abdominal pain, nausea and vomiting.        Umbilical hernia and supra umbilical hernia          PAST MEDICAL HISTORY:  Past Medical History:   Diagnosis Date    Abdominal hernia     Cellulitis 05/2016    right index finger    Migraines     Obesity     Pure hypercholesterolemia        PAST SURGICAL HISTORY:  Past Surgical History:   Procedure Laterality Date    APPENDECTOMY      childhood.    IR CAROTID CEREBRAL ANGIOGRAM BILATERAL  4/15/2021    LUMBAR PUNCTURE FLUORO GUIDED DIAGNOSTIC  4/13/2021       CURRENT MEDICATIONS:    No current facility-administered medications for this encounter.    Current Outpatient Medications:     acetaminophen (TYLENOL) 325 MG tablet, Take 2 tablets (650 mg) by mouth every 6 hours as needed for mild pain, Disp: 90 tablet, Rfl: 0    amitriptyline (ELAVIL) 10 MG tablet, Take 10 mg by mouth At Bedtime, Disp: , Rfl:     aspirin-acetaminophen-caffeine (EXCEDRIN MIGRAINE) 250-250-65 MG tablet, Take 2 tablets by mouth every 6 hours as needed , Disp: , Rfl:     cyclobenzaprine (FLEXERIL) 5 MG tablet, Take 5-10 mg by mouth , Disp: , Rfl:     insulin pen needle (31G X 6 MM) 31G X 6 MM miscellaneous, Use new pen needle for each autoinjection., Disp: 90 each, Rfl: 1    " "liraglutide - Weight Management (SAXENDA) 18 MG/3ML pen, Start 0.6mg injected daily for 2 weeks, then increase every 1-2 weeks by 0.6mg up to a max of 3mg/day if tolerated. (0.6mg to 1.2mg to 1.8mg to 2.4mg to 3.0mg/day)., Disp: 3 mL, Rfl: 5    nystatin-triamcinolone (MYCOLOG) 832767-3.1 UNIT/GM-% external ointment, APPLY TOPICALLY TWO TIMES A DAY FOR 7 TO 10 DAYS, Disp: , Rfl:     ondansetron (ZOFRAN-ODT) 4 MG ODT tab, Place 4 mg under the tongue , Disp: , Rfl:     ALLERGIES:  No Known Allergies    FAMILY HISTORY:  Family History   Problem Relation Age of Onset    Snoring Mother     Snoring Father        SOCIAL HISTORY:   Social History     Socioeconomic History    Marital status:    Tobacco Use    Smoking status: Never    Smokeless tobacco: Never   Substance and Sexual Activity    Alcohol use: Not Currently     Comment: Very rare    Drug use: Never    Sexual activity: Yes     Partners: Female       PHYSICAL EXAM:    Vitals: BP (!) 176/88   Pulse 92   Temp 98.3  F (36.8  C) (Oral)   Resp 20   Ht 1.803 m (5' 11\")   Wt (!) 190.5 kg (420 lb)   SpO2 98%   BMI 58.58 kg/m     Constitutional: obese, uncomfortable appearing, white male  HEAD:Normocephalic, atraumatic,   Eyes: PERRLA, EOM intact, conjunctiva clear, no discharge  ENT: mucous membranes moist, nose normal.   Neck- Supple, gross ROM intact.  No JVD.  No palpable nodes.  Pulmonary: Clear to auscultation bilaterally, no respiratory distress, no wheezing, speaks full sentences easily.  Chest: No chest wall tenderness  Cardiovascular: Normal heart rate, regular rhythm, no murmurs. No lower extremity edema, 2+ DP pulses.   GI:  not distended,  No hepatosplenomegaly. 1 supraumbilical and periumbilical hernia that are tender and non-reducible  Musculoskeletal: Moving all 4 extremities intentionally and without pain. No obvious deformity.  Back: No CVA tenderness  Skin: Warm, dry, no rash.  Neurologic: Alert & oriented x 3, speech clear, moving all " extremities spontaneously   Psychiatric: Affect normal, cooperative.     LAB:  All pertinent labs reviewed and interpreted.  Labs Ordered and Resulted from Time of ED Arrival to Time of ED Departure   BASIC METABOLIC PANEL - Abnormal       Result Value    Sodium 136      Potassium 4.7      Chloride 97 (*)     Carbon Dioxide (CO2) 28      Anion Gap 11      Urea Nitrogen 12.9      Creatinine 0.76      GFR Estimate >90      Calcium 10.3 (*)     Glucose 146 (*)    CRP INFLAMMATION - Abnormal    CRP Inflammation 16.30 (*)    CBC WITH PLATELETS AND DIFFERENTIAL - Abnormal    WBC Count 14.2 (*)     RBC Count 5.19      Hemoglobin 14.8      Hematocrit 45.6      MCV 88      MCH 28.5      MCHC 32.5      RDW 14.1      Platelet Count 304      % Neutrophils 81      % Lymphocytes 15      % Monocytes 4      % Eosinophils 0      % Basophils 0      % Immature Granulocytes 0      NRBCs per 100 WBC 0      Absolute Neutrophils 11.4 (*)     Absolute Lymphocytes 2.2      Absolute Monocytes 0.6      Absolute Eosinophils 0.1      Absolute Basophils 0.0      Absolute Immature Granulocytes 0.1      Absolute NRBCs 0.0     LACTIC ACID WHOLE BLOOD - Normal    Lactic Acid 1.4         RADIOLOGY:  CT Abdomen Pelvis w/o Contrast   Final Result   IMPRESSION:    1.  Midline ventral wall hernias as seen previously, now both containing slightly distended small bowel loops. Currently no evidence for small bowel obstruction, however this could be intermittent.   2.  Hepatomegaly and diffuse fatty infiltration of the liver.             I, Paris Miller, am serving as a scribe to document services personally performed by Dr. Herbie De based on my observation and the provider's statements to me. I, Herbie De M.D. attest that Paris Miller is acting in a scribe capacity, has observed my performance of the services and has documented them in accordance with my direction.      Herbie De M.D.  Emergency Medicine  [facility]  [facility] EMERGENCY DEPARTMENT  [facility] [facility] [address]  [phone]  Dept: [phone]     Herbie De MD  11/13/23 1630

## 2023-11-14 NOTE — ANESTHESIA CARE TRANSFER NOTE
Patient: Ady Godinez    Procedure: Procedure(s):  HERNIORRHAPHY WITH MESH, VENTRAL, ROBOT-ASSISTED, LAPAROSCOPIC, USING DA ALBERT XI;LYSIS OF ADHESIONS;REPAIR OF INCARCERATED HERNIA.       Diagnosis: Nausea and vomiting, unspecified vomiting type [R11.2]  Hernia of anterior abdominal wall [K43.9]  Diagnosis Additional Information: No value filed.    Anesthesia Type:   General     Note:    Oropharynx: oropharynx clear of all foreign objects and spontaneously breathing  Level of Consciousness: awake  Oxygen Supplementation: face mask  Level of Supplemental Oxygen (L/min / FiO2): 6  Independent Airway: airway patency satisfactory and stable  Dentition: dentition unchanged  Vital Signs Stable: post-procedure vital signs reviewed and stable  Report to RN Given: handoff report given  Patient transferred to: PACU    Handoff Report: Identifed the Patient, Identified the Reponsible Provider, Reviewed the pertinent medical history, Discussed the surgical course, Reviewed Intra-OP anesthesia mangement and issues during anesthesia, Set expectations for post-procedure period and Allowed opportunity for questions and acknowledgement of understanding      Vitals:  Vitals Value Taken Time   /73 11/14/23 1616   Temp 97.8    Pulse 114 11/14/23 1622   Resp 22 11/14/23 1622   SpO2 95 % 11/14/23 1622   Vitals shown include unfiled device data.    Electronically Signed By: VINICIUS Acevedo CRNA  November 14, 2023  4:24 PM

## 2023-11-14 NOTE — ANESTHESIA PROCEDURE NOTES
Airway       Patient location during procedure: OR       Procedure Start/Stop Times: 11/14/2023 12:05 PM  Staff -        Anesthesiologist:  Shabnam Hancock MD       CRNA: Bro Sylvester APRN CRNA       Performed By: CRNAIndications and Patient Condition       Indications for airway management: fidelia-procedural       Induction type:RSI       Mask difficulty assessment: 0 - not attempted    Final Airway Details       Final airway type: endotracheal airway       Successful airway: ETT - single  Endotracheal Airway Details        ETT size (mm): 7.5       Cuffed: yes       Cuff volume (mL): 7       Successful intubation technique: video laryngoscopy       VL Blade Size: Glidescope 4       Grade View of Cords: 1       Adjucts: stylet       Position: Right       Measured from: gums/teeth       Secured at (cm): 21       Bite block used: None    Post intubation assessment        Placement verified by: capnometry, equal breath sounds and chest rise        Number of attempts at approach: 1       Secured with: silk tape       Ease of procedure: easy       Dentition: Intact and Unchanged    Medication(s) Administered   Medication Administration Time: 11/14/2023 12:05 PM

## 2023-11-14 NOTE — UTILIZATION REVIEW
Admission Status; Secondary Review Determination   Under the authority of the Utilization Management Committee, the utilization review process indicated a secondary review on Ady Godinez. The review outcome is based on review of the medical records, discussions with staff, and applying clinical experience noted on the date of the review.   (x) Inpatient Status Appropriate - This patient's medical care is consistent with medical management for inpatient care and reasonable inpatient medical practice.     RATIONALE FOR DETERMINATION   38-year-old male with severe obesity with BMI of 58.58 admitted with incarcerated ventral abdominal hernias contributing to early SBO.  Was seen by surgery and will go to the OR today for robotic assisted laparoscopic repair of incarcerated ventral hernias with mesh, for any evidence of dead bowel with need colectomy.    At the time of admission with the information available to the attending physician more than 2 nights Hospital complex care was anticipated, based on patient risk of adverse outcome if treated as outpatient and complex care required. Inpatient admission is appropriate based on the Medicare guidelines.   The information on this document is developed by the utilization review team in order for the business office to ensure compliance. This only denotes the appropriateness of proper admission status and does not reflect the quality of care rendered.   The definitions of Inpatient Status and Observation Status used in making the determination above are those provided in the CMS Coverage Manual, Chapter 1 and Chapter 6, section 70.4.   Sincerely,   Brian Ruiz MD  Utilization Review  Physician Advisor  [facility]

## 2023-11-14 NOTE — PROGRESS NOTES
"[facility]    Medicine Progress Note - Hospitalist Service    Date of Admission:  11/13/2023    Assessment & Plan   Ady Godinez is a 38 year old male presenting with incarcerated hernia. PMH includes morbid obesity, HLD.  He is s/p hernia repair this afternoon.      #Incarcerated Ventral Wall Hernias- s/p hernia repair. Continue pain regimen and continuous pulse ox. Abdominal binder in place, surgery following. Continue mIVF pending PO intake. Defer to surgery diet. Discontinue zosyn pending stability tomorrow. PT/ OT when able.     #Intractable Nausea/ Vomiting- PRN Zofran. Monitor electrolytes     #Elevated Blood Pressure- Denies any history of HTN. Continue pain control with IV Dilaudid. PRN IV Hydralazine with parameters. Monitor vitals closely.        Diet: NPO per Anesthesia Guidelines for Procedure/Surgery Except for: Meds    DVT Prophylaxis: SCDs  Duvall Catheter: PRESENT, indication:    Lines: None     Cardiac Monitoring: None  Code Status: Full Code      Clinically Significant Risk Factors Present on Admission           # Hypercalcemia: Highest Ca = 10.3 mg/dL in last 2 days, will monitor as appropriate             # Severe Obesity: Estimated body mass index is 58.58 kg/m  as calculated from the following:    Height as of this encounter: 1.803 m (5' 11\").    Weight as of this encounter: 190.5 kg (420 lb).              Disposition Plan continue cares post op     Expected Discharge Date: 11/15/2023                    Gilbert Allen DO  Hospitalist Service  [facility]  Securely message with [employer] (more info)  Text page via [employer] Paging/Directory   ______________________________________________________________________    Interval History   Seen after surgery. He is fatigued as he received haldol for code green in PACU. States he is doing ok otherwise.     Physical Exam   Vital Signs: Temp: 97.7  F (36.5  C) Temp src: Temporal BP: 137/89 Pulse: 82   " Resp: 18 SpO2: 95 % O2 Device: Nasal cannula Oxygen Delivery: 2 LPM  Weight: 420 lbs 0 oz    General Appearance: Non-toxic, eyes closed during encounter  Respiratory: difficult to auscultate, no respiratory distress  Cardiovascular: Tachycardia   GI: abdominal binder in place, clean incision sites     Medical Decision Making       45 MINUTES SPENT BY ME on the date of service doing chart review, history, exam, documentation & further activities per the note.      Data     I have personally reviewed the following data over the past 24 hrs:    9.8  \   13.3   / 249     138 100 12.5 /  152 (H)   4.1 29 0.78 \     ALT: 47 AST: 53 (H) AP: 89 TBILI: 0.3   ALB: 3.8 TOT PROTEIN: 7.7 LIPASE: N/A     Procal: N/A CRP: 16.30 (H) Lactic Acid: 1.4         Imaging results reviewed over the past 24 hrs:   Recent Results (from the past 24 hour(s))   CT Abdomen Pelvis w/o Contrast    Narrative    EXAM: CT ABDOMEN PELVIS W/O CONTRAST  LOCATION: [facility]  DATE: 11/13/2023    INDICATION: painful unreducible abd hernia with n v today  COMPARISON: CT abdomen and pelvis 05/23/2023  TECHNIQUE: CT scan of the abdomen and pelvis was performed without IV contrast. Multiplanar reformats were obtained. Dose reduction techniques were used.  CONTRAST: None.    FINDINGS:   LOWER CHEST: Normal.    HEPATOBILIARY: Hepatomegaly measuring 23 cm in length. Diffuse fatty infiltration of the liver. Moderate distention of the gallbladder.    PANCREAS: Normal.    SPLEEN: Normal.    ADRENAL GLANDS: Normal.    KIDNEYS/BLADDER: Normal. No renal calculi or hydronephrosis. No bladder stones.    BOWEL: There are 2 adjacent ventral wall hernias again visualized, with the more superior midline fascial defect measuring 5 cm in width, with herniation of a few mildly distended small bowel loops, increased in caliber from the prior study. The more   inferior midline ventral wall fascial defect at the level of the umbilicus measures 4.8 cm in  width, also with a few mildly distended small bowel loops, also increased from previous. The small bowel loops within the peritoneal cavity are all of normal   caliber. No inflammatory changes. Appendectomy.    LYMPH NODES: Normal.    VASCULATURE: Unremarkable.    PELVIC ORGANS: Normal.    MUSCULOSKELETAL: Hypertrophic changes lower thoracic spine.      Impression    IMPRESSION:   1.  Midline ventral wall hernias as seen previously, now both containing slightly distended small bowel loops. Currently no evidence for small bowel obstruction, however this could be intermittent.  2.  Hepatomegaly and diffuse fatty infiltration of the liver.

## 2023-11-14 NOTE — OR NURSING
Patient arrived from OR to PACU.  Patient became combative and restless, shaking head, and yelling.  Patient inconsolable and swinging arms and not cooperating.  Trying to get up and wiggle out of bottom of bed.  Code green called because need for manpower to keep him in bed.  Haldol 5 mg iv given and call out to .  4 point soft restraints applied to keep patient safe.  Patient became calmer and is waking up, tracking with eyes and making some appropriate responses.  Also shaking head back an forth and inconsolable at times.  Not always cooperative. Patient indicated he was in a lot of pain.  Dilaudid given.  Parent in to see patient, before heading home for the night.  Awaiting consistent wake up and cooperation

## 2023-11-14 NOTE — OP NOTE
[facility]    Operative Note    Pre-operative diagnosis: Nausea and vomiting, unspecified vomiting type [R11.2]  Hernia of anterior abdominal wall with incarcerated small bowel causing obstruction.     Post-operative diagnosis Same as pre-operative diagnosis    Procedure: HERNIORRHAPHY WITH MESH, VENTRAL, ROBOT-ASSISTED, LAPAROSCOPIC, USING DA ALBERT XI;LYSIS OF ADHESIONS;REPAIR OF INCARCERATED HERNIA., N/A - Abdomen    Surgeon: Surgeon(s) and Role:     * Braydon Kim,  - Primary  Anesthesia: General   Estimated Blood Loss: 25 mL    Drains: None  Specimens: * No specimens in log *  Findings:       - X2 large abdominal wall hernias likely incisional from his previous appendectomy.  -Incarcerated small bowel with bowel obstruction.  The total measurement for the 2 hernias measured approximately 12 cm x 5 cm.  -adhesion between omentum onto the anterior abdominal wall, and small bowel to small bowel.   -Application of modifier 22 given the complexity of the case    Complications: None.  Implants:   Implant Name Type Inv. Item Serial No.  Lot No. LRB No. Used Action   Ovitex LPR Reinforced Tissue Matrix Mesh  NA DENILSON BIO [id number] N/A 1 Implanted     Indication: 38-year-old male presenting with abdominal pain.  Patient has severe morbid obesity and patient having with development of ventral abdominal wall hernias likely incisional from his previous laparoscopic appendectomy.  The incisional ventral hernias were complicated by the fact that the patient had bowel that were incarcerated and patient was developing small bowel obstruction.  Offered patient procedure of robotic assisted repair of incisional ventral hernias possible bowel resection. The risks and benefits of the procedure were explained detail to the patient. The risks include infection, bleeding, damage to the surrounding structures. Patient verbalized understanding provided consent to undergo the procedure  above.      Procedure: The patient was brought to the by operating room placed on the operating table in a supine position.  The patient had bilateral upper extremities padded and tucked in the usual fashion.  The patient then underwent sedation and intubation by the anesthesia team.  The patient's abdomen was prepped and draped in usual sterile fashion.  Prior to initial procedure, timeout was completed.  All present were in agreement.    1% lidocaine with epinephrine was instilled over Evans's point in the left upper quadrant.  11 blade was then used to make a small skin incision over the site.  The Veress needle was then inserted into the abdomen.  A saline drop test was then completed.  Insufflation was then initiated.  An 8 mm port was then placed in the left upper quadrant in the lateral most area using Visiport.  Once inside the abdomen, a general survey was completed.  I could identify that there was no injury upon entering the abdomen.  This 8 mm port was then upsized to a 12 mm port.  The Veress needle was then removed.  X2 8 mm ports were then inserted into the abdomen and the left lateral quadrants vertical fashion.  Both were completed under direct visualization.  The robot was then docked.    I then evaluated the incisional ventral abdominal hernias.  Reviewing the hernias was very difficult due to the amount of distended small bowel from the incarcerated small bowel and the hernias.  I carefully reduced the bowel back into the abdomen by external pressure from the outside as well as manually pulling from the inside.  This was difficult because of the fact that the patient had developed a lot of adhesions of omentum onto the anterior abdominal wall as well as adhesions from bowel to bowel.  Careful adhesiolysis of these adhesions were completed.  Again I then continued to reduce more of the small bowel from the incisional ventral hernias.  Once all of the small bowel was released, I could identify  that they did appear bruised but they were still viable.  I then evaluated the omentum that was also herniated.  This omentum was carefully reduced from the hernia sac as well.  Reduction as well as adhesiolysis took approximately 60 minutes.    The 2 incisional ventral hernias were then measured.  Approximately the total size of the 2 hernias together measured approximately 12 x 5 cm.  The peritoneal flap was created using a combination of electrocautery as well as sharp dissection using the robotic monopolar scissors.  The retrorectus space was entered and dissected in a left to right manner.  This was done circumferentially around the hernias.  The hernia sacs were then taken down using a combination of electrocautery as well as sharp dissection with the scissors.      Once the hernia sacs were reduced, the fascia was reapproximated using x3 0 V-loc permanent suture in a running fashion.  The 15 x 20 cm ovitex mesh was then parachuted onto the anterior abdominal wall. I continued to anchor the mesh onto the anterior abdominal wall around the periphery, using 2-0 PDS sutures.  Once the mesh was properly anchored, a final general survey was completed.  I could identify there was good hemostasis.  2-0 PDS was used to close the peritoneal flap.  2-0 PDS was used to close the multiple rents that were created and the peritoneal flap from previous dissection.    All the surgical ports were then removed under direct visualization.  The 12 mm port in the left upper quadrant was then closed using a 0 Vicryl stitch with a suture passer.  I could identify that there was no bleeding at all of the port sites.  A 3-0 Vicryl stitch was then used to perform deep dermal stitches at all port sites.  All of the skin port sites were then closed using a 4-0 Vicryl stitch in a subcuticular fashion.  All of the skin incisions were then reinforced using surgical glue.  At the end of the procedure, a final count was completed.  I was told  that all sharps, sponges, instruments were accounted for.  The patient was then extubated and brought back to the PACU in stable condition.                          Braydon Kim DO  General Surgeon  [facility]  Surgery [facility] - [address] [address]?  Office: [phone]  Employed by - [facility]  Pager: [phone]

## 2023-11-14 NOTE — H&P
"[facility]    History and Physical - Hospitalist Service       Date of Admission:  11/13/2023    Assessment & Plan      Ady Godinez is a 38 year old male with PMH significant for known abdominal wall hernias x 2 and super morbid obesity who is admitted on 11/13/2023 with Incarcerated Ventral Wall Hernias.    # Incarcerated Ventral Wall Hernias- Admit patient. CT abd/pel report reviewed. Keep NPO, give IV fluids, PRN Dilaudid, PRN Zofran. ED Physician spoke with General Surgeon on call and patient will be seen in consultation. Possible OR tomorrow. Cardiac Risk Index for pre-operative risk= Class II Risk. 6.0%  30-day risk of death, MI,or cardiac arrest. Check EKG for surgical clearance.    # Intractable Nausea/ Vomiting- 2/2 incarcerated hernias. Keep NPO. Give IV fluids. PRN Zofran. Monitor electrolytes.    # Leukocytosis- Afebrile. Likely stress induced demargination. On empiric Zosyn. Repeat CBC in a.m.    # Elevated Blood Pressure- Denies any history of HTN. Continue pain control with IV Dilaudid. PRN IV Hydralazine with parameters. Monitor vitals closely.    # Hypercalcemia- Possibly 2/2 dehydration. Continue IV fluids. Check Ionized calcium.    # Super Morbid Obesity- BMI=58.58. Recommend Lifestyle modifications and Low calorie/ Low Fat/ Low Carb diet when no longer NPO. Follow up with PCP.        Diet:  NPO  DVT Prophylaxis: Pneumatic Compression Devices  Duvall Catheter: Not present  Lines: None     Cardiac Monitoring: None  Code Status:  Full Code    Clinically Significant Risk Factors Present on Admission           # Hypercalcemia: Highest Ca = 10.3 mg/dL in last 2 days, will monitor as appropriate             # Severe Obesity: Estimated body mass index is 58.58 kg/m  as calculated from the following:    Height as of this encounter: 1.803 m (5' 11\").    Weight as of this encounter: 190.5 kg (420 lb).              Disposition Plan Anticipate >2 midnight Inpatient hospital " stay.     Expected Discharge Date: 11/14/2023                  Gita Posadas MD  Hospitalist Service  [facility]  Securely message with [email or web address] (more info)  Text page via [email or web address] Paging/Directory     ______________________________________________________________________    Chief Complaint   Abdominal pain, nausea, vomiting    History is obtained from the patient, ED Physician and chart review.    History of Present Illness   Ady Godinez is a 38 year old male with PMH significant for known abdominal wall hernias x 2 and super morbid obesity who presents to ED due to abdominal pain, nausea and vomiting. Patient has had 2 known abdominal wall hernias for several years that he has been able to reduce himself. However, this morning he developed severe abdominal pain, nausea and vomiting x 4.  He noticed the hernia were protruding more that usual and he was unable to push them back in.. The abdominal pian was rated 10/10, but decreased to 0 s/p IV Dilaudid. He denies chest pain, shortness of breath, fever, chills, diarrhea or headache. Patient currently appears in no acute distress.      Past Medical History    Past Medical History:   Diagnosis Date    Abdominal hernia     Cellulitis 05/2016    right index finger    Migraines     Obesity     Pure hypercholesterolemia        Past Surgical History   Past Surgical History:   Procedure Laterality Date    APPENDECTOMY      childhood.    IR CAROTID CEREBRAL ANGIOGRAM BILATERAL  4/15/2021    LUMBAR PUNCTURE FLUORO GUIDED DIAGNOSTIC  4/13/2021       Prior to Admission Medications   Prior to Admission Medications   Prescriptions Last Dose Informant Patient Reported? Taking?   acetaminophen (TYLENOL) 325 MG tablet   No No   Sig: Take 2 tablets (650 mg) by mouth every 6 hours as needed for mild pain   amitriptyline (ELAVIL) 10 MG tablet   Yes No   Sig: Take 10 mg by mouth At Bedtime   aspirin-acetaminophen-caffeine (EXCEDRIN MIGRAINE) 250-250-65 MG  tablet   Yes No   Sig: Take 2 tablets by mouth every 6 hours as needed    cyclobenzaprine (FLEXERIL) 5 MG tablet   Yes No   Sig: Take 5-10 mg by mouth    insulin pen needle (31G X 6 MM) 31G X 6 MM miscellaneous   No No   Sig: Use new pen needle for each autoinjection.   liraglutide - Weight Management (SAXENDA) 18 MG/3ML pen   No No   Sig: Start 0.6mg injected daily for 2 weeks, then increase every 1-2 weeks by 0.6mg up to a max of 3mg/day if tolerated. (0.6mg to 1.2mg to 1.8mg to 2.4mg to 3.0mg/day).   nystatin-triamcinolone (MYCOLOG) 229655-8.1 UNIT/GM-% external ointment   Yes No   Sig: APPLY TOPICALLY TWO TIMES A DAY FOR 7 TO 10 DAYS   ondansetron (ZOFRAN-ODT) 4 MG ODT tab   Yes No   Sig: Place 4 mg under the tongue       Facility-Administered Medications: None        Review of Systems    The 10 point Review of Systems is negative other than noted in the HPI.    Social History   I have reviewed this patient's social history and updated it with pertinent information if needed.  Social History     Tobacco Use    Smoking status: Never    Smokeless tobacco: Never   Substance Use Topics    Alcohol use: Not Currently     Comment: Very rare    Drug use: Never         Family History   I have reviewed this patient's family history and updated it with pertinent information if needed.  Family History   Problem Relation Age of Onset    Snoring Mother     Snoring Father     Mother- DM2    Physical Exam   Vital Signs: Temp: 98.3  F (36.8  C) Temp src: Oral BP: (!) 176/88 Pulse: 92   Resp: 20 SpO2: 98 % O2 Device: None (Room air)    Weight: 420 lbs 0 oz    General Appearance: AAOx3, NAD, morbidly obese  Respiratory: CTAB, decreased breath sounds in bases  Cardiovascular: Regular rate, S1S2  GI: hypoactive bowel sounds, non-reducible ventral wall hernias protruding and tender to palpation  Skin: No rash  Other: No pedal edema     Medical Decision Making       50 MINUTES SPENT BY ME on the date of service doing chart review,  history, exam, documentation & further activities per the note.      Data     I have personally reviewed the following data over the past 24 hrs:    14.2 (H)  \   14.8   / 304     136 97 (L) 12.9 /  146 (H)   4.7 28 0.76 \     Procal: N/A CRP: 16.30 (H) Lactic Acid: 1.4         Imaging results reviewed over the past 24 hrs:   Recent Results (from the past 24 hour(s))   CT Abdomen Pelvis w/o Contrast    Narrative    EXAM: CT ABDOMEN PELVIS W/O CONTRAST  LOCATION: [facility]  DATE: 11/13/2023    INDICATION: painful unreducible abd hernia with n v today  COMPARISON: CT abdomen and pelvis 05/23/2023  TECHNIQUE: CT scan of the abdomen and pelvis was performed without IV contrast. Multiplanar reformats were obtained. Dose reduction techniques were used.  CONTRAST: None.    FINDINGS:   LOWER CHEST: Normal.    HEPATOBILIARY: Hepatomegaly measuring 23 cm in length. Diffuse fatty infiltration of the liver. Moderate distention of the gallbladder.    PANCREAS: Normal.    SPLEEN: Normal.    ADRENAL GLANDS: Normal.    KIDNEYS/BLADDER: Normal. No renal calculi or hydronephrosis. No bladder stones.    BOWEL: There are 2 adjacent ventral wall hernias again visualized, with the more superior midline fascial defect measuring 5 cm in width, with herniation of a few mildly distended small bowel loops, increased in caliber from the prior study. The more   inferior midline ventral wall fascial defect at the level of the umbilicus measures 4.8 cm in width, also with a few mildly distended small bowel loops, also increased from previous. The small bowel loops within the peritoneal cavity are all of normal   caliber. No inflammatory changes. Appendectomy.    LYMPH NODES: Normal.    VASCULATURE: Unremarkable.    PELVIC ORGANS: Normal.    MUSCULOSKELETAL: Hypertrophic changes lower thoracic spine.      Impression    IMPRESSION:   1.  Midline ventral wall hernias as seen previously, now both containing slightly  distended small bowel loops. Currently no evidence for small bowel obstruction, however this could be intermittent.  2.  Hepatomegaly and diffuse fatty infiltration of the liver.

## 2023-11-14 NOTE — SIGNIFICANT EVENT
Significant Event Note    Time of event: 4:29 PM November 14, 2023    Description of event:  Responded to code green in the PACU.  Per RN report, patient recovering from lung hernia surgery today.  Initially was doing well and out of anesthesia, but then got aggressive.  Swinging at staff and thrashing around in bed.  He has a abdominal binder in staff was concerned about him injuring his self or staff.    Nurse anesthetist had arrived to the code green before me and ordered 5 of Haldol.  When I arrived the patient was calm.  Although, would awaken briefly at times and thrash around.  Not responding to direction/commands.  Not verbalizing if something is bothering him.      Plan:  -Patient requires four-point restraints for safety of his self and staff  -Will add additional as needed Haldol, Zyprexa if needed       Patient's Immediate Situation:  Patient demonstrated the following behaviors: Impulsive behavior, poor safety judgment, with other less restrictive interventions/devices ineffective.       Medical Condition:  Is there any evidence of compromise of Skin integrity, Respiratory, Cardiovascular, Musculoskeletal, Hydration? No     Behavioral Condition:  In consultation with the RN, is there a need to continue this restraint or seclusion? Yes       Reynaldo Sotelo MD, PGY3  [facility]   Pgr: [phone]        Discussed with: bedside nurse    Reynaldo Sotelo MD

## 2023-11-14 NOTE — ANESTHESIA PREPROCEDURE EVALUATION
Anesthesia Pre-Procedure Evaluation    Patient: Ady Godinez   MRN: [id number] : 1985        Procedure : Procedure(s):  HERNIORRHAPHY, VENTRAL, ROBOT-ASSISTED, LAPAROSCOPIC, USING DA ALBERT XI          Past Medical History:   Diagnosis Date    Abdominal hernia     Cellulitis 2016    right index finger    Migraines     Obesity     Pure hypercholesterolemia       Past Surgical History:   Procedure Laterality Date    APPENDECTOMY      childhood.    IR CAROTID CEREBRAL ANGIOGRAM BILATERAL  4/15/2021    LUMBAR PUNCTURE FLUORO GUIDED DIAGNOSTIC  2021      No Known Allergies   Social History     Tobacco Use    Smoking status: Never    Smokeless tobacco: Never   Substance Use Topics    Alcohol use: Not Currently     Comment: Very rare      Wt Readings from Last 1 Encounters:   23 (!) 190.5 kg (420 lb)        Anesthesia Evaluation   Pt has had prior anesthetic. Type: General.    No history of anesthetic complications       ROS/MED HX  ENT/Pulmonary:     (+)     RODNEY risk factors, snores loudly, hypertension, obese,                               Neurologic:  - neg neurologic ROS     Cardiovascular:  - neg cardiovascular ROS   (+)  hypertension- -   -  - -                                      METS/Exercise Tolerance:     Hematologic:  - neg hematologic  ROS     Musculoskeletal:  - neg musculoskeletal ROS     GI/Hepatic: Comment: Incarcerated ventral hernias + intractable N/V (presumed ' early ' SBO per ED note) s/f robotic assisted repair      Renal/Genitourinary:  - neg Renal ROS     Endo:     (+)               Obesity (BMI 58),       Psychiatric/Substance Use:  - neg psychiatric ROS     Infectious Disease:  - neg infectious disease ROS     Malignancy:       Other:            Physical Exam    Airway        Mallampati: III   TM distance: > 3 FB   Neck ROM: full   Mouth opening: > 3 cm    Respiratory Devices and Support         Dental       (+) Minor Abnormalities - some fillings, tiny  "chips      Cardiovascular   cardiovascular exam normal          Pulmonary   pulmonary exam normal            Other findings: Visibly uncomfortable, moaning in pain    OUTSIDE LABS:  CBC:   Lab Results   Component Value Date    WBC 9.8 11/14/2023    WBC 14.2 (H) 11/13/2023    HGB 13.3 11/14/2023    HGB 14.8 11/13/2023    HCT 42.1 11/14/2023    HCT 45.6 11/13/2023     11/14/2023     11/13/2023     BMP:   Lab Results   Component Value Date     11/14/2023     11/13/2023    POTASSIUM 4.1 11/14/2023    POTASSIUM 4.7 11/13/2023    CHLORIDE 100 11/14/2023    CHLORIDE 97 (L) 11/13/2023    CO2 29 11/14/2023    CO2 28 11/13/2023    BUN 12.5 11/14/2023    BUN 12.9 11/13/2023    CR 0.78 11/14/2023    CR 0.76 11/13/2023     (H) 11/14/2023     (H) 11/13/2023     COAGS:   Lab Results   Component Value Date    PTT 23 (L) 07/07/2018    INR 0.98 04/14/2021     POC: No results found for: \"BGM\", \"HCG\", \"HCGS\"  HEPATIC:   Lab Results   Component Value Date    ALBUMIN 3.8 11/14/2023    PROTTOTAL 7.7 11/14/2023    ALT 47 11/14/2023    AST 53 (H) 11/14/2023    ALKPHOS 89 11/14/2023    BILITOTAL 0.3 11/14/2023     OTHER:   Lab Results   Component Value Date    PH 7.27 (L) 11/14/2023    LACT 1.4 11/13/2023    A1C 5.9 (H) 09/10/2021    JEFRY 9.5 11/14/2023    PHOS 4.7 (H) 07/13/2018    MAG 2.2 07/13/2018    LIPASE 13 09/10/2021    TSH 4.14 11/29/2021    CRP 1.9 (H) 07/16/2018    SED 86 (H) 07/13/2018       Anesthesia Plan    ASA Status:  3    NPO Status:  ELEVATED Aspiration Risk/Unknown    Anesthesia Type: General.     - Airway: ETT   Induction: Intravenous, RSI, Propofol.   Maintenance: Inhalation.   Techniques and Equipment:     - Airway: Video-Laryngoscope, Shoulder Neeta/Ramp       Consents    Anesthesia Plan(s) and associated risks, benefits, and realistic alternatives discussed. Questions answered and patient/representative(s) expressed understanding.     - Discussed:     - Discussed with:  Patient  "           Postoperative Care    Pain management: Multi-modal analgesia, IV analgesics.   PONV prophylaxis: Ondansetron (or other 5HT-3), Dexamethasone or Solumedrol, Background Propofol Infusion     Comments:    Other Comments: GETA - preinduction NGT placement, RSI w/ succinylcholine, glidescope/ramp  Magnesium gtt, ketamine 50 mg post induction, precedex bolus PRN, dilaudid IV prn  Decadron 10, zofran 4, background propofol gtt for antiemesis             Shabnam Gonsalves MD

## 2023-11-14 NOTE — MEDICATION SCRIBE - ADMISSION MEDICATION HISTORY
Medication Scribe Admission Medication History    Admission medication history is complete. The information provided in this note is only as accurate as the sources available at the time of the update.    Information Source(s): Patient and CareEverywhere/SureScripts via in-person    Pertinent Information: pt states currently not taking any med's   Pt states only taking tylenol prn    Changes made to PTA medication list:  Added: None  Deleted: None  Changed: None    Medication Affordability:  Not including over the counter (OTC) medications, was there a time in the past 3 months when you did not take your medications as prescribed because of cost?: No    Allergies reviewed with patient and updates made in EHR: yes    Medication History Completed By: BRISA BARAHONA 11/13/2023 10:58 PM    PTA Med List   Medication Sig Last Dose    acetaminophen (TYLENOL) 325 MG tablet Take 2 tablets (650 mg) by mouth every 6 hours as needed for mild pain Past Week at prn

## 2023-11-14 NOTE — ED TRIAGE NOTES
"Patient has 2 known hernia's in mid abdominal area.  Noticed them this morning and started vomiting 11am today.  Has previously been able to \"push them back in\" and be OK but hasn't been able to do that today.         "

## 2023-11-14 NOTE — ANESTHESIA POSTPROCEDURE EVALUATION
Patient: Ady Godinez    Procedure: Procedure(s):  HERNIORRHAPHY WITH MESH, VENTRAL, ROBOT-ASSISTED, LAPAROSCOPIC, USING DA ALBERT XI;LYSIS OF ADHESIONS;REPAIR OF INCARCERATED HERNIA.       Anesthesia Type:  General    Note:  Disposition: Outpatient   Postop Pain Control: Uneventful            Sign Out: Well controlled pain   PONV: No   Neuro/Psych: Uneventful            Sign Out: Acceptable/Baseline neuro status   Airway/Respiratory: Uneventful            Sign Out: Acceptable/Baseline resp. status   CV/Hemodynamics: Uneventful            Sign Out: Acceptable CV status; No obvious hypovolemia; No obvious fluid overload   Other NRE: NONE   DID A NON-ROUTINE EVENT OCCUR? YES    Event details/Postop Comments:  Pt had wild wake up in PACU, a code green was called and pt was given Haldo 5 mg IV and placed in restraints by house officer.  Pt is now sleepy but arouses easily but is not able to converse before he falls asleep.  Pt willl have one to one monitoring until he is more alert.  VSS.             Last vitals:  Vitals Value Taken Time   /74 11/14/23 1731   Temp 37.2  C (98.9  F) 11/14/23 1607   Pulse 110 11/14/23 1742   Resp 19 11/14/23 1742   SpO2 96 % 11/14/23 1742   Vitals shown include unfiled device data.    Electronically Signed By: Pam Zhou MD  November 14, 2023  5:44 PM

## 2023-11-15 LAB
ANION GAP SERPL CALCULATED.3IONS-SCNC: 6 MMOL/L (ref 7–15)
BUN SERPL-MCNC: 10.1 MG/DL (ref 6–20)
CALCIUM SERPL-MCNC: 8.8 MG/DL (ref 8.6–10)
CHLORIDE SERPL-SCNC: 99 MMOL/L (ref 98–107)
CREAT SERPL-MCNC: 0.78 MG/DL (ref 0.67–1.17)
DEPRECATED HCO3 PLAS-SCNC: 32 MMOL/L (ref 22–29)
EGFRCR SERPLBLD CKD-EPI 2021: >90 ML/MIN/1.73M2
ERYTHROCYTE [DISTWIDTH] IN BLOOD BY AUTOMATED COUNT: 14.2 % (ref 10–15)
GLUCOSE BLDC GLUCOMTR-MCNC: 141 MG/DL (ref 70–99)
GLUCOSE BLDC GLUCOMTR-MCNC: 143 MG/DL (ref 70–99)
GLUCOSE BLDC GLUCOMTR-MCNC: 157 MG/DL (ref 70–99)
GLUCOSE BLDC GLUCOMTR-MCNC: 161 MG/DL (ref 70–99)
GLUCOSE SERPL-MCNC: 149 MG/DL (ref 70–99)
HCT VFR BLD AUTO: 40 % (ref 40–53)
HGB BLD-MCNC: 12.4 G/DL (ref 13.3–17.7)
MAGNESIUM SERPL-MCNC: 2.6 MG/DL (ref 1.7–2.3)
MCH RBC QN AUTO: 28.2 PG (ref 26.5–33)
MCHC RBC AUTO-ENTMCNC: 31 G/DL (ref 31.5–36.5)
MCV RBC AUTO: 91 FL (ref 78–100)
PHOSPHATE SERPL-MCNC: 3.6 MG/DL (ref 2.5–4.5)
PLATELET # BLD AUTO: 282 10E3/UL (ref 150–450)
POTASSIUM SERPL-SCNC: 4.4 MMOL/L (ref 3.4–5.3)
RBC # BLD AUTO: 4.39 10E6/UL (ref 4.4–5.9)
SODIUM SERPL-SCNC: 137 MMOL/L (ref 135–145)
WBC # BLD AUTO: 13.8 10E3/UL (ref 4–11)

## 2023-11-15 PROCEDURE — 36415 COLL VENOUS BLD VENIPUNCTURE: CPT | Performed by: STUDENT IN AN ORGANIZED HEALTH CARE EDUCATION/TRAINING PROGRAM

## 2023-11-15 PROCEDURE — 250N000011 HC RX IP 250 OP 636: Mod: JZ | Performed by: SURGERY

## 2023-11-15 PROCEDURE — 250N000013 HC RX MED GY IP 250 OP 250 PS 637: Performed by: INTERNAL MEDICINE

## 2023-11-15 PROCEDURE — 80048 BASIC METABOLIC PNL TOTAL CA: CPT | Performed by: STUDENT IN AN ORGANIZED HEALTH CARE EDUCATION/TRAINING PROGRAM

## 2023-11-15 PROCEDURE — 85027 COMPLETE CBC AUTOMATED: CPT | Performed by: STUDENT IN AN ORGANIZED HEALTH CARE EDUCATION/TRAINING PROGRAM

## 2023-11-15 PROCEDURE — 120N000001 HC R&B MED SURG/OB

## 2023-11-15 PROCEDURE — 250N000013 HC RX MED GY IP 250 OP 250 PS 637: Performed by: SURGERY

## 2023-11-15 PROCEDURE — 99231 SBSQ HOSP IP/OBS SF/LOW 25: CPT | Performed by: PHYSICIAN ASSISTANT

## 2023-11-15 PROCEDURE — 84100 ASSAY OF PHOSPHORUS: CPT | Performed by: SURGERY

## 2023-11-15 PROCEDURE — 83735 ASSAY OF MAGNESIUM: CPT | Performed by: SURGERY

## 2023-11-15 PROCEDURE — 250N000011 HC RX IP 250 OP 636: Mod: JZ | Performed by: STUDENT IN AN ORGANIZED HEALTH CARE EDUCATION/TRAINING PROGRAM

## 2023-11-15 PROCEDURE — 99232 SBSQ HOSP IP/OBS MODERATE 35: CPT | Performed by: STUDENT IN AN ORGANIZED HEALTH CARE EDUCATION/TRAINING PROGRAM

## 2023-11-15 RX ORDER — PIPERACILLIN SODIUM, TAZOBACTAM SODIUM 3; .375 G/15ML; G/15ML
3.38 INJECTION, POWDER, LYOPHILIZED, FOR SOLUTION INTRAVENOUS EVERY 8 HOURS
Status: COMPLETED | OUTPATIENT
Start: 2023-11-15 | End: 2023-11-15

## 2023-11-15 RX ADMIN — OXYCODONE HYDROCHLORIDE 5 MG: 5 TABLET ORAL at 15:45

## 2023-11-15 RX ADMIN — HEPARIN SODIUM 5000 UNITS: 10000 INJECTION, SOLUTION INTRAVENOUS; SUBCUTANEOUS at 21:19

## 2023-11-15 RX ADMIN — Medication 1 LOZENGE: at 08:57

## 2023-11-15 RX ADMIN — MAGNESIUM HYDROXIDE 30 ML: 400 SUSPENSION ORAL at 08:51

## 2023-11-15 RX ADMIN — HEPARIN SODIUM 5000 UNITS: 10000 INJECTION, SOLUTION INTRAVENOUS; SUBCUTANEOUS at 13:25

## 2023-11-15 RX ADMIN — SENNOSIDES AND DOCUSATE SODIUM 1 TABLET: 8.6; 5 TABLET ORAL at 21:19

## 2023-11-15 RX ADMIN — ACETAMINOPHEN 975 MG: 325 TABLET ORAL at 10:02

## 2023-11-15 RX ADMIN — PIPERACILLIN AND TAZOBACTAM 3.38 G: 3; .375 INJECTION, POWDER, FOR SOLUTION INTRAVENOUS at 05:16

## 2023-11-15 RX ADMIN — OXYCODONE HYDROCHLORIDE 10 MG: 5 TABLET ORAL at 22:57

## 2023-11-15 RX ADMIN — ACETAMINOPHEN 975 MG: 325 TABLET ORAL at 21:19

## 2023-11-15 RX ADMIN — SENNOSIDES AND DOCUSATE SODIUM 1 TABLET: 8.6; 5 TABLET ORAL at 10:02

## 2023-11-15 RX ADMIN — ACETAMINOPHEN 975 MG: 325 TABLET ORAL at 13:24

## 2023-11-15 RX ADMIN — POLYETHYLENE GLYCOL 3350 17 G: 17 POWDER, FOR SOLUTION ORAL at 10:02

## 2023-11-15 RX ADMIN — ONDANSETRON 4 MG: 2 INJECTION INTRAMUSCULAR; INTRAVENOUS at 08:53

## 2023-11-15 RX ADMIN — PIPERACILLIN AND TAZOBACTAM 3.38 G: 3; .375 INJECTION, POWDER, FOR SOLUTION INTRAVENOUS at 13:24

## 2023-11-15 ASSESSMENT — ACTIVITIES OF DAILY LIVING (ADL)
ADLS_ACUITY_SCORE: 26
ADLS_ACUITY_SCORE: 20
ADLS_ACUITY_SCORE: 26
DEPENDENT_IADLS:: INDEPENDENT
ADLS_ACUITY_SCORE: 20
ADLS_ACUITY_SCORE: 26
ADLS_ACUITY_SCORE: 26
ADLS_ACUITY_SCORE: 24
ADLS_ACUITY_SCORE: 26
ADLS_ACUITY_SCORE: 20
ADLS_ACUITY_SCORE: 26

## 2023-11-15 NOTE — PLAN OF CARE
"  Problem: Adult Inpatient Plan of Care  Goal: Plan of Care Review  Description: The Plan of Care Review/Shift note should be completed every shift.  The Outcome Evaluation is a brief statement about your assessment that the patient is improving, declining, or no change.  This information will be displayed automatically on your shift  note.  11/14/2023 2220 by Madhavi Marin RN  Outcome: Progressing  11/14/2023 2216 by Madhavi Marin RN  Outcome: Progressing  11/14/2023 2215 by Madhavi Marin RN  Outcome: Progressing  11/14/2023 2214 by Madhavi Marin RN  Outcome: Progressing  Goal: Patient-Specific Goal (Individualized)  Description: You can add care plan individualizations to a care plan. Examples of Individualization might be:  \"Parent requests to be called daily at 9am for status\", \"I have a hard time hearing out of my right ear\", or \"Do not touch me to wake me up as it startles  me\".  11/14/2023 2220 by Madhavi Marin RN  Outcome: Progressing  11/14/2023 2216 by Madhavi Marin RN  Outcome: Progressing  11/14/2023 2215 by Madhavi Marin RN  Outcome: Progressing  11/14/2023 2214 by Madhavi Marin RN  Outcome: Progressing  Goal: Absence of Hospital-Acquired Illness or Injury  11/14/2023 2220 by Madhavi Marin RN  Outcome: Progressing  11/14/2023 2216 by Madhavi Marin RN  Outcome: Progressing  11/14/2023 2215 by Madhavi Marin RN  Outcome: Progressing  11/14/2023 2214 by Madhavi Marin RN  Outcome: Progressing  Intervention: Identify and Manage Fall Risk  Recent Flowsheet Documentation  Taken 11/14/2023 1915 by Madhavi Marin RN  Safety Promotion/Fall Prevention:   activity supervised   assistive device/personal items within reach   safety round/check completed  Intervention: Prevent Skin Injury  Recent Flowsheet Documentation  Taken 11/14/2023 1915 by Madhavi Marin RN  Body Position: supine, head elevated  Goal: Optimal Comfort and Wellbeing  11/14/2023 2220 by Madhavi Marin" RN  Outcome: Progressing  11/14/2023 2216 by Madhavi Marin RN  Outcome: Progressing  11/14/2023 2215 by Madhavi Marin RN  Outcome: Progressing  11/14/2023 2214 by Madhavi Marin RN  Outcome: Progressing  Intervention: Monitor Pain and Promote Comfort  Recent Flowsheet Documentation  Taken 11/14/2023 2120 by Madhavi Marin RN  Pain Management Interventions:   medication (see MAR)   rest  Goal: Readiness for Transition of Care  11/14/2023 2220 by Madhavi Marin RN  Outcome: Progressing  11/14/2023 2216 by Madhavi Marin RN  Outcome: Progressing  11/14/2023 2215 by Madhvai Marin RN  Outcome: Progressing  11/14/2023 2214 by Madhavi Marin RN  Outcome: Progressing  Intervention: Mutually Develop Transition Plan  Recent Flowsheet Documentation  Taken 11/14/2023 2000 by Madhavi Marin RN  Equipment Currently Used at Home: none   Goal Outcome Evaluation:         Pt admitted this shift, s/p hernia surgery. Aox4, calm and cooperative, drowsy but still able to answer questions. Episode of aggression post anesthesia, continue to monitor. Reports of severe incisional pain and sore throat, abdominal binder in place, IV dilaudid 1 mg given, monitor sedation. Prn lozenges ordered. Pt refused to take oral meds. On NPO, NG tube in left nostril, in low continuous suction for decompression, having low output, yellow drainage. Able to take ice chips. Pt on 4 L O2 via oxymask, desat when attempted to wean on 2 L, diminished lung sounds.  Abdominal incision clean/dry/intact, CMS intact. Pt voidedx1 this shift, good output, requiring assistance with urinal. Bowel sounds present, RLQ hypoactive, no nausea/vomiting.

## 2023-11-15 NOTE — PLAN OF CARE
"  Problem: Adult Inpatient Plan of Care  Goal: Plan of Care Review  Description: The Plan of Care Review/Shift note should be completed every shift.  The Outcome Evaluation is a brief statement about your assessment that the patient is improving, declining, or no change.  This information will be displayed automatically on your shift  note.  Outcome: Progressing  Goal: Patient-Specific Goal (Individualized)  Description: You can add care plan individualizations to a care plan. Examples of Individualization might be:  \"Parent requests to be called daily at 9am for status\", \"I have a hard time hearing out of my right ear\", or \"Do not touch me to wake me up as it startles  me\".  Outcome: Progressing  Goal: Absence of Hospital-Acquired Illness or Injury  Outcome: Progressing  Intervention: Identify and Manage Fall Risk  Recent Flowsheet Documentation  Taken 11/15/2023 0100 by Aby López RN  Safety Promotion/Fall Prevention:   activity supervised   assistive device/personal items within reach   safety round/check completed  Intervention: Prevent Skin Injury  Recent Flowsheet Documentation  Taken 11/15/2023 0100 by Aby López RN  Body Position: position maintained  Intervention: Prevent and Manage VTE (Venous Thromboembolism) Risk  Recent Flowsheet Documentation  Taken 11/15/2023 0100 by Aby López RN  VTE Prevention/Management: SCDs (sequential compression devices) on  Goal: Optimal Comfort and Wellbeing  Outcome: Progressing  Goal: Readiness for Transition of Care  Outcome: Progressing     Problem: Pain Acute  Goal: Optimal Pain Control and Function  Outcome: Progressing  Intervention: Prevent or Manage Pain  Recent Flowsheet Documentation  Taken 11/15/2023 0100 by Aby López RN  Medication Review/Management: medications reviewed     Problem: Gas Exchange Impaired  Goal: Optimal Gas Exchange  Outcome: Progressing  Intervention: Optimize Oxygenation and Ventilation  Recent Flowsheet Documentation  Taken " 11/15/2023 0100 by Aby López, RN  Head of Bed (HOB) Positioning: HOB at 20-30 degrees     Problem: Intestinal Obstruction  Goal: Optimal Bowel Function  Outcome: Progressing  Intervention: Promote Bowel Function  Recent Flowsheet Documentation  Taken 11/15/2023 0100 by Aby López RN  Body Position: position maintained  Head of Bed (HOB) Positioning: HOB at 20-30 degrees  Goal: Fluid and Electrolyte Balance  Outcome: Progressing  Goal: Absence of Infection Signs and Symptoms  Outcome: Progressing  Goal: Optimize Nutrition Status  Outcome: Progressing  Goal: Optimal Pain Control and Function  Outcome: Progressing   Goal Outcome Evaluation:  Pt cont to be npo. Gt to low intermittent suction. Pt refused tylenol and cough drops this am, but did say his throat was still sore, but not as bad. Pt is eager to have gt out. Abdominal binder in place. Pt voiding with AX1.

## 2023-11-15 NOTE — PLAN OF CARE
Problem: Pain Acute  Goal: Optimal Pain Control and Function  Outcome: Progressing  Intervention: Develop Pain Management Plan  Recent Flowsheet Documentation  Taken 11/15/2023 0825 by Juan Curran, RN  Pain Management Interventions: (refused oral medications)   rest   repositioned  Intervention: Prevent or Manage Pain  Recent Flowsheet Documentation  Taken 11/15/2023 0830 by Juan Curran, RN  Bowel Elimination Promotion:   adequate fluid intake promoted   ambulation promoted   diet adjusted  Medication Review/Management: medications reviewed     Problem: Intestinal Obstruction  Goal: Optimal Bowel Function  Intervention: Promote Bowel Function  Recent Flowsheet Documentation  Taken 11/15/2023 1100 by Juan Curran, RN  Body Position: position maintained  Head of Bed (HOB) Positioning: HOB at 20-30 degrees  Chair: Shifted weight  Taken 11/15/2023 0830 by Juan Curran, RN  Body Position: position maintained  Head of Bed (HOB) Positioning: HOB at 20-30 degrees  Chair: Shifted weight   Goal Outcome Evaluation:       Patient had NG removed in AM. Was causing discomfort and irritation; nausea much improved since. Tolerating incisional pain (5-6/10) with tylenol. 3 incision sites open to air, edges approximated, intact. Hypoactive bowel sounds, but tolerating clear liquid diet. Later in morning, patient able to ambulate to bathroom, sore initially but tolerated ambulation. Sitting in chair.

## 2023-11-15 NOTE — CONSULTS
Care Management Initial Consult    General Information  Assessment completed with: Patient, Patient  Type of CM/SW Visit: Initial Assessment    Primary Care Provider verified and updated as needed: Yes   Readmission within the last 30 days: no previous admission in last 30 days      Reason for Consult: discharge planning  Advance Care Planning: Advance Care Planning Reviewed: verified with patient        Communication Assessment  Patient's communication style: spoken language (English or Bilingual)    Hearing Difficulty or Deaf: no   Wear Glasses or Blind: no    Cognitive  Cognitive/Neuro/Behavioral: WDL  Level of Consciousness: alert        Mood/Behavior: cooperative          Living Environment:   People in home: child(sherry), dependent, spouse     Current living Arrangements: other (see comments) ([facility])      Able to return to prior arrangements: yes       Family/Social Support:  Care provided by: self  Provides care for: child(sherry)  Marital Status:   Wife          Description of Support System: Supportive, Involved    Support Assessment: Adequate family and caregiver support    Current Resources:   Patient receiving home care services: No     Community Resources:  None  Equipment currently used at home: none  Supplies currently used at home:  none    Employment/Financial:  Employment Status: employed part-time        Financial Concerns:   No     Does the patient's insurance plan have a 3 day qualifying hospital stay waiver?  No    Lifestyle & Psychosocial Needs:  Social Determinants of Health     Food Insecurity: Not on file   Depression: Not on file   Housing Stability: Not on file   Tobacco Use: Low Risk  (11/14/2023)    Patient History     Smoking Tobacco Use: Never     Smokeless Tobacco Use: Never     Passive Exposure: Not on file   Financial Resource Strain: Not on file   Alcohol Use: Not on file   Transportation Needs: Not on file   Physical Activity: Not on file   Interpersonal Safety: Not on file    Stress: Not on file   Social Connections: Not on file       Functional Status:  Prior to admission patient needed assistance:   Dependent ADLs:: Independent  Dependent IADLs:: Independent       Additional Information:  RNCM met with patient at bedside to review role of care management services, discuss goals of care and assess need for any possible services at discharge. Patient alert, answering questions appropriately and engaged in the conversation. Patient has no HCD. Lives with spouse and children in a townhouse. Report independent with ADLs/IADLs. No community resources. No DME. Work and drive. Has no current PCP. Goal is to return home. Family will transport.       Danielel Leger RN

## 2023-11-15 NOTE — PROGRESS NOTES
General Surgery Progress Note:    Hospital Day # 2    ASSESSMENT:  1. Nausea and vomiting, unspecified vomiting type    2. Hernia of anterior abdominal wall        Ady Godinez is a 38 year old male who is s/p robot-assisted, laparoscopic NIK and ventral hernia repair with mesh for incarcerated ventral hernia with small bowel obstruction on 11/14/23 with Dr. Kim.    PLAN:  - NG tube removed  - Okay for clear liquid diet  - Increase activity and encourage ambulation to promote return of bowel function  - Abdominal binder at all times  -Appreciate medical management per primary team  -Surgery will continue to follow    SUBJECTIVE:   He is feeling okay.  Endorses abdominal soreness, but this is tolerable.  Denies fever, chills, nausea, vomiting.  Voiding spontaneously.  Has not been out of bed yet this morning.      Patient Vitals for the past 24 hrs:   BP Temp Temp src Pulse Resp SpO2   11/15/23 0752 121/62 98.5  F (36.9  C) Oral 85 20 96 %   11/15/23 0406 138/70 98.2  F (36.8  C) Oral 88 18 99 %   11/14/23 2354 (!) 141/78 98  F (36.7  C) Axillary 96 16 98 %   11/14/23 2154 -- -- -- -- -- 94 %   11/14/23 2130 -- -- -- -- -- (!) 87 %   11/14/23 2100 (!) 142/66 97.7  F (36.5  C) Oral 97 14 94 %   11/14/23 1954 -- -- -- -- -- 98 %   11/14/23 1915 (!) 141/67 98.2  F (36.8  C) Oral 93 20 99 %   11/14/23 1830 (!) 147/71 97  F (36.1  C) Temporal 101 20 95 %   11/14/23 1811 (!) 147/71 -- -- 100 20 97 %   11/14/23 1745 (!) 177/76 -- -- 109 18 95 %   11/14/23 1730 (!) 176/74 -- -- 116 17 97 %   11/14/23 1715 124/67 -- -- 113 22 96 %   11/14/23 1700 (!) 195/79 -- -- 104 18 92 %   11/14/23 1656 -- -- -- 114 22 --   11/14/23 1645 (!) 177/84 -- -- 112 22 96 %   11/14/23 1630 (!) 151/64 -- -- 110 24 95 %   11/14/23 1615 (!) 176/73 -- -- (!) 123 28 (!) 88 %   11/14/23 1607 -- 98.9  F (37.2  C) -- -- -- --   11/14/23 1145 -- -- -- 82 -- 95 %   11/14/23 1140 -- -- -- 80 -- 93 %   11/14/23 1137 -- -- -- 85 -- 96 %          PHYSICAL EXAM:  General: patient seen resting in bed, no acute distress  Resp: no respiratory distress, breathing comfortably on 4L nasal cannula  Abdomen: Abdominal binder in place.  Obese, mildly tender to palpation over incisions.  No rebound or guarding.  Laparoscopic incisions clean/dry/intact with overlying Dermabond.  Extremities: warm and well perfused    11/14 0700 - 11/15 0659  In: 1556.67 [I.V.:1556.67]  Out: 975 [Urine:800]    Admission on 11/13/2023   Component Date Value    Sodium 11/13/2023 136     Potassium 11/13/2023 4.7     Chloride 11/13/2023 97 (L)     Carbon Dioxide (CO2) 11/13/2023 28     Anion Gap 11/13/2023 11     Urea Nitrogen 11/13/2023 12.9     Creatinine 11/13/2023 0.76     GFR Estimate 11/13/2023 >90     Calcium 11/13/2023 10.3 (H)     Glucose 11/13/2023 146 (H)     CRP Inflammation 11/13/2023 16.30 (H)     Lactic Acid 11/13/2023 1.4     WBC Count 11/13/2023 14.2 (H)     RBC Count 11/13/2023 5.19     Hemoglobin 11/13/2023 14.8     Hematocrit 11/13/2023 45.6     MCV 11/13/2023 88     MCH 11/13/2023 28.5     MCHC 11/13/2023 32.5     RDW 11/13/2023 14.1     Platelet Count 11/13/2023 304     % Neutrophils 11/13/2023 81     % Lymphocytes 11/13/2023 15     % Monocytes 11/13/2023 4     % Eosinophils 11/13/2023 0     % Basophils 11/13/2023 0     % Immature Granulocytes 11/13/2023 0     NRBCs per 100 WBC 11/13/2023 0     Absolute Neutrophils 11/13/2023 11.4 (H)     Absolute Lymphocytes 11/13/2023 2.2     Absolute Monocytes 11/13/2023 0.6     Absolute Eosinophils 11/13/2023 0.1     Absolute Basophils 11/13/2023 0.0     Absolute Immature Granul* 11/13/2023 0.1     Absolute NRBCs 11/13/2023 0.0     Calcium, Ionized pH 7.4 11/14/2023 1.06 (L)     pH 11/14/2023 7.27 (L)     Calcium, Ionized Measured 11/14/2023 1.14     Sodium 11/14/2023 138     Potassium 11/14/2023 4.1     Carbon Dioxide (CO2) 11/14/2023 29     Anion Gap 11/14/2023 9     Urea Nitrogen 11/14/2023 12.5     Creatinine 11/14/2023  0.78     GFR Estimate 11/14/2023 >90     Calcium 11/14/2023 9.5     Chloride 11/14/2023 100     Glucose 11/14/2023 152 (H)     Alkaline Phosphatase 11/14/2023 89     AST 11/14/2023 53 (H)     ALT 11/14/2023 47     Protein Total 11/14/2023 7.7     Albumin 11/14/2023 3.8     Bilirubin Total 11/14/2023 0.3     WBC Count 11/14/2023 9.8     RBC Count 11/14/2023 4.64     Hemoglobin 11/14/2023 13.3     Hematocrit 11/14/2023 42.1     MCV 11/14/2023 91     MCH 11/14/2023 28.7     MCHC 11/14/2023 31.6     RDW 11/14/2023 14.1     Platelet Count 11/14/2023 249     % Neutrophils 11/14/2023 75     % Lymphocytes 11/14/2023 18     % Monocytes 11/14/2023 6     % Eosinophils 11/14/2023 1     % Basophils 11/14/2023 0     % Immature Granulocytes 11/14/2023 0     NRBCs per 100 WBC 11/14/2023 0     Absolute Neutrophils 11/14/2023 7.3     Absolute Lymphocytes 11/14/2023 1.7     Absolute Monocytes 11/14/2023 0.6     Absolute Eosinophils 11/14/2023 0.1     Absolute Basophils 11/14/2023 0.0     Absolute Immature Granul* 11/14/2023 0.0     Absolute NRBCs 11/14/2023 0.0     Platelet Count 11/14/2023 296     Hold Specimen 11/14/2023 JIC     Magnesium 11/15/2023 2.6 (H)     Phosphorus 11/15/2023 3.6     Sodium 11/15/2023 137     Potassium 11/15/2023 4.4     Chloride 11/15/2023 99     Carbon Dioxide (CO2) 11/15/2023 32 (H)     Anion Gap 11/15/2023 6 (L)     Urea Nitrogen 11/15/2023 10.1     Creatinine 11/15/2023 0.78     GFR Estimate 11/15/2023 >90     Calcium 11/15/2023 8.8     Glucose 11/15/2023 149 (H)     WBC Count 11/15/2023 13.8 (H)     RBC Count 11/15/2023 4.39 (L)     Hemoglobin 11/15/2023 12.4 (L)     Hematocrit 11/15/2023 40.0     MCV 11/15/2023 91     MCH 11/15/2023 28.2     MCHC 11/15/2023 31.0 (L)     RDW 11/15/2023 14.2     Platelet Count 11/15/2023 282     GLUCOSE BY METER POCT 11/15/2023 161 (H)         Alina Gomes PA-C  [facility]  [address] [place] [id number]

## 2023-11-15 NOTE — PROGRESS NOTES
"[facility]    Medicine Progress Note - Hospitalist Service    Date of Admission:  11/13/2023    Assessment & Plan   Ady Godinez is a 38 year old male presenting with incarcerated hernia. PMH includes morbid obesity, HLD.  He is s/p hernia repair this afternoon.      #Incarcerated Ventral Wall Hernias- s/p hernia repair. Continue pain regimen and continuous pulse ox. Abdominal binder in place, surgery following. Continue mIVF pending PO intake. Defer diet to surgery, currently on liquids. PT/ OT pending course. Discontinue zosyn.      #Intractable Nausea/ Vomiting- PRN Zofran. Monitor electrolytes     #Elevated Blood Pressure- Denies any history of HTN. Continue pain control with IV Dilaudid. PRN IV Hydralazine with parameters. Monitor vitals closely.    #OHS  - suspected, discussed           Diet: Clear Liquid Diet    DVT Prophylaxis: SCDs  Duvall Catheter: Not present  Lines: None     Cardiac Monitoring: None  Code Status: Full Code      Clinically Significant Risk Factors           # Hypercalcemia: Highest Ca = 10.3 mg/dL in last 2 days, will monitor as appropriate               # Severe Obesity: Estimated body mass index is 58.58 kg/m  as calculated from the following:    Height as of this encounter: 1.803 m (5' 11\").    Weight as of this encounter: 190.5 kg (420 lb)., PRESENT ON ADMISSION            Disposition Plan continue cares post op     Expected Discharge Date: 11/16/2023    Discharge Delays: IV Medication - consider oral or Home Infusion  Voiding/Eating Trial needed                Gilbert Allen DO  Hospitalist Service  [facility]  Securely message with [employer] (more info)  Text page via [employer] Paging/Directory   ______________________________________________________________________    Interval History   Doing better this morning. He apologizes about behavior in PACU yesterday and does not remember the events. Otherwise has some mid-abdominal pain. No " fevers.     Physical Exam   Vital Signs: Temp: 98.5  F (36.9  C) Temp src: Oral BP: 121/62 Pulse: 85   Resp: 20 SpO2: 96 % O2 Device: Oxymask Oxygen Delivery: 4 LPM  Weight: 420 lbs 0 oz    General Appearance: Non-toxic, conversational and cooperative   Respiratory: difficult to auscultate, no respiratory distress  Cardiovascular: RRR  GI: abdominal binder in place, clean incision sites     Medical Decision Making       45 MINUTES SPENT BY ME on the date of service doing chart review, history, exam, documentation & further activities per the note.      Data     I have personally reviewed the following data over the past 24 hrs:    13.8 (H)  \   12.4 (L)   / 282     137 99 10.1 /  149 (H)   4.4 32 (H) 0.78 \       Imaging results reviewed over the past 24 hrs:   No results found for this or any previous visit (from the past 24 hour(s)).

## 2023-11-16 ENCOUNTER — APPOINTMENT (OUTPATIENT)
Dept: PHYSICAL THERAPY | Facility: HOSPITAL | Age: 38
DRG: 336 | End: 2023-11-16
Attending: FAMILY MEDICINE
Payer: COMMERCIAL

## 2023-11-16 ENCOUNTER — APPOINTMENT (OUTPATIENT)
Dept: OCCUPATIONAL THERAPY | Facility: HOSPITAL | Age: 38
DRG: 336 | End: 2023-11-16
Attending: FAMILY MEDICINE
Payer: COMMERCIAL

## 2023-11-16 ENCOUNTER — APPOINTMENT (OUTPATIENT)
Dept: RADIOLOGY | Facility: HOSPITAL | Age: 38
DRG: 336 | End: 2023-11-16
Attending: FAMILY MEDICINE
Payer: COMMERCIAL

## 2023-11-16 LAB
ANION GAP SERPL CALCULATED.3IONS-SCNC: 9 MMOL/L (ref 7–15)
BUN SERPL-MCNC: 11.3 MG/DL (ref 6–20)
CALCIUM SERPL-MCNC: 9.5 MG/DL (ref 8.6–10)
CHLORIDE SERPL-SCNC: 96 MMOL/L (ref 98–107)
CREAT SERPL-MCNC: 0.79 MG/DL (ref 0.67–1.17)
CREAT SERPL-MCNC: 0.79 MG/DL (ref 0.67–1.17)
DEPRECATED HCO3 PLAS-SCNC: 29 MMOL/L (ref 22–29)
EGFRCR SERPLBLD CKD-EPI 2021: >90 ML/MIN/1.73M2
EGFRCR SERPLBLD CKD-EPI 2021: >90 ML/MIN/1.73M2
ERYTHROCYTE [DISTWIDTH] IN BLOOD BY AUTOMATED COUNT: 14.6 % (ref 10–15)
GLUCOSE BLDC GLUCOMTR-MCNC: 165 MG/DL (ref 70–99)
GLUCOSE BLDC GLUCOMTR-MCNC: 179 MG/DL (ref 70–99)
GLUCOSE BLDC GLUCOMTR-MCNC: 181 MG/DL (ref 70–99)
GLUCOSE BLDC GLUCOMTR-MCNC: 186 MG/DL (ref 70–99)
GLUCOSE BLDC GLUCOMTR-MCNC: 194 MG/DL (ref 70–99)
GLUCOSE SERPL-MCNC: 173 MG/DL (ref 70–99)
HCT VFR BLD AUTO: 42.2 % (ref 40–53)
HGB BLD-MCNC: 13.2 G/DL (ref 13.3–17.7)
MCH RBC QN AUTO: 28.3 PG (ref 26.5–33)
MCHC RBC AUTO-ENTMCNC: 31.3 G/DL (ref 31.5–36.5)
MCV RBC AUTO: 91 FL (ref 78–100)
PLATELET # BLD AUTO: 291 10E3/UL (ref 150–450)
POTASSIUM SERPL-SCNC: 4.9 MMOL/L (ref 3.4–5.3)
RBC # BLD AUTO: 4.66 10E6/UL (ref 4.4–5.9)
SODIUM SERPL-SCNC: 134 MMOL/L (ref 135–145)
WBC # BLD AUTO: 15.6 10E3/UL (ref 4–11)

## 2023-11-16 PROCEDURE — 36415 COLL VENOUS BLD VENIPUNCTURE: CPT | Performed by: STUDENT IN AN ORGANIZED HEALTH CARE EDUCATION/TRAINING PROGRAM

## 2023-11-16 PROCEDURE — 99232 SBSQ HOSP IP/OBS MODERATE 35: CPT | Performed by: FAMILY MEDICINE

## 2023-11-16 PROCEDURE — 97165 OT EVAL LOW COMPLEX 30 MIN: CPT | Mod: GO

## 2023-11-16 PROCEDURE — 97161 PT EVAL LOW COMPLEX 20 MIN: CPT | Mod: GP

## 2023-11-16 PROCEDURE — 97116 GAIT TRAINING THERAPY: CPT | Mod: GP

## 2023-11-16 PROCEDURE — 250N000013 HC RX MED GY IP 250 OP 250 PS 637: Performed by: SURGERY

## 2023-11-16 PROCEDURE — 97535 SELF CARE MNGMENT TRAINING: CPT | Mod: GO

## 2023-11-16 PROCEDURE — 99231 SBSQ HOSP IP/OBS SF/LOW 25: CPT | Performed by: SURGERY

## 2023-11-16 PROCEDURE — 80048 BASIC METABOLIC PNL TOTAL CA: CPT | Performed by: FAMILY MEDICINE

## 2023-11-16 PROCEDURE — 82565 ASSAY OF CREATININE: CPT | Performed by: STUDENT IN AN ORGANIZED HEALTH CARE EDUCATION/TRAINING PROGRAM

## 2023-11-16 PROCEDURE — 120N000001 HC R&B MED SURG/OB

## 2023-11-16 PROCEDURE — 71046 X-RAY EXAM CHEST 2 VIEWS: CPT

## 2023-11-16 PROCEDURE — 85027 COMPLETE CBC AUTOMATED: CPT | Performed by: STUDENT IN AN ORGANIZED HEALTH CARE EDUCATION/TRAINING PROGRAM

## 2023-11-16 PROCEDURE — 250N000011 HC RX IP 250 OP 636: Performed by: SURGERY

## 2023-11-16 RX ADMIN — HEPARIN SODIUM 5000 UNITS: 10000 INJECTION, SOLUTION INTRAVENOUS; SUBCUTANEOUS at 06:15

## 2023-11-16 RX ADMIN — PROCHLORPERAZINE EDISYLATE 10 MG: 5 INJECTION INTRAMUSCULAR; INTRAVENOUS at 06:14

## 2023-11-16 RX ADMIN — ACETAMINOPHEN 975 MG: 325 TABLET ORAL at 08:21

## 2023-11-16 RX ADMIN — SENNOSIDES AND DOCUSATE SODIUM 1 TABLET: 8.6; 5 TABLET ORAL at 08:21

## 2023-11-16 RX ADMIN — ACETAMINOPHEN 975 MG: 325 TABLET ORAL at 21:07

## 2023-11-16 RX ADMIN — OXYCODONE HYDROCHLORIDE 5 MG: 5 TABLET ORAL at 02:47

## 2023-11-16 RX ADMIN — ONDANSETRON 4 MG: 2 INJECTION INTRAMUSCULAR; INTRAVENOUS at 18:34

## 2023-11-16 RX ADMIN — SENNOSIDES AND DOCUSATE SODIUM 1 TABLET: 8.6; 5 TABLET ORAL at 21:08

## 2023-11-16 RX ADMIN — ACETAMINOPHEN 975 MG: 325 TABLET ORAL at 13:47

## 2023-11-16 RX ADMIN — BISACODYL 10 MG: 10 SUPPOSITORY RECTAL at 16:13

## 2023-11-16 RX ADMIN — HEPARIN SODIUM 5000 UNITS: 10000 INJECTION, SOLUTION INTRAVENOUS; SUBCUTANEOUS at 21:11

## 2023-11-16 RX ADMIN — ONDANSETRON 4 MG: 2 INJECTION INTRAMUSCULAR; INTRAVENOUS at 03:22

## 2023-11-16 RX ADMIN — MAGNESIUM HYDROXIDE 30 ML: 400 SUSPENSION ORAL at 08:21

## 2023-11-16 RX ADMIN — POLYETHYLENE GLYCOL 3350 17 G: 17 POWDER, FOR SOLUTION ORAL at 08:21

## 2023-11-16 RX ADMIN — HEPARIN SODIUM 5000 UNITS: 10000 INJECTION, SOLUTION INTRAVENOUS; SUBCUTANEOUS at 13:47

## 2023-11-16 ASSESSMENT — ACTIVITIES OF DAILY LIVING (ADL)
ADLS_ACUITY_SCORE: 26

## 2023-11-16 NOTE — PLAN OF CARE
Problem: Adult Inpatient Plan of Care  Goal: Optimal Comfort and Wellbeing  Outcome: Progressing  Intervention: Monitor Pain and Promote Comfort  Recent Flowsheet Documentation  Taken 11/16/2023 0821 by Juan Curran, RN  Pain Management Interventions:   medication (see MAR)   cold applied     Problem: Pain Acute  Goal: Optimal Pain Control and Function  Outcome: Progressing  Intervention: Develop Pain Management Plan  Recent Flowsheet Documentation  Taken 11/16/2023 0821 by Juan Curran, RN  Pain Management Interventions:   medication (see MAR)   cold applied  Intervention: Prevent or Manage Pain  Recent Flowsheet Documentation  Taken 11/16/2023 0821 by Juan Curran, RN  Sleep/Rest Enhancement:   comfort measures   room darkened  Bowel Elimination Promotion:   adequate fluid intake promoted   ambulation promoted   diet adjusted  Intervention: Optimize Psychosocial Wellbeing  Recent Flowsheet Documentation  Taken 11/16/2023 0821 by Juan Curran RN  Supportive Measures: active listening utilized     Problem: Intestinal Obstruction  Goal: Optimal Bowel Function  Outcome: Progressing  Intervention: Promote Bowel Function  Recent Flowsheet Documentation  Taken 11/16/2023 0821 by Juan Curran, RN  Body Position: position maintained  Head of Bed (HOB) Positioning: HOB at 20-30 degrees  Chair: Shifted weight     Problem: Gas Exchange Impaired  Goal: Optimal Gas Exchange  Outcome: Progressing  Intervention: Optimize Oxygenation and Ventilation  Recent Flowsheet Documentation  Taken 11/16/2023 0821 by Juan Curran RN  Head of Bed (HOB) Positioning: HOB at 20-30 degrees   Goal Outcome Evaluation:    Patient hesitant to ambulate due to pain at incision site. With encouragement and bedside assistance, patient able to ambulate to bathroom to clean self up and void appropriately. Patient had smear x1 but no substantial BM yet. Bowel sounds are more active today compared to yesterday. Given scheduled bowel  medications. If no relief, will attempt suppository in afternoon. Patient continues to rate pain as 5/10. Given tylenol, ice. Denies nausea, just mild discomfort at incision site. Tolerating clear liquids. Encouraging use of incentive spirometer, ambulation, cough and deep breathing to improve lung function.

## 2023-11-16 NOTE — PLAN OF CARE
Problem: Pain Acute  Goal: Optimal Pain Control and Function  Intervention: Prevent or Manage Pain  Recent Flowsheet Documentation  Taken 11/16/2023 0430 by Salena Aviles RN  Sleep/Rest Enhancement:   comfort measures   room darkened  Bowel Elimination Promotion:   adequate fluid intake promoted   ambulation promoted   diet adjusted  Medication Review/Management: medications reviewed  Taken 11/16/2023 0030 by Salena Aviles RN  Sleep/Rest Enhancement:   comfort measures   room darkened  Bowel Elimination Promotion:   adequate fluid intake promoted   ambulation promoted   diet adjusted  Medication Review/Management: medications reviewed  Taken 11/15/2023 2000 by Salena Aviles RN  Bowel Elimination Promotion:   adequate fluid intake promoted   ambulation promoted   diet adjusted  Medication Review/Management: medications reviewed     Problem: Intestinal Obstruction  Goal: Optimal Bowel Function  Intervention: Promote Bowel Function  Recent Flowsheet Documentation  Taken 11/16/2023 0430 by Salena Aviles RN  Body Position: position maintained  Head of Bed (HOB) Positioning: HOB at 20-30 degrees  Positioning/Transfer Devices: aBduction splint/pillow  Chair: Shifted weight  Taken 11/16/2023 0030 by Salena Aviles RN  Body Position: position maintained  Head of Bed (HOB) Positioning: HOB at 20-30 degrees  Positioning/Transfer Devices: aBduction splint/pillow  Chair: Shifted weight  Taken 11/15/2023 2000 by Salena Aviles RN  Body Position: position maintained  Head of Bed (HOB) Positioning: HOB at 20-30 degrees  Positioning/Transfer Devices: aBduction splint/pillow  Chair: Shifted weight  Goal: Optimal Pain Control and Function  Intervention: Prevent or Manage Pain  Recent Flowsheet Documentation  Taken 11/16/2023 0430 by Salena Aviles RN  Sleep/Rest Enhancement:   comfort measures   room darkened  Taken 11/16/2023 0030 by Salena Aviles RN  Sleep/Rest Enhancement:   comfort measures   room  darkened     Problem: Gas Exchange Impaired  Goal: Optimal Gas Exchange  Intervention: Optimize Oxygenation and Ventilation  Recent Flowsheet Documentation  Taken 11/16/2023 0430 by Salena Aviles RN  Head of Bed (HOB) Positioning: HOB at 20-30 degrees  Taken 11/16/2023 0030 by Salena Aviles RN  Head of Bed (HOB) Positioning: HOB at 20-30 degrees  Taken 11/15/2023 2000 by Salena Aviles RN  Head of Bed ([facility]) Positioning: HOB at 20-30 degrees   Goal Outcome Evaluation:patient unmotivated to move, he complained of abdomen pain ad nausea. Oxycodone, zofran, compazine and aroma therapy given. Patient ambulated to the bathroom and encouraged to sit on the chair. Ice place on the lap site. Ice chips given and call light within patient reach.

## 2023-11-16 NOTE — PROGRESS NOTES
"[facility]    Medicine Progress Note - Hospitalist Service    Date of Admission:  11/13/2023    Assessment & Plan   Ady Godinez is a 38 year old male presenting with incarcerated hernia. PMH includes morbid obesity, HLD.  He is s/p hernia repair 11/14.     #Incarcerated Ventral Wall Hernias- s/p hernia repair. Continue pain regimen and continuous pulse ox. Abdominal binder in place, surgery following. Continue to full liquid diet.  Off IVF.   PT/ OT pending course. Discontinued zosyn.      #Intractable Nausea/ Vomiting- PRN Zofran. Monitor electrolytes     #Elevated Blood Pressure- Denies any history of HTN. Continue pain control with IV Dilaudid. PRN IV Hydralazine with parameters. Monitor vitals closely.    #OHS +/- Obesity hypoventilation syndrome with hypoxia during sleep  - overnight O2 and prn with exertion  - continue IS  - CXR to r/o infiltrate or pulm edema            Diet: Full Liquid Diet    DVT Prophylaxis: SCDs  Duvall Catheter: Not present  Lines: None     Cardiac Monitoring: None  Code Status: Full Code      Clinically Significant Risk Factors                         # Severe Obesity: Estimated body mass index is 58.58 kg/m  as calculated from the following:    Height as of this encounter: 1.803 m (5' 11\").    Weight as of this encounter: 190.5 kg (420 lb)., PRESENT ON ADMISSION            Disposition Plan continue cares post op     Expected Discharge Date: 11/17/2023    Discharge Delays: IV Medication - consider oral or Home Infusion  Voiding/Eating Trial needed  Destination: home with family              Sanjiv Martinez MD  Hospitalist Service  [facility]  Securely message with [employer] (more info)  Text page via [employer] Paging/Directory   ______________________________________________________________________    Interval History   Overnight was noted to have apnea and desaturate.    Physical Exam   Vital Signs: Temp: 97.9  F (36.6  C) Temp " src: Oral BP: (!) 145/71 Pulse: 101   Resp: 19 SpO2: 93 % O2 Device: None (Room air) Oxygen Delivery: 3 LPM  Weight: 420 lbs 0 oz    General Appearance: Non-toxic, conversational and cooperative   Respiratory: difficult to auscultate, no respiratory distress, no crackles heard  Cardiovascular: RRR  GI: abdominal binder in place, clean incision sites     Medical Decision Making       45 MINUTES SPENT BY ME on the date of service doing chart review, history, exam, documentation & further activities per the note.      Data     I have personally reviewed the following data over the past 24 hrs:    15.6 (H)  \   13.2 (L)   / 291     134 (L) 96 (L) 11.3 /  194 (H)   4.9 29 0.79; 0.79 \       Imaging results reviewed over the past 24 hrs:   Recent Results (from the past 24 hour(s))   XR Chest 2 Views    Narrative    EXAM: XR CHEST 2 VIEWS  LOCATION: [facility]  DATE: 11/16/2023    INDICATION: Hypoxia.  COMPARISON: Chest CTA 07/07/2018      Impression    IMPRESSION: Negative chest. No interval change.

## 2023-11-16 NOTE — PROGRESS NOTES
General Surgery Progress Note:    Hospital Day # 3    ASSESSMENT:   1. Nausea and vomiting, unspecified vomiting type    2. Hernia of anterior abdominal wall    3.  SBO    Ady Godinez is a 38 year old male status post robotic assisted repair of incisional ventral hernias with incarcerated small bowel 11/14/2023    Patient is slowly improving from surgery.  His nausea is improving and he is showing signs of return of bowel function.  The main concern for the patient is to become motivated enough to continue with activity help facilitate return of bowel function.     PLAN:   -The patient can perform daily activities as tolerated.  -The only restrictions for the patient is no significant lifting of heavy weights or pushing or pulling for at least 6 weeks.  -Advancing patient to full liquid diet.  -If the patient continues to progress towards diet, the patient can be discharged in 24 to 48 hours when he is medically stable.      SUBJECTIVE:   Ady Godinez was seen on rounds.  Patient states that he has been slowly moving out of bed to chair.  Patient is not as motivated about physical activity.  Patient is tolerating some of the clear liquid diets.  Passing flatus.  No bowel movements.  Abdominal pain at surgical incision site.    Patient Vitals for the past 24 hrs:   BP Temp Temp src Pulse Resp SpO2   11/16/23 1121 (!) 141/79 98.4  F (36.9  C) Oral 109 18 93 %   11/16/23 0915 -- -- -- -- -- 92 %   11/16/23 0747 134/79 98  F (36.7  C) Oral 115 18 97 %   11/16/23 0515 (!) 140/79 98.2  F (36.8  C) Oral 103 18 94 %   11/15/23 2348 135/72 98.4  F (36.9  C) Oral 103 18 93 %   11/15/23 1515 130/69 98.5  F (36.9  C) Oral 97 18 96 %       Physical Exam:  General: NAD, pleasant  CV:RRR  LUNGS:Normal respiratory effort, no accessory muscle use  ABD: Morbidly obese abdomen.  Surgical incisions well approximated.  Appropriate tenderness to palpation.  EXT:no CCE    Braydon Kim, DO      Braydon Kim, DO  General  Surgeon  [facility]  Surgery [facility] - [address]  [place] [address]?  Office: [phone]  Employed by - [facility] [facility] [employer] [facility]  Pager: [phone]

## 2023-11-16 NOTE — PROGRESS NOTES
11/16/23 1125   Appointment Info   Signing Clinician's Name / Credentials (PT) Sweta Curran DPT   Living Environment   People in Home child(sherry), dependent;spouse  (12 and 7 year old children)   Current Living Arrangements house  (townhouse)   Home Accessibility stairs to enter home;stairs within home   Number of Stairs, Main Entrance 1   Stair Railings, Main Entrance none   Number of Stairs, Within Home, Primary greater than 10 stairs  (~12-15 stairs)   Stair Railings, Within Home, Primary railings safe and in good condition  (2nd floor bedroom and bathroom)   Transportation Anticipated family or friend will provide   Self-Care   Usual Activity Tolerance moderate   Current Activity Tolerance fair   Equipment Currently Used at Home none   General Information   Onset of Illness/Injury or Date of Surgery 11/13/23   Referring Physician Sanjiv Martinez MD   Patient/Family Therapy Goals Statement (PT) none   Pertinent History of Current Problem (include personal factors and/or comorbidities that impact the POC) 8 year old male who is s/p robot-assisted, laparoscopic NIK and ventral hernia repair with mesh for incarcerated ventral hernia with small bowel obstruction on 11/14/23 with Dr. Kim.   Existing Precautions/Restrictions abdominal  (binder at all times)   Strength (Manual Muscle Testing)   Strength (Manual Muscle Testing) Deficits observed during functional mobility   Strength Comments Due to pain   Bed Mobility   Comment, (Bed Mobility) Pt in the recliner when PT arrived.   Transfers   Transfers sit-stand transfer   Sit-Stand Transfer   Sit-Stand [place] (Transfers) supervision;verbal cues   Assistive Device (Sit-Stand Transfers) walker, front-wheeled   Gait/Stairs (Locomotion)   [place] Level (Gait) contact guard   Assistive Device (Gait) walker, front-wheeled   Distance in Feet (Gait) 15'   Pattern (Gait) step-through   Deviations/Abnormal Patterns (Gait) antalgic   Clinical Impression    Criteria for Skilled Therapeutic Intervention Yes, treatment indicated   PT Diagnosis (PT) Impaired functional mobility   Influenced by the following impairments Pain   Functional limitations due to impairments Impaired transfers, gait   Clinical Presentation (PT Evaluation Complexity) stable   Clinical Presentation Rationale Presents as diagnosed   Clinical Decision Making (Complexity) low complexity   Planned Therapy Interventions (PT) bed mobility training;gait training;home exercise program;transfer training   Risk & Benefits of therapy have been explained patient   PT Total Evaluation Time   PT Eval, Low Complexity Minutes (04505) 10   Physical Therapy Goals   PT Frequency Daily   PT Predicted Duration/Target Date for Goal Attainment 11/23/23   PT Goals Transfers;Gait   PT: Transfers Independent;Sit to/from stand;Bed to/from chair   PT: Gait Modified independent;Rolling walker;150 feet   Interventions   Interventions Quick Adds Gait Training   Gait Training   Gait Training Minutes (82661) 10   Symptoms Noted During/After Treatment (Gait Training) increased pain   Treatment Detail/Skilled Intervention Pt noting increase abd pain during ambulation. Chair follow for safety. Pt needs a lot of encouragement to participate.   Distance in Feet 50'   [place] Level (Gait Training) contact guard   Physical Assistance Level (Gait Training) 1 person + 1 person to manage equipment  (chair follow)   Weight Bearing (Gait Training) weight-bearing as tolerated   Assistive Device (Gait Training) rolling walker   Gait Analysis Deviations decreased collin   Impairments (Gait Analysis/Training) pain   PT Discharge Planning   PT Plan progress transfers, amb with FWW, 12-15 stairs   PT Discharge Recommendation (DC Rec) home with assist;home with home care physical therapy   PT Rationale for DC Rec Pt may benefit from home PT to improve overall strength and mobility.   PT Brief overview of current status Amb 65' with FWW and  CGA.   PT Equipment Needed at Discharge walker, rolling  (alberto)   Total Session Time   Timed Code Treatment Minutes 10   Total Session Time (sum of timed and untimed services) 20       Sweta Curran, PT, DPT  11/16/2023

## 2023-11-16 NOTE — PLAN OF CARE
Problem: Adult Inpatient Plan of Care  Goal: Absence of Hospital-Acquired Illness or Injury  11/15/2023 1856 by Juan Curran, RN  Outcome: Progressing     Problem: Adult Inpatient Plan of Care  Goal: Absence of Hospital-Acquired Illness or Injury  Intervention: Identify and Manage Fall Risk  Recent Flowsheet Documentation  Taken 11/15/2023 1500 by Juan Curran, RN  Safety Promotion/Fall Prevention:   activity supervised   assistive device/personal items within reach   safety round/check completed  Taken 11/15/2023 0830 by Juan Curran, RN  Safety Promotion/Fall Prevention:   activity supervised   assistive device/personal items within reach   safety round/check completed     Problem: Adult Inpatient Plan of Care  Goal: Absence of Hospital-Acquired Illness or Injury  Intervention: Prevent Skin Injury  Recent Flowsheet Documentation  Taken 11/15/2023 1500 by Juan Curran RN  Body Position: position maintained  Taken 11/15/2023 1100 by Juan Curran RN  Body Position: position maintained  Taken 11/15/2023 0830 by Juan Curran RN  Body Position: position maintained     Problem: Adult Inpatient Plan of Care  Goal: Optimal Comfort and Wellbeing  11/15/2023 1856 by Juan Curran, RN  Outcome: Progressing  11/15/2023 1305 by Juan Curran, RN  Outcome: Progressing  Intervention: Monitor Pain and Promote Comfort  Recent Flowsheet Documentation  Taken 11/15/2023 0825 by Juan Curran, RN  Pain Management Interventions: (refused oral medications)   rest   repositioned     Problem: Pain Acute  Goal: Optimal Pain Control and Function  11/15/2023 1856 by Juan Curran, RN  Outcome: Progressing  11/15/2023 1305 by Juan Curran, RN  Outcome: Progressing  Intervention: Develop Pain Management Plan  Recent Flowsheet Documentation  Taken 11/15/2023 0825 by Juan Curran, RN  Pain Management Interventions: (refused oral medications)   rest   repositioned  Intervention: Prevent or Manage Pain  Recent Flowsheet  Documentation  Taken 11/15/2023 1500 by Juan Curran, RN  Bowel Elimination Promotion:   adequate fluid intake promoted   ambulation promoted   diet adjusted  Medication Review/Management: medications reviewed  Taken 11/15/2023 0830 by Juan Curran, RN  Bowel Elimination Promotion:   adequate fluid intake promoted   ambulation promoted   diet adjusted  Medication Review/Management: medications reviewed   Goal Outcome Evaluation:             Patient more unsteady on feed relative to AM. Patient SOB, diaphoretic and anxious while ambulating from bedside chair to bathroom to void. Patient felt dizzy, shaky and lightheaded while getting back to bed. Patient did not tolerate ambulating in halls as a result. Hypoactive bowel sounds, reports intermittent gas passing but had a stool smear on chux pad while sitting in chair. Intermittent episodes of apnea while asleep; oxygen saturations dipping below 90% while on room air. Patient verbalized needing to get sleep study done, but has not gotten one done yet. Encouraged incentive spirometry while awake every hour. Tolerating clear liquid diet. Not advanced due to hypoactive bowels. Pain between 5-7/10 at incision sites. Given PRN oxycodone, ice packs and tylenol.

## 2023-11-16 NOTE — PROGRESS NOTES
Occupational Therapy      11/16/23 1430   Appointment Info   Signing Clinician's Name / Credentials (OT) Elle Willoughby OTR/L   Living Environment   People in Home child(sherry), dependent;spouse  (12 and 7 y.o children)   Current Living Arrangements house;other (see comments)  ([facility])   Home Accessibility stairs to enter home;stairs within home   Number of Stairs, Main Entrance 1   Stair Railings, Main Entrance none   Number of Stairs, Within Home, Primary greater than 10 stairs   Stair Railings, Within Home, Primary railings safe and in good condition   Transportation Anticipated family or friend will provide   Living Environment Comments 2nd floor bedroom/bathroom   Self-Care   Usual Activity Tolerance moderate   Current Activity Tolerance fair   Regular Exercise No   Equipment Currently Used at Home none   Fall history within last six months no   Activity/Exercise/Self-Care Comment Ind. w/ ADL routine   Instrumental Activities of Daily Living (IADL)   IADL Comments pt reports spouse assists w/ IADL routine   General Information   Onset of Illness/Injury or Date of Surgery 11/13/23   Referring Physician Sanjiv Martinez MD   Additional Occupational Profile Info/Pertinent History of Current Problem 38 year old male status post robotic assisted repair of incisional ventral hernias with incarcerated small bowel 11/14/2023   Existing Precautions/Restrictions abdominal;other (see comments)  (abdominal binder on at all times.)   Cognitive Status Examination   Orientation Status orientation to person, place and time   Range of Motion Comprehensive   General Range of Motion bilateral upper extremity ROM WFL   Bed Mobility   Comment (Bed Mobility) not tested at time of evaluation   Transfers   Transfers sit-stand transfer   Sit-Stand Transfer   Sit-Stand [place] (Transfers) verbal cues;supervision;contact guard   Assistive Device (Sit-Stand Transfers) walker, front-wheeled   Balance   Balance Assessment no  deficits identified   Activities of Daily Living   BADL Assessment/Intervention lower body dressing   Lower Body Dressing Assessment/Training   Position (Lower Body Dressing) unsupported sitting   Comment, (Lower Body Dressing) Pt requiring assist for LB dressing d/t abdominal prec.   [place] Level (Lower Body Dressing) minimum assist (75% patient effort)   Clinical Impression   Criteria for Skilled Therapeutic Interventions Met (OT) Yes, treatment indicated   OT Diagnosis decreased ADL ind.   OT Problem List-Impairments impacting ADL problems related to;activity tolerance impaired;strength;post-surgical precautions   Assessment of Occupational Performance 1-3 Performance Deficits   Identified Performance Deficits decreased ADL ind/act. tolerance   Planned Therapy Interventions (OT) ADL retraining;IADL retraining;strengthening;transfer training;home program guidelines   Clinical Decision Making Complexity (OT) problem focused assessment/low complexity   Risk & Benefits of therapy have been explained evaluation/treatment results reviewed;patient;care plan/treatment goals reviewed   OT Total Evaluation Time   OT Eval, Low Complexity Minutes (08059) 12   OT Goals   Therapy Frequency (OT) Daily   OT Predicted Duration/Target Date for Goal Attainment 11/22/23   OT Goals Lower Body Dressing;Hygiene/Grooming;Transfers;Toilet Transfer/Toileting   OT: Hygiene/Grooming modified independent;while standing;within precautions   OT: Lower Body Dressing Modified independent;within precautions;using adaptive equipment   OT: Transfer Modified independent;with assistive device;within precautions   OT: Toilet Transfer/Toileting Modified independent;using adaptive equipment   Self-Care/Home Management   Self-Care/Home Mgmt/ADL, Compensatory, Meal Prep Minutes (43814) 8   Symptoms Noted During/After Treatment (Meal Preparation/Planning Training) fatigue;increased pain   Treatment Detail/Skilled Intervention Pt up in recliner upon  OT arrival- OT educated pt on abdominal prec and use of abdominal binder at all times. OT ed. pt in use of AE such as SA/reacher for safe/ind. LB dressing will bring in next session to demo. Additional STS SBA w/ fww no LOB noted pt ambulated w/in room close to bed w/ SBA use of fww w/ cues for tech/safety, pt reporting sig. increased pain/nausea and requesting to rest back in recliner as he just walked w/ RN earlier. Pt ed. in use of log roll for safe bed mobility- pt reports sleeping in recliner/couch at home. alarm on at end of session all needs met.   OT Discharge Planning   OT Plan toileting, LB dressing w/ AE, g/h at sink, abd. prec, binder on at all times.   OT Discharge Recommendation (DC Rec) home with assist;home with home care occupational therapy   OT Rationale for DC Rec Pt moving w/ SBA/CGA w/ fww no LOB Noted - pt would benefit from AE such as SA/reacher for ind. LB dressing w/in abdominal prec. Pt lives in home w/ spouse and children- spouse does work during day. Pt would benefit from home services/home therapy   OT Brief overview of current status SBA fww   OT Equipment Needed at Discharge reacher;other (see comments)  (SA)   Total Session Time   Timed Code Treatment Minutes 8   Total Session Time (sum of timed and untimed services) 20

## 2023-11-17 ENCOUNTER — APPOINTMENT (OUTPATIENT)
Dept: PHYSICAL THERAPY | Facility: HOSPITAL | Age: 38
DRG: 336 | End: 2023-11-17
Payer: COMMERCIAL

## 2023-11-17 ENCOUNTER — APPOINTMENT (OUTPATIENT)
Dept: OCCUPATIONAL THERAPY | Facility: HOSPITAL | Age: 38
DRG: 336 | End: 2023-11-17
Payer: COMMERCIAL

## 2023-11-17 LAB
ANION GAP SERPL CALCULATED.3IONS-SCNC: 8 MMOL/L (ref 7–15)
BUN SERPL-MCNC: 13.5 MG/DL (ref 6–20)
CALCIUM SERPL-MCNC: 9.6 MG/DL (ref 8.6–10)
CHLORIDE SERPL-SCNC: 95 MMOL/L (ref 98–107)
CREAT SERPL-MCNC: 0.68 MG/DL (ref 0.67–1.17)
DEPRECATED HCO3 PLAS-SCNC: 31 MMOL/L (ref 22–29)
EGFRCR SERPLBLD CKD-EPI 2021: >90 ML/MIN/1.73M2
GLUCOSE BLDC GLUCOMTR-MCNC: 155 MG/DL (ref 70–99)
GLUCOSE BLDC GLUCOMTR-MCNC: 156 MG/DL (ref 70–99)
GLUCOSE BLDC GLUCOMTR-MCNC: 166 MG/DL (ref 70–99)
GLUCOSE SERPL-MCNC: 163 MG/DL (ref 70–99)
PLATELET # BLD AUTO: 291 10E3/UL (ref 150–450)
POTASSIUM SERPL-SCNC: 4.3 MMOL/L (ref 3.4–5.3)
SODIUM SERPL-SCNC: 134 MMOL/L (ref 135–145)
WBC # BLD AUTO: 16.6 10E3/UL (ref 4–11)

## 2023-11-17 PROCEDURE — 85048 AUTOMATED LEUKOCYTE COUNT: CPT | Performed by: FAMILY MEDICINE

## 2023-11-17 PROCEDURE — 120N000001 HC R&B MED SURG/OB

## 2023-11-17 PROCEDURE — 250N000011 HC RX IP 250 OP 636: Mod: JZ | Performed by: SURGERY

## 2023-11-17 PROCEDURE — 97535 SELF CARE MNGMENT TRAINING: CPT | Mod: GO

## 2023-11-17 PROCEDURE — 80048 BASIC METABOLIC PNL TOTAL CA: CPT | Performed by: FAMILY MEDICINE

## 2023-11-17 PROCEDURE — 97110 THERAPEUTIC EXERCISES: CPT | Mod: GP

## 2023-11-17 PROCEDURE — 97116 GAIT TRAINING THERAPY: CPT | Mod: GP

## 2023-11-17 PROCEDURE — 250N000013 HC RX MED GY IP 250 OP 250 PS 637: Performed by: SURGERY

## 2023-11-17 PROCEDURE — 99231 SBSQ HOSP IP/OBS SF/LOW 25: CPT | Performed by: SURGERY

## 2023-11-17 PROCEDURE — 36415 COLL VENOUS BLD VENIPUNCTURE: CPT | Performed by: SURGERY

## 2023-11-17 PROCEDURE — 250N000013 HC RX MED GY IP 250 OP 250 PS 637: Performed by: STUDENT IN AN ORGANIZED HEALTH CARE EDUCATION/TRAINING PROGRAM

## 2023-11-17 PROCEDURE — 85049 AUTOMATED PLATELET COUNT: CPT | Performed by: SURGERY

## 2023-11-17 PROCEDURE — 99233 SBSQ HOSP IP/OBS HIGH 50: CPT | Performed by: STUDENT IN AN ORGANIZED HEALTH CARE EDUCATION/TRAINING PROGRAM

## 2023-11-17 RX ORDER — ACETAMINOPHEN 325 MG/1
975 TABLET ORAL EVERY 8 HOURS
Status: DISCONTINUED | OUTPATIENT
Start: 2023-11-17 | End: 2023-11-18 | Stop reason: HOSPADM

## 2023-11-17 RX ADMIN — HEPARIN SODIUM 5000 UNITS: 10000 INJECTION, SOLUTION INTRAVENOUS; SUBCUTANEOUS at 14:01

## 2023-11-17 RX ADMIN — HEPARIN SODIUM 5000 UNITS: 10000 INJECTION, SOLUTION INTRAVENOUS; SUBCUTANEOUS at 05:36

## 2023-11-17 RX ADMIN — SENNOSIDES AND DOCUSATE SODIUM 1 TABLET: 8.6; 5 TABLET ORAL at 21:35

## 2023-11-17 RX ADMIN — ACETAMINOPHEN 650 MG: 325 TABLET ORAL at 02:24

## 2023-11-17 RX ADMIN — MAGNESIUM HYDROXIDE 30 ML: 400 SUSPENSION ORAL at 08:48

## 2023-11-17 RX ADMIN — SENNOSIDES AND DOCUSATE SODIUM 1 TABLET: 8.6; 5 TABLET ORAL at 08:48

## 2023-11-17 RX ADMIN — ACETAMINOPHEN 975 MG: 325 TABLET ORAL at 08:48

## 2023-11-17 RX ADMIN — ACETAMINOPHEN 975 MG: 325 TABLET ORAL at 05:38

## 2023-11-17 RX ADMIN — ACETAMINOPHEN 325 MG: 325 TABLET ORAL at 16:56

## 2023-11-17 RX ADMIN — POLYETHYLENE GLYCOL 3350 17 G: 17 POWDER, FOR SOLUTION ORAL at 08:48

## 2023-11-17 RX ADMIN — HEPARIN SODIUM 5000 UNITS: 10000 INJECTION, SOLUTION INTRAVENOUS; SUBCUTANEOUS at 21:36

## 2023-11-17 ASSESSMENT — ACTIVITIES OF DAILY LIVING (ADL)
ADLS_ACUITY_SCORE: 26

## 2023-11-17 NOTE — PLAN OF CARE
Problem: Adult Inpatient Plan of Care  Goal: Absence of Hospital-Acquired Illness or Injury  Intervention: Prevent Skin Injury  Recent Flowsheet Documentation  Taken 11/17/2023 0845 by Juan Curran, RN  Body Position: position changed independently     Problem: Gas Exchange Impaired  Goal: Optimal Gas Exchange  Outcome: Progressing  Intervention: Optimize Oxygenation and Ventilation  Recent Flowsheet Documentation  Taken 11/17/2023 0845 by Juan Curran, RN  Airway/Ventilation Management: airway patency maintained  Head of Bed (HOB) Positioning: HOB at 20-30 degrees     Problem: Intestinal Obstruction  Goal: Optimal Pain Control and Function  Outcome: Progressing  Intervention: Prevent or Manage Pain  Recent Flowsheet Documentation  Taken 11/17/2023 0845 by Juan Curran, RN  Pain Management Interventions:   ambulation/increased activity   cold applied   repositioned  Sleep/Rest Enhancement:   comfort measures   room darkened     Problem: Intestinal Obstruction  Goal: Optimize Nutrition Status  Outcome: Progressing   Goal Outcome Evaluation:  No neurological changes during day. Patient more ambulatory and motivated to get up and move. Went on two walks out of room and walked past nursing station and back to room. Less diaphoretic, pain and anxiety when up moving. Patient denies feeling nauseous or lightheaded. Incisions c/d/I open to air. Pain much more tolerated today; incisional pain 5/10, using tylenol for pain relief.

## 2023-11-17 NOTE — PROGRESS NOTES
"[facility]    Medicine Progress Note - Hospitalist Service    Date of Admission:  11/13/2023    Assessment & Plan   Ady Godinez is a 38 year old male presenting with incarcerated hernia. PMH includes morbid obesity, HLD.  He is s/p hernia repair 11/14.     Incarcerated Ventral Wall Hernias  s/p hernia repair. Continue pain regimen and continuous pulse ox. Abdominal binder in place, surgery following. Continue to full liquid diet.  Off IVF.   PT/ OT .  Recommending home with home care .  His management is assisting with arrangement of home care.  He does not have a primary care provider which makes it difficult.  Discontinued zosyn.      Intractable Nausea/ Vomiting  PRN Zofran. Monitor electrolytes     Elevated Blood Pressure  Denies any history of HTN. Continue pain control with IV Dilaudid. PRN IV Hydralazine with parameters. Monitor vitals closely.     OHS +/- Obesity hypoventilation syndrome with hypoxia during sleep  - overnight O2 and prn with exertion  - continue IS  - CXR to r/o infiltrate or pulm edema  -Patient will benefit from sleep study as an outpatient        Diet: Regular Diet Adult    DVT Prophylaxis: Heparin SQ  Duvall Catheter: Not present  Lines: None     Cardiac Monitoring: None  Code Status: Full Code      Clinically Significant Risk Factors                         # Severe Obesity: Estimated body mass index is 58.58 kg/m  as calculated from the following:    Height as of this encounter: 1.803 m (5' 11\").    Weight as of this encounter: 190.5 kg (420 lb)., PRESENT ON ADMISSION            Disposition Plan      Expected Discharge Date: 11/18/2023    Discharge Delays: IV Medication - consider oral or Home Infusion  Voiding/Eating Trial needed  Destination: home with family              Meme Morillo MD  Hospitalist Service  [facility]  Securely message with [employer] (more info)  Text page via [employer] Paging/Directory "   ______________________________________________________________________    Interval History   New to me today.  Chart, labs and imaging results reviewed.  No distress noted.  He was sitting on chair at the bedside.  He is recovering from surgery reasonably well.  He is medically ready for discharge awaiting home care arrangement.  Management plan discussed with the patient and he expressed understanding.    Physical Exam   Vital Signs: Temp: 98.1  F (36.7  C) Temp src: Oral BP: (!) 148/75 Pulse: 95   Resp: 20 SpO2: 96 % O2 Device: None (Room air) Oxygen Delivery: 1 LPM  Weight: 420 lbs 0 oz    General Appearance: No distress noted  Respiratory: Good air entry bilaterally  Cardiovascular: S1 and S2 are heard, no murmur or gallop  GI: Appears mildly distended, obese, mild tenderness around incision site, normoactive bowel sounds  Skin: Intact and warm       Medical Decision Making       50 MINUTES SPENT BY ME on the date of service doing chart review, history, exam, documentation & further activities per the note.      Data

## 2023-11-17 NOTE — PLAN OF CARE
Problem: Adult Inpatient Plan of Care  Goal: Plan of Care Review  Description: The Plan of Care Review/Shift note should be completed every shift.  The Outcome Evaluation is a brief statement about your assessment that the patient is improving, declining, or no change.  This information will be displayed automatically on your shift  note.  11/16/2023 1915 by Juan Curran, RN  Outcome: Progressing  11/16/2023 1019 by Juan Curran RN  Outcome: Progressing     Problem: Adult Inpatient Plan of Care  Goal: Optimal Comfort and Wellbeing  11/16/2023 1915 by Juan Curran, RN  Outcome: Progressing  11/16/2023 1019 by Juan Curran RN  Outcome: Progressing  Intervention: Monitor Pain and Promote Comfort  Recent Flowsheet Documentation  Taken 11/16/2023 0821 by Juan Curran, RN  Pain Management Interventions:   medication (see MAR)   cold applied     Problem: Pain Acute  Goal: Optimal Pain Control and Function  11/16/2023 1915 by Juan Curran, RN  Outcome: Progressing  11/16/2023 1019 by Juan Curran RN  Outcome: Progressing  Intervention: Develop Pain Management Plan  Recent Flowsheet Documentation  Taken 11/16/2023 0821 by Juan Curran RN  Pain Management Interventions:   medication (see MAR)   cold applied  Intervention: Prevent or Manage Pain  Recent Flowsheet Documentation  Taken 11/16/2023 1600 by Juan Curran RN  Sensory Stimulation Regulation: television on  Bowel Elimination Promotion:   adequate fluid intake promoted   ambulation promoted   diet adjusted  Medication Review/Management: medications reviewed  Taken 11/16/2023 0821 by Juan Curran, RN  Sensory Stimulation Regulation: television on  Sleep/Rest Enhancement:   comfort measures   room darkened  Bowel Elimination Promotion:   adequate fluid intake promoted   ambulation promoted   diet adjusted  Intervention: Optimize Psychosocial Wellbeing  Recent Flowsheet Documentation  Taken 11/16/2023 1600 by Juan Curran, PARTH  Spiritual  Activities Assistance: affirmation provided  Supportive Measures:   active listening utilized   goal-setting facilitated   positive reinforcement provided   relaxation techniques promoted   self-care encouraged   self-reflection promoted   self-responsibility promoted   verbalization of feelings encouraged  Taken 11/16/2023 0821 by Juan Curran, RN  Supportive Measures: active listening utilized   Goal Outcome Evaluation:  Patient verbalizing pain as 5-7/10. Given tylenol PO. Around 1800, while up ambulating to bathroom, patient became suddenly nauseous  and  had  300 ml of dark green emesis. Patient diaphoretic  and feeling slightly lightheaded. Got back to bed  safely. Paged surgery team. Switched patient back to NPO and verbal order to place another NG to LIS if another episode of emesis occurs.

## 2023-11-17 NOTE — PROGRESS NOTES
"Care Management Follow Up    Length of Stay (days): 4    Expected Discharge Date: 11/18/2023     Concerns to be Addressed: no discharge needs identified     Patient plan of care discussed at interdisciplinary rounds: Yes    Anticipated Discharge Disposition:  Home with homecare PT/OT     Anticipated Discharge Services:  Home with homecare PT/OT  Anticipated Discharge DME:  NA    Patient/family educated on Medicare website which has current facility and service quality ratings:  NA  Education Provided on the Discharge Plan:  Per team  Patient/Family in Agreement with the Plan: yes    Referrals Placed by CM/SW:  Yes  Private pay costs discussed: Not applicable    Additional Information:  Met patient at bedside to discuss PT/OT rec for home with home care PT/OT. Patient agreed.    Home care referral sent.    Social Hx: \"Lives with spouse and children in a townhouse. Report independent with ADLs/IADLs. No community resources. No DME. Work and drive. Has no current PCP. Goal is to return home. Family will transport.\"    RNCM to follow for medical progression, recommendations, and final discharge plan.     Danielle Leger RN     2:55 PM [facility] Not able to find home care services, because patient has no PCP to sign on-going home care orders. Patient will need to establish care with a provider, then can get home care.    Updated provider, Dr. DAO Morillo and Dr. CITLALI Kim. Left a message to ask Dr. Kim if he can sign for on-going home care orders?  Updated patient  "

## 2023-11-17 NOTE — PROGRESS NOTES
General Surgery Progress Note:    Hospital Day # 4    ASSESSMENT:   1. Nausea and vomiting, unspecified vomiting type    2. Hernia of anterior abdominal wall    3.  SBO  4.  Severe morbid obesity    Ady Godinez is a 38 year old male status post robotic assisted repair of incisional ventral hernias with incarcerated small bowel 11/14/2023.  Patient remains clinically stable with a slight bump in leukocytosis.  This could be secondary to multiple factors given that the patient is not as active.  He could be developing operative atelectasis as well.       PLAN:   -The patient can perform daily activities as tolerated.  -The only restrictions for the patient is no significant lifting of heavy weights or pushing or pulling for at least 6 weeks.  -Patient can have diet as tolerated  -Continue medical management per primary team.  -From my standpoint, the patient can be discharged when he is medically stable.      SUBJECTIVE:   Ady Godinez was seen on rounds.  Patient states that he has been more active since yesterday's evaluation.  Patient is moving around the hallways.  His pain is controlled.  He had 1 emesis yesterday but otherwise he is doing well today.  No nausea.  Passing gas.    Patient Vitals for the past 24 hrs:   BP Temp Temp src Pulse Resp SpO2   11/17/23 0729 (!) 148/75 98.1  F (36.7  C) Oral 95 20 96 %   11/16/23 2350 136/86 98  F (36.7  C) Oral 97 20 94 %   11/16/23 1528 (!) 145/71 97.9  F (36.6  C) Oral 101 19 93 %   11/16/23 1121 (!) 141/79 98.4  F (36.9  C) Oral 109 18 93 %       Physical Exam:  General: NAD, pleasant  CV:RRR  LUNGS:Normal respiratory effort, no accessory muscle use  ABD: Morbidly obese abdomen.  Surgical incisions well approximated.  Appropriate tenderness to palpation.  EXT:no CCE    Braydon Kim, DO Braydon Kim,   General Surgeon  [facility]  Surgery [address]  [place] [address]?  Office:  [phone]  Employed by - [employer]  Pager: [phone]

## 2023-11-17 NOTE — PLAN OF CARE
Problem: Adult Inpatient Plan of Care  Goal: Plan of Care Review  Description: The Plan of Care Review/Shift note should be completed every shift.  The Outcome Evaluation is a brief statement about your assessment that the patient is improving, declining, or no change.  This information will be displayed automatically on your shift  note.  Outcome: Progressing     Problem: Pain Acute  Goal: Optimal Pain Control and Function  Outcome: Progressing  Intervention: Prevent or Manage Pain  Recent Flowsheet Documentation  Taken 11/17/2023 0153 by Jemma Madden RN  Sensory Stimulation Regulation: television on  Bowel Elimination Promotion: ambulation promoted  Medication Review/Management: medications reviewed  Intervention: Optimize Psychosocial Wellbeing  Recent Flowsheet Documentation  Taken 11/17/2023 0153 by Jemma Madden RN  Spiritual Activities Assistance: affirmation provided  Supportive Measures:   active listening utilized   goal-setting facilitated   positive reinforcement provided   relaxation techniques promoted   self-care encouraged   self-reflection promoted   self-responsibility promoted   verbalization of feelings encouraged     Problem: Gas Exchange Impaired  Goal: Optimal Gas Exchange  Outcome: Progressing  Intervention: Optimize Oxygenation and Ventilation  Recent Flowsheet Documentation  Taken 11/17/2023 0153 by Jemma Madden RN  Head of Bed (HOB) Positioning: HOB at 20-30 degrees   Goal Outcome Evaluation:         Pt alert and oriented x 4,calm and cooperative with cares,flat affect,no c/o nausea or vomiting this shift,reported small BM,ambulated to nurse station and tolerated well,PRN and scheduled  tylenol given for abdominal incision site pain with some relief,NPO all night, at 2 am and 156  at 6 am. Will continue to monitor.

## 2023-11-18 VITALS
DIASTOLIC BLOOD PRESSURE: 72 MMHG | BODY MASS INDEX: 44.1 KG/M2 | HEIGHT: 71 IN | SYSTOLIC BLOOD PRESSURE: 131 MMHG | TEMPERATURE: 98.7 F | OXYGEN SATURATION: 98 % | HEART RATE: 93 BPM | WEIGHT: 315 LBS | RESPIRATION RATE: 18 BRPM

## 2023-11-18 LAB
ANION GAP SERPL CALCULATED.3IONS-SCNC: 8 MMOL/L (ref 7–15)
BUN SERPL-MCNC: 15.3 MG/DL (ref 6–20)
CALCIUM SERPL-MCNC: 9.7 MG/DL (ref 8.6–10)
CHLORIDE SERPL-SCNC: 95 MMOL/L (ref 98–107)
CREAT SERPL-MCNC: 0.73 MG/DL (ref 0.67–1.17)
DEPRECATED HCO3 PLAS-SCNC: 33 MMOL/L (ref 22–29)
EGFRCR SERPLBLD CKD-EPI 2021: >90 ML/MIN/1.73M2
ERYTHROCYTE [DISTWIDTH] IN BLOOD BY AUTOMATED COUNT: 14.4 % (ref 10–15)
GLUCOSE BLDC GLUCOMTR-MCNC: 120 MG/DL (ref 70–99)
GLUCOSE BLDC GLUCOMTR-MCNC: 147 MG/DL (ref 70–99)
GLUCOSE BLDC GLUCOMTR-MCNC: 157 MG/DL (ref 70–99)
GLUCOSE SERPL-MCNC: 137 MG/DL (ref 70–99)
HCT VFR BLD AUTO: 40.5 % (ref 40–53)
HGB BLD-MCNC: 12.7 G/DL (ref 13.3–17.7)
MCH RBC QN AUTO: 28.3 PG (ref 26.5–33)
MCHC RBC AUTO-ENTMCNC: 31.4 G/DL (ref 31.5–36.5)
MCV RBC AUTO: 90 FL (ref 78–100)
PLATELET # BLD AUTO: 313 10E3/UL (ref 150–450)
POTASSIUM SERPL-SCNC: 4.3 MMOL/L (ref 3.4–5.3)
RBC # BLD AUTO: 4.49 10E6/UL (ref 4.4–5.9)
SODIUM SERPL-SCNC: 136 MMOL/L (ref 135–145)
WBC # BLD AUTO: 15.2 10E3/UL (ref 4–11)

## 2023-11-18 PROCEDURE — 250N000013 HC RX MED GY IP 250 OP 250 PS 637: Performed by: SURGERY

## 2023-11-18 PROCEDURE — 250N000013 HC RX MED GY IP 250 OP 250 PS 637: Performed by: STUDENT IN AN ORGANIZED HEALTH CARE EDUCATION/TRAINING PROGRAM

## 2023-11-18 PROCEDURE — 99239 HOSP IP/OBS DSCHRG MGMT >30: CPT | Performed by: FAMILY MEDICINE

## 2023-11-18 PROCEDURE — 80048 BASIC METABOLIC PNL TOTAL CA: CPT | Performed by: STUDENT IN AN ORGANIZED HEALTH CARE EDUCATION/TRAINING PROGRAM

## 2023-11-18 PROCEDURE — 85027 COMPLETE CBC AUTOMATED: CPT | Performed by: STUDENT IN AN ORGANIZED HEALTH CARE EDUCATION/TRAINING PROGRAM

## 2023-11-18 PROCEDURE — 36415 COLL VENOUS BLD VENIPUNCTURE: CPT | Performed by: STUDENT IN AN ORGANIZED HEALTH CARE EDUCATION/TRAINING PROGRAM

## 2023-11-18 PROCEDURE — 250N000011 HC RX IP 250 OP 636: Mod: JZ | Performed by: SURGERY

## 2023-11-18 RX ORDER — ACETAMINOPHEN 325 MG/1
650 TABLET ORAL EVERY 6 HOURS PRN
Status: DISCONTINUED | OUTPATIENT
Start: 2023-11-18 | End: 2023-11-18 | Stop reason: HOSPADM

## 2023-11-18 RX ORDER — POLYETHYLENE GLYCOL 3350 17 G/17G
17 POWDER, FOR SOLUTION ORAL DAILY
Qty: 14 PACKET | Refills: 0 | Status: SHIPPED | OUTPATIENT
Start: 2023-11-19 | End: 2023-12-03

## 2023-11-18 RX ORDER — AMOXICILLIN 250 MG
1 CAPSULE ORAL 2 TIMES DAILY
Qty: 28 TABLET | Refills: 0 | Status: SHIPPED | OUTPATIENT
Start: 2023-11-18 | End: 2023-12-02

## 2023-11-18 RX ADMIN — ACETAMINOPHEN 975 MG: 325 TABLET ORAL at 00:58

## 2023-11-18 RX ADMIN — SENNOSIDES AND DOCUSATE SODIUM 1 TABLET: 8.6; 5 TABLET ORAL at 08:18

## 2023-11-18 RX ADMIN — ACETAMINOPHEN 975 MG: 325 TABLET ORAL at 08:16

## 2023-11-18 RX ADMIN — ONDANSETRON 4 MG: 2 INJECTION INTRAMUSCULAR; INTRAVENOUS at 01:13

## 2023-11-18 RX ADMIN — HEPARIN SODIUM 5000 UNITS: 10000 INJECTION, SOLUTION INTRAVENOUS; SUBCUTANEOUS at 06:14

## 2023-11-18 ASSESSMENT — ACTIVITIES OF DAILY LIVING (ADL)
ADLS_ACUITY_SCORE: 26

## 2023-11-18 NOTE — PROGRESS NOTES
General Surgery Progress Note:    Hospital Day # 5    ASSESSMENT:   1. Nausea and vomiting, unspecified vomiting type    2. Hernia of anterior abdominal wall    3.  SBO  4.  Severe morbid obesity    Ady Godinez is a 38 year old male status post robotic assisted repair of incisional ventral hernias with incarcerated small bowel 11/14/2023.        PLAN:   -ADAT  -The only restrictions for the patient is no significant lifting of heavy weights or pushing or pulling for at least 6 weeks.  -Patient can have diet as tolerated  -Continue medical management per primary team.  -Ok with discharge from our standpoint, message sent to have him seen in clinic in a week or so      SUBJECTIVE:   Doing well, pain tolerable, tolerating diet, wants to go home    Patient Vitals for the past 24 hrs:   BP Temp Temp src Pulse Resp SpO2   11/18/23 0750 131/72 98.7  F (37.1  C) Oral 93 18 98 %   11/18/23 0046 139/82 97.9  F (36.6  C) Oral 93 18 98 %   11/17/23 1647 133/56 98.9  F (37.2  C) Oral 106 20 95 %       Physical Exam:  General: NAD, pleasant  ABD: Morbidly obese abdomen.  Surgical incisions well approximated.  Appropriate tenderness to palpation.  EXT:no CCE    Woody Campbell PA-C

## 2023-11-18 NOTE — PLAN OF CARE
Problem: Adult Inpatient Plan of Care  Goal: Plan of Care Review  Description: The Plan of Care Review/Shift note should be completed every shift.  The Outcome Evaluation is a brief statement about your assessment that the patient is improving, declining, or no change.  This information will be displayed automatically on your shift  note.  Outcome: Progressing     Problem: Pain Acute  Goal: Optimal Pain Control and Function  Outcome: Progressing     Problem: Gas Exchange Impaired  Goal: Optimal Gas Exchange  Outcome: Progressing     Problem: Intestinal Obstruction  Goal: Optimal Bowel Function  Intervention: Promote Bowel Function  Recent Flowsheet Documentation  Taken 11/18/2023 0100 by Nancy Apodaca RN  Body Position: position changed independently  Taken 11/17/2023 2030 by Nancy Apodaca RN  Body Position: position changed independently   Goal Outcome Evaluation:       Pt alert & oriented. Able to make needs known. Pain managed by scheduled tylenol with relief. Up and ambulated to bathroom multiple times. Assist of 1 using walker. Pt vomited  when taking pills, stated not feeling nauseous, pills stuck on his throat. Prn IV zofran giiven with relief. Using oxygen at night 1LPM. Vital Signs stable. Will continue to monitor.

## 2023-11-18 NOTE — PROGRESS NOTES
Patient verbalizes understanding of discharge instructions.  Received new walker for use at home as ordered by MD.  Patient states he has all belongings he brought to hospital.  Awaiting ride home at this time, friend to transport.    Katy Brewster RN

## 2023-11-18 NOTE — PROGRESS NOTES
Physical Therapy Discharge Summary    Reason for therapy discharge:    Discharged to home with home therapy.    Progress towards therapy goal(s). See goals on Care Plan in [facility] electronic health record for goal details.  Goals not met.  Barriers to achieving goals:   discharge from facility.    Therapy recommendation(s):    Continued therapy is recommended.  Rationale/Recommendations:  Home PT.

## 2023-11-18 NOTE — DISCHARGE SUMMARY
"[facility]  Hospitalist Discharge Summary      Date of Admission:  11/13/2023  Date of Discharge:  11/18/2023  Discharging Provider: Olu Tristan MD  Discharge Service: Hospitalist Service    Discharge Diagnoses   Incarcerated ventral hernia  Morbid obesity    Clinically Significant Risk Factors     # Severe Obesity: Estimated body mass index is 58.58 kg/m  as calculated from the following:    Height as of this encounter: 1.803 m (5' 11\").    Weight as of this encounter: 190.5 kg (420 lb).       Follow-ups Needed After Discharge   Follow-up Appointments     Follow-up and recommended labs and tests       Follow up with primary care provider, Physician No Ref-Primary, within 7   days to evaluate after surgery.  No follow up labs or test are needed.  Follow-up with general surgery in 2 weeks            Unresulted Labs Ordered in the Past 30 Days of this Admission       No orders found from 10/14/2023 to 11/14/2023.            Discharge Disposition   Discharged to home  Condition at discharge: Stable    Hospital Course   38 years old male with past medical history significant for known ventral hernia x2, and morbid obesity.  Patient presented to emergency department with concern of abdominal pain nausea and vomiting.  CT scan was concerning for possible early incarcerated ventral hernia and small bowel obstruction.  Surgery consulted and patient underwent robotic assisted laparoscopic repair of incisional ventral hernia.  Postoperatively patient was doing well.  He was in the hospital waiting for home care to be set up since patient does not have primary care provider.  Today, patient is discharged home.  PT and OT is arranged for home visit.  At the time of discharge patient report doing well, no abdominal pain, tolerating regular diet and having bowel movement.    Consultations This Hospital Stay   SURGERY GENERAL IP CONSULT  CARE MANAGEMENT / SOCIAL WORK IP CONSULT  PHYSICAL THERAPY ADULT IP " CONSULT  OCCUPATIONAL THERAPY ADULT IP CONSULT    Code Status   Full Code    Time Spent on this Encounter   I, Olu Tristan MD, personally saw the patient today and spent greater than 30 minutes discharging this patient.       Olu Tristan MD  [address] [address]  Phone: [phone]  Fax: [phone]  ______________________________________________________________________    Physical Exam   Vital Signs: Temp: 98.7  F (37.1  C) Temp src: Oral BP: 131/72 Pulse: 93   Resp: 18 SpO2: 98 % O2 Device: None (Room air)    Weight: 420 lbs 0 oz  GI: Obese, nontender to palpate, incision is intact and clean.  Umbilical hernia present       Primary Care Physician   Physician No Ref-Primary    Discharge Orders      Home Care Referral      Reason for your hospital stay    Ventral hernia repair     Follow-up and recommended labs and tests     Follow up with primary care provider, Physician No Ref-Primary, within 7 days to evaluate after surgery.  No follow up labs or test are needed.  Follow-up with general surgery in 2 weeks     Activity    Your activity upon discharge: activity as tolerated     Diet    Follow this diet upon discharge: Orders Placed This Encounter      Regular Diet Adult       Significant Results and Procedures   Results for orders placed or performed during the hospital encounter of 11/13/23   CT Abdomen Pelvis w/o Contrast    Narrative    EXAM: CT ABDOMEN PELVIS W/O CONTRAST  LOCATION: [facility]  DATE: 11/13/2023    INDICATION: painful unreducible abd hernia with n v today  COMPARISON: CT abdomen and pelvis 05/23/2023  TECHNIQUE: CT scan of the abdomen and pelvis was performed without IV contrast. Multiplanar reformats were obtained. Dose reduction techniques were used.  CONTRAST: None.    FINDINGS:   LOWER CHEST: Normal.    HEPATOBILIARY: Hepatomegaly measuring 23 cm in length. Diffuse fatty infiltration of the liver. Moderate  distention of the gallbladder.    PANCREAS: Normal.    SPLEEN: Normal.    ADRENAL GLANDS: Normal.    KIDNEYS/BLADDER: Normal. No renal calculi or hydronephrosis. No bladder stones.    BOWEL: There are 2 adjacent ventral wall hernias again visualized, with the more superior midline fascial defect measuring 5 cm in width, with herniation of a few mildly distended small bowel loops, increased in caliber from the prior study. The more   inferior midline ventral wall fascial defect at the level of the umbilicus measures 4.8 cm in width, also with a few mildly distended small bowel loops, also increased from previous. The small bowel loops within the peritoneal cavity are all of normal   caliber. No inflammatory changes. Appendectomy.    LYMPH NODES: Normal.    VASCULATURE: Unremarkable.    PELVIC ORGANS: Normal.    MUSCULOSKELETAL: Hypertrophic changes lower thoracic spine.      Impression    IMPRESSION:   1.  Midline ventral wall hernias as seen previously, now both containing slightly distended small bowel loops. Currently no evidence for small bowel obstruction, however this could be intermittent.  2.  Hepatomegaly and diffuse fatty infiltration of the liver.     XR Chest 2 Views    Narrative    EXAM: XR CHEST 2 VIEWS  LOCATION: [facility]  DATE: 11/16/2023    INDICATION: Hypoxia.  COMPARISON: Chest CTA 07/07/2018      Impression    IMPRESSION: Negative chest. No interval change.       Discharge Medications   Current Discharge Medication List        START taking these medications    Details   polyethylene glycol (MIRALAX) 17 g packet Take 17 g by mouth daily for 14 days  Qty: 14 packet, Refills: 0    Associated Diagnoses: Hernia of anterior abdominal wall      senna-docusate (SENOKOT-S/PERICOLACE) 8.6-50 MG tablet Take 1 tablet by mouth 2 times daily for 14 days  Qty: 28 tablet, Refills: 0    Associated Diagnoses: Hernia of anterior abdominal wall           CONTINUE these medications which  have NOT CHANGED    Details   acetaminophen (TYLENOL) 325 MG tablet Take 2 tablets (650 mg) by mouth every 6 hours as needed for mild pain  Qty: 90 tablet, Refills: 0    Associated Diagnoses: Acute intractable headache, unspecified headache type           Allergies   No Known Allergies

## 2023-11-18 NOTE — PROGRESS NOTES
Care Management Discharge Note    Discharge Date: 11/18/2023       Discharge Disposition: Home, Home Care    Discharge Services: PT and OT    Discharge DME: None    Discharge Transportation: family or friend will provide    Private pay costs discussed: Not applicable    PAS Confirmation Code: NA  Patient/family educated on Medicare website which has current facility and service quality ratings: yes    Education Provided on the Discharge Plan: Yes  Persons Notified of Discharge Plans: patient   Patient/Family in Agreement with the Plan: yes    Handoff Referral Completed: Yes    Additional Information:    10:06 AM  SW met with Pt to discuss discharge planning and inability to have home care visits scheduled until Pt is established with PCP.  SW added instructions to AVS regarding calling clinic to schedule visit with PCP and then calling [facility] (accepting home care agency) to provide update.  Pt states understanding of this information and agreement with plan.     10:39 AM  FLOR received update from RN indicating Pt has set up an appointment to establish care with a primary at [facility] for 11/22 with Dr. Elizabeth Srinivasan.  FLOR called and spoke with Cornelia in intake at [facility] to provide update.  [facility] will be able to accept this referral for PT and OT. Moise OLIVEIRA with update.     MILLIE Calloway

## 2023-11-18 NOTE — PLAN OF CARE
Occupational Therapy Discharge Summary    Reason for therapy discharge:    Discharged to home with home therapy.    Progress towards therapy goal(s). See goals on Care Plan in [facility] electronic health record for goal details.  Goals partially met.  Barriers to achieving goals:   discharge from facility.    Therapy recommendation(s):    Continued therapy is recommended.  Rationale/Recommendations:  home OT.    Loreta ELIAS, OTR/L, CLT 11/18/2023 , 1:10 PM

## 2023-11-21 ENCOUNTER — NURSE TRIAGE (OUTPATIENT)
Dept: NURSING | Facility: CLINIC | Age: 38
End: 2023-11-21
Payer: COMMERCIAL

## 2023-11-21 DIAGNOSIS — R11.2 NAUSEA WITH VOMITING: Primary | ICD-10-CM

## 2023-11-21 RX ORDER — ONDANSETRON 4 MG/1
4 TABLET, ORALLY DISINTEGRATING ORAL EVERY 8 HOURS PRN
Qty: 8 TABLET | Refills: 0 | Status: SHIPPED | OUTPATIENT
Start: 2023-11-21 | End: 2023-12-14

## 2023-11-21 NOTE — TELEPHONE ENCOUNTER
"Pt reports \"hernia surgery Tuesday last week, discharged on Saturday, trying to eat food, can't seem to keep anything down\". Pt reports he has been vomiting since Sunday night, vomited twice in past 24 hours. Pt reports \"able to keep water down\". Pt reports he last had a bowel movement Sunday. Pt denies increased pain or swelling. Pt rates abdominal pain \"6\" and reports it is constant \"pretty sure it's from the surgery\". Pt denies fever. Pt reports vomit contained green liquid when vomited at 2 am today. Pt reports today he ate ramen noodles and drank apple juice at 10:30 am and then vomited again at 2 pm \"everything I had ate\".     Writer transferred pt to Tawana ALBA at [facility].         Reason for Disposition   Constant abdominal pain lasting > 2 hours   Constipation   Vomiting bile (green color)    Additional Information   Negative: Shock suspected (e.g., cold/pale/clammy skin, too weak to stand, low BP, rapid pulse)   Negative: Difficult to awaken or acting confused (e.g., disoriented, slurred speech)   Negative: Sounds like a life-threatening emergency to the triager   Negative: Vomiting occurs only while coughing   Negative: Pregnant < 20 Weeks and nausea/vomiting began in early pregnancy (i.e., 4-8 weeks pregnant)   Negative: Chest pain   Negative: Headache is main symptom   Negative: Vomiting red blood or black (coffee ground) material   Negative: Vomiting and hernia is more painful or swollen than usual   Negative: Recent head injury (within 3 days)   Negative: Recent abdominal injury (within 7 days)   Negative: Insulin-dependent diabetes and glucose > 240 mg/dL (13 mmol/L)   Negative: Severe pain in one eye   Negative: Sounds like a life-threatening emergency to the triager   Negative: Abdomen pain is main symptom and male   Negative: Abdomen pain is main symptom and female   Negative: Rectal bleeding or blood in stool is main symptom    Protocols used: Vomiting-A-OH, Post-Op Symptoms and " Sqclvdlza-A-UV, Constipation-A-OH

## 2023-11-22 ENCOUNTER — APPOINTMENT (OUTPATIENT)
Dept: CT IMAGING | Facility: HOSPITAL | Age: 38
DRG: 354 | End: 2023-11-22
Attending: EMERGENCY MEDICINE
Payer: COMMERCIAL

## 2023-11-22 ENCOUNTER — TELEPHONE (OUTPATIENT)
Dept: SURGERY | Facility: CLINIC | Age: 38
End: 2023-11-22

## 2023-11-22 ENCOUNTER — HOSPITAL ENCOUNTER (INPATIENT)
Facility: HOSPITAL | Age: 38
LOS: 8 days | Discharge: HOME HEALTH | DRG: 354 | End: 2023-11-30
Attending: EMERGENCY MEDICINE | Admitting: HOSPITALIST
Payer: COMMERCIAL

## 2023-11-22 DIAGNOSIS — L03.311 ABDOMINAL WALL CELLULITIS: ICD-10-CM

## 2023-11-22 DIAGNOSIS — K43.2 RECURRENT VENTRAL INCISIONAL HERNIA: Primary | ICD-10-CM

## 2023-11-22 DIAGNOSIS — S30.1XXA ABDOMINAL WALL SEROMA, INITIAL ENCOUNTER: ICD-10-CM

## 2023-11-22 DIAGNOSIS — I10 ESSENTIAL HYPERTENSION: ICD-10-CM

## 2023-11-22 DIAGNOSIS — R11.2 NAUSEA AND VOMITING, UNSPECIFIED VOMITING TYPE: ICD-10-CM

## 2023-11-22 DIAGNOSIS — K42.1 UMBILICAL HERNIA WITH GANGRENE: ICD-10-CM

## 2023-11-22 PROBLEM — H53.9 VISION DISTURBANCE: Status: ACTIVE | Noted: 2021-09-29

## 2023-11-22 PROBLEM — G47.9 SLEEP DISTURBANCE: Status: ACTIVE | Noted: 2021-09-29

## 2023-11-22 PROBLEM — R73.03 PREDIABETES: Status: ACTIVE | Noted: 2023-11-22

## 2023-11-22 PROBLEM — F39 MOOD DISORDER (H): Status: ACTIVE | Noted: 2021-09-29

## 2023-11-22 LAB
ALBUMIN SERPL BCG-MCNC: 3.7 G/DL (ref 3.5–5.2)
ALP SERPL-CCNC: 126 U/L (ref 40–150)
ALT SERPL W P-5'-P-CCNC: 39 U/L (ref 0–70)
ANION GAP SERPL CALCULATED.3IONS-SCNC: 14 MMOL/L (ref 7–15)
AST SERPL W P-5'-P-CCNC: ABNORMAL U/L
BASOPHILS # BLD AUTO: 0.1 10E3/UL (ref 0–0.2)
BASOPHILS NFR BLD AUTO: 0 %
BILIRUB SERPL-MCNC: 0.6 MG/DL
BUN SERPL-MCNC: 16.2 MG/DL (ref 6–20)
CALCIUM SERPL-MCNC: 10.2 MG/DL (ref 8.6–10)
CHLORIDE SERPL-SCNC: 90 MMOL/L (ref 98–107)
CREAT SERPL-MCNC: 0.74 MG/DL (ref 0.67–1.17)
DEPRECATED HCO3 PLAS-SCNC: 28 MMOL/L (ref 22–29)
EGFRCR SERPLBLD CKD-EPI 2021: >90 ML/MIN/1.73M2
EOSINOPHIL # BLD AUTO: 0.3 10E3/UL (ref 0–0.7)
EOSINOPHIL NFR BLD AUTO: 1 %
ERYTHROCYTE [DISTWIDTH] IN BLOOD BY AUTOMATED COUNT: 14.2 % (ref 10–15)
GLUCOSE SERPL-MCNC: 166 MG/DL (ref 70–99)
HCT VFR BLD AUTO: 42.9 % (ref 40–53)
HGB BLD-MCNC: 14.1 G/DL (ref 13.3–17.7)
IMM GRANULOCYTES # BLD: 0.2 10E3/UL
IMM GRANULOCYTES NFR BLD: 1 %
LACTATE SERPL-SCNC: 1.2 MMOL/L (ref 0.7–2)
LIPASE SERPL-CCNC: 30 U/L (ref 13–60)
LYMPHOCYTES # BLD AUTO: 2.1 10E3/UL (ref 0.8–5.3)
LYMPHOCYTES NFR BLD AUTO: 11 %
MCH RBC QN AUTO: 28.3 PG (ref 26.5–33)
MCHC RBC AUTO-ENTMCNC: 32.9 G/DL (ref 31.5–36.5)
MCV RBC AUTO: 86 FL (ref 78–100)
MONOCYTES # BLD AUTO: 1 10E3/UL (ref 0–1.3)
MONOCYTES NFR BLD AUTO: 5 %
NEUTROPHILS # BLD AUTO: 15.5 10E3/UL (ref 1.6–8.3)
NEUTROPHILS NFR BLD AUTO: 82 %
NRBC # BLD AUTO: 0 10E3/UL
NRBC BLD AUTO-RTO: 0 /100
PLATELET # BLD AUTO: 473 10E3/UL (ref 150–450)
POTASSIUM SERPL-SCNC: 4.9 MMOL/L (ref 3.4–5.3)
PROCALCITONIN SERPL IA-MCNC: 0.2 NG/ML
PROT SERPL-MCNC: 8.7 G/DL (ref 6.4–8.3)
RBC # BLD AUTO: 4.98 10E6/UL (ref 4.4–5.9)
SODIUM SERPL-SCNC: 132 MMOL/L (ref 135–145)
WBC # BLD AUTO: 19.1 10E3/UL (ref 4–11)

## 2023-11-22 PROCEDURE — 36415 COLL VENOUS BLD VENIPUNCTURE: CPT | Performed by: EMERGENCY MEDICINE

## 2023-11-22 PROCEDURE — 85025 COMPLETE CBC W/AUTO DIFF WBC: CPT | Performed by: EMERGENCY MEDICINE

## 2023-11-22 PROCEDURE — 120N000001 HC R&B MED SURG/OB

## 2023-11-22 PROCEDURE — 84155 ASSAY OF PROTEIN SERUM: CPT | Performed by: EMERGENCY MEDICINE

## 2023-11-22 PROCEDURE — 74177 CT ABD & PELVIS W/CONTRAST: CPT

## 2023-11-22 PROCEDURE — 250N000011 HC RX IP 250 OP 636: Performed by: HOSPITALIST

## 2023-11-22 PROCEDURE — 96365 THER/PROPH/DIAG IV INF INIT: CPT

## 2023-11-22 PROCEDURE — 84145 PROCALCITONIN (PCT): CPT | Performed by: HOSPITALIST

## 2023-11-22 PROCEDURE — 99232 SBSQ HOSP IP/OBS MODERATE 35: CPT | Performed by: SURGERY

## 2023-11-22 PROCEDURE — 96374 THER/PROPH/DIAG INJ IV PUSH: CPT | Mod: 59

## 2023-11-22 PROCEDURE — 96361 HYDRATE IV INFUSION ADD-ON: CPT

## 2023-11-22 PROCEDURE — 83605 ASSAY OF LACTIC ACID: CPT | Performed by: EMERGENCY MEDICINE

## 2023-11-22 PROCEDURE — 83690 ASSAY OF LIPASE: CPT | Performed by: EMERGENCY MEDICINE

## 2023-11-22 PROCEDURE — 99223 1ST HOSP IP/OBS HIGH 75: CPT | Performed by: HOSPITALIST

## 2023-11-22 PROCEDURE — 258N000003 HC RX IP 258 OP 636: Performed by: EMERGENCY MEDICINE

## 2023-11-22 PROCEDURE — 99285 EMERGENCY DEPT VISIT HI MDM: CPT | Mod: 25

## 2023-11-22 PROCEDURE — 250N000011 HC RX IP 250 OP 636: Mod: JZ | Performed by: EMERGENCY MEDICINE

## 2023-11-22 PROCEDURE — 83036 HEMOGLOBIN GLYCOSYLATED A1C: CPT | Performed by: HOSPITALIST

## 2023-11-22 RX ORDER — DIPHENHYDRAMINE HYDROCHLORIDE 50 MG/ML
25 INJECTION INTRAMUSCULAR; INTRAVENOUS ONCE
Status: COMPLETED | OUTPATIENT
Start: 2023-11-22 | End: 2023-11-22

## 2023-11-22 RX ORDER — PROCHLORPERAZINE MALEATE 10 MG
10 TABLET ORAL EVERY 6 HOURS PRN
Status: DISCONTINUED | OUTPATIENT
Start: 2023-11-22 | End: 2023-11-24

## 2023-11-22 RX ORDER — HYDRALAZINE HYDROCHLORIDE 10 MG/1
10 TABLET, FILM COATED ORAL EVERY 4 HOURS PRN
Status: DISCONTINUED | OUTPATIENT
Start: 2023-11-22 | End: 2023-11-30 | Stop reason: HOSPADM

## 2023-11-22 RX ORDER — AMOXICILLIN 250 MG
1 CAPSULE ORAL 2 TIMES DAILY PRN
Status: DISCONTINUED | OUTPATIENT
Start: 2023-11-22 | End: 2023-11-30 | Stop reason: HOSPADM

## 2023-11-22 RX ORDER — ACETAMINOPHEN 325 MG/1
650 TABLET ORAL EVERY 4 HOURS PRN
Status: DISCONTINUED | OUTPATIENT
Start: 2023-11-22 | End: 2023-11-24

## 2023-11-22 RX ORDER — IOPAMIDOL 755 MG/ML
100 INJECTION, SOLUTION INTRAVASCULAR ONCE
Status: COMPLETED | OUTPATIENT
Start: 2023-11-22 | End: 2023-11-22

## 2023-11-22 RX ORDER — PIPERACILLIN SODIUM, TAZOBACTAM SODIUM 3; .375 G/15ML; G/15ML
3.38 INJECTION, POWDER, LYOPHILIZED, FOR SOLUTION INTRAVENOUS EVERY 8 HOURS
Status: DISCONTINUED | OUTPATIENT
Start: 2023-11-23 | End: 2023-11-27

## 2023-11-22 RX ORDER — METOCLOPRAMIDE HYDROCHLORIDE 5 MG/ML
10 INJECTION INTRAMUSCULAR; INTRAVENOUS ONCE
Status: COMPLETED | OUTPATIENT
Start: 2023-11-22 | End: 2023-11-22

## 2023-11-22 RX ORDER — LIDOCAINE 40 MG/G
CREAM TOPICAL
Status: DISCONTINUED | OUTPATIENT
Start: 2023-11-22 | End: 2023-11-24

## 2023-11-22 RX ORDER — CEFAZOLIN SODIUM 1 G/50ML
2500 SOLUTION INTRAVENOUS ONCE
Status: COMPLETED | OUTPATIENT
Start: 2023-11-23 | End: 2023-11-23

## 2023-11-22 RX ORDER — PROCHLORPERAZINE 25 MG
25 SUPPOSITORY, RECTAL RECTAL EVERY 12 HOURS PRN
Status: DISCONTINUED | OUTPATIENT
Start: 2023-11-22 | End: 2023-11-24

## 2023-11-22 RX ORDER — NALOXONE HYDROCHLORIDE 0.4 MG/ML
0.4 INJECTION, SOLUTION INTRAMUSCULAR; INTRAVENOUS; SUBCUTANEOUS
Status: DISCONTINUED | OUTPATIENT
Start: 2023-11-22 | End: 2023-11-30 | Stop reason: HOSPADM

## 2023-11-22 RX ORDER — CALCIUM CARBONATE 500 MG/1
1000 TABLET, CHEWABLE ORAL 4 TIMES DAILY PRN
Status: DISCONTINUED | OUTPATIENT
Start: 2023-11-22 | End: 2023-11-30 | Stop reason: HOSPADM

## 2023-11-22 RX ORDER — ONDANSETRON 2 MG/ML
4 INJECTION INTRAMUSCULAR; INTRAVENOUS EVERY 6 HOURS PRN
Status: DISCONTINUED | OUTPATIENT
Start: 2023-11-22 | End: 2023-11-24

## 2023-11-22 RX ORDER — HYDRALAZINE HYDROCHLORIDE 20 MG/ML
10 INJECTION INTRAMUSCULAR; INTRAVENOUS EVERY 4 HOURS PRN
Status: DISCONTINUED | OUTPATIENT
Start: 2023-11-22 | End: 2023-11-30 | Stop reason: HOSPADM

## 2023-11-22 RX ORDER — HYDROMORPHONE HYDROCHLORIDE 1 MG/ML
0.5 INJECTION, SOLUTION INTRAMUSCULAR; INTRAVENOUS; SUBCUTANEOUS
Status: DISCONTINUED | OUTPATIENT
Start: 2023-11-22 | End: 2023-11-25

## 2023-11-22 RX ORDER — ONDANSETRON 4 MG/1
4 TABLET, ORALLY DISINTEGRATING ORAL EVERY 6 HOURS PRN
Status: DISCONTINUED | OUTPATIENT
Start: 2023-11-22 | End: 2023-11-24

## 2023-11-22 RX ORDER — SODIUM CHLORIDE 9 MG/ML
INJECTION, SOLUTION INTRAVENOUS CONTINUOUS
Status: DISCONTINUED | OUTPATIENT
Start: 2023-11-23 | End: 2023-11-23

## 2023-11-22 RX ORDER — HYDROMORPHONE HYDROCHLORIDE 1 MG/ML
0.5 INJECTION, SOLUTION INTRAMUSCULAR; INTRAVENOUS; SUBCUTANEOUS ONCE
Status: COMPLETED | OUTPATIENT
Start: 2023-11-22 | End: 2023-11-22

## 2023-11-22 RX ORDER — IOPAMIDOL 755 MG/ML
50 INJECTION, SOLUTION INTRAVASCULAR ONCE
Status: COMPLETED | OUTPATIENT
Start: 2023-11-22 | End: 2023-11-22

## 2023-11-22 RX ORDER — AMOXICILLIN 250 MG
2 CAPSULE ORAL 2 TIMES DAILY PRN
Status: DISCONTINUED | OUTPATIENT
Start: 2023-11-22 | End: 2023-11-30 | Stop reason: HOSPADM

## 2023-11-22 RX ORDER — ONDANSETRON 2 MG/ML
8 INJECTION INTRAMUSCULAR; INTRAVENOUS ONCE
Status: COMPLETED | OUTPATIENT
Start: 2023-11-22 | End: 2023-11-22

## 2023-11-22 RX ORDER — NALOXONE HYDROCHLORIDE 0.4 MG/ML
0.2 INJECTION, SOLUTION INTRAMUSCULAR; INTRAVENOUS; SUBCUTANEOUS
Status: DISCONTINUED | OUTPATIENT
Start: 2023-11-22 | End: 2023-11-30 | Stop reason: HOSPADM

## 2023-11-22 RX ORDER — ACETAMINOPHEN 650 MG/1
650 SUPPOSITORY RECTAL EVERY 4 HOURS PRN
Status: DISCONTINUED | OUTPATIENT
Start: 2023-11-22 | End: 2023-11-24

## 2023-11-22 RX ORDER — PIPERACILLIN SODIUM, TAZOBACTAM SODIUM 3; .375 G/15ML; G/15ML
3.38 INJECTION, POWDER, LYOPHILIZED, FOR SOLUTION INTRAVENOUS ONCE
Status: COMPLETED | OUTPATIENT
Start: 2023-11-22 | End: 2023-11-22

## 2023-11-22 RX ADMIN — SODIUM CHLORIDE 1000 ML: 9 INJECTION, SOLUTION INTRAVENOUS at 18:53

## 2023-11-22 RX ADMIN — IOPAMIDOL 50 ML: 755 INJECTION, SOLUTION INTRAVENOUS at 23:19

## 2023-11-22 RX ADMIN — DIPHENHYDRAMINE HYDROCHLORIDE 25 MG: 50 INJECTION, SOLUTION INTRAMUSCULAR; INTRAVENOUS at 20:56

## 2023-11-22 RX ADMIN — IOPAMIDOL 100 ML: 755 INJECTION, SOLUTION INTRAVENOUS at 23:19

## 2023-11-22 RX ADMIN — PIPERACILLIN AND TAZOBACTAM 3.38 G: 3; .375 INJECTION, POWDER, FOR SOLUTION INTRAVENOUS at 19:53

## 2023-11-22 RX ADMIN — HYDROMORPHONE HYDROCHLORIDE 0.5 MG: 1 INJECTION, SOLUTION INTRAMUSCULAR; INTRAVENOUS; SUBCUTANEOUS at 20:55

## 2023-11-22 RX ADMIN — ONDANSETRON 8 MG: 2 INJECTION INTRAMUSCULAR; INTRAVENOUS at 18:53

## 2023-11-22 RX ADMIN — METOCLOPRAMIDE 10 MG: 5 INJECTION, SOLUTION INTRAMUSCULAR; INTRAVENOUS at 20:56

## 2023-11-22 ASSESSMENT — ACTIVITIES OF DAILY LIVING (ADL)
ADLS_ACUITY_SCORE: 35
ADLS_ACUITY_SCORE: 18

## 2023-11-22 NOTE — LETTER
[place] [address]  [address] [facility] [address]  Phone: [phone]  Fax: [phone]    November 30, 2023        Ady Godinez  [address]  [facility] [address]          To whom it may concern:    RE: Ady Godinez    It is to inform that Mr Ady Godinez was admitted to [facility] from 11/22/23 to 11/30/23. He has undergone a complex abdominal surgery, and has been advised to rest at home for at least couple of weeks from the day of discharge. He may need longer than the stated time duration off from his work depending on his clinical course. He has been advised by surgical team not to lift greater than 10lbs for 6 wks.     Please contact me for questions or concerns.      Sincerely,    AUSTIN Ware

## 2023-11-23 LAB
ANION GAP SERPL CALCULATED.3IONS-SCNC: 11 MMOL/L (ref 7–15)
BUN SERPL-MCNC: 15.9 MG/DL (ref 6–20)
CALCIUM SERPL-MCNC: 8.9 MG/DL (ref 8.6–10)
CHLORIDE SERPL-SCNC: 96 MMOL/L (ref 98–107)
CREAT SERPL-MCNC: 0.76 MG/DL (ref 0.67–1.17)
DEPRECATED HCO3 PLAS-SCNC: 26 MMOL/L (ref 22–29)
EGFRCR SERPLBLD CKD-EPI 2021: >90 ML/MIN/1.73M2
ERYTHROCYTE [DISTWIDTH] IN BLOOD BY AUTOMATED COUNT: 14 % (ref 10–15)
GLUCOSE BLDC GLUCOMTR-MCNC: 136 MG/DL (ref 70–99)
GLUCOSE BLDC GLUCOMTR-MCNC: 136 MG/DL (ref 70–99)
GLUCOSE BLDC GLUCOMTR-MCNC: 179 MG/DL (ref 70–99)
GLUCOSE SERPL-MCNC: 130 MG/DL (ref 70–99)
HBA1C MFR BLD: 6.5 %
HCT VFR BLD AUTO: 37.4 % (ref 40–53)
HGB BLD-MCNC: 11.8 G/DL (ref 13.3–17.7)
HGB BLD-MCNC: 12.4 G/DL (ref 13.3–17.7)
MCH RBC QN AUTO: 28 PG (ref 26.5–33)
MCHC RBC AUTO-ENTMCNC: 31.6 G/DL (ref 31.5–36.5)
MCV RBC AUTO: 89 FL (ref 78–100)
MRSA DNA SPEC QL NAA+PROBE: NEGATIVE
PLATELET # BLD AUTO: 398 10E3/UL (ref 150–450)
POTASSIUM SERPL-SCNC: 4 MMOL/L (ref 3.4–5.3)
RBC # BLD AUTO: 4.22 10E6/UL (ref 4.4–5.9)
SA TARGET DNA: POSITIVE
SODIUM SERPL-SCNC: 133 MMOL/L (ref 135–145)
WBC # BLD AUTO: 14.1 10E3/UL (ref 4–11)

## 2023-11-23 PROCEDURE — 258N000003 HC RX IP 258 OP 636: Performed by: HOSPITALIST

## 2023-11-23 PROCEDURE — 36415 COLL VENOUS BLD VENIPUNCTURE: CPT | Performed by: HOSPITALIST

## 2023-11-23 PROCEDURE — 250N000011 HC RX IP 250 OP 636: Mod: JZ | Performed by: HOSPITALIST

## 2023-11-23 PROCEDURE — 250N000013 HC RX MED GY IP 250 OP 250 PS 637: Performed by: HOSPITALIST

## 2023-11-23 PROCEDURE — 36415 COLL VENOUS BLD VENIPUNCTURE: CPT

## 2023-11-23 PROCEDURE — 87641 MR-STAPH DNA AMP PROBE: CPT | Performed by: HOSPITALIST

## 2023-11-23 PROCEDURE — 120N000001 HC R&B MED SURG/OB

## 2023-11-23 PROCEDURE — 85018 HEMOGLOBIN: CPT

## 2023-11-23 PROCEDURE — 99232 SBSQ HOSP IP/OBS MODERATE 35: CPT | Performed by: HOSPITALIST

## 2023-11-23 PROCEDURE — 82310 ASSAY OF CALCIUM: CPT | Performed by: HOSPITALIST

## 2023-11-23 PROCEDURE — 99231 SBSQ HOSP IP/OBS SF/LOW 25: CPT | Performed by: SURGERY

## 2023-11-23 PROCEDURE — 85027 COMPLETE CBC AUTOMATED: CPT | Performed by: HOSPITALIST

## 2023-11-23 RX ORDER — DOCUSATE SODIUM 100 MG/1
200 CAPSULE, LIQUID FILLED ORAL 2 TIMES DAILY
Status: DISCONTINUED | OUTPATIENT
Start: 2023-11-23 | End: 2023-11-30 | Stop reason: HOSPADM

## 2023-11-23 RX ORDER — DEXTROSE MONOHYDRATE 25 G/50ML
25-50 INJECTION, SOLUTION INTRAVENOUS
Status: DISCONTINUED | OUTPATIENT
Start: 2023-11-23 | End: 2023-11-30 | Stop reason: HOSPADM

## 2023-11-23 RX ORDER — OXYCODONE HYDROCHLORIDE 5 MG/1
5 TABLET ORAL EVERY 4 HOURS PRN
Status: DISCONTINUED | OUTPATIENT
Start: 2023-11-23 | End: 2023-11-24

## 2023-11-23 RX ORDER — CEFAZOLIN SODIUM 1 G/50ML
1250 SOLUTION INTRAVENOUS EVERY 12 HOURS
Status: DISCONTINUED | OUTPATIENT
Start: 2023-11-23 | End: 2023-11-24

## 2023-11-23 RX ORDER — NICOTINE POLACRILEX 4 MG
15-30 LOZENGE BUCCAL
Status: DISCONTINUED | OUTPATIENT
Start: 2023-11-23 | End: 2023-11-30 | Stop reason: HOSPADM

## 2023-11-23 RX ORDER — SODIUM CHLORIDE 9 MG/ML
INJECTION, SOLUTION INTRAVENOUS CONTINUOUS
Status: DISCONTINUED | OUTPATIENT
Start: 2023-11-23 | End: 2023-11-27

## 2023-11-23 RX ORDER — LISINOPRIL 5 MG/1
10 TABLET ORAL DAILY
Status: DISCONTINUED | OUTPATIENT
Start: 2023-11-23 | End: 2023-11-27

## 2023-11-23 RX ADMIN — VANCOMYCIN HYDROCHLORIDE 2500 MG: 1 INJECTION, POWDER, LYOPHILIZED, FOR SOLUTION INTRAVENOUS at 00:38

## 2023-11-23 RX ADMIN — HYDROMORPHONE HYDROCHLORIDE 0.5 MG: 1 INJECTION, SOLUTION INTRAMUSCULAR; INTRAVENOUS; SUBCUTANEOUS at 08:26

## 2023-11-23 RX ADMIN — ACETAMINOPHEN 650 MG: 325 TABLET ORAL at 19:53

## 2023-11-23 RX ADMIN — SODIUM CHLORIDE: 9 INJECTION, SOLUTION INTRAVENOUS at 19:00

## 2023-11-23 RX ADMIN — HYDROMORPHONE HYDROCHLORIDE 1 MG: 1 INJECTION, SOLUTION INTRAMUSCULAR; INTRAVENOUS; SUBCUTANEOUS at 00:32

## 2023-11-23 RX ADMIN — SODIUM CHLORIDE: 9 INJECTION, SOLUTION INTRAVENOUS at 05:56

## 2023-11-23 RX ADMIN — OXYCODONE HYDROCHLORIDE 5 MG: 5 TABLET ORAL at 13:03

## 2023-11-23 RX ADMIN — OXYCODONE HYDROCHLORIDE 5 MG: 5 TABLET ORAL at 19:53

## 2023-11-23 RX ADMIN — VANCOMYCIN HYDROCHLORIDE 1250 MG: 5 INJECTION, POWDER, LYOPHILIZED, FOR SOLUTION INTRAVENOUS at 23:52

## 2023-11-23 RX ADMIN — HYDROMORPHONE HYDROCHLORIDE 0.5 MG: 1 INJECTION, SOLUTION INTRAMUSCULAR; INTRAVENOUS; SUBCUTANEOUS at 22:18

## 2023-11-23 RX ADMIN — PIPERACILLIN AND TAZOBACTAM 3.38 G: 3; .375 INJECTION, POWDER, FOR SOLUTION INTRAVENOUS at 18:42

## 2023-11-23 RX ADMIN — SODIUM CHLORIDE: 9 INJECTION, SOLUTION INTRAVENOUS at 00:39

## 2023-11-23 RX ADMIN — LISINOPRIL 10 MG: 5 TABLET ORAL at 09:44

## 2023-11-23 RX ADMIN — VANCOMYCIN HYDROCHLORIDE 1250 MG: 5 INJECTION, POWDER, LYOPHILIZED, FOR SOLUTION INTRAVENOUS at 11:39

## 2023-11-23 RX ADMIN — PIPERACILLIN AND TAZOBACTAM 3.38 G: 3; .375 INJECTION, POWDER, FOR SOLUTION INTRAVENOUS at 03:13

## 2023-11-23 RX ADMIN — PIPERACILLIN AND TAZOBACTAM 3.38 G: 3; .375 INJECTION, POWDER, FOR SOLUTION INTRAVENOUS at 09:45

## 2023-11-23 ASSESSMENT — ACTIVITIES OF DAILY LIVING (ADL)
ADLS_ACUITY_SCORE: 20
ADLS_ACUITY_SCORE: 22
ADLS_ACUITY_SCORE: 20
ADLS_ACUITY_SCORE: 22

## 2023-11-23 NOTE — CONSULTS
Care Management Follow Up    Length of Stay (days): 1    Expected Discharge Date: 11/24/2023     Concerns to be Addressed:     discharge planning  Patient plan of care discussed at interdisciplinary rounds: Yes    Anticipated Discharge Disposition:  home no needs   Anticipated Discharge Services:  n/a  Anticipated Discharge DME:  n/a  Education Provided on the Discharge Plan:  yes  Patient/Family in Agreement with the Plan:  yes     Referrals Placed by CM/SW:  n/a  Private pay costs discussed: Not applicable    Additional Information:  SW met with pt to introduce role of CM, complete  initial assessment, and to discuss needs at time of d/c. Pt comes from home; lives with spouse, and noted to be independent at baseline. Pt feels that there will be no needs from CM at discharge, and will follow with PCP if needs arise post discharge.    CM to continue to follow through hospitalization and for recommendations.  11:52 AM    DEEDEE Zambrano  11/23/2023

## 2023-11-23 NOTE — PROGRESS NOTES
Pt alert and oriented, pleasant but visibly uncomfortable with umbilical pain. Intermittent nausea, no vomiting in ER. VSS. Received dilaudid iv for pain, reglan and benedryl for nausea. Patient has PO contrast for CT. Patient verbalizes understanding of how to drink contast and in what time. Dr. Kim was present to see patient, ok to eat and drink as tolerated after CT scan.

## 2023-11-23 NOTE — H&P
[facility]    History and Physical - Hospitalist Service       Date of Admission:  11/22/2023    Assessment & Plan      Ady Godinez is a 38 year old male admitted on 11/22/2023. He was sent to the ED from primary clinic for evaluation of leukocytosis secondary to recent surgery with associated decreased appetite, nausea and vomiting    #Anterior abdominal wall gas/fluid collection  -Suspicious for abscess  -Continue IV Zosyn, add vancomycin  -Procalcitonin 0.2, check MRSA screen  -Evaluated by general surgeon Dr. Kim, awaiting further recommendations    #Probable ileus, resolving  -Symptoms of nausea and vomiting prior to arrival  -Had a bowel movement after admitted to the floor  -Supportive management  -IV Zofran as needed    #Acute abdominal pain on postop pain  -Pain management    #Hyponatremia, hypochloremia, hypercalcemia  -Secondary to intravascular volume depletion from decreased oral intake and GI losses  -IV hydration overnight and repeat labs in the morning    #History of incarcerated anterior abdominal wall hernia causing small bowel obstruction  -Status post robot-assisted, laparoscopic ventral herniorrhaphy with mesh, lysis of adhesions on 11/14/2023    #Thrombocytosis  -Platelets 473, likely acute reaction to infection    #Elevated blood pressure without treatment for hypertension  -Elevated secondary to pain  -Monitor blood pressure    #Hyperglycemia  -History of prediabetes  -Monitor glucose    #Severe morbid obesity  -BMI 58.58        Diet: Advance Diet as Tolerated: Clear Liquid Diet    DVT Prophylaxis: Pneumatic Compression Devices  Duvall Catheter: Not present  Lines: None     Cardiac Monitoring: None  Code Status: Full Code          Disposition Plan      Expected Discharge Date: 11/24/2023      Destination: home with family            Cedrick Antonio MD  Hospitalist Service  [facility]  Securely message with [employer] (more info)  Text page  via [facility] Paging/Directory     ______________________________________________________________________    Chief Complaint   Fluid drainage from umbilicus, leukocytosis, decreased oral intake, nausea and vomiting    History is obtained from the patient and electronic health record    History of Present Illness   Ady Godinez is a 38 year old male who was sent to the emergency department from primary provider clinic for evaluation of above chief complaint.  Past medical history of morbid obesity, prediabetes, hypertension, migraines, hypercholesterolemia.  Patient is status post abdominal ventral wall herniorrhaphy, robotic assisted on 11/14/2023.  He is only taking stool softeners, acetaminophen and Zofran post discharge.  Over the last few days, he has had nausea, vomiting and decreased oral intake from loss of appetite.  He went to his primary doctor, laboratory work-up showed WBC 19.1.  Patient noted some fluid oozing from the umbilicus.  However, denies fevers or chills.  I spoke with patient's father and stepmother who were on speaker when I went to see Earnest.      Past Medical History    Past Medical History:   Diagnosis Date    Abdominal hernia     Cellulitis 05/2016    right index finger    HTN (hypertension) 7/10/2018    Migraines     Obesity     Pure hypercholesterolemia        Past Surgical History   Past Surgical History:   Procedure Laterality Date    APPENDECTOMY      childhood.    DAVINCI XI HERNIORRHAPHY VENTRAL N/A 11/14/2023    Procedure: HERNIORRHAPHY WITH MESH, VENTRAL, ROBOT-ASSISTED, LAPAROSCOPIC, USING DA ALBERT XI;REPAIR OF INCARCERATED HERNIA.;  Surgeon: Braydon Kim DO;  [place] [facility] [place]    IR CAROTID CEREBRAL ANGIOGRAM BILATERAL  4/15/2021    LUMBAR PUNCTURE FLUORO GUIDED DIAGNOSTIC  4/13/2021    LYSIS, ADHESIONS, ROBOT-ASSISTED, LAPAROSCOPIC, USING DA ALBERT XI N/A 11/14/2023    Procedure: LYSIS OF ADHESIONS;  Surgeon: Braydon Kim DO;  [facility] [facility] [place]       Prior to  Admission Medications   Prior to Admission Medications   Prescriptions Last Dose Informant Patient Reported? Taking?   acetaminophen (TYLENOL) 325 MG tablet 11/22/2023 at 1500  No Yes   Sig: Take 2 tablets (650 mg) by mouth every 6 hours as needed for mild pain   ondansetron (ZOFRAN ODT) 4 MG ODT tab has not started yet at has not yet started  No No   Sig: Take 1 tablet (4 mg) by mouth every 8 hours as needed for nausea   polyethylene glycol (MIRALAX) 17 g packet has not yet started at has not yet started  No No   Sig: Take 17 g by mouth daily for 14 days   senna-docusate (SENOKOT-S/PERICOLACE) 8.6-50 MG tablet 11/22/2023 at am  No Yes   Sig: Take 1 tablet by mouth 2 times daily for 14 days      Facility-Administered Medications: None           Physical Exam   Vital Signs: Temp: 98  F (36.7  C) Temp src: Oral BP: (!) 141/75 Pulse: 98   Resp: 20 SpO2: 98 % O2 Device: None (Room air)    Weight: 420 lbs 0 oz    Constitutional: awake  Respiratory: no increased work of breathing  Cardiovascular: regular rate and rhythm  GI: hypoactive bowel sounds and tenderness noted diffusely  Skin: no bruising or bleeding  Musculoskeletal: Chronic nonpitting edema  Neurologic: Mental Status Exam:  Orientation:   person, place, time  Motor Exam:  moves all extremities well and symmetrically    Medical Decision Making       60 MINUTES SPENT BY ME on the date of service doing chart review, history, exam, documentation & further activities per the note.  MANAGEMENT DISCUSSED with the following over the past 24 hours: Patient and family       Data     I have personally reviewed the following data over the past 24 hrs:    19.1 (H)  \   14.1   / 473 (H)     132 (L) 90 (L) 16.2 /  166 (H)   4.9 28 0.74 \     ALT: 39 AST: N/A AP: 126 TBILI: 0.6   ALB: 3.7 TOT PROTEIN: 8.7 (H) LIPASE: 30     Procal: 0.20 CRP: N/A Lactic Acid: 1.2         Imaging results reviewed over the past 24 hrs:   Recent Results (from the past 24 hour(s))   Abd/pelvis CT,   IV  contrast only TRAUMA / AAA    Narrative    EXAM: CT ABDOMEN PELVIS W CONTRAST  LOCATION: [facility]  DATE: 11/22/2023    INDICATION: Intractable nausea and vomiting since recent hernia repair. Leukocytosis.    COMPARISON: 11/13/2023  TECHNIQUE: CT scan of the abdomen and pelvis was performed following injection of IV contrast. Multiplanar reformats were obtained. Dose reduction techniques were used.  CONTRAST: 100 mL Isovue 370    FINDINGS:   LOWER CHEST: Normal.    HEPATOBILIARY: Normal.    PANCREAS: Normal.    SPLEEN: Normal.    ADRENAL GLANDS: Normal.    KIDNEYS/BLADDER: Normal.    BOWEL: Interval ventral abdominal hernia repair since prior study. Fluid distention of the proximal small bowel is seen with gradual tapering and tethering towards the anterior abdominal wall, where a gas/fluid collection is present measuring   approximately 17 x 6 x 17.5 cm.    LYMPH NODES: Normal.    VASCULATURE: Unremarkable.    PELVIC ORGANS: Normal.    MUSCULOSKELETAL: Normal.        Impression    IMPRESSION:     1.  Large gas/fluid collection in the anterior abdominal wall suspicious for abscess.    2.  Fluid distended proximal small bowel with gradual tapering and tethering towards the anterior abdomen, indeterminate. Ileus is slightly favored in this immediate postoperative period over partial obstruction.

## 2023-11-23 NOTE — PROGRESS NOTES
General Surgery Progress Note  Hospital Day # 1    Subjective:   CC: Abdominal wall seroma  Status: Still with some discomfort, hungry    Vitals:    11/22/23 2149 11/22/23 2328 11/23/23 0045 11/23/23 0802   BP: 113/52 (!) 141/75  137/69   BP Location:  Right arm  Right arm   Pulse: 98 98  87   Resp:  20  18   Temp:  98  F (36.7  C)  98  F (36.7  C)   TempSrc:  Oral  Oral   SpO2: 98% 98% (!) 88% 94%   Weight:       Height:           Physical Exam:  General: NAD, pleasant  ABD: Morbidly obese with palpable seroma at the umbilical site with evidence of skin thinning but no surrounding stigmata of cellulitis  EXT:no CCE    Recent Labs   Lab 11/23/23  0554   WBC 14.1*   HGB 11.8*   HCT 37.4*          Recent Labs   Lab 11/23/23  0554 11/22/23  1848   * 132*   CO2 26 28   BUN 15.9 16.2   ALBUMIN  --  3.7   ALKPHOS  --  126   ALT  --  39       Assessment: Status post robotic ventral hernia with postoperative seroma and possible infection    Plan: Primary surgeon input noted.  CT scan reviewed and demonstrates moderately sized fluid collection.  -Continue with IV antibiotics for now.  Patient may eat a regular diet but please keep him n.p.o. after midnight in the event that surgery is needed.    Asad Alvarez DO [facility] Surgery  [phone]

## 2023-11-23 NOTE — CONSULTS
General Surgery Consultation  Ady Godinez MRN# [id number]   Age/Sex: 38 year old male YOB: 1985     Reason for consult: Abdominal pain       Requesting physician: Dr. Florez                   Assessment and Plan:   Assessment:  1.  History of incarcerated incisional ventral hernias.  Status post robotic assisted laparoscopic repair on 11/14/2023.  2.  Abdominal pain  3.  Umbilical drainage  4.  Leukocytosis  5.  Severe morbid obesity  6.  Infected seroma?    38-year-old male who has severe morbid obesity presenting with abdominal pain.  Patient has some drainage coming from the umbilical region.  I suspect that the patient had a seroma in which the thinning of the skin over the umbilicus opened and is now draining.  It is possible that the patient has leukocytosis secondary to the infected seroma.  On palpation, I cannot clearly identify a ventral defect with the patient's body habitus.  In addition, the repair was completed with a hybrid mesh.  As a result, there is no need for explantation of the mesh given the infected seroma.     Plan:  -Recommend ordering a CT abdomen pelvis with p.o. and IV contrast  -I have explained to the patient that if he does have an infected seroma, my recommendation is to proceed with an excision of the chronic umbilical tissue and possible removing the entire umbilicus.  I would then place an abdominal drain.  -Patient can have diet as tolerated  -If we do pursue surgery, surgery would likely take place this Friday.  -Continue with antibiotic coverage  -Continue medical management per primary team.  -surgery team following with you.            Chief Complaint:     Chief Complaint   Patient presents with    Abnormal Labs    Nausea        History is obtained from the patient    HPI:   Ady Godinez is a 38 year old male who presents to the hospital complaints of abdominal pain.  Patient is status post robotic repair of incarcerated incisional hernias on  11/14/2023.  The patient states that he is unclear as to when the umbilicus started to drain.  Patient has abdominal pain around the ventral hernia site.  Patient states that he had some nausea and vomiting.  Patient is passing flatus and having bowel movements.  Patient voiding.  States that is not as active as he is like to be given the abdominal pain.  Otherwise, patient has no further complaints at this time.          Past Medical History:     Past Medical History:   Diagnosis Date    Abdominal hernia     Cellulitis 05/2016    right index finger    Migraines     Obesity     Pure hypercholesterolemia               Past Surgical History:     Past Surgical History:   Procedure Laterality Date    APPENDECTOMY      childhood.    DAVINCI XI HERNIORRHAPHY VENTRAL N/A 11/14/2023    Procedure: HERNIORRHAPHY WITH MESH, VENTRAL, ROBOT-ASSISTED, LAPAROSCOPIC, USING DA ALBERT XI;REPAIR OF INCARCERATED HERNIA.;  Surgeon: Braydon Kim DO;  Location: [facility] OR    IR CAROTID CEREBRAL ANGIOGRAM BILATERAL  4/15/2021    LUMBAR PUNCTURE FLUORO GUIDED DIAGNOSTIC  4/13/2021    LYSIS, ADHESIONS, ROBOT-ASSISTED, LAPAROSCOPIC, USING DA ALBERT XI N/A 11/14/2023    Procedure: LYSIS OF ADHESIONS;  Surgeon: Braydon Kim DO;  Location: [facility] OR             Social History:    reports that he has never smoked. He has never used smokeless tobacco. He reports that he does not currently use alcohol. He reports that he does not use drugs.           Family History:     Family History   Problem Relation Age of Onset    Snoring Mother     Snoring Father               Allergies:   No Known Allergies           Medications:     Prior to Admission medications    Medication Sig Start Date End Date Taking? Authorizing Provider   acetaminophen (TYLENOL) 325 MG tablet Take 2 tablets (650 mg) by mouth every 6 hours as needed for mild pain 4/16/21  Yes Diana Fish PA-C   senna-docusate (SENOKOT-S/PERICOLACE) 8.6-50 MG tablet Take 1  "tablet by mouth 2 times daily for 14 days 11/18/23 12/2/23 Yes Olu Tristan MD   ondansetron (ZOFRAN ODT) 4 MG ODT tab Take 1 tablet (4 mg) by mouth every 8 hours as needed for nausea 11/21/23   Braydon Kim,    polyethylene glycol (MIRALAX) 17 g packet Take 17 g by mouth daily for 14 days 11/19/23 12/3/23  Olu Tristan MD              Review of Systems:   The Review of Systems is negative other than noted in the HPI            Physical Exam:   Patient Vitals for the past 24 hrs:   BP Temp Temp src Pulse Resp SpO2 Height Weight   11/22/23 2149 113/52 -- -- 98 -- 98 % -- --   11/22/23 2130 (!) 170/95 97.8  F (36.6  C) Oral 98 18 95 % -- --   11/22/23 2100 133/74 -- -- 99 -- 97 % -- --   11/22/23 1942 133/83 -- -- 102 -- 96 % -- --   11/22/23 1833 (!) 149/89 98.2  F (36.8  C) Oral 106 18 96 % 1.803 m (5' 11\") (!) 190.5 kg (420 lb)          Intake/Output Summary (Last 24 hours) at 11/22/2023 2313  Last data filed at 11/22/2023 2039  Gross per 24 hour   Intake 1100 ml   Output --   Net 1100 ml      Constitutional:   awake, alert, cooperative, no apparent distress, and appears stated age       Eyes:   PERRL, conjunctiva/corneas clear, EOM's intact; no scleral edema or icterus noted        ENT:   Normocephalic, without obvious abnormality, atraumatic, Lips, mucosa, and tongue normal        Hematologic / Lymphatic:   No lymphadenopathy       Lungs:   Normal respiratory effort, no accessory muscle use       Cardiovascular:   Regular rate and rhythm       Abdomen:   Obese abdomen, tender to palpation over the umbilical region.  Surgical incisions well-approximated.  Patient has some mild erythema and bruising around the skin of the umbilicus.  No appreciable drainage at this time.  No crepitus appreciated.       Musculoskeletal:   No obvious swelling, bruising or deformity       Skin:   Skin color and texture normal for patient, no rashes or lesions              Data:        All imaging studies reviewed by kalpana Bryson " DO Judy  General Surgeon  [facility]  Surgery [facility] - [address]  [place] [address]?  Office: [phone]  Employed by - [facility]  Pager: [phone]

## 2023-11-23 NOTE — MEDICATION SCRIBE - ADMISSION MEDICATION HISTORY
Medication Scribe Admission Medication History    Admission medication history is complete. The information provided in this note is only as accurate as the sources available at the time of the update.    Information Source(s): Patient via in-person    Pertinent Information: PTA list was completed on 11/13/23. Patient reports has not yet started taking the Zofran or Miralax.     Changes made to PTA medication list:  Added: None  Deleted: None  Changed: None    Medication Affordability:  Not including over the counter (OTC) medications, was there a time in the past 3 months when you did not take your medications as prescribed because of cost?: No    Allergies reviewed with patient and updates made in EHR: yes    Medication History Completed By: Ralph Wright 11/22/2023 8:09 PM    PTA Med List   Medication Sig Last Dose    acetaminophen (TYLENOL) 325 MG tablet Take 2 tablets (650 mg) by mouth every 6 hours as needed for mild pain 11/22/2023 at 1500    senna-docusate (SENOKOT-S/PERICOLACE) 8.6-50 MG tablet Take 1 tablet by mouth 2 times daily for 14 days 11/22/2023 at am

## 2023-11-23 NOTE — ED PROVIDER NOTES
EMERGENCY DEPARTMENT ENCOUNTER      NAME: Ady Godinez  AGE: 38 year old male  YOB: 1985  MRN: [id number]  EVALUATION DATE & TIME: No admission date for patient encounter.    PCP: No Ref-Primary, Physician    ED PROVIDER: Sheila Damian MD    Chief Complaint   Patient presents with    Abnormal Labs    Nausea         FINAL IMPRESSION:  1. Umbilical hernia with gangrene    2. Nausea and vomiting, unspecified vomiting type    3. Abdominal wall cellulitis          ED COURSE & MEDICAL DECISION MAKING:    Pertinent Labs & Imaging studies reviewed. (See chart for details)  38 year old male with history of morbid obesity and recent admission for incarcerated ventral hernia status post repair and mesh who presents to the Emergency Department for evaluation of intractable nausea, vomiting over the course the last several days since his hospital discharge.  On examination patient has a periumbilical abdominal hernia with overlying erythema, and when the hernia was reduced easily at bedside starts to bleed.  The area was probed with a Q-tip and I am able to bury the Q-tip into the abdomen which makes me concerned that this is not only within the subcutaneous tissue but into the peritoneum proper.  The skin overlying the umbilical hernia is erythematous and I do have concerns about a concurrent cellulitis to the abdominal wall versus this extending into the peritoneum.  Certainly with his postoperative nausea and vomiting concern for obstruction, and with the known leukocytosis from clinic concern for intra-abdominal abscess which no doubt would be a limited exam for with his abdominal habitus.    Patient initially seen evaluate by myself in triage area due to boarding crisis.  IV established, blood obtained.  Given 8 mg Zofran, 1 L normal saline bolus.  CBC, CMP, lipase notable for WBC of 19.1 with left shift.  Lactate thankfully is normal.  Case discussed with general surgery, recommend proceeding with CT  with both IV and oral contrast and covering with Zosyn and admitting for further management.  The results of the CT scan of the abdomen pelvis are pending at the time of dictation.  Dr. Kim came to bedside to evaluate patient and knows patient will warrant some operative management, pending his CT admitted to medicine.      ED Course as of 11/22/23 2157 Wed Nov 22, 2023 1910 WBC(!): 19.1   1911 Case discussed w Dr. Kim, gen surg   2049 Admitted to Dr. Mckeon       Medical Decision Making    History:  Supplemental history from: Documented in chart, if applicable  External Record(s) reviewed: Inpatient Record: Inpatient record from recent hospital stay as well as phone conversation today    Work Up:  Chart documentation includes differential considered and any EKGs or imaging independently interpreted by provider, see MDM  In additional to work up documented, I considered the following work up: see MDM    External consultation:  Discussion of management with another provider: General Surgery - Dr. Kim, hospitalist    Complicating factors:  Care impacted by chronic illness: Hypertension  Care affected by social determinants of health: Access to Medical Care referred to ED    Disposition considerations: Admit.        At the conclusion of the encounter I discussed the results of all of the tests and the disposition. The questions were answered. The patient or family acknowledged understanding and was agreeable with the care plan.      MEDICATIONS GIVEN IN THE EMERGENCY:  Medications   sodium chloride 0.9% BOLUS 1,000 mL (0 mLs Intravenous Stopped 11/22/23 2039)   ondansetron (ZOFRAN) injection 8 mg (8 mg Intravenous $Given 11/22/23 1853)   piperacillin-tazobactam (ZOSYN) 3.375 g vial to attach to  mL bag (0 g Intravenous Stopped 11/22/23 2026)   metoclopramide (REGLAN) injection 10 mg (10 mg Intravenous $Given 11/22/23 2056)   diphenhydrAMINE (BENADRYL) injection 25 mg (25 mg Intravenous $Given 11/22/23  2056)   HYDROmorphone (PF) (DILAUDID) injection 0.5 mg (0.5 mg Intravenous $Given 11/22/23 2055)       NEW PRESCRIPTIONS STARTED AT TODAY'S ER VISIT  Current Discharge Medication List             =================================================================    HPI    Patient information was obtained from: Patient     Use of Intrepreter: N/A        Ady Godinez is a 38 year old male with pertinent medical history of s/p  davinci xi herniorrhaphy ventral (11/14/2023), HTN, morbid obesity, prediabetes, sepsis who presents with elevated WBC with PCP earlier today and post-op 1 week ago.    Patient states since coming home post surgery on 11/18 he has vomited 4 times and endorses pain to his LLQ. He states he has bowel movements since his discharge. Is able to tolerate water, but solid foods cause patient to vomit, but notes that while in hospital he was able to tolerate toast. He denies any pain or bleeding to the hernia at his belly button.       PAST MEDICAL HISTORY:  Past Medical History:   Diagnosis Date    Abdominal hernia     Cellulitis 05/2016    right index finger    Migraines     Obesity     Pure hypercholesterolemia        PAST SURGICAL HISTORY:  Past Surgical History:   Procedure Laterality Date    APPENDECTOMY      childhood.    DAVINCI XI HERNIORRHAPHY VENTRAL N/A 11/14/2023    Procedure: HERNIORRHAPHY WITH MESH, VENTRAL, ROBOT-ASSISTED, LAPAROSCOPIC, USING DA ALBERT XI;REPAIR OF INCARCERATED HERNIA.;  Surgeon: Braydon Kim DO;  Location: [facility] OR    IR CAROTID CEREBRAL ANGIOGRAM BILATERAL  4/15/2021    LUMBAR PUNCTURE FLUORO GUIDED DIAGNOSTIC  4/13/2021    LYSIS, ADHESIONS, ROBOT-ASSISTED, LAPAROSCOPIC, USING DA ALBERT XI N/A 11/14/2023    Procedure: LYSIS OF ADHESIONS;  Surgeon: Braydon Kim DO;  Location: [facility] OR       CURRENT MEDICATIONS:    Prior to Admission Medications   Prescriptions Last Dose Informant Patient Reported? Taking?   acetaminophen (TYLENOL) 325 MG tablet  "11/22/2023 at 1500  No Yes   Sig: Take 2 tablets (650 mg) by mouth every 6 hours as needed for mild pain   ondansetron (ZOFRAN ODT) 4 MG ODT tab has not started yet at has not yet started  No No   Sig: Take 1 tablet (4 mg) by mouth every 8 hours as needed for nausea   polyethylene glycol (MIRALAX) 17 g packet has not yet started at has not yet started  No No   Sig: Take 17 g by mouth daily for 14 days   senna-docusate (SENOKOT-S/PERICOLACE) 8.6-50 MG tablet 11/22/2023 at am  No Yes   Sig: Take 1 tablet by mouth 2 times daily for 14 days      Facility-Administered Medications: None       ALLERGIES:  No Known Allergies    FAMILY HISTORY:  Family History   Problem Relation Age of Onset    Snoring Mother     Snoring Father        SOCIAL HISTORY:  Social History     Tobacco Use    Smoking status: Never    Smokeless tobacco: Never   Substance Use Topics    Alcohol use: Not Currently     Comment: Very rare    Drug use: Never        VITALS:  Patient Vitals for the past 24 hrs:   BP Temp Temp src Pulse Resp SpO2 Height Weight   11/22/23 2149 113/52 -- -- 98 -- 98 % -- --   11/22/23 2130 (!) 170/95 97.8  F (36.6  C) Oral 98 18 95 % -- --   11/22/23 2100 133/74 -- -- 99 -- 97 % -- --   11/22/23 1942 133/83 -- -- 102 -- 96 % -- --   11/22/23 1833 (!) 149/89 98.2  F (36.8  C) Oral 106 18 96 % 1.803 m (5' 11\") (!) 190.5 kg (420 lb)       PHYSICAL EXAM    General Appearance: Well-appearing, well-nourished, no acute distress  Head:  Normocephalic  Cardio:  Regular rate and rhythm  Pulm:  No respiratory distress  Back:  No CVA tenderness, normal ROM  Abdomen:  Soft, mild diffuse abdominal tenderness.Morbidly obese, left side laparoscopic incision sites with minimal bruising or pain.  Umbilical hernia is erythematous with small area of necrosis of the skin overlying the umbilical hernia.  When the umbilical hernia was reduced there is an area that has a steady stream of old, venous appearing blood.  This area was probed with a Q-tip " and goes into the abdominal wall 6 to 7 inches, the entire depth of the Q-tip.  Skin:  Skin warm, dry, no rashes  Neuro:  Alert and oriented ×3     RADIOLOGY/LABS:  Reviewed all pertinent imaging. Please see official radiology report. All pertinent labs reviewed and interpreted.    Results for orders placed or performed during the hospital encounter of 11/22/23   Comprehensive metabolic panel   Result Value Ref Range    Sodium 132 (L) 135 - 145 mmol/L    Potassium 4.9 3.4 - 5.3 mmol/L    Carbon Dioxide (CO2) 28 22 - 29 mmol/L    Anion Gap 14 7 - 15 mmol/L    Urea Nitrogen 16.2 6.0 - 20.0 mg/dL    Creatinine 0.74 0.67 - 1.17 mg/dL    GFR Estimate >90 >60 mL/min/1.73m2    Calcium 10.2 (H) 8.6 - 10.0 mg/dL    Chloride 90 (L) 98 - 107 mmol/L    Glucose 166 (H) 70 - 99 mg/dL    Alkaline Phosphatase 126 40 - 150 U/L    AST      ALT 39 0 - 70 U/L    Protein Total 8.7 (H) 6.4 - 8.3 g/dL    Albumin 3.7 3.5 - 5.2 g/dL    Bilirubin Total 0.6 <=1.2 mg/dL   Result Value Ref Range    Lipase 30 13 - 60 U/L   Lactic acid whole blood   Result Value Ref Range    Lactic Acid 1.2 0.7 - 2.0 mmol/L   CBC with platelets and differential   Result Value Ref Range    WBC Count 19.1 (H) 4.0 - 11.0 10e3/uL    RBC Count 4.98 4.40 - 5.90 10e6/uL    Hemoglobin 14.1 13.3 - 17.7 g/dL    Hematocrit 42.9 40.0 - 53.0 %    MCV 86 78 - 100 fL    MCH 28.3 26.5 - 33.0 pg    MCHC 32.9 31.5 - 36.5 g/dL    RDW 14.2 10.0 - 15.0 %    Platelet Count 473 (H) 150 - 450 10e3/uL    % Neutrophils 82 %    % Lymphocytes 11 %    % Monocytes 5 %    % Eosinophils 1 %    % Basophils 0 %    % Immature Granulocytes 1 %    NRBCs per 100 WBC 0 <1 /100    Absolute Neutrophils 15.5 (H) 1.6 - 8.3 10e3/uL    Absolute Lymphocytes 2.1 0.8 - 5.3 10e3/uL    Absolute Monocytes 1.0 0.0 - 1.3 10e3/uL    Absolute Eosinophils 0.3 0.0 - 0.7 10e3/uL    Absolute Basophils 0.1 0.0 - 0.2 10e3/uL    Absolute Immature Granulocytes 0.2 <=0.4 10e3/uL    Absolute NRBCs 0.0 10e3/uL     The creation  of this record is based on the scribe s observations of the work being performed by Sheila Damian MD and the provider s statements to them. It was created on her behalf by Tyler Chung, a trained medical scribe. This document has been checked and approved by the attending provider.    Sheila Damian MD  Emergency Medicine  [facility] EMERGENCY DEPARTMENT  [address] [place] [id number] [phone]  Dept: [phone]     Sheila Damian MD  11/22/23 2835

## 2023-11-23 NOTE — PROGRESS NOTES
Pt arrived to unit at approx. 2100. Pt reported pain at abdominal umbilicus site 6/10 described as sore, recently received IV dilaudid in ED. Site has scant amount of dired drainage, serosanguinous. Pt reported ongoing nausea, mild improvement from meds given in ED. Pt able to transfer SBA. Pt had one large loose BM, brown and voided x1. Pt brought down for abdominal CT which was tolerated. Pt vitals stable, on RA and afebrile.   Pt now resting. Will continue to monitor. Pt father called and requesting to be updated when possible.

## 2023-11-23 NOTE — ED TRIAGE NOTES
Patient arrives from PCP today.   States he had hernia surgery x1 week ago.   Was being seen by primary for follow up and told he has high WBC and told to come to ED.     States he has had no appetite and intermittent n/v for past few days.

## 2023-11-23 NOTE — PLAN OF CARE
Problem: Adult Inpatient Plan of Care  Goal: Plan of Care Review  Description: The Plan of Care Review/Shift note should be completed every shift.  The Outcome Evaluation is a brief statement about your assessment that the patient is improving, declining, or no change.  This information will be displayed automatically on your shift  note.  Outcome: Progressing  Flowsheets (Taken 11/23/2023 1046)  Plan of Care Reviewed With: patient   Goal Outcome Evaluation:plan is for surgery to investigate abscess on Friday  Problem: Pain Acute  Goal: Optimal Pain Control and Function  Outcome: Progressing  Intervention: Develop Pain Management Plan  Recent Flowsheet Documentation  Taken 11/23/2023 0826 by Sarah Chambers, RN  Pain Management Interventions:   medication (see MAR)   ambulation/increased activity-patient understands pain scale (0-10) and medicate as per mar  Patient alert and oriented - medicate for abdominal pain - diet started and encouraged to eat what is easy for him to avoid and nausea or vomiting.  Assist of 1 to bathroom.  Can make needs known         Plan of Care Reviewed With: patient

## 2023-11-23 NOTE — PROGRESS NOTES
"[facility]    Medicine Progress Note - Hospitalist Service    Date of Admission:  11/22/2023    Assessment & Plan   38-year-old male with severe obesity admitted for abscess of the abdominal wall.    #Abscess, anterior abdominal wall  General surgery consulted, no surgery planned until Friday (if needed) per their note last night  Diet as tolerated  Empiric antibiotics with Zosyn and vancomycin  MRSA nasal swab pending   Continue gentle IV fluids  Pain control    #Hypercalcemia, mild  Ca initially 10.2, now wnl after IV fluids  Follow chemistry    # Diabetes type 2  A1c 6.5  No home medications  SSI    #Hypertension  No home medication  Start lisinopril and titrate as needed    #Severe obesity  BMI 58.58      DVT ppx: PCDs        Diet: Regular Diet Adult    Duvall Catheter: Not present  Lines: None     Cardiac Monitoring: None  Code Status: Full Code      Clinically Significant Risk Factors Present on Admission           # Hypercalcemia: Highest Ca = 10.2 mg/dL in last 2 days, will monitor as appropriate        # Hypertension: Noted on problem list     # DMII: A1C = 6.5 % (Ref range: <5.7 %) within past 6 months    # Severe Obesity: Estimated body mass index is 58.58 kg/m  as calculated from the following:    Height as of this encounter: 1.803 m (5' 11\").    Weight as of this encounter: 190.5 kg (420 lb).              Disposition Plan      Expected Discharge Date: 11/24/2023      Destination: home with family              Reza Gonsalez, DO  Hospitalist Service  [facility]  Securely message with [employer] (more info)  Text page via [facility] Paging/Directory   ______________________________________________________________________    Interval History   No acute events overnight    Physical Exam   Vital Signs: Temp: 98  F (36.7  C) Temp src: Oral BP: 137/69 Pulse: 87   Resp: 18 SpO2: 94 % O2 Device: None (Room air) Oxygen Delivery: 2 LPM  Weight: 420 lbs 0 oz    General " Appearance:  No acute distress  Respiratory: Clear to auscultation bilaterally  Cardiovascular: Regular rate and rhythm  GI: Normal bowel sounds, abdomen is soft with no rebound. Dressing to the mid/lower abdomen clean, dry and intact but there is a foul odor in the room      Medical Decision Making             Data

## 2023-11-23 NOTE — PHARMACY-VANCOMYCIN DOSING SERVICE
Pharmacy Vancomycin Initial Note  Date of Service 2023  Patient's  1985  38 year old, male    Indication:  Post-op infection, suspicious for abscess    Current estimated CrCl = Estimated Creatinine Clearance: 232.4 mL/min (based on SCr of 0.74 mg/dL).    Creatinine for last 3 days  2023:  6:48 PM Creatinine 0.74 mg/dL    Recent Vancomycin Level(s) for last 3 days  No results found for requested labs within last 3 days.      Vancomycin IV Administrations (past 72 hours)                     vancomycin (VANCOCIN) 2,500 mg in sodium chloride 0.9 % 500 mL intermittent infusion (mg) 2,500 mg New Bag 23 0038                    Nephrotoxins and other renal medications (From now, onward)      Start     Dose/Rate Route Frequency Ordered Stop    23 0200  piperacillin-tazobactam (ZOSYN) 3.375 g vial to attach to  mL bag         3.375 g  over 240 Minutes Intravenous EVERY 8 HOURS 234      23 0000  vancomycin (VANCOCIN) 2,500 mg in sodium chloride 0.9 % 500 mL intermittent infusion         2,500 mg  over 2 Hours Intravenous ONCE 23 2346              Contrast Orders - past 72 hours (72h ago, onward)      Start     Dose/Rate Route Frequency Stop    23 2330  iopamidol (ISOVUE-370) solution 100 mL         100 mL Intravenous ONCE 23 2319    23 2330  iopamidol (ISOVUE-370) solution 50 mL         50 mL Oral ONCE 23 231            [employer] Prediction of Planned Initial Vancomycin Regimen  Loading dose: 2500 mg on 23 at 00:38  Regimen: 1250 mg IV every 12 hours.  Start time: 12:00 on 2023  Exposure target: AUC24 (range)400-600 mg/L.hr   AUC24,ss: 448 mg/L.hr  Probability of AUC24 > 400: 58 %  Ctrough,ss: 11.3 mg/L  Probability of Ctrough,ss > 20: 25 %  Probability of nephrotoxicity (Lodise SANTO ): 7 %          Plan:  Start vancomycin  2500 mg IV x1, followed by 1250 mg IV q12h.   Vancomycin monitoring method: AUC  Vancomycin  therapeutic monitoring goal: 400-600 mg*h/L  Pharmacy will check vancomycin levels as appropriate in 1-3 Days.    Serum creatinine levels will be ordered a minimum of twice weekly.      Jayda Ayon RPH

## 2023-11-24 ENCOUNTER — ANESTHESIA EVENT (OUTPATIENT)
Dept: SURGERY | Facility: HOSPITAL | Age: 38
DRG: 354 | End: 2023-11-24
Payer: COMMERCIAL

## 2023-11-24 ENCOUNTER — ANESTHESIA (OUTPATIENT)
Dept: SURGERY | Facility: HOSPITAL | Age: 38
DRG: 354 | End: 2023-11-24
Payer: COMMERCIAL

## 2023-11-24 LAB
ANION GAP SERPL CALCULATED.3IONS-SCNC: 11 MMOL/L (ref 7–15)
BASOPHILS # BLD AUTO: 0.1 10E3/UL (ref 0–0.2)
BASOPHILS NFR BLD AUTO: 0 %
BUN SERPL-MCNC: 10.8 MG/DL (ref 6–20)
CALCIUM SERPL-MCNC: 8.4 MG/DL (ref 8.6–10)
CHLORIDE SERPL-SCNC: 100 MMOL/L (ref 98–107)
CREAT SERPL-MCNC: 0.68 MG/DL (ref 0.67–1.17)
DEPRECATED HCO3 PLAS-SCNC: 23 MMOL/L (ref 22–29)
EGFRCR SERPLBLD CKD-EPI 2021: >90 ML/MIN/1.73M2
EOSINOPHIL # BLD AUTO: 0 10E3/UL (ref 0–0.7)
EOSINOPHIL NFR BLD AUTO: 0 %
ERYTHROCYTE [DISTWIDTH] IN BLOOD BY AUTOMATED COUNT: 14.3 % (ref 10–15)
GLUCOSE BLDC GLUCOMTR-MCNC: 107 MG/DL (ref 70–99)
GLUCOSE BLDC GLUCOMTR-MCNC: 135 MG/DL (ref 70–99)
GLUCOSE BLDC GLUCOMTR-MCNC: 168 MG/DL (ref 70–99)
GLUCOSE BLDC GLUCOMTR-MCNC: 97 MG/DL (ref 70–99)
GLUCOSE SERPL-MCNC: 106 MG/DL (ref 70–99)
GRAM STAIN RESULT: NORMAL
GRAM STAIN RESULT: NORMAL
HCT VFR BLD AUTO: 38.6 % (ref 40–53)
HGB BLD-MCNC: 12 G/DL (ref 13.3–17.7)
HOLD SPECIMEN: NORMAL
HOLD SPECIMEN: NORMAL
IMM GRANULOCYTES # BLD: 0.2 10E3/UL
IMM GRANULOCYTES NFR BLD: 1 %
LYMPHOCYTES # BLD AUTO: 1.2 10E3/UL (ref 0.8–5.3)
LYMPHOCYTES NFR BLD AUTO: 7 %
MCH RBC QN AUTO: 28.2 PG (ref 26.5–33)
MCHC RBC AUTO-ENTMCNC: 31.1 G/DL (ref 31.5–36.5)
MCV RBC AUTO: 91 FL (ref 78–100)
MONOCYTES # BLD AUTO: 0.6 10E3/UL (ref 0–1.3)
MONOCYTES NFR BLD AUTO: 3 %
NEUTROPHILS # BLD AUTO: 16.8 10E3/UL (ref 1.6–8.3)
NEUTROPHILS NFR BLD AUTO: 89 %
NRBC # BLD AUTO: 0 10E3/UL
NRBC BLD AUTO-RTO: 0 /100
PLATELET # BLD AUTO: 443 10E3/UL (ref 150–450)
POTASSIUM SERPL-SCNC: 4.7 MMOL/L (ref 3.4–5.3)
RBC # BLD AUTO: 4.25 10E6/UL (ref 4.4–5.9)
SODIUM SERPL-SCNC: 134 MMOL/L (ref 135–145)
WBC # BLD AUTO: 18.8 10E3/UL (ref 4–11)

## 2023-11-24 PROCEDURE — 49615 RPR AA HRN RCR 3-10 RDC: CPT | Performed by: SURGERY

## 2023-11-24 PROCEDURE — 258N000003 HC RX IP 258 OP 636: Performed by: NURSE ANESTHETIST, CERTIFIED REGISTERED

## 2023-11-24 PROCEDURE — 250N000011 HC RX IP 250 OP 636: Performed by: SURGERY

## 2023-11-24 PROCEDURE — 258N000003 HC RX IP 258 OP 636: Performed by: HOSPITALIST

## 2023-11-24 PROCEDURE — 370N000017 HC ANESTHESIA TECHNICAL FEE, PER MIN: Performed by: SURGERY

## 2023-11-24 PROCEDURE — 250N000011 HC RX IP 250 OP 636: Mod: JZ | Performed by: HOSPITALIST

## 2023-11-24 PROCEDURE — 36415 COLL VENOUS BLD VENIPUNCTURE: CPT | Performed by: HOSPITALIST

## 2023-11-24 PROCEDURE — 250N000013 HC RX MED GY IP 250 OP 250 PS 637: Performed by: SURGERY

## 2023-11-24 PROCEDURE — 250N000009 HC RX 250: Performed by: SURGERY

## 2023-11-24 PROCEDURE — 99231 SBSQ HOSP IP/OBS SF/LOW 25: CPT | Mod: 25 | Performed by: SURGERY

## 2023-11-24 PROCEDURE — 250N000013 HC RX MED GY IP 250 OP 250 PS 637: Performed by: HOSPITALIST

## 2023-11-24 PROCEDURE — 250N000011 HC RX IP 250 OP 636: Performed by: ANESTHESIOLOGY

## 2023-11-24 PROCEDURE — 250N000011 HC RX IP 250 OP 636: Performed by: NURSE ANESTHETIST, CERTIFIED REGISTERED

## 2023-11-24 PROCEDURE — 250N000009 HC RX 250: Performed by: ANESTHESIOLOGY

## 2023-11-24 PROCEDURE — 250N000009 HC RX 250: Performed by: NURSE ANESTHETIST, CERTIFIED REGISTERED

## 2023-11-24 PROCEDURE — 710N000009 HC RECOVERY PHASE 1, LEVEL 1, PER MIN: Performed by: SURGERY

## 2023-11-24 PROCEDURE — 87075 CULTR BACTERIA EXCEPT BLOOD: CPT | Performed by: SURGERY

## 2023-11-24 PROCEDURE — 120N000001 HC R&B MED SURG/OB

## 2023-11-24 PROCEDURE — 250N000025 HC SEVOFLURANE, PER MIN: Performed by: SURGERY

## 2023-11-24 PROCEDURE — 0WUF0JZ SUPPLEMENT ABDOMINAL WALL WITH SYNTHETIC SUBSTITUTE, OPEN APPROACH: ICD-10-PCS | Performed by: SURGERY

## 2023-11-24 PROCEDURE — 49615 RPR AA HRN RCR 3-10 RDC: CPT | Mod: AS | Performed by: PHYSICIAN ASSISTANT

## 2023-11-24 PROCEDURE — 82374 ASSAY BLOOD CARBON DIOXIDE: CPT | Performed by: HOSPITALIST

## 2023-11-24 PROCEDURE — 360N000076 HC SURGERY LEVEL 3, PER MIN: Performed by: SURGERY

## 2023-11-24 PROCEDURE — 250N000011 HC RX IP 250 OP 636: Mod: JZ | Performed by: SURGERY

## 2023-11-24 PROCEDURE — 999N000141 HC STATISTIC PRE-PROCEDURE NURSING ASSESSMENT: Performed by: SURGERY

## 2023-11-24 PROCEDURE — 85025 COMPLETE CBC W/AUTO DIFF WBC: CPT | Performed by: HOSPITALIST

## 2023-11-24 PROCEDURE — 87205 SMEAR GRAM STAIN: CPT | Performed by: SURGERY

## 2023-11-24 PROCEDURE — 250N000013 HC RX MED GY IP 250 OP 250 PS 637: Performed by: ANESTHESIOLOGY

## 2023-11-24 PROCEDURE — 99232 SBSQ HOSP IP/OBS MODERATE 35: CPT | Performed by: HOSPITALIST

## 2023-11-24 PROCEDURE — 87070 CULTURE OTHR SPECIMN AEROBIC: CPT | Performed by: SURGERY

## 2023-11-24 PROCEDURE — C1781 MESH (IMPLANTABLE): HCPCS | Performed by: SURGERY

## 2023-11-24 PROCEDURE — 272N000001 HC OR GENERAL SUPPLY STERILE: Performed by: SURGERY

## 2023-11-24 DEVICE — THE OVITEX® REINFORCED TISSUE MATRIX DEVICE IS A STERILE REINFORCED MATRIX DEVICE COMPRISED OF NON-DERMAL SOURCED LAYERS OF EXTRACELLULAR MATRIX (ECM) (OVINE (SHEEP) DERIVED ECM), PAIRED WITH A MINIMAL SUTURE FOOTPRINT. OVITEX REINFORCED TISSUE MATRIX DEVICE PROVIDES AN ALTERNATIVE DEVICE SOLUTION TO PERMANENT SYNTHETIC, RESORBABLE/BIOABSORBABLE SYNTHETIC, AND BIOLOGIC MESHES. THE DEVICE REMODELS AND HELPS REGENERATE TISSUE WHILE REINFORCING THE REPAIR AND PROVIDING STRUCTURAL SUPPORT. REINFORCED TISSUE MATRICES ENHANCE PERFORMANCE WHILE MAINTAINING COMPATIBILITY WITH THE BODY. THE OVITEX REINFORCED TISSUE MATRIX DEVICE IS USED FOR HERNIA REPAIR AND ABDOMINAL WALL RECONSTRUCTION.
Type: IMPLANTABLE DEVICE | Site: ABDOMEN | Status: FUNCTIONAL
Brand: OVITEX REINFORCED BIOSCAFFOLD WITH PERMANENT POLYMER (OVITEX)

## 2023-11-24 RX ORDER — ACETAMINOPHEN 325 MG/1
975 TABLET ORAL EVERY 8 HOURS
Status: COMPLETED | OUTPATIENT
Start: 2023-11-24 | End: 2023-11-27

## 2023-11-24 RX ORDER — ONDANSETRON 4 MG/1
4 TABLET, ORALLY DISINTEGRATING ORAL EVERY 6 HOURS PRN
Status: DISCONTINUED | OUTPATIENT
Start: 2023-11-24 | End: 2023-11-30 | Stop reason: HOSPADM

## 2023-11-24 RX ORDER — CEFAZOLIN SODIUM/WATER 3 G/30 ML
3 SYRINGE (ML) INTRAVENOUS
Status: COMPLETED | OUTPATIENT
Start: 2023-11-24 | End: 2023-11-24

## 2023-11-24 RX ORDER — CEFAZOLIN SODIUM/WATER 3 G/30 ML
3 SYRINGE (ML) INTRAVENOUS SEE ADMIN INSTRUCTIONS
Status: DISCONTINUED | OUTPATIENT
Start: 2023-11-24 | End: 2023-11-24 | Stop reason: HOSPADM

## 2023-11-24 RX ORDER — OXYCODONE HYDROCHLORIDE 5 MG/1
5 TABLET ORAL EVERY 4 HOURS PRN
Status: DISCONTINUED | OUTPATIENT
Start: 2023-11-24 | End: 2023-11-30 | Stop reason: HOSPADM

## 2023-11-24 RX ORDER — FENTANYL CITRATE 50 UG/ML
INJECTION, SOLUTION INTRAMUSCULAR; INTRAVENOUS PRN
Status: DISCONTINUED | OUTPATIENT
Start: 2023-11-24 | End: 2023-11-24

## 2023-11-24 RX ORDER — OXYCODONE HYDROCHLORIDE 5 MG/1
5 TABLET ORAL
Status: CANCELLED | OUTPATIENT
Start: 2023-11-24

## 2023-11-24 RX ORDER — ONDANSETRON 4 MG/1
4 TABLET, ORALLY DISINTEGRATING ORAL EVERY 30 MIN PRN
Status: CANCELLED | OUTPATIENT
Start: 2023-11-24

## 2023-11-24 RX ORDER — ONDANSETRON 2 MG/ML
4 INJECTION INTRAMUSCULAR; INTRAVENOUS EVERY 6 HOURS PRN
Status: DISCONTINUED | OUTPATIENT
Start: 2023-11-24 | End: 2023-11-30 | Stop reason: HOSPADM

## 2023-11-24 RX ORDER — OXYCODONE HYDROCHLORIDE 5 MG/1
10 TABLET ORAL EVERY 4 HOURS PRN
Status: DISCONTINUED | OUTPATIENT
Start: 2023-11-24 | End: 2023-11-30 | Stop reason: HOSPADM

## 2023-11-24 RX ORDER — SODIUM CHLORIDE, SODIUM LACTATE, POTASSIUM CHLORIDE, CALCIUM CHLORIDE 600; 310; 30; 20 MG/100ML; MG/100ML; MG/100ML; MG/100ML
INJECTION, SOLUTION INTRAVENOUS CONTINUOUS
Status: DISCONTINUED | OUTPATIENT
Start: 2023-11-24 | End: 2023-11-24 | Stop reason: HOSPADM

## 2023-11-24 RX ORDER — GLYCOPYRROLATE 0.2 MG/ML
INJECTION, SOLUTION INTRAMUSCULAR; INTRAVENOUS PRN
Status: DISCONTINUED | OUTPATIENT
Start: 2023-11-24 | End: 2023-11-24

## 2023-11-24 RX ORDER — PROPOFOL 10 MG/ML
INJECTION, EMULSION INTRAVENOUS PRN
Status: DISCONTINUED | OUTPATIENT
Start: 2023-11-24 | End: 2023-11-24

## 2023-11-24 RX ORDER — ONDANSETRON 4 MG/1
4 TABLET, ORALLY DISINTEGRATING ORAL EVERY 30 MIN PRN
Status: DISCONTINUED | OUTPATIENT
Start: 2023-11-24 | End: 2023-11-24 | Stop reason: HOSPADM

## 2023-11-24 RX ORDER — HEPARIN SODIUM 5000 [USP'U]/.5ML
5000 INJECTION, SOLUTION INTRAVENOUS; SUBCUTANEOUS EVERY 8 HOURS
Status: DISCONTINUED | OUTPATIENT
Start: 2023-11-25 | End: 2023-11-30 | Stop reason: HOSPADM

## 2023-11-24 RX ORDER — ONDANSETRON 2 MG/ML
4 INJECTION INTRAMUSCULAR; INTRAVENOUS EVERY 30 MIN PRN
Status: DISCONTINUED | OUTPATIENT
Start: 2023-11-24 | End: 2023-11-24 | Stop reason: HOSPADM

## 2023-11-24 RX ORDER — BISACODYL 10 MG
10 SUPPOSITORY, RECTAL RECTAL DAILY PRN
Status: DISCONTINUED | OUTPATIENT
Start: 2023-11-24 | End: 2023-11-30 | Stop reason: HOSPADM

## 2023-11-24 RX ORDER — ACETAMINOPHEN 325 MG/1
975 TABLET ORAL ONCE
Status: COMPLETED | OUTPATIENT
Start: 2023-11-24 | End: 2023-11-24

## 2023-11-24 RX ORDER — BUPIVACAINE HYDROCHLORIDE 2.5 MG/ML
INJECTION, SOLUTION INFILTRATION; PERINEURAL PRN
Status: DISCONTINUED | OUTPATIENT
Start: 2023-11-24 | End: 2023-11-27

## 2023-11-24 RX ORDER — PROCHLORPERAZINE MALEATE 10 MG
10 TABLET ORAL EVERY 6 HOURS PRN
Status: DISCONTINUED | OUTPATIENT
Start: 2023-11-24 | End: 2023-11-30 | Stop reason: HOSPADM

## 2023-11-24 RX ORDER — MAGNESIUM SULFATE 4 G/50ML
4 INJECTION INTRAVENOUS ONCE
Status: COMPLETED | OUTPATIENT
Start: 2023-11-24 | End: 2023-11-24

## 2023-11-24 RX ORDER — MAGNESIUM HYDROXIDE 1200 MG/15ML
LIQUID ORAL PRN
Status: DISCONTINUED | OUTPATIENT
Start: 2023-11-24 | End: 2023-11-24 | Stop reason: HOSPADM

## 2023-11-24 RX ORDER — OXYCODONE HYDROCHLORIDE 5 MG/1
10 TABLET ORAL
Status: CANCELLED | OUTPATIENT
Start: 2023-11-24

## 2023-11-24 RX ORDER — CEFAZOLIN SODIUM/WATER 3 G/30 ML
SYRINGE (ML) INTRAVENOUS
Status: DISCONTINUED
Start: 2023-11-24 | End: 2023-11-24 | Stop reason: HOSPADM

## 2023-11-24 RX ORDER — AMOXICILLIN 250 MG
1 CAPSULE ORAL 2 TIMES DAILY
Status: DISCONTINUED | OUTPATIENT
Start: 2023-11-24 | End: 2023-11-30 | Stop reason: HOSPADM

## 2023-11-24 RX ORDER — FENTANYL CITRATE 50 UG/ML
50 INJECTION, SOLUTION INTRAMUSCULAR; INTRAVENOUS EVERY 5 MIN PRN
Status: DISCONTINUED | OUTPATIENT
Start: 2023-11-24 | End: 2023-11-24 | Stop reason: HOSPADM

## 2023-11-24 RX ORDER — LIDOCAINE 40 MG/G
CREAM TOPICAL
Status: DISCONTINUED | OUTPATIENT
Start: 2023-11-24 | End: 2023-11-24 | Stop reason: HOSPADM

## 2023-11-24 RX ORDER — DEXAMETHASONE SODIUM PHOSPHATE 10 MG/ML
INJECTION, SOLUTION INTRAMUSCULAR; INTRAVENOUS PRN
Status: DISCONTINUED | OUTPATIENT
Start: 2023-11-24 | End: 2023-11-24

## 2023-11-24 RX ORDER — LIDOCAINE 40 MG/G
CREAM TOPICAL
Status: DISCONTINUED | OUTPATIENT
Start: 2023-11-24 | End: 2023-11-30 | Stop reason: HOSPADM

## 2023-11-24 RX ORDER — ACETAMINOPHEN 325 MG/1
650 TABLET ORAL EVERY 4 HOURS PRN
Status: DISCONTINUED | OUTPATIENT
Start: 2023-11-27 | End: 2023-11-27

## 2023-11-24 RX ORDER — FENTANYL CITRATE 50 UG/ML
25 INJECTION, SOLUTION INTRAMUSCULAR; INTRAVENOUS EVERY 5 MIN PRN
Status: DISCONTINUED | OUTPATIENT
Start: 2023-11-24 | End: 2023-11-24 | Stop reason: HOSPADM

## 2023-11-24 RX ORDER — HYDROMORPHONE HCL IN WATER/PF 6 MG/30 ML
0.2 PATIENT CONTROLLED ANALGESIA SYRINGE INTRAVENOUS
Status: DISCONTINUED | OUTPATIENT
Start: 2023-11-24 | End: 2023-11-24 | Stop reason: ALTCHOICE

## 2023-11-24 RX ORDER — ONDANSETRON 2 MG/ML
4 INJECTION INTRAMUSCULAR; INTRAVENOUS EVERY 30 MIN PRN
Status: CANCELLED | OUTPATIENT
Start: 2023-11-24

## 2023-11-24 RX ORDER — POLYETHYLENE GLYCOL 3350 17 G/17G
17 POWDER, FOR SOLUTION ORAL DAILY
Status: DISCONTINUED | OUTPATIENT
Start: 2023-11-25 | End: 2023-11-30 | Stop reason: HOSPADM

## 2023-11-24 RX ORDER — HYDROMORPHONE HCL IN WATER/PF 6 MG/30 ML
0.4 PATIENT CONTROLLED ANALGESIA SYRINGE INTRAVENOUS
Status: DISCONTINUED | OUTPATIENT
Start: 2023-11-24 | End: 2023-11-24 | Stop reason: ALTCHOICE

## 2023-11-24 RX ADMIN — FENTANYL CITRATE 50 MCG: 50 INJECTION INTRAMUSCULAR; INTRAVENOUS at 13:07

## 2023-11-24 RX ADMIN — HYDROMORPHONE HYDROCHLORIDE 0.5 MG: 1 INJECTION, SOLUTION INTRAMUSCULAR; INTRAVENOUS; SUBCUTANEOUS at 15:15

## 2023-11-24 RX ADMIN — MAGNESIUM SULFATE HEPTAHYDRATE 4 G: 80 INJECTION, SOLUTION INTRAVENOUS at 12:00

## 2023-11-24 RX ADMIN — FENTANYL CITRATE 50 MCG: 50 INJECTION INTRAMUSCULAR; INTRAVENOUS at 12:48

## 2023-11-24 RX ADMIN — PHENYLEPHRINE HYDROCHLORIDE 50 MCG: 10 INJECTION INTRAVENOUS at 13:58

## 2023-11-24 RX ADMIN — FENTANYL CITRATE 50 MCG: 50 INJECTION INTRAMUSCULAR; INTRAVENOUS at 12:44

## 2023-11-24 RX ADMIN — PIPERACILLIN AND TAZOBACTAM 3.38 G: 3; .375 INJECTION, POWDER, FOR SOLUTION INTRAVENOUS at 02:40

## 2023-11-24 RX ADMIN — LIDOCAINE HYDROCHLORIDE 3 ML: 10 INJECTION, SOLUTION INFILTRATION; PERINEURAL at 12:28

## 2023-11-24 RX ADMIN — Medication 3 G: at 12:25

## 2023-11-24 RX ADMIN — HYDROMORPHONE HYDROCHLORIDE 1 MG: 1 INJECTION, SOLUTION INTRAMUSCULAR; INTRAVENOUS; SUBCUTANEOUS at 02:47

## 2023-11-24 RX ADMIN — SODIUM CHLORIDE: 9 INJECTION, SOLUTION INTRAVENOUS at 14:25

## 2023-11-24 RX ADMIN — HYDROMORPHONE HYDROCHLORIDE 1 MG: 1 INJECTION, SOLUTION INTRAMUSCULAR; INTRAVENOUS; SUBCUTANEOUS at 06:51

## 2023-11-24 RX ADMIN — PHENYLEPHRINE HYDROCHLORIDE 50 MCG: 10 INJECTION INTRAVENOUS at 14:46

## 2023-11-24 RX ADMIN — PIPERACILLIN AND TAZOBACTAM 3.38 G: 3; .375 INJECTION, POWDER, FOR SOLUTION INTRAVENOUS at 19:59

## 2023-11-24 RX ADMIN — ONDANSETRON 4 MG: 2 INJECTION INTRAMUSCULAR; INTRAVENOUS at 12:42

## 2023-11-24 RX ADMIN — SUGAMMADEX 400 MG: 100 INJECTION, SOLUTION INTRAVENOUS at 15:59

## 2023-11-24 RX ADMIN — DOCUSATE SODIUM 200 MG: 100 CAPSULE, LIQUID FILLED ORAL at 21:45

## 2023-11-24 RX ADMIN — OXYCODONE HYDROCHLORIDE 10 MG: 5 TABLET ORAL at 18:34

## 2023-11-24 RX ADMIN — FENTANYL CITRATE 50 MCG: 50 INJECTION, SOLUTION INTRAMUSCULAR; INTRAVENOUS at 16:58

## 2023-11-24 RX ADMIN — ROCURONIUM BROMIDE 50 MG: 50 INJECTION, SOLUTION INTRAVENOUS at 12:33

## 2023-11-24 RX ADMIN — DEXAMETHASONE SODIUM PHOSPHATE 5 MG: 10 INJECTION, SOLUTION INTRAMUSCULAR; INTRAVENOUS at 12:28

## 2023-11-24 RX ADMIN — PHENYLEPHRINE HYDROCHLORIDE 100 MCG: 10 INJECTION INTRAVENOUS at 13:49

## 2023-11-24 RX ADMIN — PROPOFOL 200 MG: 10 INJECTION, EMULSION INTRAVENOUS at 12:28

## 2023-11-24 RX ADMIN — SODIUM CHLORIDE: 9 INJECTION, SOLUTION INTRAVENOUS at 08:34

## 2023-11-24 RX ADMIN — FENTANYL CITRATE 50 MCG: 50 INJECTION, SOLUTION INTRAMUSCULAR; INTRAVENOUS at 16:51

## 2023-11-24 RX ADMIN — SENNOSIDES AND DOCUSATE SODIUM 1 TABLET: 8.6; 5 TABLET ORAL at 21:45

## 2023-11-24 RX ADMIN — FENTANYL CITRATE 50 MCG: 50 INJECTION INTRAMUSCULAR; INTRAVENOUS at 12:28

## 2023-11-24 RX ADMIN — ACETAMINOPHEN 975 MG: 325 TABLET ORAL at 12:03

## 2023-11-24 RX ADMIN — HYDROMORPHONE HYDROCHLORIDE 0.5 MG: 1 INJECTION, SOLUTION INTRAMUSCULAR; INTRAVENOUS; SUBCUTANEOUS at 20:47

## 2023-11-24 RX ADMIN — PHENYLEPHRINE HYDROCHLORIDE 100 MCG: 10 INJECTION INTRAVENOUS at 13:33

## 2023-11-24 RX ADMIN — PHENYLEPHRINE HYDROCHLORIDE 100 MCG: 10 INJECTION INTRAVENOUS at 14:19

## 2023-11-24 RX ADMIN — PIPERACILLIN AND TAZOBACTAM 3.38 G: 3; .375 INJECTION, POWDER, FOR SOLUTION INTRAVENOUS at 11:21

## 2023-11-24 RX ADMIN — DEXMEDETOMIDINE HYDROCHLORIDE 4 MCG: 100 INJECTION, SOLUTION INTRAVENOUS at 15:48

## 2023-11-24 RX ADMIN — GLYCOPYRROLATE 0.2 MG: 0.2 INJECTION INTRAMUSCULAR; INTRAVENOUS at 12:45

## 2023-11-24 RX ADMIN — PHENYLEPHRINE HYDROCHLORIDE 100 MCG: 10 INJECTION INTRAVENOUS at 14:07

## 2023-11-24 RX ADMIN — MIDAZOLAM 2 MG: 1 INJECTION INTRAMUSCULAR; INTRAVENOUS at 12:27

## 2023-11-24 RX ADMIN — PHENYLEPHRINE HYDROCHLORIDE 50 MCG: 10 INJECTION INTRAVENOUS at 15:22

## 2023-11-24 RX ADMIN — HYDROMORPHONE HYDROCHLORIDE 0.5 MG: 1 INJECTION, SOLUTION INTRAMUSCULAR; INTRAVENOUS; SUBCUTANEOUS at 15:45

## 2023-11-24 RX ADMIN — FENTANYL CITRATE 50 MCG: 50 INJECTION, SOLUTION INTRAMUSCULAR; INTRAVENOUS at 16:37

## 2023-11-24 RX ADMIN — PHENYLEPHRINE HYDROCHLORIDE 100 MCG: 10 INJECTION INTRAVENOUS at 13:46

## 2023-11-24 RX ADMIN — PHENYLEPHRINE HYDROCHLORIDE 100 MCG: 10 INJECTION INTRAVENOUS at 13:28

## 2023-11-24 RX ADMIN — DEXMEDETOMIDINE HYDROCHLORIDE 4 MCG: 100 INJECTION, SOLUTION INTRAVENOUS at 16:03

## 2023-11-24 RX ADMIN — ACETAMINOPHEN 975 MG: 325 TABLET, FILM COATED ORAL at 18:34

## 2023-11-24 RX ADMIN — FENTANYL CITRATE 50 MCG: 50 INJECTION, SOLUTION INTRAMUSCULAR; INTRAVENOUS at 16:44

## 2023-11-24 RX ADMIN — Medication 80 MG: at 12:28

## 2023-11-24 RX ADMIN — PHENYLEPHRINE HYDROCHLORIDE 100 MCG: 10 INJECTION INTRAVENOUS at 14:15

## 2023-11-24 RX ADMIN — HYDROMORPHONE HYDROCHLORIDE 0.4 MG: 1 INJECTION, SOLUTION INTRAMUSCULAR; INTRAVENOUS; SUBCUTANEOUS at 17:09

## 2023-11-24 RX ADMIN — DEXMEDETOMIDINE HYDROCHLORIDE 8 MCG: 100 INJECTION, SOLUTION INTRAVENOUS at 12:58

## 2023-11-24 RX ADMIN — DEXMEDETOMIDINE HYDROCHLORIDE 8 MCG: 100 INJECTION, SOLUTION INTRAVENOUS at 12:59

## 2023-11-24 RX ADMIN — LISINOPRIL 10 MG: 5 TABLET ORAL at 08:27

## 2023-11-24 RX ADMIN — DEXMEDETOMIDINE HYDROCHLORIDE 8 MCG: 100 INJECTION, SOLUTION INTRAVENOUS at 14:22

## 2023-11-24 RX ADMIN — DEXMEDETOMIDINE HYDROCHLORIDE 8 MCG: 100 INJECTION, SOLUTION INTRAVENOUS at 15:11

## 2023-11-24 ASSESSMENT — ACTIVITIES OF DAILY LIVING (ADL)
ADLS_ACUITY_SCORE: 20

## 2023-11-24 NOTE — PLAN OF CARE
The patient was up to the bathroom with standby assist. After repositioning into bed, this nurse removed his abdominal dressing to change it as it was saturated. Copious amount of red drainage was noted from the umbilical area. Vitals stable and not symptomatic. The house officer was contacted to assess the patient. Ordered to apply a compression dressing and check Hgb. Dry gauze, ABD, and compression tape used. The on-call general surgeon was contacted as well. The bleeding is expected and to continue to monitor. Change dressing as needed. Hgb was 12.4g/dL. will continue to monitor. The patient reported abdominal pain. Controlled with oxycodone and IV Dialudid. The patient reported having loose stools before his admission. Stating after his surgery on 11/14 he was constipated, received bowel meds, then had looses stools since.  IV fluids infusing 75ml/hr. NPO at midnight, plan for surgery tomorrow.     Goal Outcome Evaluation:    Problem: Diarrhea  Goal: Effective Diarrhea Management  Outcome: Not Progressing  Intervention: Manage Diarrhea  Recent Flowsheet Documentation  Taken 11/23/2023 1606 by Manasa Wilcox RN  Medication Review/Management: medications reviewed     Problem: Pain Acute  Goal: Optimal Pain Control and Function  Outcome: Progressing  Intervention: Develop Pain Management Plan  Recent Flowsheet Documentation  Taken 11/23/2023 2218 by Manasa Wilcox RN  Pain Management Interventions:   medication (see MAR)   repositioned   relaxation techniques promoted  Taken 11/23/2023 1953 by Manasa Wilcox RN  Pain Management Interventions:   medication (see MAR)   environmental changes   pillow support provided   repositioned  Intervention: Prevent or Manage Pain  Recent Flowsheet Documentation  Taken 11/23/2023 1606 by Manasa Wilcox RN  Medication Review/Management: medications reviewed     Problem: Comorbidity Management  Goal: Blood Glucose Levels Within Targeted Range  Outcome:  Progressing  Intervention: Monitor and Manage Glycemia  Recent Flowsheet Documentation  Taken 11/23/2023 1606 by Manasa Wilcox, RN  Medication Review/Management: medications reviewed     Problem: Anemia  Goal: Anemia Symptom Improvement  Outcome: Progressing  Intervention: Monitor and Manage Anemia  Recent Flowsheet Documentation  Taken 11/23/2023 1606 by Manasa Wilcox, RN  Safety Promotion/Fall Prevention: assistive device/personal items within reach

## 2023-11-24 NOTE — PROGRESS NOTES
"7:31 PM    S: House staff was called by the patient's nurse due to bleeding from umbilical site. Briefly, the patient was admitted for abscess of abdominal wall. He is s/p abdominal ventral wall herniorrhaphy 11/14/23.     I went to evaluate the patient and found him resting in bed, nurse attending to bloody drainage from umbilical site.    Patient was up to use the restroom and passed a BM without issue. States he did not have any excessive straining. When he returned to bed, umbillical site started bleeding. With any movement, more bleeding would occur.     Patient states he feels a little bloated and is concerned about his abdomen bleeding, but was encouraged that he was able to tolerate a diet without problem today.     Patient is currently receiving IV antibiotics. Plan is to keep patient NPO at midnight for possible surgery tomorrow.     Exam: /58 (BP Location: Right arm)   Pulse 95   Temp 98  F (36.7  C) (Oral)   Resp 18   Ht 1.803 m (5' 11\")   Wt (!) 190.5 kg (420 lb)   SpO2 100%   BMI 58.58 kg/m    GENERAL: alert and no distress  RESP: normal work of breathing  CV: extremities well perfused  ABDOMEN: obese abdomen, non tender to palpation; umbilical site with trickle of blood; no surrounding erythema or warmth; other abdominal scars well approximated without any redness or tenderness  PSYCH: appropriate mood and affect        A/P:   - Pressure dressing to umbilical site. No signs of redness, erythema to the abdomen. Abdomen was soft and nontender. No acute abdomen. Vitals have remained stable.  - Advised patient to be careful with any straining or movements that increase intraabdominal pressure  - Will check hemoglobin  - Patient to be NPO at midnight for possible procedure tomorrow     Discussed with bedside RN      Sweta Lehman, DO [facility]     "

## 2023-11-24 NOTE — BRIEF OP NOTE
[facility]    Operative Note    Pre-operative diagnosis: Abdominal wall seroma, initial encounter [S30.1XXA]  Post-operative diagnosis Recurrent incisional ventral hernia    Procedure: EXPLORATORY LAPAROTOMY WITH REPAIR OF INCISIONAL VENTURAL HERNIA., N/A - Trunk    Surgeon: Surgeon(s) and Role:     * Braydon Kim DO - Primary     * Bradley Arnold PA-C - Assisting  Anesthesia: General   Estimated Blood Loss: 250 mL    Drains: None  Specimens:   ID Type Source Tests Collected by Time Destination   A : Swab from Abdomen Swab Abdomen ANAEROBIC BACTERIAL CULTURE ROUTINE, GRAM STAIN, AEROBIC BACTERIAL CULTURE ROUTINE Braydon Kim DO 11/24/2023 12:59 PM      Findings:    -  recurrent incisional ventral hernia.  - Bridging of 20 x 20 cm 1s ovitex mesh.   - 20 x 15 cm overlay mesh on mesh.    Complications: None.  Implants:   Implant Name Type Inv. Item Serial No.  Lot No. LRB No. Used Action   MESH OVITEX [id number] PERM GRID 1 TH SD [facility] [id number] - [id number] Mesh MESH OVITEX [id number] PERM GRID 1 [facility] [id number]  DENILSON BIO [id number] N/A 1 Implanted           Braydon Kim DO  General Surgeon  [facility]  [facility]gery Clinic - [address] [address]?  Office: [phone]  Employed by [facility] [employer]  Pager: [phone]

## 2023-11-24 NOTE — PROGRESS NOTES
"[facility]    Medicine Progress Note - Hospitalist Service    Date of Admission:  11/22/2023    Assessment & Plan   38-year-old male with severe obesity admitted for abscess of the abdominal wall.    #Abscess, anterior abdominal wall  General surgery planning for : \"To the OR today for abdominal seroma drainage and washout.  Will place an abdominal drain and also excise the umbilicus which has necrotic tissue\"  Empiric antibiotics with Zosyn   Stopped Vanc 2/2 neg MRSA swab  Continue gentle IV fluids  Pain control    #Hypercalcemia, mild  Ca initially 10.2, now wnl after IV fluids  Follow chemistry    # Diabetes type 2  A1c 6.5  No home medications  SSI    #Hypertension  No home medication  Started lisinopril     #Severe obesity  BMI 58.58      DVT ppx: PCDs          Diet: NPO per Anesthesia Guidelines for Procedure/Surgery Except for: Meds    Duvall Catheter: Not present  Lines: None     Cardiac Monitoring: None  Code Status: Full Code      Clinically Significant Risk Factors           # Hypercalcemia: Highest Ca = 10.2 mg/dL in last 2 days, will monitor as appropriate        # Hypertension: Noted on problem list       # DMII: A1C = 6.5 % (Ref range: <5.7 %) within past 6 months, PRESENT ON ADMISSION  # Severe Obesity: Estimated body mass index is 58.58 kg/m  as calculated from the following:    Height as of this encounter: 1.803 m (5' 11\").    Weight as of this encounter: 190.5 kg (420 lb)., PRESENT ON ADMISSION            Disposition Plan      Expected Discharge Date: 11/27/2023    Discharge Delays: IV Medication - consider oral or Home Infusion  Procedure Pending (enter procedure & time in comments)  Destination: home with family  Discharge Comments: surgery today (friday)            Reza Gonsalez,   Hospitalist Service  [facility]  Securely message with [employer] (more info)  Text page via [employer] Paging/Directory "   ______________________________________________________________________    Interval History   Had bleeding from abdominal wound overnight/early this AM which has now resolved after a pressure dressing was placed.     Physical Exam   Vital Signs: Temp: 97.7  F (36.5  C) Temp src: Oral BP: 121/59 Pulse: 82   Resp: 19 SpO2: 97 % O2 Device: None (Room air)    Weight: 420 lbs 0 oz    General Appearance:  No acute distress  Respiratory: No tachypnea or accessory muscle use  Abdomen: Dressing to the mid lower abdomen clean dry and intact      Medical Decision Making             Data

## 2023-11-24 NOTE — ANESTHESIA PREPROCEDURE EVALUATION
Anesthesia Pre-Procedure Evaluation    Patient: Ady Godinez   MRN: [id number] : 1985        Procedure : Procedure(s):  INCISION AND DRAINAGE OF ABDOMINAL WALL SEROMA AND EXCISION OF UMBILICUS AND PLACEMENT OF ABDOMINAL DRAIN          Past Medical History:   Diagnosis Date    Abdominal hernia     Cellulitis 2016    right index finger    HTN (hypertension) 7/10/2018    Migraines     Obesity     Pure hypercholesterolemia       Past Surgical History:   Procedure Laterality Date    APPENDECTOMY      childhood.    DAVINCI XI HERNIORRHAPHY VENTRAL N/A 2023    Procedure: HERNIORRHAPHY WITH MESH, VENTRAL, ROBOT-ASSISTED, LAPAROSCOPIC, USING DA ALBERT XI;REPAIR OF INCARCERATED HERNIA.;  Surgeon: Braydon Kim DO;  Location: [facility] [place] OR    IR CAROTID CEREBRAL ANGIOGRAM BILATERAL  4/15/2021    LUMBAR PUNCTURE FLUORO GUIDED DIAGNOSTIC  2021    LYSIS, ADHESIONS, ROBOT-ASSISTED, LAPAROSCOPIC, USING DA ALBERT XI N/A 2023    Procedure: LYSIS OF ADHESIONS;  Surgeon: Braydon Kim DO;  Location: [facility] [place] OR      No Known Allergies   Social History     Tobacco Use    Smoking status: Never    Smokeless tobacco: Never   Substance Use Topics    Alcohol use: Not Currently     Comment: Very rare      Wt Readings from Last 1 Encounters:   23 (!) 182.8 kg (403 lb)        Anesthesia Evaluation   Pt has had prior anesthetic.         ROS/MED HX  ENT/Pulmonary:  - neg pulmonary ROS     Neurologic:  - neg neurologic ROS     Cardiovascular:     (+)  hypertension- -   -  - -                                      METS/Exercise Tolerance: >4 METS    Hematologic:  - neg hematologic  ROS     Musculoskeletal:  - neg musculoskeletal ROS     GI/Hepatic:  - neg GI/hepatic ROS     Renal/Genitourinary:  - neg Renal ROS     Endo:     (+)               Obesity,       Psychiatric/Substance Use:  - neg psychiatric ROS     Infectious Disease:  - neg infectious disease ROS     Malignancy:  - neg malignancy ROS    "  Other:  - neg other ROS          Physical Exam    Airway        Mallampati: IV   TM distance: > 3 FB   Neck ROM: limited   Mouth opening: > 3 cm    Respiratory Devices and Support         Dental  no notable dental history     (+) Minor Abnormalities - some fillings, tiny chips      Cardiovascular   cardiovascular exam normal          Pulmonary   pulmonary exam normal            Other findings: Hb 11.8    OUTSIDE LABS:  CBC:   Lab Results   Component Value Date    WBC 14.1 (H) 11/23/2023    WBC 19.1 (H) 11/22/2023    HGB 12.4 (L) 11/23/2023    HGB 11.8 (L) 11/23/2023    HCT 37.4 (L) 11/23/2023    HCT 42.9 11/22/2023     11/23/2023     (H) 11/22/2023     BMP:   Lab Results   Component Value Date     (L) 11/23/2023     (L) 11/22/2023    POTASSIUM 4.0 11/23/2023    POTASSIUM 4.9 11/22/2023    CHLORIDE 96 (L) 11/23/2023    CHLORIDE 90 (L) 11/22/2023    CO2 26 11/23/2023    CO2 28 11/22/2023    BUN 15.9 11/23/2023    BUN 16.2 11/22/2023    CR 0.76 11/23/2023    CR 0.74 11/22/2023     (H) 11/24/2023     (H) 11/23/2023     COAGS:   Lab Results   Component Value Date    PTT 23 (L) 07/07/2018    INR 0.98 04/14/2021     POC: No results found for: \"BGM\", \"HCG\", \"HCGS\"  HEPATIC:   Lab Results   Component Value Date    ALBUMIN 3.7 11/22/2023    PROTTOTAL 8.7 (H) 11/22/2023    ALT 39 11/22/2023    AST  11/22/2023      Comment:      Unsatisfactory specimen - hemolyzed    Reference intervals for this test were updated on 6/12/2023 to more accurately reflect our healthy population. There may be differences in the flagging of prior results with similar values performed with this method. Interpretation of those prior results can be made in the context of the updated reference intervals.    ALKPHOS 126 11/22/2023    BILITOTAL 0.6 11/22/2023     OTHER:   Lab Results   Component Value Date    PH 7.27 (L) 11/14/2023    LACT 1.2 11/22/2023    A1C 6.5 (H) 11/22/2023    JEFRY 8.9 11/23/2023    PHOS 3.6 " 11/15/2023    MAG 2.6 (H) 11/15/2023    LIPASE 30 11/22/2023    TSH 4.14 11/29/2021    CRP 1.9 (H) 07/16/2018    SED 86 (H) 07/13/2018       Anesthesia Plan    ASA Status:  4    NPO Status:  NPO Appropriate    Anesthesia Type: General.     - Airway: ETT   Induction: Intravenous, Propofol.   Maintenance: Balanced.   Techniques and Equipment:     - Airway: Video-Laryngoscope       Consents    Anesthesia Plan(s) and associated risks, benefits, and realistic alternatives discussed. Questions answered and patient/representative(s) expressed understanding.     - Discussed: Risks, Benefits and Alternatives for BOTH SEDATION and the PROCEDURE were discussed     - Discussed with:  Patient       - Patient is DNR/DNI Status: No          Postoperative Care    Pain management: IV analgesics, Oral pain medications.   PONV prophylaxis: Ondansetron (or other 5HT-3), Dexamethasone or Solumedrol     Comments:    Other Comments: GETA  Decadron/Zofran for PONV  Midazolam in Pre op area  Glidescope intubation            Francisco Wells MD

## 2023-11-24 NOTE — ANESTHESIA PROCEDURE NOTES
Airway       Patient location during procedure: OR       Procedure Start/Stop Times: 11/24/2023 12:34 PM  Staff -        Performed By: CRNAIndications and Patient Condition       Indications for airway management: fidelia-procedural       Induction type:intravenous       Mask difficulty assessment: 1 - vent by mask    Final Airway Details       Final airway type: endotracheal airway       Successful airway: ETT - single and Oral  Endotracheal Airway Details        ETT size (mm): 8.0       Cuffed: yes       Successful intubation technique: video laryngoscopy       VL Blade Size: Glidescope 4       Grade View of Cords: 1       Adjucts: stylet       Position: Right       Measured from: lips       Secured at (cm): 24       Bite block used: None    Post intubation assessment        Placement verified by: capnometry, equal breath sounds and chest rise        Number of attempts at approach: 1       Number of other approaches attempted: 0       Secured with: tape       Ease of procedure: easy       Dentition: Intact and Unchanged    Medication(s) Administered   Medication Administration Time: 11/24/2023 12:34 PM

## 2023-11-24 NOTE — OR NURSING
"Patient currently in PACU, rating pain at a level of 7-9/10 in abdominal area. Ice pack applied and repositioned patient. Hospital gown applied. Patient's wife, Rosalinda called and updated. Patient was able to chat with wife Rosalinda on the phone and requesting for her to come back to the hospital. This RN administered 200 mcg Fentanyl and 0.4 mg of Dilaudid IV. Patient saturation dipped to 86% after and was placed on 2L NC, now satting around 94-95%.     Patient appears comfortable, dozing off and snoring. When RN reassesses pain level, patient reports it being at a level of 10/10. Patient's pain rating does not match appearance or vital signs. /57 (Cuff Size: Adult Regular)   Pulse 98   Temp 98.2  F (36.8  C) (Temporal)   Resp 12   Ht 1.803 m (5' 11\")   Wt (!) 182.8 kg (403 lb)   SpO2 96%   BMI 56.21 kg/m    RN reassured patient and stated that pain would not disappear completely, but we would like it to be at a tolerable and manageable level. Patient verbalizes understanding. Patient meets transfer criteria out of PACU.  "

## 2023-11-24 NOTE — ANESTHESIA POSTPROCEDURE EVALUATION
Patient: Ady Godinez    Procedure: Procedure(s):  EXPLORATORY LAPAROTOMY WITH REPAIR OF INCISIONAL VENTRAL HERNIA.       Anesthesia Type:  General    Note:  Disposition: Inpatient   Postop Pain Control: Uneventful            Sign Out: Well controlled pain   PONV: No   Neuro/Psych: Uneventful            Sign Out: Acceptable/Baseline neuro status   Airway/Respiratory: Uneventful            Sign Out: Acceptable/Baseline resp. status   CV/Hemodynamics: Uneventful            Sign Out: Acceptable CV status; No obvious hypovolemia; No obvious fluid overload   Other NRE: NONE   DID A NON-ROUTINE EVENT OCCUR? No    Event details/Postop Comments:  Woke up nicely without agitation or delirium. Pain well controlled, comfortable, VS stable. Heading to floor.       Last vitals:  Vitals Value Taken Time   /57 11/24/23 1715   Temp 36.8  C (98.2  F) 11/24/23 1700   Pulse 99 11/24/23 1722   Resp 13 11/24/23 1718   SpO2 95 % 11/24/23 1722   Vitals shown include unfiled device data.    Electronically Signed By: Reyes Lofton MD  November 24, 2023  5:46 PM

## 2023-11-24 NOTE — PROGRESS NOTES
General Surgery Progress Note:    Hospital Day # 2    ASSESSMENT:   1. Umbilical hernia with gangrene    2. Nausea and vomiting, unspecified vomiting type    3. Abdominal wall cellulitis    4. Abdominal wall seroma, initial encounter        Ady Godinez is a 38 year old male abdominal seroma after robotic laparoscopic ventral hernia repair.  Patient's leukocytosis is improving.    PLAN:   -To the OR today for abdominal seroma drainage and washout.  Will place an abdominal drain and also excise the umbilicus which has necrotic tissue  .  -N.p.o.    -Continue with antibiotic coverage      SUBJECTIVE:   Ady Godinez was seen on rounds.  Patient states that he was able to tolerate turkey dinner yesterday.  Patient has no nausea or vomiting.  Patient did have some drainage from the umbilicus.  He has no fevers no chills he has no further complaints at this time.    Patient Vitals for the past 24 hrs:   BP Temp Temp src Pulse Resp SpO2   11/23/23 2354 114/57 97.5  F (36.4  C) Oral 93 18 93 %   11/23/23 1918 119/58 98  F (36.7  C) Oral 95 -- 100 %   11/23/23 1519 121/62 98.5  F (36.9  C) Oral 99 18 95 %   11/23/23 1202 135/60 98  F (36.7  C) Oral 90 18 96 %   11/23/23 0802 137/69 98  F (36.7  C) Oral 87 18 94 %       Physical Exam:  General: NAD, pleasant  CV:RRR  LUNGS:Normal respiratory effort, no accessory muscle use  ABD: Obese abdomen.  The umbilicus has some mild drainage that is serosanguineous.  There is necrotic tissue over the umbilicus.  Edema improved  EXT:no CCE        Braydon Kim, DO Braydon Kim,   General Surgeon  [facility]  Surgery [facility] - [address]  [address]  [place] [address]?  Office: [phone]  Employed by - [employer]  Pager: [phone]

## 2023-11-24 NOTE — ANESTHESIA CARE TRANSFER NOTE
Patient: Ady Godinez    Procedure: Procedure(s):  EXPLORATORY LAPAROTOMY WITH REPAIR OF INCISIONAL VENTRAL HERNIA.       Diagnosis: Abdominal wall seroma, initial encounter [S30.1XXA]  Diagnosis Additional Information: No value filed.    Anesthesia Type:   General     Note:    Oropharynx: oropharynx clear of all foreign objects  Level of Consciousness: awake  Oxygen Supplementation: face mask  Level of Supplemental Oxygen (L/min / FiO2): 6  Independent Airway: airway patency satisfactory and stable  Dentition: dentition unchanged  Vital Signs Stable: post-procedure vital signs reviewed and stable  Report to RN Given: handoff report given  Patient transferred to: PACU    Handoff Report: Identifed the Patient, Identified the Reponsible Provider, Reviewed the pertinent medical history, Discussed the surgical course, Reviewed Intra-OP anesthesia mangement and issues during anesthesia, Set expectations for post-procedure period and Allowed opportunity for questions and acknowledgement of understanding      Vitals:  Vitals Value Taken Time   /54 11/24/23 1619   Temp 36.4  C (97.5  F) 11/24/23 1618   Pulse 101 11/24/23 1620   Resp 18 11/24/23 1620   SpO2 95 % 11/24/23 1620   Vitals shown include unfiled device data.    Electronically Signed By: VINICIUS Gomez CRNA  November 24, 2023  4:21 PM

## 2023-11-24 NOTE — OR NURSING
Patient's last documented void was 1132. No record in [facility] or OR regarding urine occurrence. This RN had trouble bladder scanning, finally able to record 112 mL (checked x2). This RN encouraged patient to use urinal, patient unable to go. Communicated to Carlyn, P4 RN.

## 2023-11-24 NOTE — OP NOTE
[facility]    Operative Note    Pre-operative diagnosis: Abdominal wall seroma, initial encounter [S30.1XXA]  Post-operative diagnosis Recurrent incisional ventral hernia    Procedure: EXPLORATORY LAPAROTOMY WITH REPAIR OF INCISIONAL VENTRAL HERNIA., N/A - Trunk    Surgeon: Surgeon(s) and Role:     * Braydon Kim DO - Primary     * Bradley Arnold PA-C - Assisting  Anesthesia: General   Estimated Blood Loss: 250 mL    Drains: None  Specimens:   ID Type Source Tests Collected by Time Destination   A : Swab from Abdomen Swab Abdomen ANAEROBIC BACTERIAL CULTURE ROUTINE, GRAM STAIN, AEROBIC BACTERIAL CULTURE ROUTINE Braydon Kim DO 11/24/2023 12:59 PM      Findings:    -  recurrent incisional ventral hernia approximately 10 x 5 cm  - Bridging of 20 x 20 cm 1s ovitex mesh.   - 20 x 15 cm overlay mesh on mesh.    Complications: None.  Implants:   Implant Name Type Inv. Item Serial No.  Lot No. LRB No. Used Action   MESH OVITEX [id number] PERM GRID 1 [facility] [id number] - [id number] Mesh MESH OVITEX [id number] PERM GRID 1 [facility] [id number]  DENILSON BIO [id number] N/A 1 Implanted     Indication: 38-year-old male presenting with a fluid collection that was draining through the umbilicus.  CT scan was completed showing that the patient had developed a seroma anterior to the abdominal repair.  Offered patient procedure of incision and drainage of the abdominal fluid with excision of the skin of the umbilicus as well as placement of drain. The risks and benefits of the procedure were explained detail to the patient. The risks include infection, bleeding, damage to the surrounding structures. Patient verbalized understanding provided consent to undergo the procedure above.    Procedure: Patient was brought to the operating placed on the operative table in the supine position.  The patient then underwent sedation and intubation by the anesthesia team.  Patient's abdomen was prepped and  draped in usual sterile fashion.  Prior to initiate the procedure, a timeout was completed.  All present were in agreement.    I started off by making an elliptical incision around the umbilicus.  This was done using a 10 blade.  Electrocautery was used to cut through the subcutaneous tissue.  The suction  was used to suction out the seroma.  After completion of suctioning the seroma, I evaluated the mesh.  On evaluation of the mesh, I could identify that the mesh had moved and there were loops of bowel that were penetrating through the left side portion of the mesh.  On further investigation with palpation, I could identify that the patient had really herniated through the left side of the mesh.    At this time, I called the patient's wife over the phone and discussed the findings with her.  I explained to the patient's wife that I needed to proceed with an exploratory laparotomy with repair of the recurrent incisional ventral hernia.  The patient's wife was in agreement.    I then enlarged my midline abdominal incision superiorly until I could visualize the superior portion of where my previous mesh was.  The superior portion of the mesh as well as the entire right side would still in good position and well attached to the surrounding tissue.  The right side portion of the mesh is displaced due to the reherniation.    At this point, I used 0 Ethibond sutures to try to reapproximate the posterior sheath.  Unfortunately due to the patient's obesity, the sheer weight from each side of the abdomen prevented me from reapproximating this posterior sheath.  The only way to repair this defect would be to bridge the hernia.  The hernia measured approximately 10 cm x 5 cm.  Since the patient had an infected seroma, I then used a 20 x 20 cm 1s ovitex mesh in order to bridge the defect.  I then used 0 Ethibond sutures and I sewed the mesh onto the posterior sheath.  Once the entire defect was bridged, I then brought  down the remaining portion of the previous 15 x 20 LPR mesh and I used it to further support the 1S 20 x 20 bridging mesh in an onlay fashion.  A 19 Algerian Severino drain was then placed in the left lower quadrant of the abdomen with the drain directly over the 2 mesh and underneath the rectus muscle.    I attempted to reapproximate the fascia, unfortunately the patient's tissue was too weak to handle the overall weight of his abdominal fat and could not be reapproximated.  At this time, I used a 0 looped PDS and placed multiple retention sutures in order to take the weight off of the fascial closure.  Once the retention sutures were in position, I was then able to reapproximate the fascia using x 2 looped PDS's in a running fashion.  I then used multiple 0 Ethibond sutures in a single interrupted fashion to further support the fascial closure.  A 19 Algerian Severino drain was then placed in the subcutaneous tissue and brought out through the right portion of the abdomen.  The midline incision was then closed using staples.      At the end of the procedure, a final count was completed.  I was told that all sharps, sponges, instruments were accounted for.  Patient was extubated and brought back to the PACU in stable condition.    Bradley Arnold was present to assist myself in the surgical case.  Her assistance was required for exposure and visualization, leading to decreased operating time, and decreased blood loss.                Braydon Kim DO  General Surgeon  [facility]  Surgery [address]  [place] [address]?  Office: [phone]  Employed by - [facility]  Pager: [phone]

## 2023-11-24 NOTE — PLAN OF CARE
Problem: Adult Inpatient Plan of Care  Goal: Absence of Hospital-Acquired Illness or Injury  Intervention: Prevent Skin Injury  Recent Flowsheet Documentation  Taken 11/24/2023 3146 by Tr Valente RN  Body Position: position changed independently   Goal Outcome Evaluation:       Pt is A&Ox4, able to make needs known, lungs sound clear, on a room air, denies SOB, c/o pain rated 6/10 at mid abdomin. SBA, ambulated to bathroom. Remained NPO for solid food since 11/23 at noon and liquid from 2355 of 11/23. Pt went to dialysis at 1100. Pt was encouraged cough.

## 2023-11-24 NOTE — PLAN OF CARE
Problem: Adult Inpatient Plan of Care  Goal: Plan of Care Review  Description: The Plan of Care Review/Shift note should be completed every shift.  The Outcome Evaluation is a brief statement about your assessment that the patient is improving, declining, or no change.  This information will be displayed automatically on your shift  note.  Outcome: Progressing     Problem: Pain Acute  Goal: Optimal Pain Control and Function  Outcome: Progressing  Intervention: Develop Pain Management Plan  Recent Flowsheet Documentation  Taken 11/24/2023 0247 by Nancy Apodaca RN  Pain Management Interventions:   medication (see MAR)   emotional support  Intervention: Prevent or Manage Pain  Recent Flowsheet Documentation  Taken 11/24/2023 0100 by Nancy Apodaca RN  Medication Review/Management: medications reviewed   Goal Outcome Evaluation:       Pt alert & oriented. Able to make needs known. IV dilaudid given for pain. No drainage for umbilical site dressing. NPO at midnight. IVF NS running at 75ml/hr. IV abx given as scheduled. On room air, Vital Signs stable. Will continue to monitor.

## 2023-11-25 LAB
ANION GAP SERPL CALCULATED.3IONS-SCNC: 9 MMOL/L (ref 7–15)
BASOPHILS # BLD AUTO: 0.1 10E3/UL (ref 0–0.2)
BASOPHILS NFR BLD AUTO: 0 %
BUN SERPL-MCNC: 10.2 MG/DL (ref 6–20)
CALCIUM SERPL-MCNC: 8.4 MG/DL (ref 8.6–10)
CHLORIDE SERPL-SCNC: 98 MMOL/L (ref 98–107)
CREAT SERPL-MCNC: 0.85 MG/DL (ref 0.67–1.17)
DEPRECATED HCO3 PLAS-SCNC: 25 MMOL/L (ref 22–29)
EGFRCR SERPLBLD CKD-EPI 2021: >90 ML/MIN/1.73M2
EOSINOPHIL # BLD AUTO: 0.1 10E3/UL (ref 0–0.7)
EOSINOPHIL NFR BLD AUTO: 1 %
ERYTHROCYTE [DISTWIDTH] IN BLOOD BY AUTOMATED COUNT: 14.5 % (ref 10–15)
GLUCOSE BLDC GLUCOMTR-MCNC: 112 MG/DL (ref 70–99)
GLUCOSE BLDC GLUCOMTR-MCNC: 126 MG/DL (ref 70–99)
GLUCOSE BLDC GLUCOMTR-MCNC: 138 MG/DL (ref 70–99)
GLUCOSE BLDC GLUCOMTR-MCNC: 141 MG/DL (ref 70–99)
GLUCOSE SERPL-MCNC: 139 MG/DL (ref 70–99)
HCT VFR BLD AUTO: 37.6 % (ref 40–53)
HGB BLD-MCNC: 11.7 G/DL (ref 13.3–17.7)
IMM GRANULOCYTES # BLD: 0.2 10E3/UL
IMM GRANULOCYTES NFR BLD: 1 %
LYMPHOCYTES # BLD AUTO: 1.6 10E3/UL (ref 0.8–5.3)
LYMPHOCYTES NFR BLD AUTO: 10 %
MAGNESIUM SERPL-MCNC: 2.5 MG/DL (ref 1.7–2.3)
MCH RBC QN AUTO: 28.1 PG (ref 26.5–33)
MCHC RBC AUTO-ENTMCNC: 31.1 G/DL (ref 31.5–36.5)
MCV RBC AUTO: 90 FL (ref 78–100)
MONOCYTES # BLD AUTO: 1 10E3/UL (ref 0–1.3)
MONOCYTES NFR BLD AUTO: 7 %
NEUTROPHILS # BLD AUTO: 12.9 10E3/UL (ref 1.6–8.3)
NEUTROPHILS NFR BLD AUTO: 81 %
NRBC # BLD AUTO: 0 10E3/UL
NRBC BLD AUTO-RTO: 0 /100
PHOSPHATE SERPL-MCNC: 4 MG/DL (ref 2.5–4.5)
PLATELET # BLD AUTO: 418 10E3/UL (ref 150–450)
POTASSIUM SERPL-SCNC: 4.4 MMOL/L (ref 3.4–5.3)
RBC # BLD AUTO: 4.17 10E6/UL (ref 4.4–5.9)
SODIUM SERPL-SCNC: 132 MMOL/L (ref 135–145)
WBC # BLD AUTO: 15.9 10E3/UL (ref 4–11)

## 2023-11-25 PROCEDURE — 120N000001 HC R&B MED SURG/OB

## 2023-11-25 PROCEDURE — 250N000013 HC RX MED GY IP 250 OP 250 PS 637: Performed by: SURGERY

## 2023-11-25 PROCEDURE — 80048 BASIC METABOLIC PNL TOTAL CA: CPT | Performed by: SURGERY

## 2023-11-25 PROCEDURE — 258N000003 HC RX IP 258 OP 636: Performed by: SURGERY

## 2023-11-25 PROCEDURE — 84100 ASSAY OF PHOSPHORUS: CPT | Performed by: SURGERY

## 2023-11-25 PROCEDURE — 99233 SBSQ HOSP IP/OBS HIGH 50: CPT | Performed by: HOSPITALIST

## 2023-11-25 PROCEDURE — 250N000011 HC RX IP 250 OP 636: Mod: JZ | Performed by: SURGERY

## 2023-11-25 PROCEDURE — 250N000011 HC RX IP 250 OP 636: Performed by: HOSPITALIST

## 2023-11-25 PROCEDURE — 99232 SBSQ HOSP IP/OBS MODERATE 35: CPT | Performed by: PHYSICIAN ASSISTANT

## 2023-11-25 PROCEDURE — 85025 COMPLETE CBC W/AUTO DIFF WBC: CPT | Performed by: SURGERY

## 2023-11-25 PROCEDURE — 250N000013 HC RX MED GY IP 250 OP 250 PS 637: Performed by: PHYSICIAN ASSISTANT

## 2023-11-25 PROCEDURE — 83735 ASSAY OF MAGNESIUM: CPT | Performed by: SURGERY

## 2023-11-25 PROCEDURE — 36415 COLL VENOUS BLD VENIPUNCTURE: CPT | Performed by: SURGERY

## 2023-11-25 PROCEDURE — 250N000012 HC RX MED GY IP 250 OP 636 PS 637: Performed by: SURGERY

## 2023-11-25 RX ORDER — HYDROMORPHONE HYDROCHLORIDE 1 MG/ML
0.5 INJECTION, SOLUTION INTRAMUSCULAR; INTRAVENOUS; SUBCUTANEOUS
Status: DISCONTINUED | OUTPATIENT
Start: 2023-11-25 | End: 2023-11-30 | Stop reason: HOSPADM

## 2023-11-25 RX ORDER — HYDROMORPHONE HYDROCHLORIDE 1 MG/ML
0.3 INJECTION, SOLUTION INTRAMUSCULAR; INTRAVENOUS; SUBCUTANEOUS
Status: DISCONTINUED | OUTPATIENT
Start: 2023-11-25 | End: 2023-11-30 | Stop reason: HOSPADM

## 2023-11-25 RX ORDER — HYDROXYZINE HYDROCHLORIDE 25 MG/1
50 TABLET, FILM COATED ORAL EVERY 6 HOURS PRN
Status: DISCONTINUED | OUTPATIENT
Start: 2023-11-25 | End: 2023-11-30 | Stop reason: HOSPADM

## 2023-11-25 RX ORDER — HYDROXYZINE HYDROCHLORIDE 25 MG/1
25 TABLET, FILM COATED ORAL EVERY 6 HOURS PRN
Status: DISCONTINUED | OUTPATIENT
Start: 2023-11-25 | End: 2023-11-30 | Stop reason: HOSPADM

## 2023-11-25 RX ADMIN — OXYCODONE HYDROCHLORIDE 5 MG: 5 TABLET ORAL at 03:22

## 2023-11-25 RX ADMIN — HYDROMORPHONE HYDROCHLORIDE 0.5 MG: 1 INJECTION, SOLUTION INTRAMUSCULAR; INTRAVENOUS; SUBCUTANEOUS at 21:52

## 2023-11-25 RX ADMIN — HEPARIN SODIUM 5000 UNITS: 5000 INJECTION, SOLUTION INTRAVENOUS; SUBCUTANEOUS at 21:56

## 2023-11-25 RX ADMIN — HYDROMORPHONE HYDROCHLORIDE 0.5 MG: 1 INJECTION, SOLUTION INTRAMUSCULAR; INTRAVENOUS; SUBCUTANEOUS at 08:45

## 2023-11-25 RX ADMIN — SODIUM CHLORIDE: 9 INJECTION, SOLUTION INTRAVENOUS at 12:56

## 2023-11-25 RX ADMIN — PIPERACILLIN AND TAZOBACTAM 3.38 G: 3; .375 INJECTION, POWDER, FOR SOLUTION INTRAVENOUS at 04:20

## 2023-11-25 RX ADMIN — PIPERACILLIN AND TAZOBACTAM 3.38 G: 3; .375 INJECTION, POWDER, FOR SOLUTION INTRAVENOUS at 21:53

## 2023-11-25 RX ADMIN — OXYCODONE HYDROCHLORIDE 10 MG: 5 TABLET ORAL at 11:52

## 2023-11-25 RX ADMIN — OXYCODONE HYDROCHLORIDE 10 MG: 5 TABLET ORAL at 17:11

## 2023-11-25 RX ADMIN — SENNOSIDES AND DOCUSATE SODIUM 1 TABLET: 8.6; 5 TABLET ORAL at 10:28

## 2023-11-25 RX ADMIN — HYDROMORPHONE HYDROCHLORIDE 0.5 MG: 1 INJECTION, SOLUTION INTRAMUSCULAR; INTRAVENOUS; SUBCUTANEOUS at 05:35

## 2023-11-25 RX ADMIN — DOCUSATE SODIUM 200 MG: 100 CAPSULE, LIQUID FILLED ORAL at 21:56

## 2023-11-25 RX ADMIN — HYDROMORPHONE HYDROCHLORIDE 1 MG: 1 INJECTION, SOLUTION INTRAMUSCULAR; INTRAVENOUS; SUBCUTANEOUS at 01:15

## 2023-11-25 RX ADMIN — HYDROMORPHONE HYDROCHLORIDE 0.5 MG: 1 INJECTION, SOLUTION INTRAMUSCULAR; INTRAVENOUS; SUBCUTANEOUS at 18:43

## 2023-11-25 RX ADMIN — INSULIN ASPART 1 UNITS: 100 INJECTION, SOLUTION INTRAVENOUS; SUBCUTANEOUS at 17:22

## 2023-11-25 RX ADMIN — ACETAMINOPHEN 975 MG: 325 TABLET, FILM COATED ORAL at 17:11

## 2023-11-25 RX ADMIN — HYDROMORPHONE HYDROCHLORIDE 0.5 MG: 1 INJECTION, SOLUTION INTRAMUSCULAR; INTRAVENOUS; SUBCUTANEOUS at 14:41

## 2023-11-25 RX ADMIN — ACETAMINOPHEN 975 MG: 325 TABLET, FILM COATED ORAL at 23:09

## 2023-11-25 RX ADMIN — PIPERACILLIN AND TAZOBACTAM 3.38 G: 3; .375 INJECTION, POWDER, FOR SOLUTION INTRAVENOUS at 10:27

## 2023-11-25 RX ADMIN — ACETAMINOPHEN 975 MG: 325 TABLET, FILM COATED ORAL at 03:22

## 2023-11-25 RX ADMIN — SENNOSIDES AND DOCUSATE SODIUM 1 TABLET: 8.6; 5 TABLET ORAL at 21:56

## 2023-11-25 RX ADMIN — HEPARIN SODIUM 5000 UNITS: 5000 INJECTION, SOLUTION INTRAVENOUS; SUBCUTANEOUS at 14:30

## 2023-11-25 RX ADMIN — ACETAMINOPHEN 975 MG: 325 TABLET, FILM COATED ORAL at 10:27

## 2023-11-25 RX ADMIN — HYDROXYZINE HYDROCHLORIDE 25 MG: 25 TABLET ORAL at 18:43

## 2023-11-25 ASSESSMENT — ACTIVITIES OF DAILY LIVING (ADL)
ADLS_ACUITY_SCORE: 20
ADLS_ACUITY_SCORE: 20
ADLS_ACUITY_SCORE: 22
ADLS_ACUITY_SCORE: 20
ADLS_ACUITY_SCORE: 22
ADLS_ACUITY_SCORE: 20
ADLS_ACUITY_SCORE: 22
ADLS_ACUITY_SCORE: 20

## 2023-11-25 NOTE — PLAN OF CARE
Pt A&Ox4, fluids running @ 75ml/hr. VSS, 2 L NC   Pain controlled with PRNs, however up to 10/10 pain with movement and between medications.   Up to commode A2.   Abdominal dressing CDI, JOSE drains left and right to bulb suction with bright red minimal output.     Problem: Adult Inpatient Plan of Care  Goal: Plan of Care Review      Goal: Absence of Hospital-Acquired Illness or Injury  11/25/2023 0208 by Kasandra Garcia RN  Outcome: Progressing    Intervention: Identify and Manage Fall Risk  Recent Flowsheet Documentation  Taken 11/25/2023 0015 by Kasandra Garcia RN  Safety Promotion/Fall Prevention:   activity supervised   nonskid shoes/slippers when out of bed  Taken 11/24/2023 2015 by Kasandra Garcia RN  Safety Promotion/Fall Prevention:   activity supervised   nonskid shoes/slippers when out of bed  Intervention: Prevent Skin Injury  Recent Flowsheet Documentation  Taken 11/25/2023 0015 by Kasandra Garcia RN  Body Position:   position changed independently   supine, head elevated  Taken 11/24/2023 2015 by Kasandra Garcia RN  Body Position:   position changed independently   supine, head elevated  Intervention: Prevent and Manage VTE (Venous Thromboembolism) Risk  Recent Flowsheet Documentation  Taken 11/25/2023 0015 by Kasandra Garcia RN  VTE Prevention/Management: SCDs (sequential compression devices) on  Taken 11/24/2023 2015 by Kasandra Garcia RN  VTE Prevention/Management: SCDs (sequential compression devices) on  Goal: Optimal Comfort and Wellbeing  11/25/2023 0208 by Kasandra Garcia RN  Outcome: Progressing    Goal: Readiness for Transition of Care  11/25/2023 0208 by Kasandra Garcia RN  Outcome: Progressing       Problem: Pain Acute  Goal: Optimal Pain Control and Function  11/25/2023 0208 by Kasandra Garcia RN  Outcome: Progressing    Intervention: Prevent or Manage Pain  Recent Flowsheet Documentation  Taken 11/25/2023 0015 by Kasandra Garcia RN  Sensory Stimulation  Regulation: television on  Sleep/Rest Enhancement:   comfort measures   room darkened  Bowel Elimination Promotion: ambulation promoted  Taken 11/24/2023 2015 by Kasandra Garcia RN  Sensory Stimulation Regulation: television on  Sleep/Rest Enhancement:   comfort measures   room darkened  Bowel Elimination Promotion: ambulation promoted  Intervention: Optimize Psychosocial Wellbeing  Recent Flowsheet Documentation  Taken 11/25/2023 0015 by Kasanrda Garcia RN  Supportive Measures:   active listening utilized   goal-setting facilitated   positive reinforcement provided   relaxation techniques promoted   self-care encouraged   self-reflection promoted   self-responsibility promoted   verbalization of feelings encouraged  Taken 11/24/2023 2015 by Kasandra Garcia RN  Supportive Measures:   active listening utilized   goal-setting facilitated   positive reinforcement provided   relaxation techniques promoted   self-care encouraged   self-reflection promoted   self-responsibility promoted   verbalization of feelings encouraged     Problem: Infection  Goal: Absence of Infection Signs and Symptoms  11/25/2023 0208 by Kasandra Garcia RN  Outcome: Progressing       Problem: Diarrhea  Goal: Effective Diarrhea Management  11/25/2023 0208 by Kasandra Garcia RN  Outcome: Progressing  11/25/2023 0208 by Kasandra Garcia RN  Outcome: Progressing  11/25/2023 0207 by Kasandra Garcia RN  Outcome: Not Progressing     Problem: Comorbidity Management  Goal: Blood Glucose Levels Within Targeted Range  11/25/2023 0208 by Kasandra Garcia RN  Outcome: Progressing    Problem: Anemia  Goal: Anemia Symptom Improvement  11/25/2023 0208 by Kasandra Garcia RN  Outcome: Progressing    Intervention: Monitor and Manage Anemia  Recent Flowsheet Documentation  Taken 11/25/2023 0015 by Kasandra Garcia RN  Safety Promotion/Fall Prevention:   activity supervised   nonskid shoes/slippers when out of bed  Taken 11/24/2023 2015 by  Jose, Kasandra, RN  Safety Promotion/Fall Prevention:   activity supervised   nonskid shoes/slippers when out of bed     Problem: Surgery Nonspecified  Goal: Absence of Bleeding  11/25/2023 0208 by Kasandra Garcia RN  Outcome: Progressing    Goal: Effective Bowel Elimination  11/25/2023 0208 by Kasandra Garcia RN  Outcome: Progressing    Intervention: Enhance Bowel Motility and Elimination  Recent Flowsheet Documentation  Taken 11/25/2023 0015 by Kasandra Garcia RN  Bowel Motility Enhancement: ambulation promoted  Taken 11/24/2023 2015 by Kasandra Garcia RN  Bowel Motility Enhancement: ambulation promoted  Goal: Fluid and Electrolyte Balance  11/25/2023 0208 by Kasandra Garcia RN  Outcome: Progressing    Goal: Blood Glucose Level Within Targeted Range  11/25/2023 0208 by Kasandra Garcia RN  Outcome: Progressing    Goal: Absence of Infection Signs and Symptoms  11/25/2023 0208 by Kasandra Garcia RN  Outcome: Progressing    Goal: Anesthesia/Sedation Recovery  11/25/2023 0208 by Kasandra Garcia RN  Outcome: Progressing    Intervention: Optimize Anesthesia Recovery  Recent Flowsheet Documentation  Taken 11/25/2023 0015 by Kasandra Garcia RN  Safety Promotion/Fall Prevention:   activity supervised   nonskid shoes/slippers when out of bed  Taken 11/24/2023 2015 by Kasandra Garcia RN  Safety Promotion/Fall Prevention:   activity supervised   nonskid shoes/slippers when out of bed  Goal: Optimal Pain Control and Function  11/25/2023 0208 by Kasandra Garcia RN  Outcome: Progressing    Goal: Nausea and Vomiting Relief  11/25/2023 0208 by Kasandra Garcia RN  Outcome: Progressing  11/25/2023 0208 by Kasandra Garcia RN  Outcome: Progressing  11/25/2023 0207 by Kasandra Garcia RN  Outcome: Not Progressing  Goal: Effective Urinary Elimination  11/25/2023 0208 by Kasandra Garcia RN  Outcome: Progressing    Intervention: Monitor and Manage Urinary Retention  Recent Flowsheet  Documentation  Taken 11/25/2023 0015 by Kasandra Garcia, RN  Urinary Elimination Promotion: toileting offered  Taken 11/24/2023 2015 by Kasandra Garcia, RN  Urinary Elimination Promotion: toileting offered  Goal: Effective Oxygenation and Ventilation  11/25/2023 0208 by Kasandra Garcia, RN  Outcome: Progressing    Intervention: Optimize Oxygenation and Ventilation  Recent Flowsheet Documentation  Taken 11/25/2023 0015 by Kasandra Garcia, RN  Head of Bed ([facility]) Positioning: HOB at 30-45 degrees  Taken 11/24/2023 2015 by Kasandra Garcia, RN  Head of Bed ([facility]) Positioning: HOB at 30-45 degrees   Goal Outcome Evaluation:    11/25/23

## 2023-11-25 NOTE — PROGRESS NOTES
ASSESSMENT:  1. Umbilical hernia with gangrene    2. Nausea and vomiting, unspecified vomiting type    3. Abdominal wall cellulitis    4. Abdominal wall seroma, initial encounter        Ady Godinez is a 38 year old male who is s/p Sporter laparotomy with repair of recurrence of incisional ventral hernia requiring significant amount of repair and bridging.  Pain is definitely expected    PLAN:  -Continue to work with pain and I added Atarax to adjunct his pain control.  -Hopefully tomorrow we can start working on getting him sitting up.  -Lets try taking a couple of binders and making a binder to go around his abdomen we actually may need a total of 4 and layering the binders just to help him so he can move.  -Diet as tolerated  -agree with heparin since he does not like scds    SUBJECTIVE:   He is feeling miserable as he is in quite a bit of pain.  He is able to stand up and use the urinal.  He has not wanted to move otherwise due to the severe amount of pain that he is experiencing.      Patient Vitals for the past 24 hrs:   BP Temp Temp src Pulse Resp SpO2   11/25/23 1254 -- -- -- -- -- 97 %   11/25/23 1152 -- -- -- -- -- 99 %   11/25/23 1142 107/55 97.7  F (36.5  C) Oral 92 17 99 %   11/25/23 0933 -- -- -- 87 16 94 %   11/25/23 0830 100/60 -- -- -- -- --   11/25/23 0821 96/43 98.3  F (36.8  C) Axillary 94 16 96 %   11/25/23 0725 -- -- -- -- -- 95 %   11/25/23 0535 132/58 98.4  F (36.9  C) Oral 88 16 95 %   11/25/23 0118 117/53 97.4  F (36.3  C) Oral 87 12 97 %   11/24/23 2121 139/64 -- -- 97 15 93 %   11/24/23 2000 127/62 97.5  F (36.4  C) Oral 96 15 98 %   11/24/23 1833 119/64 97.7  F (36.5  C) Axillary 95 14 94 %   11/24/23 1800 121/66 98.3  F (36.8  C) Axillary 96 14 92 %   11/24/23 1715 126/57 -- -- 96 13 92 %   11/24/23 1709 -- -- -- 98 12 96 %   11/24/23 1700 123/57 98.2  F (36.8  C) Temporal 99 12 91 %   11/24/23 1645 127/56 -- -- 101 13 97 %   11/24/23 1637 -- -- -- 101 16 100 %   11/24/23 1634 --  -- -- 100 16 97 %   11/24/23 1630 137/61 -- -- 98 15 98 %   11/24/23 1625 137/61 -- -- 99 16 98 %   11/24/23 1618 107/54 97.5  F (36.4  C) Temporal 101 16 95 %         PHYSICAL EXAM:  GEN: No acute distress, comfortable    ABD: Obese and soft.  Wound looks clean and dry.  Drains: Both bloody appearing and not a lot of output currently  Output by Drain (mL) 11/23/23 0700 - 11/23/23 1459 11/23/23 1500 - 11/23/23 2259 11/23/23 2300 - 11/24/23 0659 11/24/23 0700 - 11/24/23 1459 11/24/23 1500 - 11/24/23 2259 11/24/23 2300 - 11/25/23 0659 11/25/23 0700 - 11/25/23 1400   Closed/Suction Drain 1 LLQ Bulb 19 Bahamian     30 27    Closed/Suction Drain 2 RLQ Bulb 19 Bahamian     20 45       EXT:No cyanosis, edema or obvious abnormalities    11/24 0700 - 11/25 0659  In: 2784 [I.V.:2784]  Out: 372 [Drains:122]    Admission on 11/22/2023   Component Date Value    Sodium 11/22/2023 132 (L)     Potassium 11/22/2023 4.9     Carbon Dioxide (CO2) 11/22/2023 28     Anion Gap 11/22/2023 14     Urea Nitrogen 11/22/2023 16.2     Creatinine 11/22/2023 0.74     GFR Estimate 11/22/2023 >90     Calcium 11/22/2023 10.2 (H)     Chloride 11/22/2023 90 (L)     Glucose 11/22/2023 166 (H)     Alkaline Phosphatase 11/22/2023 126     AST 11/22/2023      ALT 11/22/2023 39     Protein Total 11/22/2023 8.7 (H)     Albumin 11/22/2023 3.7     Bilirubin Total 11/22/2023 0.6     Lipase 11/22/2023 30     Lactic Acid 11/22/2023 1.2     WBC Count 11/22/2023 19.1 (H)     RBC Count 11/22/2023 4.98     Hemoglobin 11/22/2023 14.1     Hematocrit 11/22/2023 42.9     MCV 11/22/2023 86     MCH 11/22/2023 28.3     MCHC 11/22/2023 32.9     RDW 11/22/2023 14.2     Platelet Count 11/22/2023 473 (H)     % Neutrophils 11/22/2023 82     % Lymphocytes 11/22/2023 11     % Monocytes 11/22/2023 5     % Eosinophils 11/22/2023 1     % Basophils 11/22/2023 0     % Immature Granulocytes 11/22/2023 1     NRBCs per 100 WBC 11/22/2023 0     Absolute Neutrophils 11/22/2023 15.5 (H)      Absolute Lymphocytes 11/22/2023 2.1     Absolute Monocytes 11/22/2023 1.0     Absolute Eosinophils 11/22/2023 0.3     Absolute Basophils 11/22/2023 0.1     Absolute Immature Granul* 11/22/2023 0.2     Absolute NRBCs 11/22/2023 0.0     MRSA Target DNA 11/23/2023 Negative     SA Target DNA 11/23/2023 Positive     Procalcitonin 11/22/2023 0.20     Sodium 11/23/2023 133 (L)     Potassium 11/23/2023 4.0     Chloride 11/23/2023 96 (L)     Carbon Dioxide (CO2) 11/23/2023 26     Anion Gap 11/23/2023 11     Urea Nitrogen 11/23/2023 15.9     Creatinine 11/23/2023 0.76     GFR Estimate 11/23/2023 >90     Calcium 11/23/2023 8.9     Glucose 11/23/2023 130 (H)     WBC Count 11/23/2023 14.1 (H)     RBC Count 11/23/2023 4.22 (L)     Hemoglobin 11/23/2023 11.8 (L)     Hematocrit 11/23/2023 37.4 (L)     MCV 11/23/2023 89     MCH 11/23/2023 28.0     MCHC 11/23/2023 31.6     RDW 11/23/2023 14.0     Platelet Count 11/23/2023 398     Hemoglobin A1C 11/22/2023 6.5 (H)     GLUCOSE BY METER POCT 11/23/2023 136 (H)     GLUCOSE BY METER POCT 11/23/2023 179 (H)     Hemoglobin 11/23/2023 12.4 (L)     GLUCOSE BY METER POCT 11/23/2023 136 (H)     Sodium 11/24/2023 134 (L)     Potassium 11/24/2023 4.7     Chloride 11/24/2023 100     Carbon Dioxide (CO2) 11/24/2023 23     Anion Gap 11/24/2023 11     Urea Nitrogen 11/24/2023 10.8     Creatinine 11/24/2023 0.68     GFR Estimate 11/24/2023 >90     Calcium 11/24/2023 8.4 (L)     Glucose 11/24/2023 106 (H)     GLUCOSE BY METER POCT 11/24/2023 107 (H)     WBC Count 11/24/2023 18.8 (H)     RBC Count 11/24/2023 4.25 (L)     Hemoglobin 11/24/2023 12.0 (L)     Hematocrit 11/24/2023 38.6 (L)     MCV 11/24/2023 91     MCH 11/24/2023 28.2     MCHC 11/24/2023 31.1 (L)     RDW 11/24/2023 14.3     Platelet Count 11/24/2023 443     % Neutrophils 11/24/2023 89     % Lymphocytes 11/24/2023 7     % Monocytes 11/24/2023 3     % Eosinophils 11/24/2023 0     % Basophils 11/24/2023 0     % Immature Granulocytes 11/24/2023  1     NRBCs per 100 WBC 11/24/2023 0     Absolute Neutrophils 11/24/2023 16.8 (H)     Absolute Lymphocytes 11/24/2023 1.2     Absolute Monocytes 11/24/2023 0.6     Absolute Eosinophils 11/24/2023 0.0     Absolute Basophils 11/24/2023 0.1     Absolute Immature Granul* 11/24/2023 0.2     Absolute NRBCs 11/24/2023 0.0     GLUCOSE BY METER POCT 11/24/2023 97     Gram Stain Result 11/24/2023 No organisms seen     Gram Stain Result 11/24/2023 2+ WBC seen     Culture 11/24/2023 No growth, less than 1 day     Hold Specimen 11/24/2023 JIC     Hold Specimen 11/24/2023 JIC     GLUCOSE BY METER POCT 11/24/2023 168 (H)     GLUCOSE BY METER POCT 11/24/2023 135 (H)     Sodium 11/25/2023 132 (L)     Potassium 11/25/2023 4.4     Chloride 11/25/2023 98     Carbon Dioxide (CO2) 11/25/2023 25     Anion Gap 11/25/2023 9     Urea Nitrogen 11/25/2023 10.2     Creatinine 11/25/2023 0.85     GFR Estimate 11/25/2023 >90     Calcium 11/25/2023 8.4 (L)     Glucose 11/25/2023 139 (H)     Magnesium 11/25/2023 2.5 (H)     Phosphorus 11/25/2023 4.0     WBC Count 11/25/2023 15.9 (H)     RBC Count 11/25/2023 4.17 (L)     Hemoglobin 11/25/2023 11.7 (L)     Hematocrit 11/25/2023 37.6 (L)     MCV 11/25/2023 90     MCH 11/25/2023 28.1     MCHC 11/25/2023 31.1 (L)     RDW 11/25/2023 14.5     Platelet Count 11/25/2023 418     % Neutrophils 11/25/2023 81     % Lymphocytes 11/25/2023 10     % Monocytes 11/25/2023 7     % Eosinophils 11/25/2023 1     % Basophils 11/25/2023 0     % Immature Granulocytes 11/25/2023 1     NRBCs per 100 WBC 11/25/2023 0     Absolute Neutrophils 11/25/2023 12.9 (H)     Absolute Lymphocytes 11/25/2023 1.6     Absolute Monocytes 11/25/2023 1.0     Absolute Eosinophils 11/25/2023 0.1     Absolute Basophils 11/25/2023 0.1     Absolute Immature Granul* 11/25/2023 0.2     Absolute NRBCs 11/25/2023 0.0     GLUCOSE BY METER POCT 11/25/2023 126 (H)     GLUCOSE BY METER POCT 11/25/2023 112 (H)           Bradley Arnold, PRAKASH Arnold  ASHISH  [facility]  [address] [address]  Phone- [phone]  Fax- [phone]

## 2023-11-25 NOTE — PLAN OF CARE
Goal Outcome Evaluation:      Plan of Care Reviewed With: patient    Overall Patient Progress: improvingOverall Patient Progress: improving       Problem: Pain Acute  Goal: Optimal Pain Control and Function  Outcome: Progressing   Pain just a bit ago was 10/10. Iv pain medication given. He gets good relief in a very short time after taking the medication, continuous pulse oximeter in use, patient on 1 liter. He says the medication makes him sleepy but he is easily arousable when woke up by staff.     Problem: Surgery Nonspecified  Goal: Absence of Bleeding  Outcome: Progressing   2 dianne drains in place, one on each side of the abdomen. Dressing is dry. Per order, abdominal binders were ordered, they are in patient room. He did not want to wear them right now, wants better pain control.     Problem: Surgery Nonspecified  Goal: Effective Urinary Elimination  Outcome: Progressing   Was able to stand at the bedside to ure the urinal, voided 400 at one time    Problem: Surgery Nonspecified  Goal: Effective Oxygenation and Ventilation  Outcome: Progressing   Currently on 1 liter of oxygen, SAT's always above 92%. Is using incentive spirometer to about 1250  Occasional productive cough, white or yellow in color.

## 2023-11-25 NOTE — PROGRESS NOTES
"[facility]    Medicine Progress Note - Hospitalist Service    Date of Admission:  11/22/2023    Assessment & Plan   38-year-old male with severe obesity admitted for abscess of the abdominal wall.    #Abscess, anterior abdominal wall  #Recurrent incisional ventral hernia  s/p exploratory laparotomy with repair of incisional ventral hernia with mesh, drain placement x 2  Continue empiric antibiotics with Zosyn for now  Stopped Vanc 2/2 neg MRSA swab  Gentle IV fluids  Pain control - ordered IV dilaudid at lower dose 2/2 soft BP, titrate as needed and as BP will allow    #Hypercalcemia, mild  Ca initially 10.2, now wnl after IV fluids  Follow chemistry    #Diabetes type 2  A1c 6.5  No home medications  SSI    #Hypertension  No home medication  Hold lisinopril today 2/2 soft BP    #Severe obesity  BMI 58.58      DVT ppx: sc heparin        Diet: Clear Liquid Diet    Duvall Catheter: Not present  Lines: None     Cardiac Monitoring: None  Code Status: Full Code      Clinically Significant Risk Factors                  # Hypertension: Noted on problem list       # DMII: A1C = 6.5 % (Ref range: <5.7 %) within past 6 months, PRESENT ON ADMISSION  # Severe Obesity: Estimated body mass index is 56.21 kg/m  as calculated from the following:    Height as of this encounter: 1.803 m (5' 11\").    Weight as of this encounter: 182.8 kg (403 lb)., PRESENT ON ADMISSION            Disposition Plan     Expected Discharge Date: 11/27/2023    Discharge Delays: IV Medication - consider oral or Home Infusion  Procedure Pending (enter procedure & time in comments)  Destination: home with family  Discharge Comments: surgery today (friday)            Reza Gonsalez DO  Hospitalist Service  [facility]  Securely message with Jolie (more info)  Text page via AMCmention Paging/Directory   ______________________________________________________________________    Interval History   No acute events " overnight    Physical Exam   Vital Signs: Temp: 97.7  F (36.5  C) Temp src: Oral BP: 107/55 Pulse: 92   Resp: 17 SpO2: 99 % O2 Device: None (Room air) Oxygen Delivery: 2 LPM  Weight: 403 lbs .01 oz    General Appearance:  No acute distress  Respiratory: Clear to auscultation bilaterally  Cardiovascular: Regular rate and rhythm  Abdominal wound dressing clean, dry and intact, drain x2 both with serosanginous fluid    Medical Decision Making             Data

## 2023-11-25 NOTE — PROVIDER NOTIFICATION
Message sent to Dr. Gonsalez, manual BP is 100/60 HR of 95. Patient still very painful, wondering if ok to still give IV pain medications. Await return call.    Update 3689: see new orders by Dr. Gonsalez

## 2023-11-25 NOTE — PLAN OF CARE
Problem: Pain Acute  Goal: Optimal Pain Control and Function  Outcome: Progressing   Goal Outcome Evaluation:      Plan of Care Reviewed With: patient    Overall Patient Progress: improvingOverall Patient Progress: improving  Since getting back from surgery, patient c/o pain sharp mid abdomen 10/10. Took oxycodone and tylenol with little relief, IV access lost then re established. Will give additional pain medication as ordered.     Problem: Comorbidity Management  Goal: Blood Glucose Levels Within Targeted Range  Outcome: Progressing      Blood sugar upon arrival to the floor was 168. Will continue to monitor.    Problem: Surgery Nonspecified  Goal: Effective Bowel Elimination  Outcome: Progressing   Bowel sounds are hypoactive so far. He is belching, but has not passed flatus.     Problem: Surgery Nonspecified  Goal: Anesthesia/Sedation Recovery  Outcome: Progressing  Intervention: Optimize Anesthesia Recovery  Recent Flowsheet Documentation  Taken 11/24/2023 1800 by Carlyn Vargas, RN  Safety Promotion/Fall Prevention:   activity supervised   nonskid shoes/slippers when out of bed   Vital signs are stable, currently on room air SAT's above 92%    Problem: Surgery Nonspecified  Goal: Effective Urinary Elimination  Outcome: Progressing   Shortly after arrival to the unit, patient insisted on walking to the bathroom to urinate. Assisted with 2, gait belt. He was able to stand at the toilet, voided a large amount, got back to bed. Did c/o feeling slightly dizzy right before laying down, but felt normal again once he sat down and then laid down.

## 2023-11-26 LAB
ANION GAP SERPL CALCULATED.3IONS-SCNC: 9 MMOL/L (ref 7–15)
BUN SERPL-MCNC: 7.9 MG/DL (ref 6–20)
CALCIUM SERPL-MCNC: 8.6 MG/DL (ref 8.6–10)
CHLORIDE SERPL-SCNC: 97 MMOL/L (ref 98–107)
CREAT SERPL-MCNC: 0.63 MG/DL (ref 0.67–1.17)
DEPRECATED HCO3 PLAS-SCNC: 25 MMOL/L (ref 22–29)
EGFRCR SERPLBLD CKD-EPI 2021: >90 ML/MIN/1.73M2
ERYTHROCYTE [DISTWIDTH] IN BLOOD BY AUTOMATED COUNT: 14.6 % (ref 10–15)
GLUCOSE BLDC GLUCOMTR-MCNC: 102 MG/DL (ref 70–99)
GLUCOSE BLDC GLUCOMTR-MCNC: 106 MG/DL (ref 70–99)
GLUCOSE BLDC GLUCOMTR-MCNC: 125 MG/DL (ref 70–99)
GLUCOSE BLDC GLUCOMTR-MCNC: 125 MG/DL (ref 70–99)
GLUCOSE BLDC GLUCOMTR-MCNC: 135 MG/DL (ref 70–99)
GLUCOSE SERPL-MCNC: 127 MG/DL (ref 70–99)
HCT VFR BLD AUTO: 34.4 % (ref 40–53)
HGB BLD-MCNC: 10.6 G/DL (ref 13.3–17.7)
MCH RBC QN AUTO: 28.2 PG (ref 26.5–33)
MCHC RBC AUTO-ENTMCNC: 30.8 G/DL (ref 31.5–36.5)
MCV RBC AUTO: 92 FL (ref 78–100)
PLATELET # BLD AUTO: 380 10E3/UL (ref 150–450)
POTASSIUM SERPL-SCNC: 4.4 MMOL/L (ref 3.4–5.3)
RBC # BLD AUTO: 3.76 10E6/UL (ref 4.4–5.9)
SODIUM SERPL-SCNC: 131 MMOL/L (ref 135–145)
WBC # BLD AUTO: 14.6 10E3/UL (ref 4–11)

## 2023-11-26 PROCEDURE — 85027 COMPLETE CBC AUTOMATED: CPT | Performed by: HOSPITALIST

## 2023-11-26 PROCEDURE — 250N000011 HC RX IP 250 OP 636: Mod: JZ | Performed by: SURGERY

## 2023-11-26 PROCEDURE — 36415 COLL VENOUS BLD VENIPUNCTURE: CPT | Performed by: HOSPITALIST

## 2023-11-26 PROCEDURE — 99232 SBSQ HOSP IP/OBS MODERATE 35: CPT | Performed by: HOSPITALIST

## 2023-11-26 PROCEDURE — 250N000013 HC RX MED GY IP 250 OP 250 PS 637: Performed by: PHYSICIAN ASSISTANT

## 2023-11-26 PROCEDURE — 82310 ASSAY OF CALCIUM: CPT | Performed by: HOSPITALIST

## 2023-11-26 PROCEDURE — 250N000013 HC RX MED GY IP 250 OP 250 PS 637: Performed by: SURGERY

## 2023-11-26 PROCEDURE — 250N000011 HC RX IP 250 OP 636: Performed by: HOSPITALIST

## 2023-11-26 PROCEDURE — 120N000001 HC R&B MED SURG/OB

## 2023-11-26 RX ADMIN — DOCUSATE SODIUM 200 MG: 100 CAPSULE, LIQUID FILLED ORAL at 08:06

## 2023-11-26 RX ADMIN — PIPERACILLIN AND TAZOBACTAM 3.38 G: 3; .375 INJECTION, POWDER, FOR SOLUTION INTRAVENOUS at 12:15

## 2023-11-26 RX ADMIN — ACETAMINOPHEN 975 MG: 325 TABLET, FILM COATED ORAL at 08:06

## 2023-11-26 RX ADMIN — PIPERACILLIN AND TAZOBACTAM 3.38 G: 3; .375 INJECTION, POWDER, FOR SOLUTION INTRAVENOUS at 22:14

## 2023-11-26 RX ADMIN — SENNOSIDES AND DOCUSATE SODIUM 1 TABLET: 8.6; 5 TABLET ORAL at 08:06

## 2023-11-26 RX ADMIN — HYDROMORPHONE HYDROCHLORIDE 0.5 MG: 1 INJECTION, SOLUTION INTRAMUSCULAR; INTRAVENOUS; SUBCUTANEOUS at 13:06

## 2023-11-26 RX ADMIN — OXYCODONE HYDROCHLORIDE 10 MG: 5 TABLET ORAL at 00:30

## 2023-11-26 RX ADMIN — PIPERACILLIN AND TAZOBACTAM 3.38 G: 3; .375 INJECTION, POWDER, FOR SOLUTION INTRAVENOUS at 05:24

## 2023-11-26 RX ADMIN — ONDANSETRON 4 MG: 2 INJECTION INTRAMUSCULAR; INTRAVENOUS at 19:40

## 2023-11-26 RX ADMIN — SENNOSIDES AND DOCUSATE SODIUM 1 TABLET: 8.6; 5 TABLET ORAL at 22:15

## 2023-11-26 RX ADMIN — ACETAMINOPHEN 975 MG: 325 TABLET, FILM COATED ORAL at 16:14

## 2023-11-26 RX ADMIN — HEPARIN SODIUM 5000 UNITS: 5000 INJECTION, SOLUTION INTRAVENOUS; SUBCUTANEOUS at 05:22

## 2023-11-26 RX ADMIN — OXYCODONE HYDROCHLORIDE 10 MG: 5 TABLET ORAL at 23:44

## 2023-11-26 RX ADMIN — HYDROMORPHONE HYDROCHLORIDE 0.5 MG: 1 INJECTION, SOLUTION INTRAMUSCULAR; INTRAVENOUS; SUBCUTANEOUS at 08:12

## 2023-11-26 RX ADMIN — HEPARIN SODIUM 5000 UNITS: 5000 INJECTION, SOLUTION INTRAVENOUS; SUBCUTANEOUS at 13:35

## 2023-11-26 RX ADMIN — HYDROMORPHONE HYDROCHLORIDE 0.5 MG: 1 INJECTION, SOLUTION INTRAMUSCULAR; INTRAVENOUS; SUBCUTANEOUS at 02:49

## 2023-11-26 RX ADMIN — OXYCODONE HYDROCHLORIDE 10 MG: 5 TABLET ORAL at 16:13

## 2023-11-26 RX ADMIN — HYDROXYZINE HYDROCHLORIDE 50 MG: 25 TABLET, FILM COATED ORAL at 22:15

## 2023-11-26 RX ADMIN — DOCUSATE SODIUM 200 MG: 100 CAPSULE, LIQUID FILLED ORAL at 22:14

## 2023-11-26 RX ADMIN — HEPARIN SODIUM 5000 UNITS: 5000 INJECTION, SOLUTION INTRAVENOUS; SUBCUTANEOUS at 22:15

## 2023-11-26 RX ADMIN — HYDROMORPHONE HYDROCHLORIDE 0.5 MG: 1 INJECTION, SOLUTION INTRAMUSCULAR; INTRAVENOUS; SUBCUTANEOUS at 19:31

## 2023-11-26 RX ADMIN — ACETAMINOPHEN 975 MG: 325 TABLET, FILM COATED ORAL at 23:44

## 2023-11-26 RX ADMIN — HYDROXYZINE HYDROCHLORIDE 50 MG: 25 TABLET, FILM COATED ORAL at 05:21

## 2023-11-26 ASSESSMENT — ACTIVITIES OF DAILY LIVING (ADL)
ADLS_ACUITY_SCORE: 22
ADLS_ACUITY_SCORE: 20
ADLS_ACUITY_SCORE: 20
ADLS_ACUITY_SCORE: 28
ADLS_ACUITY_SCORE: 22
ADLS_ACUITY_SCORE: 28
ADLS_ACUITY_SCORE: 22
ADLS_ACUITY_SCORE: 22
ADLS_ACUITY_SCORE: 28
ADLS_ACUITY_SCORE: 22
ADLS_ACUITY_SCORE: 28
ADLS_ACUITY_SCORE: 22

## 2023-11-26 NOTE — PLAN OF CARE
Problem: Adult Inpatient Plan of Care  Goal: Plan of Care Review  Description: The Plan of Care Review/Shift note should be completed every shift.  The Outcome Evaluation is a brief statement about your assessment that the patient is improving, declining, or no change.  This information will be displayed automatically on your shift  note.  Outcome: Progressing  Goal: Absence of Hospital-Acquired Illness or Injury  Intervention: Identify and Manage Fall Risk  Recent Flowsheet Documentation  Taken 11/25/2023 1711 by Tanya Lowery, RN  Safety Promotion/Fall Prevention: activity supervised  Intervention: Prevent Skin Injury  Recent Flowsheet Documentation  Taken 11/25/2023 1711 by Tanya Lowery, RN  Body Position: (with kilo)   turned   weight shifting  Intervention: Prevent Infection  Recent Flowsheet Documentation  Taken 11/25/2023 1711 by Tanya Lowery, RN  Infection Prevention:   rest/sleep promoted   single patient room provided  Goal: Optimal Comfort and Wellbeing  Intervention: Monitor Pain and Promote Comfort  Recent Flowsheet Documentation  Taken 11/25/2023 1858 by Tanya Lowery, RN  Pain Management Interventions:   medication (see MAR)   rest   repositioned  Taken 11/25/2023 1756 by Tanya Lowery, RN  Pain Management Interventions:   medication (see MAR)   rest   repositioned  Taken 11/25/2023 1711 by Tanya Lowery, RN  Pain Management Interventions:   medication (see MAR)   rest     Problem: Pain Acute  Goal: Optimal Pain Control and Function  Intervention: Prevent or Manage Pain  Recent Flowsheet Documentation  Taken 11/25/2023 1711 by Tanya Lowery, RN  Medication Review/Management: medications reviewed     Problem: Pain Acute  Goal: Optimal Pain Control and Function  Intervention: Develop Pain Management Plan  Recent Flowsheet Documentation  Taken 11/25/2023 1858 by Tanya Lowery, RN  Pain Management Interventions:   medication (see MAR)   rest    repositioned  Taken 11/25/2023 1756 by Tanya Lowery, RN  Pain Management Interventions:   medication (see MAR)   rest   repositioned  Taken 11/25/2023 1711 by Tanya Lowery, RN  Pain Management Interventions:   medication (see MAR)   rest     Goal Outcome Evaluation:  Patient is alert and oriented and able to make needs known. Patient's pain has been slightly controlled with PRN oxycodone,  IV dilaudid x 2 and atarax. Rating pain a 10 dropping to a 7. Patient up with assist x 2 with gait belt.

## 2023-11-26 NOTE — PLAN OF CARE
"  Problem: Adult Inpatient Plan of Care  Goal: Plan of Care Review  Description: The Plan of Care Review/Shift note should be completed every shift.  The Outcome Evaluation is a brief statement about your assessment that the patient is improving, declining, or no change.  This information will be displayed automatically on your shift  note.  Outcome: Progressing  Goal: Patient-Specific Goal (Individualized)  Description: You can add care plan individualizations to a care plan. Examples of Individualization might be:  \"Parent requests to be called daily at 9am for status\", \"I have a hard time hearing out of my right ear\", or \"Do not touch me to wake me up as it startles  me\".  Outcome: Progressing  Goal: Absence of Hospital-Acquired Illness or Injury  Outcome: Progressing  Intervention: Identify and Manage Fall Risk  Recent Flowsheet Documentation  Taken 11/26/2023 0951 by John Mercado RN  Safety Promotion/Fall Prevention:   activity supervised   nonskid shoes/slippers when out of bed  Intervention: Prevent Skin Injury  Recent Flowsheet Documentation  Taken 11/26/2023 0951 by John Mercado RN  Body Position:   turned   weight shifting   position changed independently  Taken 11/26/2023 0812 by John Mercado RN  Body Position: position changed independently  Goal: Optimal Comfort and Wellbeing  Outcome: Progressing  Intervention: Monitor Pain and Promote Comfort  Recent Flowsheet Documentation  Taken 11/26/2023 0812 by John Mercado RN  Pain Management Interventions: medication (see MAR)  Goal: Readiness for Transition of Care  Outcome: Progressing     Problem: Pain Acute  Goal: Optimal Pain Control and Function  Outcome: Progressing  Intervention: Develop Pain Management Plan  Recent Flowsheet Documentation  Taken 11/26/2023 0812 by John Mercado RN  Pain Management Interventions: medication (see MAR)  Intervention: Prevent or Manage Pain  Recent Flowsheet Documentation  Taken 11/26/2023 0951 by " Khabbaz, Faezeh, RN  Sensory Stimulation Regulation: television on  Sleep/Rest Enhancement:   comfort measures   room darkened  Bowel Elimination Promotion: ambulation promoted  Intervention: Optimize Psychosocial Wellbeing  Recent Flowsheet Documentation  Taken 11/26/2023 0951 by John Mercado RN  Supportive Measures:   active listening utilized   goal-setting facilitated   positive reinforcement provided   relaxation techniques promoted   self-care encouraged   self-reflection promoted   self-responsibility promoted   verbalization of feelings encouraged     Problem: Infection  Goal: Absence of Infection Signs and Symptoms  Outcome: Progressing     Problem: Diarrhea  Goal: Effective Diarrhea Management  Outcome: Progressing     Problem: Comorbidity Management  Goal: Blood Glucose Levels Within Targeted Range  Outcome: Progressing     Problem: Anemia  Goal: Anemia Symptom Improvement  Outcome: Progressing  Intervention: Monitor and Manage Anemia  Recent Flowsheet Documentation  Taken 11/26/2023 0951 by John Mercado RN  Safety Promotion/Fall Prevention:   activity supervised   nonskid shoes/slippers when out of bed     Problem: Surgery Nonspecified  Goal: Absence of Bleeding  Outcome: Progressing  Goal: Effective Bowel Elimination  Outcome: Progressing  Intervention: Enhance Bowel Motility and Elimination  Recent Flowsheet Documentation  Taken 11/26/2023 0951 by John Mercado RN  Bowel Motility Enhancement: ambulation promoted  Goal: Fluid and Electrolyte Balance  Outcome: Progressing  Goal: Blood Glucose Level Within Targeted Range  Outcome: Progressing  Goal: Absence of Infection Signs and Symptoms  Outcome: Progressing  Goal: Anesthesia/Sedation Recovery  Outcome: Progressing  Intervention: Optimize Anesthesia Recovery  Recent Flowsheet Documentation  Taken 11/26/2023 0951 by John Mercado RN  Safety Promotion/Fall Prevention:   activity supervised   nonskid shoes/slippers when out of bed  Goal:  Optimal Pain Control and Function  Outcome: Progressing  Intervention: Prevent or Manage Pain  Recent Flowsheet Documentation  Taken 11/26/2023 0812 by John Mercado RN  Pain Management Interventions: medication (see MAR)  Goal: Nausea and Vomiting Relief  Outcome: Progressing  Goal: Effective Urinary Elimination  Outcome: Progressing  Intervention: Monitor and Manage Urinary Retention  Recent Flowsheet Documentation  Taken 11/26/2023 0951 by John Mercado RN  Urinary Elimination Promotion: toileting offered  Goal: Effective Oxygenation and Ventilation  Outcome: Progressing  Intervention: Optimize Oxygenation and Ventilation  Recent Flowsheet Documentation  Taken 11/26/2023 0951 by John Mercado RN  Head of Bed (HOB) Positioning: HOB at 30-45 degrees  Taken 11/26/2023 0812 by John Mercado RN  Head of Bed (HOB) Positioning: HOB at 30-45 degrees   Goal Outcome Evaluation:                  Pt is alert and oriented x4. Pt is med complaint. Pt received pain med for the abdomen ache. Pt is up with assist of 1. Pt's v/s is stable. Continue to monitor pt.

## 2023-11-26 NOTE — PLAN OF CARE
Goal Outcome Evaluation:    A&Ox4, VSS. 1 L nc. Continuous pulse ox. remains in place.  PRNs for pain. Abdominal dressing CDI. JOSE drains with minimal bright red serosanguinous output.   NS @ 75ml/hr     Problem: Adult Inpatient Plan of Care  Goal: Plan of Care Review    Outcome: Progressing  Goal: Absence of Hospital-Acquired Illness or Injury  Outcome: Progressing  Intervention: Identify and Manage Fall Risk  Recent Flowsheet Documentation  Taken 11/25/2023 2345 by Kasandra Garcia RN  Safety Promotion/Fall Prevention:   activity supervised   nonskid shoes/slippers when out of bed  Intervention: Prevent Skin Injury  Recent Flowsheet Documentation  Taken 11/25/2023 2345 by Kasandra Garcia RN  Body Position:   turned   weight shifting   position changed independently  Goal: Optimal Comfort and Wellbeing  Outcome: Progressing  Intervention: Monitor Pain and Promote Comfort  Recent Flowsheet Documentation  Taken 11/26/2023 0112 by Kasandra Garcia RN  Pain Management Interventions: medication (see MAR)  Taken 11/25/2023 2345 by Kasandra Garcia RN  Pain Management Interventions:   medication (see MAR)   rest   repositioned  Goal: Readiness for Transition of Care  Outcome: Progressing     Problem: Pain Acute  Goal: Optimal Pain Control and Function  Outcome: Progressing  Intervention: Develop Pain Management Plan  Recent Flowsheet Documentation  Taken 11/26/2023 0112 by Kasandra Garcia RN  Pain Management Interventions: medication (see MAR)  Taken 11/25/2023 2345 by Kasandra Garcia RN  Pain Management Interventions:   medication (see MAR)   rest   repositioned  Intervention: Prevent or Manage Pain  Recent Flowsheet Documentation  Taken 11/25/2023 2345 by Kasandra Garcia RN  Sensory Stimulation Regulation: television on  Sleep/Rest Enhancement:   comfort measures   room darkened  Bowel Elimination Promotion: ambulation promoted  Intervention: Optimize Psychosocial Wellbeing  Recent Flowsheet  Documentation  Taken 11/25/2023 2345 by Kasandra Garcia, RN  Supportive Measures:   active listening utilized   goal-setting facilitated   positive reinforcement provided   relaxation techniques promoted   self-care encouraged   self-reflection promoted   self-responsibility promoted   verbalization of feelings encouraged     Problem: Infection  Goal: Absence of Infection Signs and Symptoms  Outcome: Progressing     Problem: Diarrhea  Goal: Effective Diarrhea Management  Outcome: Progressing     Problem: Comorbidity Management  Goal: Blood Glucose Levels Within Targeted Range  Outcome: Progressing     Problem: Anemia  Goal: Anemia Symptom Improvement  Outcome: Progressing  Intervention: Monitor and Manage Anemia  Recent Flowsheet Documentation  Taken 11/25/2023 2345 by Kasandra Garcia, RN  Safety Promotion/Fall Prevention:   activity supervised   nonskid shoes/slippers when out of bed     Problem: Surgery Nonspecified  Goal: Absence of Bleeding  Outcome: Progressing  Goal: Effective Bowel Elimination  Outcome: Progressing  Intervention: Enhance Bowel Motility and Elimination  Recent Flowsheet Documentation  Taken 11/25/2023 2345 by Kasandra Garcia RN  Bowel Motility Enhancement: ambulation promoted  Goal: Fluid and Electrolyte Balance  Outcome: Progressing  Goal: Blood Glucose Level Within Targeted Range  Outcome: Progressing  Goal: Absence of Infection Signs and Symptoms  Outcome: Progressing  Goal: Anesthesia/Sedation Recovery  Outcome: Progressing  Intervention: Optimize Anesthesia Recovery  Recent Flowsheet Documentation  Taken 11/25/2023 2345 by Kasandra Garcia, RN  Safety Promotion/Fall Prevention:   activity supervised   nonskid shoes/slippers when out of bed  Goal: Optimal Pain Control and Function  Outcome: Progressing  Intervention: Prevent or Manage Pain  Recent Flowsheet Documentation  Taken 11/26/2023 0112 by Kasandra Garcia, RN  Pain Management Interventions: medication (see MAR)  Taken  11/25/2023 2345 by Kasandra Garcia, RN  Pain Management Interventions:   medication (see MAR)   rest   repositioned  Goal: Nausea and Vomiting Relief  Outcome: Progressing  Goal: Effective Urinary Elimination  Outcome: Progressing  Intervention: Monitor and Manage Urinary Retention  Recent Flowsheet Documentation  Taken 11/25/2023 2345 by Kasandra Garcia, RN  Urinary Elimination Promotion: toileting offered  Goal: Effective Oxygenation and Ventilation  Outcome: Progressing  Intervention: Optimize Oxygenation and Ventilation  Recent Flowsheet Documentation  Taken 11/25/2023 2345 by Kasandra Garcia RN  Head of Bed (HOB) Positioning: HOB at 30-45 degrees     11/26/23

## 2023-11-26 NOTE — PROGRESS NOTES
"[facility]    Medicine Progress Note - Hospitalist Service    Date of Admission:  11/22/2023    Assessment & Plan   38-year-old male with severe obesity admitted for abscess of the abdominal wall.    #Umbilical hernia with gangrene  #Recurrent incisional ventral hernia  s/p exploratory laparotomy with repair of incisional ventral hernia with mesh, drain placement x 2  Operative cultures negative x1 day  Continue empiric antibiotics with Zosyn for now  Stopped Vanc 2/2 neg MRSA swab  Gentle IV fluids  Pain control    PT eval    #Hypercalcemia, mild  Ca initially 10.2, now wnl after IV fluids  Follow chemistry    #Diabetes type 2  A1c 6.5  No home medications  SSI    #Hypertension  No home medication  Hold lisinopril today 2/2 soft BP    #Severe obesity  BMI 58.58      DVT ppx: sc heparin          Diet: Moderate Consistent Carb (60 g CHO per Meal) Diet    Duvall Catheter: Not present  Lines: None     Cardiac Monitoring: None  Code Status: Full Code      Clinically Significant Risk Factors                  # Hypertension: Noted on problem list       # DMII: A1C = 6.5 % (Ref range: <5.7 %) within past 6 months, PRESENT ON ADMISSION  # Severe Obesity: Estimated body mass index is 56.21 kg/m  as calculated from the following:    Height as of this encounter: 1.803 m (5' 11\").    Weight as of this encounter: 182.8 kg (403 lb)., PRESENT ON ADMISSION            Disposition Plan     Expected Discharge Date: 11/27/2023    Discharge Delays: IV Medication - consider oral or Home Infusion  Procedure Pending (enter procedure & time in comments)  Destination: home with family  Discharge Comments: surgery today (friday)            Reza Gonsalez DO  Hospitalist Service  [facility]  Securely message with [employer] (more info)  Text page via [employer] Paging/Directory   ______________________________________________________________________    Interval History   No acute events " overnight    Physical Exam   Vital Signs: Temp: 97.8  F (36.6  C) Temp src: Oral BP: 110/63 Pulse: 92   Resp: 22 SpO2: 97 % O2 Device: Nasal cannula Oxygen Delivery: 1 LPM  Weight: 403 lbs .01 oz    General Appearance:  No acute distress  Respiratory: Clear to auscultation bilaterally  Cardiovascular: Regular rate and rhythm  GI: Hypoactive bowel sounds, abdomen is soft with no rebound    Medical Decision Making             Data

## 2023-11-26 NOTE — PROGRESS NOTES
General Surgery Progress Note  Ady Godinez  [id number]    Subjective  No acute events overnight.  Pain much better.  No n/v, tolerating diet.  Unsure if passing flatus.    Objective  Temp:  [97  F (36.1  C)-97.8  F (36.6  C)] 97.8  F (36.6  C)  Pulse:  [92-97] 92  Resp:  [16-22] 22  BP: (107-130)/(55-70) 110/63  SpO2:  [96 %-99 %] 97 %    Physical Exam:  Gen: Awake, alert, NAD  CVS: RRR  Resp: NLB  Abd: Soft, obese, incision c/d/I with bolsters, drains SS  Extrem: WWP    Labs overall unchanged    Assessment & Plan  38 year old male s/p repair of recurrent incisional hernia repair 11/24.    - C/w diet  - Pain control   - Working on PT/OT, was up earlier today    Torito Yeager MD

## 2023-11-27 ENCOUNTER — APPOINTMENT (OUTPATIENT)
Dept: PHYSICAL THERAPY | Facility: HOSPITAL | Age: 38
DRG: 354 | End: 2023-11-27
Attending: HOSPITALIST
Payer: COMMERCIAL

## 2023-11-27 PROBLEM — S06.0X0S CONCUSSION WITH NO LOSS OF CONSCIOUSNESS, SEQUELA (H): Status: ACTIVE | Noted: 2021-09-29

## 2023-11-27 PROBLEM — H81.90 VESTIBULAR DYSFUNCTION: Status: ACTIVE | Noted: 2021-09-29

## 2023-11-27 PROBLEM — H51.11 CONVERGENCE INSUFFICIENCY: Status: ACTIVE | Noted: 2021-11-17

## 2023-11-27 PROBLEM — A41.9 SEPSIS (H): Status: ACTIVE | Noted: 2018-07-07

## 2023-11-27 PROBLEM — M79.662 PAIN OF LEFT LOWER LEG: Status: ACTIVE | Noted: 2018-07-07

## 2023-11-27 PROBLEM — F07.81 POST CONCUSSION SYNDROME: Status: ACTIVE | Noted: 2021-09-29

## 2023-11-27 PROBLEM — L03.90 CELLULITIS: Status: ACTIVE | Noted: 2018-07-10

## 2023-11-27 PROBLEM — R79.89 POSITIVE D DIMER: Status: ACTIVE | Noted: 2018-07-07

## 2023-11-27 LAB
ANION GAP SERPL CALCULATED.3IONS-SCNC: 6 MMOL/L (ref 7–15)
BASOPHILS # BLD AUTO: 0 10E3/UL (ref 0–0.2)
BASOPHILS NFR BLD AUTO: 0 %
BUN SERPL-MCNC: 7.9 MG/DL (ref 6–20)
CALCIUM SERPL-MCNC: 9 MG/DL (ref 8.6–10)
CHLORIDE SERPL-SCNC: 98 MMOL/L (ref 98–107)
CREAT SERPL-MCNC: 0.64 MG/DL (ref 0.67–1.17)
DEPRECATED HCO3 PLAS-SCNC: 29 MMOL/L (ref 22–29)
EGFRCR SERPLBLD CKD-EPI 2021: >90 ML/MIN/1.73M2
EOSINOPHIL # BLD AUTO: 0.3 10E3/UL (ref 0–0.7)
EOSINOPHIL NFR BLD AUTO: 3 %
ERYTHROCYTE [DISTWIDTH] IN BLOOD BY AUTOMATED COUNT: 14.5 % (ref 10–15)
GLUCOSE BLDC GLUCOMTR-MCNC: 115 MG/DL (ref 70–99)
GLUCOSE BLDC GLUCOMTR-MCNC: 120 MG/DL (ref 70–99)
GLUCOSE BLDC GLUCOMTR-MCNC: 90 MG/DL (ref 70–99)
GLUCOSE SERPL-MCNC: 103 MG/DL (ref 70–99)
HCT VFR BLD AUTO: 34.5 % (ref 40–53)
HGB BLD-MCNC: 10.8 G/DL (ref 13.3–17.7)
IMM GRANULOCYTES # BLD: 0.1 10E3/UL
IMM GRANULOCYTES NFR BLD: 1 %
LYMPHOCYTES # BLD AUTO: 2 10E3/UL (ref 0.8–5.3)
LYMPHOCYTES NFR BLD AUTO: 17 %
MCH RBC QN AUTO: 28.4 PG (ref 26.5–33)
MCHC RBC AUTO-ENTMCNC: 31.3 G/DL (ref 31.5–36.5)
MCV RBC AUTO: 91 FL (ref 78–100)
MONOCYTES # BLD AUTO: 0.7 10E3/UL (ref 0–1.3)
MONOCYTES NFR BLD AUTO: 6 %
NEUTROPHILS # BLD AUTO: 8.8 10E3/UL (ref 1.6–8.3)
NEUTROPHILS NFR BLD AUTO: 73 %
NRBC # BLD AUTO: 0 10E3/UL
NRBC BLD AUTO-RTO: 0 /100
PLATELET # BLD AUTO: 387 10E3/UL (ref 150–450)
POTASSIUM SERPL-SCNC: 4.5 MMOL/L (ref 3.4–5.3)
RBC # BLD AUTO: 3.8 10E6/UL (ref 4.4–5.9)
SODIUM SERPL-SCNC: 133 MMOL/L (ref 135–145)
WBC # BLD AUTO: 12.1 10E3/UL (ref 4–11)

## 2023-11-27 PROCEDURE — 97530 THERAPEUTIC ACTIVITIES: CPT | Mod: GP

## 2023-11-27 PROCEDURE — 36415 COLL VENOUS BLD VENIPUNCTURE: CPT | Performed by: HOSPITALIST

## 2023-11-27 PROCEDURE — 97161 PT EVAL LOW COMPLEX 20 MIN: CPT | Mod: GP

## 2023-11-27 PROCEDURE — 250N000013 HC RX MED GY IP 250 OP 250 PS 637: Performed by: PHYSICIAN ASSISTANT

## 2023-11-27 PROCEDURE — 250N000013 HC RX MED GY IP 250 OP 250 PS 637: Performed by: HOSPITALIST

## 2023-11-27 PROCEDURE — 85025 COMPLETE CBC W/AUTO DIFF WBC: CPT | Performed by: HOSPITALIST

## 2023-11-27 PROCEDURE — 250N000011 HC RX IP 250 OP 636: Mod: JZ | Performed by: SURGERY

## 2023-11-27 PROCEDURE — 120N000001 HC R&B MED SURG/OB

## 2023-11-27 PROCEDURE — 250N000013 HC RX MED GY IP 250 OP 250 PS 637: Performed by: SURGERY

## 2023-11-27 PROCEDURE — 250N000011 HC RX IP 250 OP 636: Performed by: HOSPITALIST

## 2023-11-27 PROCEDURE — 99232 SBSQ HOSP IP/OBS MODERATE 35: CPT | Performed by: HOSPITALIST

## 2023-11-27 PROCEDURE — 97116 GAIT TRAINING THERAPY: CPT | Mod: GP

## 2023-11-27 PROCEDURE — 80048 BASIC METABOLIC PNL TOTAL CA: CPT | Performed by: HOSPITALIST

## 2023-11-27 RX ORDER — ACETAMINOPHEN 325 MG/10.15ML
650 LIQUID ORAL EVERY 4 HOURS PRN
Status: DISCONTINUED | OUTPATIENT
Start: 2023-11-27 | End: 2023-11-30 | Stop reason: HOSPADM

## 2023-11-27 RX ORDER — PIPERACILLIN SODIUM, TAZOBACTAM SODIUM 3; .375 G/15ML; G/15ML
3.38 INJECTION, POWDER, LYOPHILIZED, FOR SOLUTION INTRAVENOUS EVERY 8 HOURS
Status: COMPLETED | OUTPATIENT
Start: 2023-11-27 | End: 2023-11-28

## 2023-11-27 RX ORDER — LISINOPRIL 5 MG/1
10 TABLET ORAL DAILY
Status: DISCONTINUED | OUTPATIENT
Start: 2023-11-27 | End: 2023-11-30 | Stop reason: HOSPADM

## 2023-11-27 RX ADMIN — HYDROXYZINE HYDROCHLORIDE 25 MG: 25 TABLET ORAL at 14:32

## 2023-11-27 RX ADMIN — OXYCODONE HYDROCHLORIDE 10 MG: 5 TABLET ORAL at 06:07

## 2023-11-27 RX ADMIN — DOCUSATE SODIUM 200 MG: 100 CAPSULE, LIQUID FILLED ORAL at 20:18

## 2023-11-27 RX ADMIN — SENNOSIDES AND DOCUSATE SODIUM 1 TABLET: 8.6; 5 TABLET ORAL at 08:03

## 2023-11-27 RX ADMIN — HYDROMORPHONE HYDROCHLORIDE 0.3 MG: 1 INJECTION, SOLUTION INTRAMUSCULAR; INTRAVENOUS; SUBCUTANEOUS at 22:17

## 2023-11-27 RX ADMIN — OXYCODONE HYDROCHLORIDE 5 MG: 5 TABLET ORAL at 14:33

## 2023-11-27 RX ADMIN — PIPERACILLIN AND TAZOBACTAM 3.38 G: 3; .375 INJECTION, POWDER, FOR SOLUTION INTRAVENOUS at 12:11

## 2023-11-27 RX ADMIN — PIPERACILLIN AND TAZOBACTAM 3.38 G: 3; .375 INJECTION, POWDER, FOR SOLUTION INTRAVENOUS at 20:18

## 2023-11-27 RX ADMIN — ACETAMINOPHEN 975 MG: 325 TABLET, FILM COATED ORAL at 07:56

## 2023-11-27 RX ADMIN — LISINOPRIL 10 MG: 5 TABLET ORAL at 16:47

## 2023-11-27 RX ADMIN — HEPARIN SODIUM 5000 UNITS: 5000 INJECTION, SOLUTION INTRAVENOUS; SUBCUTANEOUS at 05:09

## 2023-11-27 RX ADMIN — HEPARIN SODIUM 5000 UNITS: 5000 INJECTION, SOLUTION INTRAVENOUS; SUBCUTANEOUS at 21:19

## 2023-11-27 RX ADMIN — OXYCODONE HYDROCHLORIDE 10 MG: 5 TABLET ORAL at 21:19

## 2023-11-27 RX ADMIN — HYDROMORPHONE HYDROCHLORIDE 0.5 MG: 1 INJECTION, SOLUTION INTRAMUSCULAR; INTRAVENOUS; SUBCUTANEOUS at 09:51

## 2023-11-27 RX ADMIN — SENNOSIDES AND DOCUSATE SODIUM 1 TABLET: 8.6; 5 TABLET ORAL at 20:18

## 2023-11-27 RX ADMIN — ACETAMINOPHEN 650 MG: 325 SOLUTION ORAL at 17:51

## 2023-11-27 RX ADMIN — PIPERACILLIN AND TAZOBACTAM 3.38 G: 3; .375 INJECTION, POWDER, FOR SOLUTION INTRAVENOUS at 05:09

## 2023-11-27 RX ADMIN — HEPARIN SODIUM 5000 UNITS: 5000 INJECTION, SOLUTION INTRAVENOUS; SUBCUTANEOUS at 14:32

## 2023-11-27 RX ADMIN — DOCUSATE SODIUM 200 MG: 100 CAPSULE, LIQUID FILLED ORAL at 08:03

## 2023-11-27 ASSESSMENT — ACTIVITIES OF DAILY LIVING (ADL)
ADLS_ACUITY_SCORE: 28
ADLS_ACUITY_SCORE: 28
ADLS_ACUITY_SCORE: 26
ADLS_ACUITY_SCORE: 25
ADLS_ACUITY_SCORE: 25
ADLS_ACUITY_SCORE: 28
ADLS_ACUITY_SCORE: 26
ADLS_ACUITY_SCORE: 25
ADLS_ACUITY_SCORE: 25
ADLS_ACUITY_SCORE: 26

## 2023-11-27 NOTE — PROGRESS NOTES
"[facility]    Medicine Progress Note - Hospitalist Service    Date of Admission:  11/22/2023    Assessment & Plan   38-year-old male with severe obesity admitted for abscess of the abdominal wall.    #Umbilical hernia with gangrene  #Recurrent incisional ventral hernia  s/p exploratory laparotomy with repair of incisional ventral hernia with mesh, drain placement 11/24  Operative cultures negative   Day #5 Zosyn, discontinue after today's doses  Pain control    PT     #Hypercalcemia, mild  Ca initially 10.2, now wnl after IV fluids    #Diabetes type 2  A1c 6.5  No home medications  SSI    #Hypertension  No home medication  Resume lisinopril     #Severe obesity  BMI 58.58  Dr. Kim recommends bariatric surgery       DVT ppx: sc heparin          Diet: Moderate Consistent Carb (60 g CHO per Meal) Diet    Duvall Catheter: Not present  Lines: None     Cardiac Monitoring: None  Code Status: Full Code      Clinically Significant Risk Factors                  # Hypertension: Noted on problem list       # DMII: A1C = 6.5 % (Ref range: <5.7 %) within past 6 months   # Severe Obesity: Estimated body mass index is 56.21 kg/m  as calculated from the following:    Height as of this encounter: 1.803 m (5' 11\").    Weight as of this encounter: 182.8 kg (403 lb).             Disposition Plan      Expected Discharge Date: 11/28/2023    Discharge Delays: IV Medication - consider oral or Home Infusion  Procedure Pending (enter procedure & time in comments)  Voiding/Eating Trial needed  Destination: home with family  Discharge Comments: surgery today (friday)            Reza Gonsalez DO  Hospitalist Service  [facility]  Securely message with [employer] (more info)  Text page via [employer] Paging/Directory   ______________________________________________________________________    Interval History   No acute events overnight    Physical Exam   Vital Signs: Temp: 98.5  F (36.9  C) Temp src: " Axillary BP: (!) 141/75 Pulse: 89   Resp: 18 SpO2: 95 % O2 Device: Nasal cannula Oxygen Delivery: 1 LPM  Weight: 403 lbs .01 oz    General Appearance:  No acute distress  Respiratory: Clear to auscultation bilaterally  Cardiovascular: Regular rate and rhythm      Medical Decision Making             Data

## 2023-11-27 NOTE — PLAN OF CARE
Problem: Adult Inpatient Plan of Care  Goal: Plan of Care Review  Description: The Plan of Care Review/Shift note should be completed every shift.  The Outcome Evaluation is a brief statement about your assessment that the patient is improving, declining, or no change.  This information will be displayed automatically on your shift  note.  Outcome: Progressing  Goal: Absence of Hospital-Acquired Illness or Injury  Intervention: Identify and Manage Fall Risk  Recent Flowsheet Documentation  Taken 11/26/2023 1613 by Tanya Lowery, RN  Safety Promotion/Fall Prevention:   activity supervised   lighting adjusted   mobility aid in reach   nonskid shoes/slippers when out of bed   supervised activity   toileting scheduled  Intervention: Prevent and Manage VTE (Venous Thromboembolism) Risk  Recent Flowsheet Documentation  Taken 11/26/2023 1613 by Tanya Lowery, RN  VTE Prevention/Management: patient refused intervention  Intervention: Prevent Infection  Recent Flowsheet Documentation  Taken 11/26/2023 1613 by Tanya Lowery, RN  Infection Prevention:   rest/sleep promoted   single patient room provided  Goal: Optimal Comfort and Wellbeing  Intervention: Monitor Pain and Promote Comfort  Recent Flowsheet Documentation  Taken 11/26/2023 1946 by Tanya Lowery, RN  Pain Management Interventions:   medication (see MAR)   pillow support provided   quiet environment facilitated   repositioned   rest  Taken 11/26/2023 1658 by Tanya Lowery, RN  Pain Management Interventions:   medication (see MAR)   pillow support provided   quiet environment facilitated   repositioned   rest  Taken 11/26/2023 1613 by Tanya Lowery, RN  Pain Management Interventions:   medication (see MAR)   pillow support provided   quiet environment facilitated   repositioned   rest  Goal: Readiness for Transition of Care  Outcome: Progressing     Goal Outcome Evaluation:  Patient alert and oriented x 4 and able to make needs known.  Patient had one episode of emesis, given PRN IV Zofran effective per patient. Patient's pain has been controlled with a combination of oxycodone, dilaudid and atarax.

## 2023-11-27 NOTE — PROGRESS NOTES
General Surgery Progress Note:    Hospital Day # 5    ASSESSMENT:   1. Umbilical hernia with gangrene    2. Nausea and vomiting, unspecified vomiting type    3. Abdominal wall cellulitis    4. Abdominal wall seroma, initial encounter        Ady Godinez is a 38 year old male s/p exploratory laparotomy evacuation of seroma as well as repair of recurrent incisional ventral hernia on 11/24/2023.      The patient is slowly healing from surgery.  I have discussed with the patient and showed him the pictures from surgery.  Patient leukocytosis is downtrending.  Patient remains afebrile.      -Wound culture from 11/24/2023 with no growth to date.    We discussed the importance of having the abdominal drain in position to prevent a seromatous recurrence.  At the same time, we discussed the importance of the presence of the retention sutures and the need for the abdominal binder.      PLAN:   -I have discussed with the patient regarding bariatric surgery.  Patient needs bariatric intervention in order to help weight loss which will in turn help with the prevention of recurrence of the hernia.  I will place an order for our bariatric team to contact the patient to discuss consult Tatian.    -Patient encouraged to chest pain PT OT.  No heavy lifting more than 10 pounds for the next 6 weeks.  The patient requires the abdominal binder is to be in position when up and walking.    -Diet as tolerated    -Continue medical management per primary team.    -If the patient continues to progress well, he can likely be discharged from the general surgery standpoint within 24 to 48 hours.    SUBJECTIVE:   Ady Godinez was seen on rounds.  Patient finished an omelette yesterday.  He did have 1 emesis.  No nausea no vomiting this morning.  Passing flatus had 1 bowel movement.  He was walking little bit more.    Patient Vitals for the past 24 hrs:   BP Temp Temp src Pulse Resp SpO2   11/27/23 0752 -- -- -- -- -- 97 %   11/26/23  2335 139/69 97.7  F (36.5  C) Oral 101 18 97 %   11/26/23 1524 135/66 98.3  F (36.8  C) Oral 97 19 99 %       Physical Exam:  General: NAD, pleasant  CV:RRR  LUNGS:Normal respiratory effort, no accessory muscle use  ABD: Severely obese abdomen.  Midline incision well-approximated.  Retention sutures in position.  Abdominal drains x 2 with serosanguineous output.  EXT:no CCE    No results displayed because visit has over 200 results.           DO Braydon Mejia DO  General Surgeon  [facility]  Surgery [address] [address]?  Office: [phone]  Employed by - [employer]  Pager: [phone]

## 2023-11-27 NOTE — PROGRESS NOTES
Care Management Follow Up    Length of Stay (days): 5    Expected Discharge Date: 11/28/2023     Concerns to be Addressed:       Patient plan of care discussed at interdisciplinary rounds: Yes    Anticipated Discharge Disposition: Home     Anticipated Discharge Services: Home care RN, PT, OT, HHA  Anticipated Discharge DME:      Patient/family educated on Medicare website which has current facility and service quality ratings: yes  Education Provided on the Discharge Plan: Yes  Patient/Family in Agreement with the Plan: yes    Referrals Placed by CM/SW: Homecare  Private pay costs discussed: Not applicable    Additional Information:  Therapy rec is home care. CM met with patient and he is agreeable to home care and understanding of home bound status. Home care RN, PT, OT, HHA referral sent and pending.    MP ZhuW

## 2023-11-27 NOTE — PROGRESS NOTES
11/27/23 0910   Appointment Info   Signing Clinician's Name / Credentials (PT) Jazzmine Duque PT   Rehab Comments (PT) Patient in bed .Left up in chair after PT with call light in reach.   Living Environment   People in Home child(sherry), dependent;spouse   Current Living Arrangements house   Home Accessibility stairs to enter home   Number of Stairs, Main Entrance 1   Stair Railings, Main Entrance none   Number of Stairs, Within Home, Primary greater than 10 stairs   Stair Railings, Within Home, Primary railings safe and in good condition   Transportation Anticipated family or friend will provide   Living Environment Comments once in the house can stay on one level   Self-Care   Usual Activity Tolerance moderate   Current Activity Tolerance fair   Equipment Currently Used at Home none  (has FWW)   Fall history within last six months no   Activity/Exercise/Self-Care Comment Patient independent with mobility and amb at baseline .was using FWW after last admit./suregry   General Information   Onset of Illness/Injury or Date of Surgery 11/22/23   Referring Physician Reza Gonsalez   Patient/Family Therapy Goals Statement (PT) hopes to go home   Pertinent History of Current Problem (include personal factors and/or comorbidities that impact the POC) Admitted with abscess abd wall with umbilical hernia with gangrene,S/p hernia repair 11/24.Patient has a hx of morbid obesity.   Existing Precautions/Restrictions abdominal  (binder while OOB for comfort,2 abd drains)   Weight-Bearing Status - LLE weight-bearing as tolerated   Weight-Bearing Status - RLE weight-bearing as tolerated   Cognition   Affect/Mental Status (Cognition) WFL   Orientation Status (Cognition) oriented x 4   Pain Assessment   Patient Currently in Pain Yes, see Vital Sign flowsheet   Range of Motion (ROM)   Range of Motion ROM is WFL   Strength (Manual Muscle Testing)   Strength (Manual Muscle Testing) strength is WFL   Bed Mobility   Supine-Sit  [place] (Bed Mobility) supervision;verbal cues   Bed Mobility Limitations decreased ability to use arms for pushing/pulling;other (see comments)   Comment, (Bed Mobility) HOB elevated ,educated on log roll for OOB .pt did not have enough room to do log roll in bed   Sit-Stand Transfer   Sit-Stand [place] (Transfers) contact guard   Assistive Device (Sit-Stand Transfers) walker, front-wheeled   Comment, (Sit-Stand Transfer) bed elevated   Stand-Sit Transfer   Stand-Sit [place] (Transfers) contact guard   Assistive Device (Stand-Sit Transfers) walker, front-wheeled   Gait/Stairs (Locomotion)   [place] Level (Gait) supervision   Assistive Device (Gait) walker, front-wheeled   Distance in Feet (Gait) 15 feet   Pattern (Gait) step-through   Deviations/Abnormal Patterns (Gait) base of support, wide;collin decreased;gait speed decreased;stride length decreased;weight shifting decreased   Maintains Weight-bearing Status (Gait) verbal cues to maintain   Clinical Impression   Criteria for Skilled Therapeutic Intervention Yes, treatment indicated   PT Diagnosis (PT) impaired functional mobility   Influenced by the following impairments surgery ,pain   Functional limitations due to impairments bed mobility ,transfers,gait   Clinical Presentation (PT Evaluation Complexity) stable   Clinical Presentation Rationale pt presents as med diagnosed   Clinical Decision Making (Complexity) low complexity   Planned Therapy Interventions (PT) bed mobility training;gait training;stair training;strengthening;transfer training;progressive activity/exercise   Risk & Benefits of therapy have been explained evaluation/treatment results reviewed;patient   Clinical Impression Comments Patient is a 39 yo male s/p hernia repair presents with mobility deficits due to surgery,pain and large size.Appropriate for skilled PT to mobilize and improve functional mobility for d/c home.   PT Total Evaluation Time   PT Eval, Low Complexity  Minutes (34983) 12   Physical Therapy Goals   PT Frequency Daily   PT Goals Bed Mobility;Transfers;Gait;Stairs   PT: Bed Mobility Independent;Supine to/from sit;Within precautions   PT: Transfers Supervision/stand-by assist;Assistive device;Bed to/from chair;Sit to/from stand   PT: Gait Supervision/stand-by assist;Rolling walker;Greater than 200 feet   PT: Stairs Supervision/stand-by assist;Within precautions;Greater than 10 stairs   Therapeutic Activity   Therapeutic Activities: dynamic activities to improve functional performance Minutes (22626) 8   Treatment Detail/Skilled Intervention Toilet transfer with FWW ,CGA and grab bar.   Gait Training   Gait Training Minutes (08202) 10   Symptoms Noted During/After Treatment (Gait Training) fatigue;increased pain   Treatment Detail/Skilled Intervention amb in hallway,slow pace but steady   Distance in Feet 80 feet   Green Level (Gait Training) stand-by assist   Physical Assistance Level (Gait Training) supervision;1 person assist  (fassist or IV pole)   Weight Bearing (Gait Training) weight-bearing as tolerated   Assistive Device (Gait Training) rolling walker   Gait Analysis Deviations decreased collin;decreased step length;decreased toe-to-floor clearance;decreased weight-shifting ability;decreased stride length   Impairments (Gait Analysis/Training) pain;strength decreased   Pattern Analysis (Gait Training) swing-through gait   PT Discharge Planning   PT Plan mobilize ,distance amb with FWW,bed mobility ,stairs when ready   PT Discharge Recommendation (DC Rec) home with assist;home with home care physical therapy   PT Rationale for DC Rec Patient young and was independent with mobility prior   PT Brief overview of current status SBA /CGA for mobility .Amb 80 feet with FWW ,SBA   PT Equipment Needed at Discharge walker, rolling   Total Session Time   Timed Code Treatment Minutes 18   Total Session Time (sum of timed and untimed services) 30

## 2023-11-27 NOTE — PLAN OF CARE
"  Problem: Pain Acute  Goal: Optimal Pain Control and Function  Outcome: Progressing     Problem: Adult Inpatient Plan of Care  Goal: Optimal Comfort and Wellbeing  Outcome: Progressing     Problem: Surgery Nonspecified  Goal: Absence of Infection Signs and Symptoms  Outcome: Progressing     Problem: Surgery Nonspecified  Goal: Nausea and Vomiting Relief  Outcome: Progressing   Goal Outcome Evaluation:                      A&Ox4,/69 (BP Location: Right arm)   Pulse 101   Temp 97.7  F (36.5  C) (Oral)   Resp 18   Ht 1.803 m (5' 11\")   Wt (!) 182.8 kg (403 lb)   SpO2 97%   BMI 56.21 kg/m   . 1 L nc with Continuous pulse ox patient c/o abdominal pain, oxy 10 mg po X 2. Abdominal dressing C/D/I. JOSE drains intact and patent ( see flowsheet for OP). Pt up with walker and gait belt X 2 to restroom. Pt resting and IV abx running.   "

## 2023-11-27 NOTE — PLAN OF CARE
Problem: Adult Inpatient Plan of Care  Goal: Optimal Comfort and Wellbeing  Intervention: Monitor Pain and Promote Comfort  Recent Flowsheet Documentation  Taken 11/27/2023 0752 by Sandra Curran RN  Pain Management Interventions:   medication (see MAR)   pillow support provided   quiet environment facilitated   repositioned   rest     Problem: Adult Inpatient Plan of Care  Goal: Readiness for Transition of Care  Outcome: Progressing     Problem: Surgery Nonspecified  Goal: Nausea and Vomiting Relief  Outcome: Progressing   Goal Outcome Evaluation:  Pt is A&O x4  On/off 1L NC, especially while sleeping.   Having incisional pain in lower abdomen. Taking PRN oxycodone and dilaudid which is bringing the pain to a tolerable level per pt report.   PIV remains c/d/I  JOSE drains remain c/d/I. Surgical incision left open to air per surgeon direction. JOSE drain sites covered with gauze per surgeon direction.  Ambulating with walker and gait belt. Worked with PT.   Drinking and eating well.   No stool, but is passing gas.  One episode of emesis this afternoon. MD notified.  JOSE drains (right) 15ml/output, (left) 5ml/output this shift.

## 2023-11-28 ENCOUNTER — APPOINTMENT (OUTPATIENT)
Dept: PHYSICAL THERAPY | Facility: HOSPITAL | Age: 38
DRG: 354 | End: 2023-11-28
Payer: COMMERCIAL

## 2023-11-28 LAB
GLUCOSE BLDC GLUCOMTR-MCNC: 121 MG/DL (ref 70–99)
GLUCOSE BLDC GLUCOMTR-MCNC: 90 MG/DL (ref 70–99)
GLUCOSE BLDC GLUCOMTR-MCNC: 93 MG/DL (ref 70–99)
GLUCOSE BLDC GLUCOMTR-MCNC: 94 MG/DL (ref 70–99)
GLUCOSE BLDC GLUCOMTR-MCNC: 94 MG/DL (ref 70–99)

## 2023-11-28 PROCEDURE — 99232 SBSQ HOSP IP/OBS MODERATE 35: CPT | Performed by: HOSPITALIST

## 2023-11-28 PROCEDURE — 250N000011 HC RX IP 250 OP 636: Mod: JZ | Performed by: SURGERY

## 2023-11-28 PROCEDURE — 250N000013 HC RX MED GY IP 250 OP 250 PS 637: Performed by: HOSPITALIST

## 2023-11-28 PROCEDURE — 97116 GAIT TRAINING THERAPY: CPT | Mod: GP

## 2023-11-28 PROCEDURE — 250N000013 HC RX MED GY IP 250 OP 250 PS 637: Performed by: SURGERY

## 2023-11-28 PROCEDURE — 250N000013 HC RX MED GY IP 250 OP 250 PS 637: Performed by: PHYSICIAN ASSISTANT

## 2023-11-28 PROCEDURE — 97530 THERAPEUTIC ACTIVITIES: CPT | Mod: GP

## 2023-11-28 PROCEDURE — 99231 SBSQ HOSP IP/OBS SF/LOW 25: CPT | Performed by: PHYSICIAN ASSISTANT

## 2023-11-28 PROCEDURE — 120N000001 HC R&B MED SURG/OB

## 2023-11-28 RX ADMIN — HEPARIN SODIUM 5000 UNITS: 5000 INJECTION, SOLUTION INTRAVENOUS; SUBCUTANEOUS at 13:32

## 2023-11-28 RX ADMIN — HYDROXYZINE HYDROCHLORIDE 50 MG: 25 TABLET, FILM COATED ORAL at 17:09

## 2023-11-28 RX ADMIN — OXYCODONE HYDROCHLORIDE 10 MG: 5 TABLET ORAL at 09:34

## 2023-11-28 RX ADMIN — HEPARIN SODIUM 5000 UNITS: 5000 INJECTION, SOLUTION INTRAVENOUS; SUBCUTANEOUS at 21:21

## 2023-11-28 RX ADMIN — DOCUSATE SODIUM 200 MG: 100 CAPSULE, LIQUID FILLED ORAL at 20:35

## 2023-11-28 RX ADMIN — OXYCODONE HYDROCHLORIDE 10 MG: 5 TABLET ORAL at 20:48

## 2023-11-28 RX ADMIN — OXYCODONE HYDROCHLORIDE 10 MG: 5 TABLET ORAL at 05:37

## 2023-11-28 RX ADMIN — OXYCODONE HYDROCHLORIDE 10 MG: 5 TABLET ORAL at 01:03

## 2023-11-28 RX ADMIN — LISINOPRIL 10 MG: 5 TABLET ORAL at 09:35

## 2023-11-28 RX ADMIN — HEPARIN SODIUM 5000 UNITS: 5000 INJECTION, SOLUTION INTRAVENOUS; SUBCUTANEOUS at 05:39

## 2023-11-28 RX ADMIN — AMOXICILLIN AND CLAVULANATE POTASSIUM 1 TABLET: 875; 125 TABLET, FILM COATED ORAL at 13:32

## 2023-11-28 RX ADMIN — DOCUSATE SODIUM 200 MG: 100 CAPSULE, LIQUID FILLED ORAL at 09:35

## 2023-11-28 RX ADMIN — SENNOSIDES AND DOCUSATE SODIUM 1 TABLET: 8.6; 5 TABLET ORAL at 09:35

## 2023-11-28 RX ADMIN — SENNOSIDES AND DOCUSATE SODIUM 1 TABLET: 8.6; 5 TABLET ORAL at 20:35

## 2023-11-28 RX ADMIN — AMOXICILLIN AND CLAVULANATE POTASSIUM 1 TABLET: 875; 125 TABLET, FILM COATED ORAL at 20:36

## 2023-11-28 ASSESSMENT — ACTIVITIES OF DAILY LIVING (ADL)
ADLS_ACUITY_SCORE: 25

## 2023-11-28 NOTE — PROGRESS NOTES
ASSESSMENT:  1. Recurrent ventral incisional hernia    2. Nausea and vomiting, unspecified vomiting type    3. Abdominal wall cellulitis    4. Umbilical hernia with gangrene    5. Abdominal wall seroma, initial encounter        Ady Godinez is a 38 year old male s/p exploratory laparotomy evacuation of seroma as well as repair of recurrent incisional ventral hernia on 11/24/20   Overall improving slowly every day.    PLAN:  -Continue with PT and OT.  Plan for home with home assistant PT.  -Continue to wear binder  -Lifting over 10 pounds for at least 6 weeks maybe even longer.  -Did discuss bariatric clinic with the patient.  He states that he is already planning on going back to [facility] as this is his insurance.    SUBJECTIVE:   He is feeling a little better today he still feels like he is not ready to go home as he is having a difficult time with movement and pain.  No significant events overnight.      Patient Vitals for the past 24 hrs:   BP Temp Temp src Pulse Resp SpO2   11/28/23 0800 117/74 98  F (36.7  C) Oral 92 18 94 %   11/27/23 2325 135/60 98.9  F (37.2  C) Oral 100 18 94 %   11/27/23 1536 -- -- -- -- -- 94 %   11/27/23 1533 (!) 176/87 98.1  F (36.7  C) Oral 97 16 92 %   11/27/23 1207 (!) 141/75 98.5  F (36.9  C) Axillary 89 -- 95 %         PHYSICAL EXAM:  GEN: No acute distress, comfortable    ABD: Wound is erythematous mainly from irritation of staples and deep retention sutures with plastic tubing used to help bridge and give good tension to the skin.  Drains: Bloody on both  Output by Drain (mL) 11/26/23 0700 - 11/26/23 1459 11/26/23 1500 - 11/26/23 2259 11/26/23 2300 - 11/27/23 0659 11/27/23 0700 - 11/27/23 1459 11/27/23 1500 - 11/27/23 2259 11/27/23 2300 - 11/28/23 0659 11/28/23 0700 - 11/28/23 0904   Closed/Suction Drain 1 LLQ Bulb 19 [place]  (P)50 5 5  8    Closed/Suction Drain 2 RLQ Bulb 19 [place]   10 15  15       EXT:No cyanosis, edema or obvious abnormalities    11/27 0700 -  11/28 0659  In: 2824 [P.O.:1200; I.V.:1624]  Out: 43 [Drains:43]    No results displayed because visit has over 200 results.   Recent Results (from the past 24 hour(s))   Glucose by meter    Collection Time: 11/27/23 12:13 PM   Result Value Ref Range    GLUCOSE BY METER POCT 120 (H) 70 - 99 mg/dL   Glucose by meter    Collection Time: 11/27/23  8:39 PM   Result Value Ref Range    GLUCOSE BY METER POCT 115 (H) 70 - 99 mg/dL   Glucose by meter    Collection Time: 11/28/23  3:14 AM   Result Value Ref Range    GLUCOSE BY METER POCT 93 70 - 99 mg/dL               PRAKASH Springer  [facility]  [address] [place] [id number]  Phone- [phone]  Fax- [phone]

## 2023-11-28 NOTE — PLAN OF CARE
Problem: Adult Inpatient Plan of Care  Goal: Absence of Hospital-Acquired Illness or Injury  Outcome: Progressing  Intervention: Identify and Manage Fall Risk  Recent Flowsheet Documentation  Taken 11/28/2023 1716 by Tanya Lowery, RN  Safety Promotion/Fall Prevention:   activity supervised   clutter free environment maintained   lighting adjusted  Intervention: Prevent and Manage VTE (Venous Thromboembolism) Risk  Recent Flowsheet Documentation  Taken 11/28/2023 1716 by Tanya Lowery, RN  VTE Prevention/Management: patient refused intervention  Intervention: Prevent Infection  Recent Flowsheet Documentation  Taken 11/28/2023 1716 by Tanya Lowery, RN  Infection Prevention: hand hygiene promoted  Goal: Optimal Comfort and Wellbeing  Intervention: Monitor Pain and Promote Comfort  Recent Flowsheet Documentation  Taken 11/28/2023 1709 by Tanya Lowery, RN  Pain Management Interventions: medication (see MAR)     Problem: Diarrhea  Goal: Effective Diarrhea Management  Intervention: Manage Diarrhea  Recent Flowsheet Documentation  Taken 11/28/2023 1716 by Tanya Lowery, RN  Medication Review/Management: medications reviewed     Problem: Surgery Nonspecified  Goal: Absence of Bleeding  Outcome: Progressing  Goal: Anesthesia/Sedation Recovery  Intervention: Optimize Anesthesia Recovery  Recent Flowsheet Documentation  Taken 11/28/2023 1716 by Tanya Lowery, RN  Safety Promotion/Fall Prevention:   activity supervised   clutter free environment maintained   lighting adjusted  Goal: Optimal Pain Control and Function  Intervention: Prevent or Manage Pain  Recent Flowsheet Documentation  Taken 11/28/2023 1709 by Tanya Lowery, RN  Pain Management Interventions: medication (see MAR)  Goal: Effective Urinary Elimination  Outcome: Progressing     Goal Outcome Evaluation:  Patient is alert and oriented x 4 and able to make needs known. Patient up with assist x 1 and walker/gait belt. Pain under  better control with atarax given x 1 (see MAR). Midline incision open to air, with erythema, no drainage noted.

## 2023-11-28 NOTE — PROGRESS NOTES
Care Management Follow Up    Length of Stay (days): 6    Expected Discharge Date: 11/29/2023     Concerns to be Addressed:       Patient plan of care discussed at interdisciplinary rounds: Yes    Anticipated Discharge Disposition: Home     Anticipated Discharge Services: home care Rn, PT, OT, HHA  Anticipated Discharge DME:      Patient/family educated on Medicare website which has current facility and service quality ratings: yes  Education Provided on the Discharge Plan: Yes  Patient/Family in Agreement with the Plan: yes    Referrals Placed by CM/SW: Homecare  Private pay costs discussed: Not applicable    Additional Information:  Patient from home with spouse and is independent a tbaseline. Patient accepted by [facility] for RN, PT, OT, HHA.    3:44 PM  Therapy rec changed to TCU. Patient has [facility] [place] [facility] which typically does not have TCU coverage. CM reached out to financial counselor to check coverage benefits and then will follow up with patient.      Paris Rao, MPW

## 2023-11-28 NOTE — PROGRESS NOTES
"[facility]    Medicine Progress Note - Hospitalist Service    Date of Admission:  11/22/2023    Assessment & Plan   38-year-old male with severe obesity admitted for umbilical hernia with gangrene.    #Umbilical hernia with gangrene  #Recurrent incisional ventral hernia  s/p exploratory laparotomy with repair of incisional ventral hernia with mesh, drain placement 11/24  Operative culture grew cutibacterium acnes  s/p 5 days Zosyn, continue with 2 more days PO abx  Pain control    PT     #Hypercalcemia, mild  Ca initially 10.2, now wnl after IV fluids    #Diabetes type 2  A1c 6.5  No home medications  SSI    #Hypertension  No home medication  Started lisinopril     #Severe obesity  BMI 58.58  Dr. Kim recommends bariatric surgery       DVT ppx: sc heparin          Diet: Moderate Consistent Carb (60 g CHO per Meal) Diet    Duvall Catheter: Not present  Lines: None     Cardiac Monitoring: None  Code Status: Full Code      Clinically Significant Risk Factors                  # Hypertension: Noted on problem list       # DMII: A1C = 6.5 % (Ref range: <5.7 %) within past 6 months   # Severe Obesity: Estimated body mass index is 56.21 kg/m  as calculated from the following:    Height as of this encounter: 1.803 m (5' 11\").    Weight as of this encounter: 182.8 kg (403 lb).             Disposition Plan      Expected Discharge Date: 11/29/2023    Discharge Delays: pain, ambulation  Destination: home with family            Reza Gonsalez DO  Hospitalist Service  [facility]  Securely message with [employer] (more info)  Text page via [employer] Paging/Directory   ______________________________________________________________________    Interval History   No acute events overnight    Physical Exam   Vital Signs: Temp: 98  F (36.7  C) Temp src: Oral BP: 117/74 Pulse: 92   Resp: 18 SpO2: 94 % O2 Device: None (Room air) Oxygen Delivery: 1 LPM  Weight: 403 lbs .01 oz  General appearance: No " acute distress, severe obesity  Respiratory: No tachypnea or accessory muscle use    Medical Decision Making             Data

## 2023-11-28 NOTE — PLAN OF CARE
Problem: Adult Inpatient Plan of Care  Goal: Absence of Hospital-Acquired Illness or Injury  Outcome: Progressing  Intervention: Identify and Manage Fall Risk  Recent Flowsheet Documentation  Taken 11/28/2023 0100 by Kevin Conklin RN  Safety Promotion/Fall Prevention:   activity supervised   lighting adjusted   room near nurse's station   room organization consistent   safety round/check completed  Taken 11/27/2023 2015 by Kevin Conklin RN  Safety Promotion/Fall Prevention:   activity supervised   lighting adjusted   room near nurse's station   room organization consistent   safety round/check completed  Intervention: Prevent and Manage VTE (Venous Thromboembolism) Risk  Recent Flowsheet Documentation  Taken 11/28/2023 0100 by Kevin Conklin RN  VTE Prevention/Management: patient refused intervention  Taken 11/27/2023 2015 by Kevin Conklin RN  VTE Prevention/Management: patient refused intervention     Problem: Pain Acute  Goal: Optimal Pain Control and Function  Outcome: Progressing  Intervention: Develop Pain Management Plan  Recent Flowsheet Documentation  Taken 11/28/2023 0100 by Kevin Conklin RN  Pain Management Interventions: medication (see MAR)  Taken 11/27/2023 2015 by Kevin Conklin RN  Pain Management Interventions:   pillow support provided   quiet environment facilitated   repositioned   rest  Intervention: Prevent or Manage Pain  Recent Flowsheet Documentation  Taken 11/28/2023 0100 by Kevin Conklin RN  Bowel Elimination Promotion: ambulation promoted  Medication Review/Management: medications reviewed  Taken 11/27/2023 2015 by Kevin Conklin RN  Bowel Elimination Promotion: ambulation promoted  Medication Review/Management: medications reviewed   Goal Outcome Evaluation:  VSS on RA, A&Ox4, pain as high as 7/10 given Oxy and IV dilaudid (see MAR). Pt denies N/V this shift. Abd binder remains in place while up ad german. JOSE drains x2 patent with serosanguinous drainage, dressings CDI. RPIV-SL.  Midline incision open to air with erythema and ecchymosis. PT encouraged to be more independent with activity, pt hesitant, education reinforced. Continent of B&B. Sleeping between cares.

## 2023-11-28 NOTE — PLAN OF CARE
"  Problem: Adult Inpatient Plan of Care  Goal: Plan of Care Review  Description: The Plan of Care Review/Shift note should be completed every shift.  The Outcome Evaluation is a brief statement about your assessment that the patient is improving, declining, or no change.  This information will be displayed automatically on your shift  note.  Outcome: Progressing  Flowsheets (Taken 11/28/2023 1126)  Plan of Care Reviewed With: patient  Goal: Patient-Specific Goal (Individualized)  Description: You can add care plan individualizations to a care plan. Examples of Individualization might be:  \"Parent requests to be called daily at 9am for status\", \"I have a hard time hearing out of my right ear\", or \"Do not touch me to wake me up as it startles  me\".  Outcome: Progressing  Goal: Absence of Hospital-Acquired Illness or Injury  Outcome: Progressing  Intervention: Identify and Manage Fall Risk  Recent Flowsheet Documentation  Taken 11/28/2023 0934 by Nick More RN  Safety Promotion/Fall Prevention:   activity supervised   clutter free environment maintained   lighting adjusted  Intervention: Prevent Skin Injury  Recent Flowsheet Documentation  Taken 11/28/2023 0934 by Nick More RN  Body Position: position changed independently  Goal: Optimal Comfort and Wellbeing  Outcome: Progressing  Intervention: Monitor Pain and Promote Comfort  Recent Flowsheet Documentation  Taken 11/28/2023 0934 by Nick More RN  Pain Management Interventions: medication (see MAR)  Goal: Readiness for Transition of Care  Outcome: Progressing     Problem: Pain Acute  Goal: Optimal Pain Control and Function  Outcome: Progressing  Intervention: Develop Pain Management Plan  Recent Flowsheet Documentation  Taken 11/28/2023 0934 by Nick More RN  Pain Management Interventions: medication (see MAR)  Intervention: Prevent or Manage Pain  Recent Flowsheet Documentation  Taken 11/28/2023 0934 by Nick More RN  Bowel Elimination " Promotion: ambulation promoted  Medication Review/Management: medications reviewed     Problem: Infection  Goal: Absence of Infection Signs and Symptoms  Outcome: Progressing     Problem: Comorbidity Management  Goal: Blood Glucose Levels Within Targeted Range  Outcome: Progressing  Intervention: Monitor and Manage Glycemia  Recent Flowsheet Documentation  Taken 11/28/2023 0934 by Nick More RN  Medication Review/Management: medications reviewed     Problem: Surgery Nonspecified  Goal: Absence of Bleeding  Outcome: Progressing  Goal: Effective Bowel Elimination  Outcome: Progressing  Intervention: Enhance Bowel Motility and Elimination  Recent Flowsheet Documentation  Taken 11/28/2023 0934 by Nick More RN  Bowel Motility Enhancement: ambulation promoted  Bowel Elimination Management: toileting offered  Goal: Absence of Infection Signs and Symptoms  Outcome: Progressing  Goal: Optimal Pain Control and Function  Outcome: Progressing  Intervention: Prevent or Manage Pain  Recent Flowsheet Documentation  Taken 11/28/2023 0934 by Nick More RN  Pain Management Interventions: medication (see MAR)  Goal: Effective Urinary Elimination  Outcome: Progressing   Goal Outcome Evaluation:      Plan of Care Reviewed With: patient

## 2023-11-29 ENCOUNTER — APPOINTMENT (OUTPATIENT)
Dept: PHYSICAL THERAPY | Facility: HOSPITAL | Age: 38
DRG: 354 | End: 2023-11-29
Payer: COMMERCIAL

## 2023-11-29 LAB
BACTERIA TISS BX CULT: NO GROWTH
GLUCOSE BLDC GLUCOMTR-MCNC: 105 MG/DL (ref 70–99)
GLUCOSE BLDC GLUCOMTR-MCNC: 106 MG/DL (ref 70–99)
GLUCOSE BLDC GLUCOMTR-MCNC: 118 MG/DL (ref 70–99)
GLUCOSE BLDC GLUCOMTR-MCNC: 120 MG/DL (ref 70–99)
GLUCOSE BLDC GLUCOMTR-MCNC: 92 MG/DL (ref 70–99)

## 2023-11-29 PROCEDURE — 99232 SBSQ HOSP IP/OBS MODERATE 35: CPT | Performed by: INTERNAL MEDICINE

## 2023-11-29 PROCEDURE — 250N000013 HC RX MED GY IP 250 OP 250 PS 637: Performed by: HOSPITALIST

## 2023-11-29 PROCEDURE — 250N000011 HC RX IP 250 OP 636: Mod: JZ | Performed by: SURGERY

## 2023-11-29 PROCEDURE — 250N000013 HC RX MED GY IP 250 OP 250 PS 637: Performed by: INTERNAL MEDICINE

## 2023-11-29 PROCEDURE — 97530 THERAPEUTIC ACTIVITIES: CPT | Mod: GP

## 2023-11-29 PROCEDURE — 99231 SBSQ HOSP IP/OBS SF/LOW 25: CPT | Mod: FS | Performed by: SURGERY

## 2023-11-29 PROCEDURE — 120N000001 HC R&B MED SURG/OB

## 2023-11-29 PROCEDURE — 250N000013 HC RX MED GY IP 250 OP 250 PS 637: Performed by: SURGERY

## 2023-11-29 PROCEDURE — 97116 GAIT TRAINING THERAPY: CPT | Mod: GP

## 2023-11-29 RX ORDER — OXYCODONE HYDROCHLORIDE 5 MG/1
5-10 TABLET ORAL EVERY 4 HOURS PRN
Qty: 25 TABLET | Refills: 0 | Status: SHIPPED | OUTPATIENT
Start: 2023-11-29 | End: 2024-01-30

## 2023-11-29 RX ORDER — MULTIPLE VITAMINS W/ MINERALS TAB 9MG-400MCG
1 TAB ORAL DAILY
Status: DISCONTINUED | OUTPATIENT
Start: 2023-11-29 | End: 2023-11-30 | Stop reason: HOSPADM

## 2023-11-29 RX ADMIN — OXYCODONE HYDROCHLORIDE 5 MG: 5 TABLET ORAL at 21:26

## 2023-11-29 RX ADMIN — HEPARIN SODIUM 5000 UNITS: 5000 INJECTION, SOLUTION INTRAVENOUS; SUBCUTANEOUS at 07:09

## 2023-11-29 RX ADMIN — SENNOSIDES AND DOCUSATE SODIUM 1 TABLET: 8.6; 5 TABLET ORAL at 21:27

## 2023-11-29 RX ADMIN — MULTIPLE VITAMINS W/ MINERALS TAB 1 TABLET: TAB at 13:04

## 2023-11-29 RX ADMIN — AMOXICILLIN AND CLAVULANATE POTASSIUM 1 TABLET: 875; 125 TABLET, FILM COATED ORAL at 21:26

## 2023-11-29 RX ADMIN — ACETAMINOPHEN 650 MG: 325 SOLUTION ORAL at 14:24

## 2023-11-29 RX ADMIN — AMOXICILLIN AND CLAVULANATE POTASSIUM 1 TABLET: 875; 125 TABLET, FILM COATED ORAL at 09:18

## 2023-11-29 RX ADMIN — HEPARIN SODIUM 5000 UNITS: 5000 INJECTION, SOLUTION INTRAVENOUS; SUBCUTANEOUS at 13:06

## 2023-11-29 RX ADMIN — DOCUSATE SODIUM 200 MG: 100 CAPSULE, LIQUID FILLED ORAL at 09:17

## 2023-11-29 RX ADMIN — SENNOSIDES AND DOCUSATE SODIUM 1 TABLET: 8.6; 5 TABLET ORAL at 09:17

## 2023-11-29 RX ADMIN — HEPARIN SODIUM 5000 UNITS: 5000 INJECTION, SOLUTION INTRAVENOUS; SUBCUTANEOUS at 21:27

## 2023-11-29 RX ADMIN — OXYCODONE HYDROCHLORIDE 10 MG: 5 TABLET ORAL at 07:08

## 2023-11-29 RX ADMIN — LISINOPRIL 10 MG: 5 TABLET ORAL at 09:19

## 2023-11-29 RX ADMIN — OXYCODONE HYDROCHLORIDE 5 MG: 5 TABLET ORAL at 14:23

## 2023-11-29 ASSESSMENT — ACTIVITIES OF DAILY LIVING (ADL)
ADLS_ACUITY_SCORE: 25

## 2023-11-29 NOTE — PLAN OF CARE
Problem: Adult Inpatient Plan of Care  Goal: Absence of Hospital-Acquired Illness or Injury  Intervention: Identify and Manage Fall Risk  Recent Flowsheet Documentation  Taken 11/29/2023 0100 by Salena Aviles RN  Safety Promotion/Fall Prevention:   activity supervised   clutter free environment maintained   lighting adjusted  Intervention: Prevent Skin Injury  Recent Flowsheet Documentation  Taken 11/29/2023 0100 by Salena Aviles RN  Body Position: position changed independently     Problem: Pain Acute  Goal: Optimal Pain Control and Function  Intervention: Prevent or Manage Pain  Recent Flowsheet Documentation  Taken 11/29/2023 0100 by aSlena Aviles RN  Medication Review/Management: medications reviewed     Problem: Diarrhea  Goal: Effective Diarrhea Management  Intervention: Manage Diarrhea  Recent Flowsheet Documentation  Taken 11/29/2023 0100 by Salena Aviles RN  Medication Review/Management: medications reviewed     Problem: Comorbidity Management  Goal: Blood Glucose Levels Within Targeted Range  Intervention: Monitor and Manage Glycemia  Recent Flowsheet Documentation  Taken 11/29/2023 0100 by Salena Aviles RN  Medication Review/Management: medications reviewed     Problem: Anemia  Goal: Anemia Symptom Improvement  Intervention: Monitor and Manage Anemia  Recent Flowsheet Documentation  Taken 11/29/2023 0100 by Salena Aviles RN  Safety Promotion/Fall Prevention:   activity supervised   clutter free environment maintained   lighting adjusted     Problem: Surgery Nonspecified  Goal: Anesthesia/Sedation Recovery  Intervention: Optimize Anesthesia Recovery  Recent Flowsheet Documentation  Taken 11/29/2023 0100 by Salena Aviles RN  Safety Promotion/Fall Prevention:   activity supervised   clutter free environment maintained   lighting adjusted  Goal: Effective Oxygenation and Ventilation  Intervention: Optimize Oxygenation and Ventilation  Recent Flowsheet Documentation  Taken  11/29/2023 0100 by Salena Aviles, RN  Head of Bed (HOB) Positioning: HOB at 30-45 degrees   Goal Outcome Evaluation:  Patient reports abdomen pain of 4/10 had oxycodone before bad. He slept most of the night. Abdomen incision open to air with staples,tension sutures intact. He denies nausea and vomiting.

## 2023-11-29 NOTE — PROGRESS NOTES
CLINICAL NUTRITION SERVICES - ASSESSMENT NOTE     Nutrition Prescription    RECOMMENDATIONS FOR MDs/PROVIDERS TO ORDER:  -Recommend mvi with minerals d/t not meeting RDIs x 2+ weeks    Malnutrition Status:    Moderate in acute illness    Recommendations already ordered by Registered Dietitian (RD):  -special k bar and gel plus daily   -Reduce CHO restriction d/t poor intake, increased needs, BG WNL to;  90 g CHO/meal    Future/Additional Recommendations:  Adjust supplement pending intake, weight, tolerance acceptance  Monitor BG for need to adjust CHO limits on DM Diet     REASON FOR ASSESSMENT  Ady Godinez is a/an 38 year old male assessed by the dietitian for [facility]    Pt presents s/p exploratory laparotomy evacuation of seroma as well as repair of recurrent incisional ventral hernia with gangrene on 11/24/20    *Surgery is recommending bariatric surgery to prevent further recurrent hernias    Hx DM2, HTN, severe obesity    NUTRITION HISTORY  Does not have DM meds at home rx    Pt has had counseling with bariatric RD through [facility] 12/2021-4/2022 as he was pursuing LSG bariatric surgery with weight loss goal to 380 lb or below to get surgery  Pt beginning weight of 400 lb and only achieved 6 lb weight loss in that time d/t stress eating while unemployed and no set regimen for exercise.   Pt has been counseled to eat high protein, avoid carbonated beverages in preparation for post surgery requirements, drink liquids away from meals    Pt reports he is signed up for the [facility] bariatric surgery program and was to have his first 2 appointments but then was hospitalized - he is still interested in bariatric surgery and plans to continue this program after hospitalization    Pt reports his appetite has been poor since 11/9 d/t abd pain/N/V, <50%  He has taken protein shakes in the past but does not like them    CURRENT NUTRITION ORDERS  Diet: Moderate Consistent Carbohydrate  NPO/CL  "11/22 and 11/24    Intake/Tolerance: Poor to Fair. 100% of meals but ordering inadequately  Ordering 1-2 meal/day at 513-960 kcal, 16-52 g protein/day, meeting at the most 35% of estimated kcal and 40% of estimated protein needs    Pt reports his appetite is still < baseline but he is gradually eating and tolerating more food. He is being cautious with food choices and amounts to prevent N/V. He plans to order lunch today. He reports hunger for meals but doesn't know what he wants but tries to make sure to eat  Pt is willing to try supplements    LABS  Labs reviewed  A1C 6.5 11/22  BG WNL in the setting of poor intake    MEDICATIONS  Medications reviewed  Oral abx, colace bid, ssi, , miralax daily, pericolace bid    ANTHROPOMETRICS  Height: 180.3 cm (5' 11\")  Most Recent Weight: (!) 182.8 kg (403 lb)    IBW: 78.1 kg  BMI: Obesity Grade III BMI >40  Weight History: 4% weight loss x 4 months or less  Pt reports 8 lb weight loss from 11/9 to 11/24 = 1.9% weight loss x 2 weeks - not clinically significant  Date/Time Weight Weight Method   11/24/23 1127 182.8 kg (403 lb) Abnormal  Bed scale   11/22/23 1833 190.5 kg (420 lb) Abnormal - likely stated in ED --     Wt Readings from Last 10 Encounters:   11/13/23 06/30/23 (!) 190.5 kg (420 lb)- likely stated in ED  190.1 kg (419 lb) - office   03/21/22 (!) 180.9 kg (398 lb 14.4 oz)   11/29/21 (!) 181.9 kg (401 lb)   11/08/21 (!) 179.2 kg (395 lb)   10/14/21 (!) 181.4 kg (400 lb)   09/10/21 (!) 184.9 kg (407 lb 11.2 oz)   04/13/21 (!) 180.8 kg (398 lb 9.5 oz)   07/16/18 (!) 153.6 kg (338 lb 9.6 oz)       Dosing Weight: 104.2 kg adjusted weight    ASSESSED NUTRITION NEEDS  Estimated Energy Needs: 6177-5901 kcals/day (25 - 30 kcals/kg)  Justification: Post-op  Estimated Protein Needs: 125-156 grams protein/day (1.2 - 1.5 grams of pro/kg)  Justification: Post-op and Repletion  Estimated Fluid Needs: 3049-5585 mL/day (1 mL/kcal)   Justification: Maintenance    PHYSICAL " FINDINGS  See malnutrition section below.  Abd surgical incision  GI - 1-2 BM/day. Pt reports stomach gurgling and urgency right now with Bms  Prn zofran last given 11/26  Drain x 2 OP - 50 ml    MALNUTRITION:  % Weight Loss:  Weight loss does not meet criteria for malnutrition   % Intake:  </= 50% for >/= 5 days (severe malnutrition)  Subcutaneous Fat Loss:  None observed  Muscle Loss:  None observed  Fluid Retention:  Mild +1 LE  Fat/muscle losses may be masked by obesity. Pt has lost strength    Malnutrition Diagnosis: Moderate malnutrition  In Context of:  Acute illness or injury    NUTRITION DIAGNOSIS  Inadequate oral intake related to poor appetite, nausea, recent surgery as evidenced by intake <50% x 2 weeks, unintentional weight loss    INTERVENTIONS  Implementation  -Pt is interested in finding out if he lost more weight d/t poor p.o- he understands this is undesirable nutritional weight loss- ordered new weight  -special k bar and gel plus daily   -Recommend mvi with minerals d/t not meeting RDIs x 2+ weeks  - Educated pt on current inadequate intake compared to increased estimated needs post op.   -Educated and recommended low fiber foods until GI is healed  -Reduce CHO restriction d/t poor intake, increased needs, BG WNL to;  90 g CHO/meal    Goals  Meet > 75% of estimated needs  Maintain weight  Normalize bowels     Monitoring/Evaluation  Progress toward goals will be monitored and evaluated per protocol.

## 2023-11-29 NOTE — PROGRESS NOTES
Care Management Follow Up    Length of Stay (days): 7    Expected Discharge Date: 11/30/2023     Concerns to be Addressed:       Patient plan of care discussed at interdisciplinary rounds: Yes    Anticipated Discharge Disposition: Home     Anticipated Discharge Services: home care RN, PT, OT, HHA  Anticipated Discharge DME:  has 4WW    Patient/family educated on Medicare website which has current facility and service quality ratings: yes  Education Provided on the Discharge Plan: Yes  Patient/Family in Agreement with the Plan: yes    Referrals Placed by CM/SW: Homecare  Private pay costs discussed: Not applicable    Additional Information:  Patient from home with spouse and is independent at baseline.     Therapy rec changed to TCU. Patient does not have TCU coverage through his insurance.     CM discussed this with patient.  Patient is not able to private pay for TCU, though is accepting of home care.  His biggest concern is getting up the flight of stairs to the main level.  Can stay on one level.    Patient accepted by [facility] home care for RN, PT, OT, HHA.    Plan to discharge tomorrow.  Patient will have either his wife or dad pick him up, will update staff tonight.  CM will follow up in the morning.     Olga Vallecillo RN

## 2023-11-29 NOTE — PROGRESS NOTES
"Daily Progress Note        CODE STATUS:  Full Code    11/29/23  Assessment/Plan:  38-year-old male with severe obesity admitted for umbilical hernia with gangrene.     #Umbilical hernia with gangrene  #Recurrent incisional ventral hernia  -s/p exploratory laparotomy with repair of incisional ventral hernia with mesh, drain placement 11/24  -Operative culture grew cutibacterium acnes  -s/p 5 days Zosyn, continue with 2 more days PO abx (will complete the antibiotics course today)  -Pain control    -PT      #Hypercalcemia, mild  -Ca initially 10.2, now wnl after IV fluids     #Diabetes type 2  -A1c 6.5  -No home medications  -SSI     #Hypertension  -No home medication  -Started lisinopril      #Severe obesity  -BMI 58.58  -Dr. Kim recommends bariatric surgery         DVT ppx: sc heparin       Diet: Moderate Consistent Carb (60 g CHO per Meal) Diet    Duvall Catheter: Not present  Lines: None     Cardiac Monitoring: None  Code Status: Full Code        Clinically Significant Risk Factors                  # Hypertension: Noted on problem list       # DMII: A1C = 6.5 % (Ref range: <5.7 %) within past 6 months   # Severe Obesity: Estimated body mass index is 56.21 kg/m  as calculated from the following:    Height as of this encounter: 1.803 m (5' 11\").    Weight as of this encounter: 182.8 kg (403 lb).             Disposition; Likely home with [facility] tomorrow  Barrier to discharge; None      Subjective:  Interval History: Patient seen and examined. Notes, labs, imaging reports personally reviewed. Patient is new to me today. Patient reports doing the same. PT has recommended TCU for him, but the patient apparently doesn't have TCU coverage. He doesn't feel ready to go home today.     Review of Systems:   As mentioned in subjective.    Patient Active Problem List   Diagnosis    Headache    Umbilical hernia without obstruction and without gangrene    Morbid obesity (H)    Hernia of anterior abdominal wall    Nausea and " vomiting, unspecified vomiting type    HTN (hypertension)    Mood disorder (H24)    Prediabetes    Pure hypercholesterolemia    Sleep disturbance    Vitamin D deficiency    Vision disturbance    Acute hypoxemic respiratory failure (H)    Cellulitis    Concussion with no loss of consciousness, sequela (H24)    Convergence insufficiency    Pain of left lower leg    Positive D dimer    Post concussion syndrome    Sepsis (H)    Vestibular dysfunction       Scheduled Meds:   amoxicillin-clavulanate  1 tablet Oral Q12H PABLO (08/20)    docusate sodium  200 mg Oral BID    heparin ANTICOAGULANT  5,000 Units Subcutaneous Q8H    insulin aspart  1-7 Units Subcutaneous TID AC    insulin aspart  1-5 Units Subcutaneous At Bedtime    lisinopril  10 mg Oral Daily    multivitamin w/minerals  1 tablet Oral Daily    polyethylene glycol  17 g Oral Daily    senna-docusate  1 tablet Oral BID    sodium chloride (PF)  3 mL Intracatheter Q8H    sodium chloride (PF)  3 mL Intracatheter Q8H     Continuous Infusions:  PRN Meds:.acetaminophen, bisacodyl, calcium carbonate, glucose **OR** dextrose **OR** glucagon, hydrALAZINE **OR** hydrALAZINE, HYDROmorphone, HYDROmorphone, hydrOXYzine **OR** hydrOXYzine, lidocaine 4%, lidocaine (buffered or not buffered), magnesium hydroxide, melatonin, menthol-zinc oxide, miconazole, naloxone **OR** naloxone **OR** naloxone **OR** naloxone, ondansetron **OR** ondansetron, oxyCODONE **OR** oxyCODONE, prochlorperazine **OR** prochlorperazine, senna-docusate **OR** senna-docusate, sodium chloride (PF), sodium chloride (PF)    Objective:  Vital signs in last 24 hours:  Temp:  [97.9  F (36.6  C)-98.3  F (36.8  C)] 98.3  F (36.8  C)  Pulse:  [80-97] 90  Resp:  [16-18] 16  BP: (115-126)/(58-67) 123/58  SpO2:  [95 %-96 %] 95 %        Intake/Output Summary (Last 24 hours) at 11/29/2023 1615  Last data filed at 11/29/2023 1433  Gross per 24 hour   Intake 810 ml   Output 55 ml   Net 755 ml       Physical Exam:    General:  Not in obvious distress.  HEENT: NC, AT   Chest: Clear to auscultation bilaterally  Heart: S1S2 normal, regular. No M/R/G  Abdomen: Soft. NT, ND. Bowel sounds- active. Abdominal binder in place. Midline surgical incision is healing. 4 tension sutures noted. 2 JOSE drains (one on each side) noted.   Extremities: No legs swelling  Neuro: Alert and awake, grossly non-focal      Lab Results:(I have personally reviewed the results)    Recent Results (from the past 24 hour(s))   Glucose by meter    Collection Time: 11/28/23  5:51 PM   Result Value Ref Range    GLUCOSE BY METER POCT 90 70 - 99 mg/dL   Glucose by meter    Collection Time: 11/28/23  8:39 PM   Result Value Ref Range    GLUCOSE BY METER POCT 106 (H) 70 - 99 mg/dL   Glucose by meter    Collection Time: 11/29/23  8:43 AM   Result Value Ref Range    GLUCOSE BY METER POCT 92 70 - 99 mg/dL   Glucose by meter    Collection Time: 11/29/23 12:09 PM   Result Value Ref Range    GLUCOSE BY METER POCT 120 (H) 70 - 99 mg/dL       All laboratory and imaging data in the past 24 hours reviewed  Serum Glucose range:   Recent Labs   Lab 11/29/23  1209 11/29/23  0843 11/28/23  2039 11/28/23  1751   * 92 106* 90     ABG: No lab results found in last 7 days.  CBC:   Recent Labs   Lab 11/27/23  0535 11/26/23  0552 11/25/23  0532 11/24/23  1623   WBC 12.1* 14.6* 15.9* 18.8*   HGB 10.8* 10.6* 11.7* 12.0*   HCT 34.5* 34.4* 37.6* 38.6*   MCV 91 92 90 91    380 418 443   NEUTROPHIL 73  --  81 89   LYMPH 17  --  10 7   MONOCYTE 6  --  7 3   EOSINOPHIL 3  --  1 0     Chemistry:   Recent Labs   Lab 11/27/23  0535 11/26/23  0552 11/25/23  0532 11/23/23  0554 11/22/23  1848   * 131* 132*   < > 132*   POTASSIUM 4.5 4.4 4.4   < > 4.9   CHLORIDE 98 97* 98   < > 90*   CO2 29 25 25   < > 28   BUN 7.9 7.9 10.2   < > 16.2   CR 0.64* 0.63* 0.85   < > 0.74   GFRESTIMATED >90 >90 >90   < > >90   JEFRY 9.0 8.6 8.4*   < > 10.2*   MAG  --   --  2.5*  --   --    PROTTOTAL  --   --   --    "--  8.7*   ALBUMIN  --   --   --   --  3.7   ALT  --   --   --   --  39   ALKPHOS  --   --   --   --  126   BILITOTAL  --   --   --   --  0.6    < > = values in this interval not displayed.     Coags:  No results for input(s): \"INR\", \"PROTIME\", \"PTT\" in the last 168 hours.    Invalid input(s): \"APTT\"  Cardiac Markers:  No results for input(s): \"CKTOTAL\", \"TROPONINI\" in the last 168 hours.       Abd/pelvis CT,  IV  contrast only TRAUMA / AAA    Result Date: 11/22/2023  EXAM: CT ABDOMEN PELVIS W CONTRAST LOCATION: [facility] DATE: 11/22/2023 INDICATION: Intractable nausea and vomiting since recent hernia repair. Leukocytosis.  COMPARISON: 11/13/2023 TECHNIQUE: CT scan of the abdomen and pelvis was performed following injection of IV contrast. Multiplanar reformats were obtained. Dose reduction techniques were used. CONTRAST: 100 mL Isovue 370 FINDINGS: LOWER CHEST: Normal. HEPATOBILIARY: Normal. PANCREAS: Normal. SPLEEN: Normal. ADRENAL GLANDS: Normal. KIDNEYS/BLADDER: Normal. BOWEL: Interval ventral abdominal hernia repair since prior study. Fluid distention of the proximal small bowel is seen with gradual tapering and tethering towards the anterior abdominal wall, where a gas/fluid collection is present measuring approximately 17 x 6 x 17.5 cm. LYMPH NODES: Normal. VASCULATURE: Unremarkable. PELVIC ORGANS: Normal. MUSCULOSKELETAL: Normal.     IMPRESSION: 1.  Large gas/fluid collection in the anterior abdominal wall suspicious for abscess. 2.  Fluid distended proximal small bowel with gradual tapering and tethering towards the anterior abdomen, indeterminate. Ileus is slightly favored in this immediate postoperative period over partial obstruction.    XR Chest 2 Views    Result Date: 11/16/2023  EXAM: XR CHEST 2 VIEWS LOCATION: [facility] DATE: 11/16/2023 INDICATION: Hypoxia. COMPARISON: Chest CTA 07/07/2018     IMPRESSION: Negative chest. No interval change.    CT " Abdomen Pelvis w/o Contrast    Result Date: 11/13/2023  EXAM: CT ABDOMEN PELVIS W/O CONTRAST LOCATION: [facility] DATE: 11/13/2023 INDICATION: painful unreducible abd hernia with n v today COMPARISON: CT abdomen and pelvis 05/23/2023 TECHNIQUE: CT scan of the abdomen and pelvis was performed without IV contrast. Multiplanar reformats were obtained. Dose reduction techniques were used. CONTRAST: None. FINDINGS: LOWER CHEST: Normal. HEPATOBILIARY: Hepatomegaly measuring 23 cm in length. Diffuse fatty infiltration of the liver. Moderate distention of the gallbladder. PANCREAS: Normal. SPLEEN: Normal. ADRENAL GLANDS: Normal. KIDNEYS/BLADDER: Normal. No renal calculi or hydronephrosis. No bladder stones. BOWEL: There are 2 adjacent ventral wall hernias again visualized, with the more superior midline fascial defect measuring 5 cm in width, with herniation of a few mildly distended small bowel loops, increased in caliber from the prior study. The more inferior midline ventral wall fascial defect at the level of the umbilicus measures 4.8 cm in width, also with a few mildly distended small bowel loops, also increased from previous. The small bowel loops within the peritoneal cavity are all of normal caliber. No inflammatory changes. Appendectomy. LYMPH NODES: Normal. VASCULATURE: Unremarkable. PELVIC ORGANS: Normal. MUSCULOSKELETAL: Hypertrophic changes lower thoracic spine.     IMPRESSION: 1.  Midline ventral wall hernias as seen previously, now both containing slightly distended small bowel loops. Currently no evidence for small bowel obstruction, however this could be intermittent. 2.  Hepatomegaly and diffuse fatty infiltration of the liver.       Latest radiology report personally reviewed.    Note created using dragon voice recognition software so sounds alike errors may have escaped editing.      11/29/2023   Tomas Glass MD  Hospitalist, [facility]  Pager: [phone]

## 2023-11-29 NOTE — PROGRESS NOTES
ASSESSMENT:  1. Recurrent ventral incisional hernia    2. Nausea and vomiting, unspecified vomiting type    3. Abdominal wall cellulitis    4. Umbilical hernia with gangrene    5. Abdominal wall seroma, initial encounter      Ady Godinez is a 38 year old male s/p exploratory laparotomy evacuation of seroma as well as repair of recurrent incisional ventral hernia on 11/24/20   Overall improving slowly every day.      PLAN:  -Okay discharge from our standpoint anytime to TCU.  -We will make sure he has follow-up care recommendations in the discharge portion.  -Binder very important to wear tightly while walking.  He does not need to wear this while he is resting in bed.    SUBJECTIVE:   He is feeling better every day.  He is still quite concerned about his activity as he feels like he cannot go up more than a couple of stairs.  He has 12 stairs at home.  Patient also is home alone most of the time.  He does not feel that he will be safe at home.  Per PT he can go to TCU.      Patient Vitals for the past 24 hrs:   BP Temp Temp src Pulse Resp SpO2   11/29/23 0740 126/67 98  F (36.7  C) Oral 80 18 96 %   11/28/23 2328 115/66 97.9  F (36.6  C) Oral 97 18 96 %   11/28/23 1532 109/59 98.2  F (36.8  C) Oral 92 18 96 %         PHYSICAL EXAM:  GEN: No acute distress, comfortable    ABD: No change.  Slight erythema from staples and retention sutures.  Everything appears to be dry and okay.  Drains: Scant bloody  Output by Drain (mL) 11/27/23 0700 - 11/27/23 1459 11/27/23 1500 - 11/27/23 2259 11/27/23 2300 - 11/28/23 0659 11/28/23 0700 - 11/28/23 1459 11/28/23 1500 - 11/28/23 2259 11/28/23 2300 - 11/29/23 0659 11/29/23 0700 - 11/29/23 1133   Closed/Suction Drain 1 LLQ Bulb 19 [place]is[place] 5  8 0  15    Closed/Suction Drain 2 RLQ Bulb 19 [place]isian 15  15 10  25       EXT:No cyanosis, edema or obvious abnormalities    11/28 0700 - 11/29 0659  In: 1310 [P.O.:1310]  Out: 50 [Drains:50]    No results displayed because visit has over  200 results.   Recent Results (from the past 24 hour(s))   Glucose by meter    Collection Time: 11/28/23 12:11 PM   Result Value Ref Range    GLUCOSE BY METER POCT 94 70 - 99 mg/dL   Glucose by meter    Collection Time: 11/28/23  5:51 PM   Result Value Ref Range    GLUCOSE BY METER POCT 90 70 - 99 mg/dL   Glucose by meter    Collection Time: 11/28/23  8:39 PM   Result Value Ref Range    GLUCOSE BY METER POCT 106 (H) 70 - 99 mg/dL   Glucose by meter    Collection Time: 11/29/23  8:43 AM   Result Value Ref Range    GLUCOSE BY METER POCT 92 70 - 99 mg/dL               PRAKASH SpringerC  [facility] General Surgery & Bariatric Care  [address] [place], [place] [id number]  Phone- [phone]  Fax- [phone]

## 2023-11-30 VITALS
WEIGHT: 315 LBS | RESPIRATION RATE: 18 BRPM | HEIGHT: 71 IN | TEMPERATURE: 97.4 F | HEART RATE: 82 BPM | OXYGEN SATURATION: 96 % | SYSTOLIC BLOOD PRESSURE: 132 MMHG | BODY MASS INDEX: 44.1 KG/M2 | DIASTOLIC BLOOD PRESSURE: 63 MMHG

## 2023-11-30 LAB
GLUCOSE BLDC GLUCOMTR-MCNC: 107 MG/DL (ref 70–99)
GLUCOSE BLDC GLUCOMTR-MCNC: 113 MG/DL (ref 70–99)
GLUCOSE BLDC GLUCOMTR-MCNC: 116 MG/DL (ref 70–99)
PLATELET # BLD AUTO: 412 10E3/UL (ref 150–450)

## 2023-11-30 PROCEDURE — 250N000011 HC RX IP 250 OP 636: Mod: JZ | Performed by: SURGERY

## 2023-11-30 PROCEDURE — 99239 HOSP IP/OBS DSCHRG MGMT >30: CPT | Performed by: INTERNAL MEDICINE

## 2023-11-30 PROCEDURE — 250N000013 HC RX MED GY IP 250 OP 250 PS 637: Performed by: HOSPITALIST

## 2023-11-30 PROCEDURE — 250N000013 HC RX MED GY IP 250 OP 250 PS 637: Performed by: SURGERY

## 2023-11-30 PROCEDURE — 85049 AUTOMATED PLATELET COUNT: CPT | Performed by: SURGERY

## 2023-11-30 PROCEDURE — 99231 SBSQ HOSP IP/OBS SF/LOW 25: CPT | Performed by: SURGERY

## 2023-11-30 PROCEDURE — 250N000013 HC RX MED GY IP 250 OP 250 PS 637: Performed by: INTERNAL MEDICINE

## 2023-11-30 PROCEDURE — 36415 COLL VENOUS BLD VENIPUNCTURE: CPT | Performed by: SURGERY

## 2023-11-30 RX ORDER — HYDROCODONE BITARTRATE AND ACETAMINOPHEN 5; 325 MG/1; MG/1
1 TABLET ORAL EVERY 6 HOURS PRN
Qty: 15 TABLET | Refills: 0 | Status: SHIPPED | OUTPATIENT
Start: 2023-11-30 | End: 2023-11-30

## 2023-11-30 RX ORDER — LISINOPRIL 10 MG/1
10 TABLET ORAL DAILY
Qty: 30 TABLET | Refills: 1 | Status: SHIPPED | OUTPATIENT
Start: 2023-12-01 | End: 2024-01-30

## 2023-11-30 RX ORDER — DOCUSATE SODIUM 100 MG/1
100 CAPSULE, LIQUID FILLED ORAL 2 TIMES DAILY
Qty: 30 CAPSULE | Refills: 0 | Status: SHIPPED | OUTPATIENT
Start: 2023-11-30 | End: 2024-01-30

## 2023-11-30 RX ADMIN — LISINOPRIL 10 MG: 5 TABLET ORAL at 08:29

## 2023-11-30 RX ADMIN — HEPARIN SODIUM 5000 UNITS: 5000 INJECTION, SOLUTION INTRAVENOUS; SUBCUTANEOUS at 05:27

## 2023-11-30 RX ADMIN — SENNOSIDES AND DOCUSATE SODIUM 1 TABLET: 8.6; 5 TABLET ORAL at 08:29

## 2023-11-30 RX ADMIN — DOCUSATE SODIUM 200 MG: 100 CAPSULE, LIQUID FILLED ORAL at 08:29

## 2023-11-30 RX ADMIN — MULTIPLE VITAMINS W/ MINERALS TAB 1 TABLET: TAB at 08:30

## 2023-11-30 ASSESSMENT — ACTIVITIES OF DAILY LIVING (ADL)
ADLS_ACUITY_SCORE: 23
ADLS_ACUITY_SCORE: 25
ADLS_ACUITY_SCORE: 25
ADLS_ACUITY_SCORE: 23
ADLS_ACUITY_SCORE: 25

## 2023-11-30 NOTE — PLAN OF CARE
Goal Outcome Evaluation:      Plan of Care Reviewed With: patient    Overall Patient Progress: improvingOverall Patient Progress: improving         Alert and oriented. Stand by assist. Exploratory with with repair of incisional ventral hernia on 11/24/23. Surgery following.  2 dianne drain. Incision CDI. Last oxy received at 2130 last night.  Diabetic, AC and HS blood glucose.  PIV right saline lock.  Expected discharge today 11/30/23. Oral antibiotic completed.

## 2023-11-30 NOTE — PROGRESS NOTES
General Surgery Progress Note:    Hospital Day # 8    ASSESSMENT:   1. Recurrent ventral incisional hernia    2. Nausea and vomiting, unspecified vomiting type    3. Abdominal wall cellulitis    4. Umbilical hernia with gangrene    5. Abdominal wall seroma, initial encounter        Ady Godinez is a 38 year old male s/p exploratory laparotomy evacuation of seroma as well as repair of recurrent incisional ventral hernia on 11/24/2023.      PLAN:   -Okay for discharge to home today with home PT.  Discharge information as well as pain control and stool softeners provided.    -Patient is to be discharged to home today with abdominal drains and retention sutures in position.    -Diet as tolerated    -Continue medical management per primary team.    -Patient is to complete the antibiotics.      SUBJECTIVE:   Ady Godinez was seen on rounds.  No new complaints today.  Patient ambulated twice yesterday.  Still passing flatus having bowel movements.  Tolerating diet.    Patient Vitals for the past 24 hrs:   BP Temp Temp src Pulse Resp SpO2   11/30/23 0040 112/55 98.6  F (37  C) Oral 91 17 96 %   11/29/23 1613 123/58 98.3  F (36.8  C) Oral 90 16 95 %       Physical Exam:  General: NAD, pleasant  CV:RRR  LUNGS:Normal respiratory effort, no accessory muscle use  ABD: Severely obese abdomen.  Midline incision well-approximated.  Retention sutures in position.  Abdominal drains x 2 with serosanguineous output.  EXT:no CCE    Braydon Kim, DO Braydon Kim,   General Surgeon  [facility]  Surgery [address] [address]?  Office: [phone]  Employed by - [facility] [employer] [facility]  Pager: [phone]

## 2023-11-30 NOTE — PLAN OF CARE
Physical Therapy Discharge Summary    Reason for therapy discharge:    Discharged to home with home therapy.    Progress towards therapy goal(s). See goals on Care Plan in [facility] electronic health record for goal details.  Goals partially met.  Barriers to achieving goals:   discharge from facility.    Therapy recommendation(s):    Recommend continued therapies to improve overall strength, endurance and mobility.       Sweta Curran, PT, DPT  11/30/2023

## 2023-11-30 NOTE — DISCHARGE SUMMARY
[facility] MEDICINE  DISCHARGE SUMMARY     Primary Care Physician: Elizabeth Black  Admission Date: 11/22/2023   Discharge Provider: AUSTIN Ware Discharge Date: 11/30/2023   Diet: as below   Code Status: Full Code   Activity: DCACTIVITY: Activity as tolerated        Condition at Discharge: Stable     REASON FOR PRESENTATION(See Admission Note for Details)   Drainage from the umbilicus    PRINCIPAL & ACTIVE DISCHARGE DIAGNOSES     Principal Problem:    Umbilical hernia without obstruction and without gangrene  Active Problems:    Morbid obesity (H)    Nausea and vomiting, unspecified vomiting type    Prediabetes      PENDING LABS     Unresulted Labs Ordered in the Past 30 Days of this Admission       Date and Time Order Name Status Description    11/24/2023  1:00 PM Anaerobic Bacterial Culture Routine Preliminary               PROCEDURES ( this hospitalization only)      Procedure(s):  EXPLORATORY LAPAROTOMY WITH  REPAIR OF INCISIONAL VENTRAL HERNIA.    RECOMMENDATIONS TO OUTPATIENT PROVIDER FOR F/U VISIT     Follow-up Appointments     Follow-up and recommended labs and tests       Follow up with Dr Kim.   [facility] Surgery [facility]  [address] [place] [id number]  Phone- [phone]  Fax- [phone]    From your Surgeon.                               Diet: Regular diet. Patients can have difficulty with constipation   following surgery,due in part to the administration of narcotic   medications.  If you are suffering with constipation, you should avoid   foods such as hard cheeses or red meat.  Things to help avoid constipation   are eating foods high in fiber,drink plenty of fluids, and be active as   much as you can. If needed you can take over the counter medications for   constipation such as laxatives or bulking psyllium products.     Activity: You should continue to be active at home, including ambulating    frequently.  If possible try to limit the amount of time spent in bed.    Restrictions: You should not lift greater than 10 pounds for 6 weeks, and   will want to avoid strenuous physical activity for 6 weeks.  You should   limit your physical activity if it causes you discomfort; however, this   should resolve within 6 weeks.   Walking does not count as strenuous   physical activity.You are safe to walk up and down stairs.  Following 8   weeks you may resume all normal physical activity.    Wound / drain care:. Keep clean and dry. Ok to shower.   Wear binder when walking    It is normal to have a small rim of red present around the incisions. This   should not, however,extend beyond 1/4 inch from the incision.  If your   incisions become increasingly tender, red, or draining, please contact us.         Bathing: You may shower after 24 hours from surgery.  It is ok to get your   incisions wet, but avoid rubbing them.  Avoid soaking in bath tubs, or   swimming in lakes, pools, or streams        Follow-up and recommended labs and tests       Follow-up with general surgery team regarding drain removal and suture   removal        Follow-up and recommended labs and tests       Follow up with primary care provider, Elizabeth Black, within 7 days for   hospital follow- up.                DISPOSITION     TCU recommended, but the patient decided to go home with [facility] due to no TCU coverage.     SUMMARY OF HOSPITAL COURSE:      38-year-old male with severe obesity admitted for umbilical hernia with gangrene.     #Umbilical hernia with gangrene  #Recurrent incisional ventral hernia  -s/p exploratory laparotomy with repair of incisional ventral hernia with mesh, drain placement 11/24  -Operative culture grew cutibacterium acnes  -s/p 5 days Zosyn, followed by 2 more days PO abx. Completed antibiotics course yesterday  -Pain controlled with prn PO oxycodone  -Ok to discharge home today per surgical service. Patient is  discharging home with the drains and tension sutures in place. Patient advised to keep track of the drainage from the drains for surgeons to review at the time of follow up visit.      #Hypercalcemia, mild  -Ca initially 10.2, now wnl after IV fluids     #Diabetes type 2  -A1c 6.5  -No home medications  -sliding scale insulin while here     #Hypertension  -No home medication  -Started lisinopril      #Severe obesity  -BMI 58.58  -Dr. Kim recommends bariatric surgery     Discharge Medications with Med changes:     Discharge Medication List as of 11/30/2023 12:03 PM        START taking these medications    Details   docusate sodium (COLACE) 100 MG capsule Take 1 capsule (100 mg) by mouth 2 times daily, Disp-30 capsule, R-0, E-Prescribe      lisinopril (ZESTRIL) 10 MG tablet Take 1 tablet (10 mg) by mouth daily, Disp-30 tablet, R-1, E-Prescribe      oxyCODONE (ROXICODONE) 5 MG tablet Take 1-2 tablets (5-10 mg) by mouth every 4 hours as needed for moderate pain, Disp-25 tablet, R-0, E-Prescribe           CONTINUE these medications which have NOT CHANGED    Details   acetaminophen (TYLENOL) 325 MG tablet Take 2 tablets (650 mg) by mouth every 6 hours as needed for mild pain, Disp-90 tablet, R-0, E-Prescribe      senna-docusate (SENOKOT-S/PERICOLACE) 8.6-50 MG tablet Take 1 tablet by mouth 2 times daily for 14 days, Disp-28 tablet, R-0, E-Prescribe      ondansetron (ZOFRAN ODT) 4 MG ODT tab Take 1 tablet (4 mg) by mouth every 8 hours as needed for nausea, Disp-8 tablet, R-0, E-Prescribe      polyethylene glycol (MIRALAX) 17 g packet Take 17 g by mouth daily for 14 days, Disp-14 packet, R-0, E-Prescribe                   Rationale for medication changes:      Please see above        Consults   General Surgery       Immunizations given this encounter     Most Recent Immunizations   Administered Date(s) Administered    COVID-19 MONOVALENT 12+ (Pfizer) 05/23/2021    TD,PF 7+ (Tenivac) 10/22/2009    TDAP (Adacel,Boostrix)  "06/26/2018           Anticoagulation Information      Recent INR results: No results for input(s): \"INR\" in the last 168 hours.  Warfarin doses (if applicable) or name of other anticoagulant: NA      SIGNIFICANT IMAGING FINDINGS     Results for orders placed or performed during the hospital encounter of 11/22/23   Abd/pelvis CT,  IV  contrast only TRAUMA / AAA    Impression    IMPRESSION:     1.  Large gas/fluid collection in the anterior abdominal wall suspicious for abscess.    2.  Fluid distended proximal small bowel with gradual tapering and tethering towards the anterior abdomen, indeterminate. Ileus is slightly favored in this immediate postoperative period over partial obstruction.       SIGNIFICANT LABORATORY FINDINGS     Most Recent 3 CBC's:  Recent Labs   Lab Test 11/30/23  0605 11/27/23  0535 11/26/23  0552 11/25/23  0532   WBC  --  12.1* 14.6* 15.9*   HGB  --  10.8* 10.6* 11.7*   MCV  --  91 92 90    387 380 418     Most Recent 3 BMP's:  Recent Labs   Lab Test 11/30/23  1134 11/30/23  0814 11/30/23  0209 11/27/23  0807 11/27/23  0535 11/26/23  0811 11/26/23  0552 11/25/23  0815 11/25/23  0532   NA  --   --   --   --  133*  --  131*  --  132*   POTASSIUM  --   --   --   --  4.5  --  4.4  --  4.4   CHLORIDE  --   --   --   --  98  --  97*  --  98   CO2  --   --   --   --  29  --  25  --  25   BUN  --   --   --   --  7.9  --  7.9  --  10.2   CR  --   --   --   --  0.64*  --  0.63*  --  0.85   ANIONGAP  --   --   --   --  6*  --  9  --  9   JEFRY  --   --   --   --  9.0  --  8.6  --  8.4*   * 107* 113*   < > 103*   < > 127*   < > 139*    < > = values in this interval not displayed.     Most Recent 2 LFT's:  Recent Labs   Lab Test 11/22/23  1848 11/14/23  0722 03/21/22  1033   AST  --  53* 30   ALT 39 47 37   ALKPHOS 126 89 124*   BILITOTAL 0.6 0.3 0.2     Most Recent 3 INR's:  Recent Labs   Lab Test 04/14/21  0735 07/07/18  0034   INR 0.98 1.00         Discharge Orders        Adult Comprehensive " Weight Management  Referral      Home Care Referral      Follow-up and recommended labs and tests     Follow up with Dr Kim.   [facility]  [address] [place] [address]  Phone- [phone]  Fax- [phone]    From your Surgeon.                               Diet: Regular diet. Patients can have difficulty with constipation following surgery,due in part to the administration of narcotic medications.  If you are suffering with constipation, you should avoid foods such as hard cheeses or red meat.  Things to help avoid constipation are eating foods high in fiber,drink plenty of fluids, and be active as much as you can. If needed you can take over the counter medications for constipation such as laxatives or bulking psyllium products.     Activity: You should continue to be active at home, including ambulating frequently.  If possible try to limit the amount of time spent in bed.    Restrictions: You should not lift greater than 10 pounds for 6 weeks, and will want to avoid strenuous physical activity for 6 weeks.  You should limit your physical activity if it causes you discomfort; however, this should resolve within 6 weeks.   Walking does not count as strenuous physical activity.You are safe to walk up and down stairs.  Following 8 weeks you may resume all normal physical activity.    Wound / drain care:. Keep clean and dry. Ok to shower.   Wear binder when walking    It is normal to have a small rim of red present around the incisions. This should not, however,extend beyond 1/4 inch from the incision.  If your incisions become increasingly tender, red, or draining, please contact us.       Bathing: You may shower after 24 hours from surgery.  It is ok to get your incisions wet, but avoid rubbing them.  Avoid soaking in bath tubs, or swimming in lakes, pools, or streams     Activity    See note from surgeon     Reason for your hospital stay     "Infected seroma and recurrent incisional ventral hernia     Follow-up and recommended labs and tests     Follow-up with general surgery team regarding drain removal and suture removal     Activity    Your activity upon discharge: Okay to perform daily activities.  No significant lifting more than 10 pounds for the next 6 weeks.     Reason for your hospital stay    Ventral hernia     Follow-up and recommended labs and tests     Follow up with primary care provider, Elizabeth Black, within 7 days for hospital follow- up.     Diet    Follow this diet upon discharge:regular diet.       Examination   /63 (BP Location: Right arm)   Pulse 82   Temp 97.4  F (36.3  C) (Oral)   Resp 18   Ht 1.803 m (5' 11\")   Wt (!) 182.8 kg (403 lb)   SpO2 96%   BMI 56.21 kg/m    General: Not in obvious distress.  HEENT: NC, AT   Chest: Clear to auscultation bilaterally  Heart: S1S2 normal, regular. No M/R/G  Abdomen: Soft. NT, ND. Bowel sounds- active. Abdominal binder in place. Midline surgical incision is healing. 4 tension sutures noted. 2 JOSE drains (one on each side) noted.   Extremities: No legs swelling  Neuro: Alert and awake, grossly non-focal    Please see EMR for more detailed significant labs, imaging, consultant notes etc.    I, AUSTIN Ware, personally saw the patient today and spent greater than 30 minutes discharging this patient.    AUSTIN Ware  [facility]    CC:Elizabeth Black"

## 2023-11-30 NOTE — PROGRESS NOTES
Patient received orders for ok to discharge home today, with homecare services, OT/OT/RN/Home health aide.  JOSE remain in place, with a small amount of serosanguinous drainage.  He denied need for pain medications this shift. Had a BM today.  Went over discharge instructions prior to dishcharge. Alina Small RN

## 2023-11-30 NOTE — PROGRESS NOTES
Care Management Discharge Note    Discharge Date: 11/30/2023       Discharge Disposition: Home Care    Discharge Services: None    Discharge DME: None    Discharge Transportation: family or friend will provide    Private pay costs discussed: Not applicable    Does the patient's insurance plan have a 3 day qualifying hospital stay waiver?  No    PAS Confirmation Code: NA  Patient/family educated on Medicare website which has current facility and service quality ratings: yes    Education Provided on the Discharge Plan: Yes  Persons Notified of Discharge Plans: patient, and per care team  Patient/Family in Agreement with the Plan: yes    Handoff Referral Completed: Yes    Additional Information:  Patient discharging home with home care RN, PT, OT, HHA through [facility].       Olga Vallecillo RN

## 2023-11-30 NOTE — PLAN OF CARE
Problem: Adult Inpatient Plan of Care  Goal: Absence of Hospital-Acquired Illness or Injury  Intervention: Identify and Manage Fall Risk  Recent Flowsheet Documentation  Taken 11/29/2023 1700 by Tanya Lowery RN  Safety Promotion/Fall Prevention:   activity supervised   assistive device/personal items within reach   nonskid shoes/slippers when out of bed  Intervention: Prevent Skin Injury  Recent Flowsheet Documentation  Taken 11/29/2023 1700 by Tanya Lowery RN  Body Position: position changed independently  Intervention: Prevent and Manage VTE (Venous Thromboembolism) Risk  Recent Flowsheet Documentation  Taken 11/29/2023 1700 by Tanya Lowery RN  VTE Prevention/Management: patient refused intervention  Intervention: Prevent Infection  Recent Flowsheet Documentation  Taken 11/29/2023 1700 by Tanya Lowery RN  Infection Prevention: hand hygiene promoted  Goal: Optimal Comfort and Wellbeing  Outcome: Progressing     Problem: Pain Acute  Goal: Optimal Pain Control and Function  Outcome: Progressing     Goal Outcome Evaluation:  Patient is alert and oriented x 4 and able to make needs known. Patient up to the bathroom with assist x 1. Patient received PRN oxycodone x 1 for pain. VSS. Abdominal binders on and incision is open to air.

## 2023-11-30 NOTE — DISCHARGE INSTRUCTIONS
Follow up: Please call us at [phone] to schedule an appointment at your convenience.      If you would prefer to follow up with us by phone please let us know so that we may contact you 2-3 weeks following your procedure.         Restrictions -no lifting more than 10 pounds for the next 6 weeks until cleared by general surgery team.    Diet: Regular diet.  Foods high in fiber are recommended with 8 to 10 glasses of fluids each day.     Patients can have difficulty with constipation following surgery, due in part to the administration of narcotic medications.  If you are suffering with constipation, you should avoid foods such as hard cheeses or red meat.      Activity: You should continue to be active at home, including ambulating frequently.  If possible try to limit the amount of time spent in bed.     Restrictions: You have no lifting restrictions post operatively, but may wish to avoid strenuous physical activity for 1-2 weeks.  You should limit your physical activity if it causes you discomfort; however, this should resolve within 1-2 weeks.   Walking does not count as strenuous physical activity.  You are safe to walk up and down stairs.  Following 2 weeks you may resume all normal physical activity.     Wound / drain care:      It is normal to have a small rim of red present around the incisions. This should not, however, extend beyond 1/4 inch from the incision.  If your incisions become increasingly tender, red, or draining, please contact us.       The 2 abdominal drains will be in position.  Continue to strip them daily or as needed.  You can shower with the abdominal drains.  Do not bathe with the abdominal drains in position.    Please dab dry around the retention sutures.  Retention sutures will be removed in clinic.     Bathing: You may shower.It is ok to get your incisions wet, but avoid rubbing them.  Avoid soaking in bath tubs, or swimming in lakes, pools, or streams for 4 weeks following  surgery.

## 2023-12-01 ENCOUNTER — PATIENT OUTREACH (OUTPATIENT)
Dept: CARE COORDINATION | Facility: CLINIC | Age: 38
End: 2023-12-01
Payer: COMMERCIAL

## 2023-12-01 LAB — BACTERIA TISS BX CULT: ABNORMAL

## 2023-12-01 NOTE — PROGRESS NOTES
Clinic Care Coordination Contact  [facility]: Post-Discharge Note  SITUATION                                                      Admission:    Admission Date: 11/22/23   Reason for Admission: 1. Umbilical hernia with gangrene   2. Nausea and vomiting, unspecified vomiting type   3. Abdominal wall cellulitis  Discharge:   Discharge Date: 11/30/23  Discharge Diagnosis: Principal Problem:    Umbilical hernia without obstruction and without gangrene  Active Problems:    Morbid obesity (H)    Nausea and vomiting, unspecified vomiting type    Prediabetes    BACKGROUND                                                      Per hospital discharge summary and inpatient provider notes:    Ady Godinez is a 38 year old male with pertinent medical history of s/p  davinci xi herniorrhaphy ventral (11/14/2023), HTN, morbid obesity, prediabetes, sepsis who presents with elevated WBC with PCP earlier today and post-op 1 week ago.     Patient states since coming home post surgery on 11/18 he has vomited 4 times and endorses pain to his LLQ. He states he has bowel movements since his discharge. Is able to tolerate water, but solid foods cause patient to vomit, but notes that while in hospital he was able to tolerate toast. He denies any pain or bleeding to the hernia at his belly button.       ASSESSMENT           Discharge Assessment  How are you doing now that you are home?: Patient states he is okay. States he feels about the same as when he left the hospital.  Denies any new or worsening symptoms.  How are your symptoms? (Red Flag symptoms escalate to triage hotline per guidelines): Unchanged  Do you feel your condition is stable enough to be safe at home until your provider visit?: Yes  Does the patient have their discharge instructions? : Yes  Does the patient have questions regarding their discharge instructions? : No  Were you started on any new medications or were there changes to any of your previous  medications? : Yes  Does the patient have all of their medications?: Yes  Do you have questions regarding any of your medications? : No  Do you have all of your needed medical supplies or equipment (DME)?  (i.e. oxygen tank, CPAP, cane, etc.): Yes  Discharge follow-up appointment scheduled within 14 calendar days? : Yes  Discharge Follow Up Appointment Date: 12/11/23  Discharge Follow Up Appointment Scheduled with?: Specialty Care Provider         Post-op (Clinicians Only)  Did the patient have surgery or a procedure: Yes  Incision: closed  Drainage: No  Bleeding: none  Fever: No  Chills: No  Redness: No  Warmth: No  Swelling: No  Incision site pain: No  Eating & Drinking: eating and drinking without complaints/concerns  PO Intake: regular diet  Additional Symptoms:  (Denies)  Bowel Function: loose stools  Date of last BM: 11/30/23  Urinary Status: voiding without complaint/concerns      PLAN                                                      Outpatient Plan:  Follow-up and recommended labs and tests  Follow-up with general surgery team regarding drain removal and  suture removal    Follow-up and recommended labs and tests  Follow up with primary care provider, Elizabeth Black, within 7 days for  hospital follow- up.    Future Appointments   Date Time Provider Department Center   12/11/2023  2:15 PM Day, PRAKASH Heller [facility]         For any urgent concerns, please contact our 24 hour nurse triage line: [phone] ([phone])         Earline Salinas RN

## 2023-12-03 ENCOUNTER — MEDICAL CORRESPONDENCE (OUTPATIENT)
Dept: HEALTH INFORMATION MANAGEMENT | Facility: CLINIC | Age: 38
End: 2023-12-03

## 2023-12-03 ENCOUNTER — MEDICAL CORRESPONDENCE (OUTPATIENT)
Dept: HEALTH INFORMATION MANAGEMENT | Facility: CLINIC | Age: 38
End: 2023-12-03
Payer: COMMERCIAL

## 2023-12-11 ENCOUNTER — OFFICE VISIT (OUTPATIENT)
Dept: SURGERY | Facility: CLINIC | Age: 38
End: 2023-12-11
Attending: SURGERY
Payer: COMMERCIAL

## 2023-12-11 VITALS — SYSTOLIC BLOOD PRESSURE: 134 MMHG | DIASTOLIC BLOOD PRESSURE: 76 MMHG

## 2023-12-11 DIAGNOSIS — L03.311 CELLULITIS OF ABDOMINAL WALL: Primary | ICD-10-CM

## 2023-12-11 DIAGNOSIS — Z48.89 ENCOUNTER FOR POSTOPERATIVE CARE: ICD-10-CM

## 2023-12-11 PROCEDURE — 99212 OFFICE O/P EST SF 10 MIN: CPT | Performed by: PHYSICIAN ASSISTANT

## 2023-12-11 NOTE — LETTER
12/11/2023         RE: Ady Godinez  [address]  [facility] [address]        Dear Colleague,    Thank you for referring your patient, Ady Godinez, to the [facility] [place]. Please see a copy of my visit note below.    HPI:  Ady Godinez is a 38 year old male s/p exploratory laparotomy evacuation of seroma as well as repair of recurrent incisional ventral hernia on 11/24/20 . Having significant pain. No fevers. Eating ok.       /76 (BP Location: Right arm, Patient Position: Sitting, Cuff Size: Adult Regular)     EXAM:  GENERAL:Appears well  ABDOMEN:  Soft, +BS  Midline with retention sutures. Three are causing pressure ulcers. With surrounding erythema.   Drains scant and serous. removed  Assessment/Plan: . Started Augmentin. He has pain medications at home. To see Kim next week. Call if worse.     Bradley HINOJOSA        Again, thank you for allowing me to participate in the care of your patient.        Sincerely,        Bradley Arnold PA-C

## 2023-12-14 DIAGNOSIS — R11.2 NAUSEA WITH VOMITING: ICD-10-CM

## 2023-12-14 RX ORDER — ONDANSETRON 4 MG/1
4 TABLET, ORALLY DISINTEGRATING ORAL EVERY 6 HOURS PRN
Qty: 8 TABLET | Refills: 0 | Status: SHIPPED | OUTPATIENT
Start: 2023-12-14 | End: 2023-12-26

## 2023-12-16 ENCOUNTER — HOSPITAL ENCOUNTER (INPATIENT)
Facility: HOSPITAL | Age: 38
LOS: 1 days | Discharge: HOME HEALTH | DRG: 863 | End: 2023-12-20
Attending: EMERGENCY MEDICINE | Admitting: INTERNAL MEDICINE
Payer: COMMERCIAL

## 2023-12-16 ENCOUNTER — APPOINTMENT (OUTPATIENT)
Dept: CT IMAGING | Facility: HOSPITAL | Age: 38
DRG: 863 | End: 2023-12-16
Attending: EMERGENCY MEDICINE
Payer: COMMERCIAL

## 2023-12-16 DIAGNOSIS — L03.311 CELLULITIS OF ABDOMINAL WALL: Primary | ICD-10-CM

## 2023-12-16 DIAGNOSIS — L03.90 CELLULITIS, UNSPECIFIED CELLULITIS SITE: ICD-10-CM

## 2023-12-16 DIAGNOSIS — D50.0 IRON DEFICIENCY ANEMIA DUE TO CHRONIC BLOOD LOSS: ICD-10-CM

## 2023-12-16 DIAGNOSIS — T81.31XA DEHISCENCE OF OPERATIVE WOUND, INITIAL ENCOUNTER: ICD-10-CM

## 2023-12-16 DIAGNOSIS — E66.01 MORBID OBESITY (H): ICD-10-CM

## 2023-12-16 LAB
ANION GAP SERPL CALCULATED.3IONS-SCNC: 9 MMOL/L (ref 7–15)
BASOPHILS # BLD AUTO: 0 10E3/UL (ref 0–0.2)
BASOPHILS NFR BLD AUTO: 0 %
BUN SERPL-MCNC: 8.8 MG/DL (ref 6–20)
CALCIUM SERPL-MCNC: 9.4 MG/DL (ref 8.6–10)
CHLORIDE SERPL-SCNC: 98 MMOL/L (ref 98–107)
CREAT SERPL-MCNC: 0.71 MG/DL (ref 0.67–1.17)
DEPRECATED HCO3 PLAS-SCNC: 30 MMOL/L (ref 22–29)
EGFRCR SERPLBLD CKD-EPI 2021: >90 ML/MIN/1.73M2
EOSINOPHIL # BLD AUTO: 0.8 10E3/UL (ref 0–0.7)
EOSINOPHIL NFR BLD AUTO: 7 %
ERYTHROCYTE [DISTWIDTH] IN BLOOD BY AUTOMATED COUNT: 14.6 % (ref 10–15)
GLUCOSE SERPL-MCNC: 107 MG/DL (ref 70–99)
HCT VFR BLD AUTO: 34.7 % (ref 40–53)
HGB BLD-MCNC: 10.9 G/DL (ref 13.3–17.7)
HOLD SPECIMEN: NORMAL
HOLD SPECIMEN: NORMAL
IMM GRANULOCYTES # BLD: 0.1 10E3/UL
IMM GRANULOCYTES NFR BLD: 1 %
LACTATE SERPL-SCNC: 1.2 MMOL/L (ref 0.7–2)
LYMPHOCYTES # BLD AUTO: 2.3 10E3/UL (ref 0.8–5.3)
LYMPHOCYTES NFR BLD AUTO: 21 %
MCH RBC QN AUTO: 28.3 PG (ref 26.5–33)
MCHC RBC AUTO-ENTMCNC: 31.4 G/DL (ref 31.5–36.5)
MCV RBC AUTO: 90 FL (ref 78–100)
MONOCYTES # BLD AUTO: 0.7 10E3/UL (ref 0–1.3)
MONOCYTES NFR BLD AUTO: 7 %
NEUTROPHILS # BLD AUTO: 6.9 10E3/UL (ref 1.6–8.3)
NEUTROPHILS NFR BLD AUTO: 64 %
NRBC # BLD AUTO: 0 10E3/UL
NRBC BLD AUTO-RTO: 0 /100
PLATELET # BLD AUTO: 365 10E3/UL (ref 150–450)
POTASSIUM SERPL-SCNC: 4.4 MMOL/L (ref 3.4–5.3)
RBC # BLD AUTO: 3.85 10E6/UL (ref 4.4–5.9)
SODIUM SERPL-SCNC: 137 MMOL/L (ref 135–145)
WBC # BLD AUTO: 10.8 10E3/UL (ref 4–11)

## 2023-12-16 PROCEDURE — 80048 BASIC METABOLIC PNL TOTAL CA: CPT | Performed by: EMERGENCY MEDICINE

## 2023-12-16 PROCEDURE — 96374 THER/PROPH/DIAG INJ IV PUSH: CPT | Mod: 59

## 2023-12-16 PROCEDURE — 74177 CT ABD & PELVIS W/CONTRAST: CPT

## 2023-12-16 PROCEDURE — 85025 COMPLETE CBC W/AUTO DIFF WBC: CPT | Performed by: EMERGENCY MEDICINE

## 2023-12-16 PROCEDURE — 250N000011 HC RX IP 250 OP 636: Mod: JZ | Performed by: EMERGENCY MEDICINE

## 2023-12-16 PROCEDURE — 250N000011 HC RX IP 250 OP 636: Performed by: EMERGENCY MEDICINE

## 2023-12-16 PROCEDURE — 83605 ASSAY OF LACTIC ACID: CPT | Performed by: EMERGENCY MEDICINE

## 2023-12-16 PROCEDURE — 87040 BLOOD CULTURE FOR BACTERIA: CPT | Performed by: EMERGENCY MEDICINE

## 2023-12-16 PROCEDURE — 96365 THER/PROPH/DIAG IV INF INIT: CPT

## 2023-12-16 PROCEDURE — 36415 COLL VENOUS BLD VENIPUNCTURE: CPT | Performed by: EMERGENCY MEDICINE

## 2023-12-16 PROCEDURE — 99223 1ST HOSP IP/OBS HIGH 75: CPT | Performed by: INTERNAL MEDICINE

## 2023-12-16 PROCEDURE — 99285 EMERGENCY DEPT VISIT HI MDM: CPT | Mod: 25

## 2023-12-16 RX ORDER — IOPAMIDOL 755 MG/ML
90 INJECTION, SOLUTION INTRAVASCULAR ONCE
Status: COMPLETED | OUTPATIENT
Start: 2023-12-16 | End: 2023-12-16

## 2023-12-16 RX ORDER — PIPERACILLIN SODIUM, TAZOBACTAM SODIUM 3; .375 G/15ML; G/15ML
3.38 INJECTION, POWDER, LYOPHILIZED, FOR SOLUTION INTRAVENOUS ONCE
Status: COMPLETED | OUTPATIENT
Start: 2023-12-16 | End: 2023-12-17

## 2023-12-16 RX ADMIN — IOPAMIDOL 90 ML: 755 INJECTION, SOLUTION INTRAVENOUS at 22:35

## 2023-12-16 RX ADMIN — PIPERACILLIN AND TAZOBACTAM 3.38 G: 3; .375 INJECTION, POWDER, FOR SOLUTION INTRAVENOUS at 23:23

## 2023-12-16 ASSESSMENT — ACTIVITIES OF DAILY LIVING (ADL)
ADLS_ACUITY_SCORE: 35
ADLS_ACUITY_SCORE: 35

## 2023-12-17 LAB
ALBUMIN SERPL BCG-MCNC: 3.4 G/DL (ref 3.5–5.2)
ALP SERPL-CCNC: 104 U/L (ref 40–150)
ALT SERPL W P-5'-P-CCNC: 23 U/L (ref 0–70)
ANION GAP SERPL CALCULATED.3IONS-SCNC: 9 MMOL/L (ref 7–15)
AST SERPL W P-5'-P-CCNC: 25 U/L (ref 0–45)
BILIRUB SERPL-MCNC: 0.3 MG/DL
BUN SERPL-MCNC: 9.1 MG/DL (ref 6–20)
CALCIUM SERPL-MCNC: 9.6 MG/DL (ref 8.6–10)
CHLORIDE SERPL-SCNC: 96 MMOL/L (ref 98–107)
CREAT SERPL-MCNC: 0.72 MG/DL (ref 0.67–1.17)
CRP SERPL-MCNC: 104.7 MG/L
DEPRECATED HCO3 PLAS-SCNC: 29 MMOL/L (ref 22–29)
EGFRCR SERPLBLD CKD-EPI 2021: >90 ML/MIN/1.73M2
ERYTHROCYTE [DISTWIDTH] IN BLOOD BY AUTOMATED COUNT: 14.5 % (ref 10–15)
GLUCOSE BLDC GLUCOMTR-MCNC: 112 MG/DL (ref 70–99)
GLUCOSE BLDC GLUCOMTR-MCNC: 116 MG/DL (ref 70–99)
GLUCOSE BLDC GLUCOMTR-MCNC: 119 MG/DL (ref 70–99)
GLUCOSE BLDC GLUCOMTR-MCNC: 119 MG/DL (ref 70–99)
GLUCOSE SERPL-MCNC: 120 MG/DL (ref 70–99)
HCT VFR BLD AUTO: 32.3 % (ref 40–53)
HGB BLD-MCNC: 9.8 G/DL (ref 13.3–17.7)
MCH RBC QN AUTO: 27.5 PG (ref 26.5–33)
MCHC RBC AUTO-ENTMCNC: 30.3 G/DL (ref 31.5–36.5)
MCV RBC AUTO: 91 FL (ref 78–100)
PLATELET # BLD AUTO: 342 10E3/UL (ref 150–450)
POTASSIUM SERPL-SCNC: 4.5 MMOL/L (ref 3.4–5.3)
PROT SERPL-MCNC: 7.4 G/DL (ref 6.4–8.3)
RBC # BLD AUTO: 3.56 10E6/UL (ref 4.4–5.9)
SODIUM SERPL-SCNC: 134 MMOL/L (ref 135–145)
WBC # BLD AUTO: 9.2 10E3/UL (ref 4–11)

## 2023-12-17 PROCEDURE — 250N000011 HC RX IP 250 OP 636: Performed by: INTERNAL MEDICINE

## 2023-12-17 PROCEDURE — 258N000003 HC RX IP 258 OP 636: Performed by: INTERNAL MEDICINE

## 2023-12-17 PROCEDURE — 250N000013 HC RX MED GY IP 250 OP 250 PS 637: Performed by: INTERNAL MEDICINE

## 2023-12-17 PROCEDURE — 96376 TX/PRO/DX INJ SAME DRUG ADON: CPT

## 2023-12-17 PROCEDURE — 86140 C-REACTIVE PROTEIN: CPT | Performed by: INTERNAL MEDICINE

## 2023-12-17 PROCEDURE — 85027 COMPLETE CBC AUTOMATED: CPT | Performed by: INTERNAL MEDICINE

## 2023-12-17 PROCEDURE — G0378 HOSPITAL OBSERVATION PER HR: HCPCS

## 2023-12-17 PROCEDURE — 96366 THER/PROPH/DIAG IV INF ADDON: CPT

## 2023-12-17 PROCEDURE — 36415 COLL VENOUS BLD VENIPUNCTURE: CPT | Performed by: INTERNAL MEDICINE

## 2023-12-17 PROCEDURE — 87205 SMEAR GRAM STAIN: CPT | Performed by: INTERNAL MEDICINE

## 2023-12-17 PROCEDURE — 99233 SBSQ HOSP IP/OBS HIGH 50: CPT | Performed by: INTERNAL MEDICINE

## 2023-12-17 PROCEDURE — 96367 TX/PROPH/DG ADDL SEQ IV INF: CPT

## 2023-12-17 PROCEDURE — 99214 OFFICE O/P EST MOD 30 MIN: CPT | Performed by: SPECIALIST

## 2023-12-17 PROCEDURE — 87075 CULTR BACTERIA EXCEPT BLOOD: CPT | Performed by: INTERNAL MEDICINE

## 2023-12-17 PROCEDURE — 82040 ASSAY OF SERUM ALBUMIN: CPT | Performed by: INTERNAL MEDICINE

## 2023-12-17 PROCEDURE — 99204 OFFICE O/P NEW MOD 45 MIN: CPT | Performed by: INTERNAL MEDICINE

## 2023-12-17 PROCEDURE — 82962 GLUCOSE BLOOD TEST: CPT

## 2023-12-17 RX ORDER — CEFAZOLIN SODIUM 1 G/50ML
20 SOLUTION INTRAVENOUS ONCE
Status: COMPLETED | OUTPATIENT
Start: 2023-12-17 | End: 2023-12-18

## 2023-12-17 RX ORDER — NALOXONE HYDROCHLORIDE 0.4 MG/ML
0.2 INJECTION, SOLUTION INTRAMUSCULAR; INTRAVENOUS; SUBCUTANEOUS
Status: DISCONTINUED | OUTPATIENT
Start: 2023-12-17 | End: 2023-12-20 | Stop reason: HOSPADM

## 2023-12-17 RX ORDER — NALOXONE HYDROCHLORIDE 0.4 MG/ML
0.4 INJECTION, SOLUTION INTRAMUSCULAR; INTRAVENOUS; SUBCUTANEOUS
Status: DISCONTINUED | OUTPATIENT
Start: 2023-12-17 | End: 2023-12-20 | Stop reason: HOSPADM

## 2023-12-17 RX ORDER — CALCIUM CARBONATE 500 MG/1
1000 TABLET, CHEWABLE ORAL 4 TIMES DAILY PRN
Status: DISCONTINUED | OUTPATIENT
Start: 2023-12-17 | End: 2023-12-20 | Stop reason: HOSPADM

## 2023-12-17 RX ORDER — DOCUSATE SODIUM 100 MG/1
100 CAPSULE, LIQUID FILLED ORAL 2 TIMES DAILY
Status: DISCONTINUED | OUTPATIENT
Start: 2023-12-17 | End: 2023-12-20 | Stop reason: HOSPADM

## 2023-12-17 RX ORDER — NICOTINE POLACRILEX 4 MG
15-30 LOZENGE BUCCAL
Status: DISCONTINUED | OUTPATIENT
Start: 2023-12-17 | End: 2023-12-20 | Stop reason: HOSPADM

## 2023-12-17 RX ORDER — OXYCODONE HYDROCHLORIDE 5 MG/1
5 TABLET ORAL EVERY 4 HOURS PRN
Status: DISCONTINUED | OUTPATIENT
Start: 2023-12-17 | End: 2023-12-18

## 2023-12-17 RX ORDER — PIPERACILLIN SODIUM, TAZOBACTAM SODIUM 3; .375 G/15ML; G/15ML
3.38 INJECTION, POWDER, LYOPHILIZED, FOR SOLUTION INTRAVENOUS EVERY 8 HOURS
Status: DISCONTINUED | OUTPATIENT
Start: 2023-12-17 | End: 2023-12-18

## 2023-12-17 RX ORDER — DEXTROSE MONOHYDRATE 25 G/50ML
25-50 INJECTION, SOLUTION INTRAVENOUS
Status: DISCONTINUED | OUTPATIENT
Start: 2023-12-17 | End: 2023-12-20 | Stop reason: HOSPADM

## 2023-12-17 RX ORDER — LISINOPRIL 5 MG/1
10 TABLET ORAL DAILY
Status: DISCONTINUED | OUTPATIENT
Start: 2023-12-17 | End: 2023-12-20 | Stop reason: HOSPADM

## 2023-12-17 RX ORDER — LIDOCAINE 40 MG/G
CREAM TOPICAL
Status: DISCONTINUED | OUTPATIENT
Start: 2023-12-17 | End: 2023-12-20 | Stop reason: HOSPADM

## 2023-12-17 RX ORDER — ONDANSETRON 2 MG/ML
4 INJECTION INTRAMUSCULAR; INTRAVENOUS EVERY 6 HOURS PRN
Status: DISCONTINUED | OUTPATIENT
Start: 2023-12-17 | End: 2023-12-20 | Stop reason: HOSPADM

## 2023-12-17 RX ORDER — ONDANSETRON 4 MG/1
4 TABLET, ORALLY DISINTEGRATING ORAL EVERY 6 HOURS PRN
Status: DISCONTINUED | OUTPATIENT
Start: 2023-12-17 | End: 2023-12-20 | Stop reason: HOSPADM

## 2023-12-17 RX ADMIN — OXYCODONE HYDROCHLORIDE 5 MG: 5 TABLET ORAL at 14:55

## 2023-12-17 RX ADMIN — DOCUSATE SODIUM 100 MG: 100 CAPSULE, LIQUID FILLED ORAL at 09:24

## 2023-12-17 RX ADMIN — PIPERACILLIN AND TAZOBACTAM 3.38 G: 3; .375 INJECTION, POWDER, FOR SOLUTION INTRAVENOUS at 23:17

## 2023-12-17 RX ADMIN — PIPERACILLIN AND TAZOBACTAM 3.38 G: 3; .375 INJECTION, POWDER, FOR SOLUTION INTRAVENOUS at 06:30

## 2023-12-17 RX ADMIN — PIPERACILLIN AND TAZOBACTAM 3.38 G: 3; .375 INJECTION, POWDER, FOR SOLUTION INTRAVENOUS at 16:37

## 2023-12-17 RX ADMIN — VANCOMYCIN HYDROCHLORIDE 2500 MG: 1 INJECTION, POWDER, LYOPHILIZED, FOR SOLUTION INTRAVENOUS at 12:51

## 2023-12-17 RX ADMIN — LISINOPRIL 10 MG: 5 TABLET ORAL at 09:24

## 2023-12-17 ASSESSMENT — ACTIVITIES OF DAILY LIVING (ADL)
ADLS_ACUITY_SCORE: 35
DEPENDENT_IADLS:: CLEANING;LAUNDRY;SHOPPING;TRANSPORTATION
ADLS_ACUITY_SCORE: 35

## 2023-12-17 NOTE — CONSULTS
[facility] Surgery Consultation    Ady Godinez MRN# [id number]   Age: 38 year old YOB: 1985     Date of Admission:  12/16/2023    Reason for consult: Wound infection       Requesting physician: Dr. Hatfield       Level of consult: Consult, follow and place orders           Assessment and Plan:   Assessment:   -Patient has a hernia repair done by Dr. Kim he had infection they pulled this mesh replaced this mesh now he has another infection he has, loose retention sutures and is draining through the sutures.  He had drains but these were pulled a week ago.      Plan:   IV antibiotics  Admitted for observation and care  IR will be consulted apart tomorrow most likely for drain placement  May need to have mesh explanted               Chief Complaint:   Draining wound foul-smelling discharge pain     History is obtained from the patient         History of Present Illness:   This patient is a 38 year old  male with a significant past medical history of obesity incarcerated hernia underwent hernia repair got a mesh infection this was removed he had a piece of coated mesh placed and retention sutures but this is now infected returns with this infection.  Denies fevers or chills             Past Medical History:   I have reviewed this patient's past medical history  Past Medical History:   Diagnosis Date    Abdominal hernia     Cellulitis 05/2016    right index finger    HTN (hypertension) 07/10/2018    Migraines     Obesity     Pure hypercholesterolemia     Sleep apnea              Past Surgical History:     Past Surgical History:   Procedure Laterality Date    APPENDECTOMY      childhood.    DAVINCI XI HERNIORRHAPHY VENTRAL N/A 11/14/2023    Procedure: HERNIORRHAPHY WITH MESH, VENTRAL, ROBOT-ASSISTED, LAPAROSCOPIC, USING DA ALBERT XI;REPAIR OF INCARCERATED HERNIA.;  Surgeon: Braydon Kim DO;  Location: [facility] [place] OR    HERNIA REPAIR      HERNIORRHAPHY VENTRAL N/A 11/24/2023     Procedure: REPAIR OF INCISIONAL VENTRAL HERNIA.;  Surgeon: Braydon Kim DO;  Location: [facility] [place]    IR CAROTID CEREBRAL ANGIOGRAM BILATERAL  04/15/2021    LAPAROTOMY EXPLORATORY N/A 11/24/2023    Procedure: EXPLORATORY LAPAROTOMY WITH;  Surgeon: Braydon Kim DO;  Location: [facility] [place] OR    LUMBAR PUNCTURE FLUORO GUIDED DIAGNOSTIC  04/13/2021    LYSIS, ADHESIONS, ROBOT-ASSISTED, LAPAROSCOPIC, USING DA ALBERT XI N/A 11/14/2023    Procedure: LYSIS OF ADHESIONS;  Surgeon: Braydon Kim DO;  Location: [facility] [place] OR             Social History:     Social History     Tobacco Use    Smoking status: Never    Smokeless tobacco: Never   Substance Use Topics    Alcohol use: Not Currently     Comment: Very rare             Family History:     Family History   Problem Relation Age of Onset    Snoring Mother     Snoring Father      Family history reviewed and updated in [facility]          Immunizations:   Immunization status is unknown          Allergies:   All allergies reviewed and addressed  No Known Allergies          Medications:     Current Facility-Administered Medications   Medication    calcium carbonate (TUMS) chewable tablet 1,000 mg    glucose gel 15-30 g    Or    dextrose 50 % injection 25-50 mL    Or    glucagon injection 1 mg    docusate sodium (COLACE) capsule 100 mg    insulin aspart (NovoLOG) injection (RAPID ACTING)    insulin aspart (NovoLOG) injection (RAPID ACTING)    lidocaine (LMX4) cream    lidocaine 1 % 0.1-1 mL    lisinopril (ZESTRIL) tablet 10 mg    naloxone (NARCAN) injection 0.2 mg    Or    naloxone (NARCAN) injection 0.4 mg    Or    naloxone (NARCAN) injection 0.2 mg    Or    naloxone (NARCAN) injection 0.4 mg    ondansetron (ZOFRAN ODT) ODT tab 4 mg    Or    ondansetron (ZOFRAN) injection 4 mg    oxyCODONE (ROXICODONE) tablet 5 mg    piperacillin-tazobactam (ZOSYN) 3.375 g vial to attach to  mL bag    sodium chloride (PF) 0.9% PF flush 3 mL    sodium chloride (PF) 0.9% PF flush 3 mL     vancomycin (VANCOCIN) 1,500 mg in sodium chloride 0.9 % 250 mL intermittent infusion     Current Outpatient Medications   Medication Sig    acetaminophen (TYLENOL) 325 MG tablet Take 2 tablets (650 mg) by mouth every 6 hours as needed for mild pain    amoxicillin-clavulanate (AUGMENTIN) 875-125 MG tablet Take 1 tablet by mouth 2 times daily for 10 days    docusate sodium (COLACE) 100 MG capsule Take 1 capsule (100 mg) by mouth 2 times daily    lisinopril (ZESTRIL) 10 MG tablet Take 1 tablet (10 mg) by mouth daily    ondansetron (ZOFRAN ODT) 4 MG ODT tab Take 1 tablet (4 mg) by mouth every 6 hours as needed for nausea    oxyCODONE (ROXICODONE) 5 MG tablet Take 1-2 tablets (5-10 mg) by mouth every 4 hours as needed for moderate pain             Review of Systems:   The Review of Systems is negative other than noted in the HPI          Physical Exam:   Vitals were reviewed  Temp: 98.7  F (37.1  C) Temp src: Oral BP: 121/68 Pulse: 92   Resp: 18 SpO2: 98 % O2 Device: None (Room air)    Wt Readings from Last 4 Encounters:   12/16/23 (!) 176.9 kg (390 lb)   11/24/23 (!) 182.8 kg (403 lb)   11/13/23 (!) 190.5 kg (420 lb)   03/21/22 (!) 180.9 kg (398 lb 14.4 oz)     No intake/output data recorded.  Constitutional:   awake, alert, cooperative, no apparent distress, and appears stated age morbidly obese     Eyes:   Lids and lashes normal, pupils equal, round and reactive to light, extra ocular muscles intact, sclera clear, conjunctiva normal     ENT:   Normocephalic, without obvious abnormality, atraumatic, sinuses nontender on palpation, external ears without lesions, oral pharynx with moist mucous membranes, tonsils without erythema or exudates, gums normal and good dentition.     Neck:   Supple, symmetrical, trachea midline, no adenopathy, thyroid symmetric, not enlarged and no tenderness, skin normal     Lungs:   No increased work of breathing, good air exchange, clear to auscultation bilaterally, no crackles or wheezing      Cardiovascular:   Normal apical impulse, regular rate and rhythm, normal S1 and S2, no S3 or S4, and no murmur noted     Abdomen:     Abdominal wound has retention sutures 4 of them is draining pus or each one of them separation around them but the bottom is mostly melted away     Musculoskeletal:   There is no redness, warmth, or swelling of the joints.  Full range of motion noted.  Motor strength is 5 out of 5 all extremities bilaterally.  Tone is normal.     Neurologic:   Awake, alert, oriented to name, place and time.  Cranial nerves II-XII are grossly intact.  Motor is 5 out of 5 bilaterally.  Cerebellar finger to nose, heel to shin intact.  Sensory is intact.  Babinski down going, Romberg negative, and gait is normal.             Data:   All laboratory data reviewed  Results for orders placed or performed during the hospital encounter of 12/16/23 (from the past 24 hour(s))   CBC with platelets + differential    Narrative    The following orders were created for panel order CBC with platelets + differential.  Procedure                               Abnormality         Status                     ---------                               -----------         ------                     CBC with platelets and d...[[id number]]  Abnormal            Final result                 Please view results for these tests on the individual orders.   Basic metabolic panel   Result Value Ref Range    Sodium 137 135 - 145 mmol/L    Potassium 4.4 3.4 - 5.3 mmol/L    Chloride 98 98 - 107 mmol/L    Carbon Dioxide (CO2) 30 (H) 22 - 29 mmol/L    Anion Gap 9 7 - 15 mmol/L    Urea Nitrogen 8.8 6.0 - 20.0 mg/dL    Creatinine 0.71 0.67 - 1.17 mg/dL    GFR Estimate >90 >60 mL/min/1.73m2    Calcium 9.4 8.6 - 10.0 mg/dL    Glucose 107 (H) 70 - 99 mg/dL   Lactic acid whole blood   Result Value Ref Range    Lactic Acid 1.2 0.7 - 2.0 mmol/L   [facility] [place] Draw    Narrative    The following orders were created for panel order [facility] [place] Draw.  Procedure                                Abnormality         Status                     ---------                               -----------         ------                     Extra Blue Top Tube[[id number]]                              Final result               Extra Red Top Tube[[id number]]                               Final result               Extra Green Top (Lithium...[[id number]]                                                 Extra Purple Top Tube[[id number]]                                                         Please view results for these tests on the individual orders.   CBC with platelets and differential   Result Value Ref Range    WBC Count 10.8 4.0 - 11.0 10e3/uL    RBC Count 3.85 (L) 4.40 - 5.90 10e6/uL    Hemoglobin 10.9 (L) 13.3 - 17.7 g/dL    Hematocrit 34.7 (L) 40.0 - 53.0 %    MCV 90 78 - 100 fL    MCH 28.3 26.5 - 33.0 pg    MCHC 31.4 (L) 31.5 - 36.5 g/dL    RDW 14.6 10.0 - 15.0 %    Platelet Count 365 150 - 450 10e3/uL    % Neutrophils 64 %    % Lymphocytes 21 %    % Monocytes 7 %    % Eosinophils 7 %    % Basophils 0 %    % Immature Granulocytes 1 %    NRBCs per 100 WBC 0 <1 /100    Absolute Neutrophils 6.9 1.6 - 8.3 10e3/uL    Absolute Lymphocytes 2.3 0.8 - 5.3 10e3/uL    Absolute Monocytes 0.7 0.0 - 1.3 10e3/uL    Absolute Eosinophils 0.8 (H) 0.0 - 0.7 10e3/uL    Absolute Basophils 0.0 0.0 - 0.2 10e3/uL    Absolute Immature Granulocytes 0.1 <=0.4 10e3/uL    Absolute NRBCs 0.0 10e3/uL   Extra Blue Top Tube   Result Value Ref Range    Hold Specimen JIC    Extra Red Top Tube   Result Value Ref Range    Hold Specimen JIC    CT Abdomen Pelvis w Contrast    Narrative    EXAM: CT ABDOMEN PELVIS W CONTRAST  LOCATION: [facility]  DATE: 12/16/2023    INDICATION: Ventral hernia repair with concern for possible underlying postsurgical abscess in the surgical wound  COMPARISON: CT from 11/22/2023  TECHNIQUE: CT scan of the abdomen and pelvis was performed following injection of IV contrast.  Multiplanar reformats were obtained. Dose reduction techniques were used.  CONTRAST: isovue 370 90ml    FINDINGS:   LOWER CHEST: Normal.    HEPATOBILIARY: Extensive hepatic steatosis. The liver measures 22 cm craniocaudal. The gallbladder is within normal limits.    PANCREAS: Normal.    SPLEEN: Normal.    ADRENAL GLANDS: Normal.    KIDNEYS/BLADDER: Normal.    BOWEL: No bowel obstruction or free air. There is a loop of small bowel which is closely apposed to an area of the anterior abdominal wall which demonstrates extensive inflammatory change as seen on axial image 160.    LYMPH NODES: Normal.    VASCULATURE: Unremarkable.    PELVIC ORGANS: Normal.    MUSCULOSKELETAL: Midline ventral laparotomy incision. Ill-defined areas of fluid and gas within the deeper aspects of the incision line, with inflammatory stranding and thickening of the anterior abdominal wall in this region. Of note, fluid is markedly   diminished compared to prior CT. No acute osseous findings.      Impression    IMPRESSION:   1.  Inflammation with edema and scattered locules of gas throughout the ventral laparotomy incision line continuing deep to the abdominal wall which is thickened in this region, possibly representing ongoing infection. The overall extent of abdominal   wall fluid is markedly improved compared to what was present on the prior CT, however. No discrete, drainable abscess at this time.   Wound Aerobic Bacterial Culture Routine With Gram Stain    Specimen: Abdomen; Wound   Result Value Ref Range    Gram Stain Result 3+ Gram positive cocci (A)     Gram Stain Result 1+ Gram positive bacilli (A)     Gram Stain Result 1+ Gram negative bacilli (A)     Gram Stain Result 3+ WBC seen (A)    Comprehensive metabolic panel   Result Value Ref Range    Sodium 134 (L) 135 - 145 mmol/L    Potassium 4.5 3.4 - 5.3 mmol/L    Carbon Dioxide (CO2) 29 22 - 29 mmol/L    Anion Gap 9 7 - 15 mmol/L    Urea Nitrogen 9.1 6.0 - 20.0 mg/dL    Creatinine 0.72  0.67 - 1.17 mg/dL    GFR Estimate >90 >60 mL/min/1.73m2    Calcium 9.6 8.6 - 10.0 mg/dL    Chloride 96 (L) 98 - 107 mmol/L    Glucose 120 (H) 70 - 99 mg/dL    Alkaline Phosphatase 104 40 - 150 U/L    AST 25 0 - 45 U/L    ALT 23 0 - 70 U/L    Protein Total 7.4 6.4 - 8.3 g/dL    Albumin 3.4 (L) 3.5 - 5.2 g/dL    Bilirubin Total 0.3 <=1.2 mg/dL   CBC with platelets   Result Value Ref Range    WBC Count 9.2 4.0 - 11.0 10e3/uL    RBC Count 3.56 (L) 4.40 - 5.90 10e6/uL    Hemoglobin 9.8 (L) 13.3 - 17.7 g/dL    Hematocrit 32.3 (L) 40.0 - 53.0 %    MCV 91 78 - 100 fL    MCH 27.5 26.5 - 33.0 pg    MCHC 30.3 (L) 31.5 - 36.5 g/dL    RDW 14.5 10.0 - 15.0 %    Platelet Count 342 150 - 450 10e3/uL   CRP inflammation   Result Value Ref Range    CRP Inflammation 104.70 (H) <5.00 mg/L   Glucose by meter   Result Value Ref Range    GLUCOSE BY METER POCT 119 (H) 70 - 99 mg/dL   Glucose by meter   Result Value Ref Range    GLUCOSE BY METER POCT 112 (H) 70 - 99 mg/dL   Glucose by meter   Result Value Ref Range    GLUCOSE BY METER POCT 119 (H) 70 - 99 mg/dL     All imaging studies reviewed by me.     Attestation:  I have reviewed today's vital signs, notes, medications, labs and imaging.    Soham Nunez MD

## 2023-12-17 NOTE — MEDICATION SCRIBE - ADMISSION MEDICATION HISTORY
Medication Scribe Admission Medication History    Admission medication history is complete. The information provided in this note is only as accurate as the sources available at the time of the update.    Information Source(s): Patient, Hospital records, and CareEverywhere/SureScripts via in-person    Pertinent Information: pt reported her hasn't taken any  medications other than Augmentin.    Changes made to PTA medication list:  Added: None  Deleted: None  Changed: None    Medication Affordability:  Not including over the counter (OTC) medications, was there a time in the past 3 months when you did not take your medications as prescribed because of cost?: No    Allergies reviewed with patient and updates made in EHR: yes    Medication History Completed By: Faby Ferro 12/17/2023 1:42 AM    Prior to Admission medications    Medication Sig Last Dose Taking? Auth Provider Long Term End Date   acetaminophen (TYLENOL) 325 MG tablet Take 2 tablets (650 mg) by mouth every 6 hours as needed for mild pain Past Week at prn Yes Diana Fish PA-C     amoxicillin-clavulanate (AUGMENTIN) 875-125 MG tablet Take 1 tablet by mouth 2 times daily for 10 days 12/16/2023 at am Yes Bradley Arnold PA-C  12/21/23   docusate sodium (COLACE) 100 MG capsule Take 1 capsule (100 mg) by mouth 2 times daily Past Week at prn Yes Braydon Kim DO     lisinopril (ZESTRIL) 10 MG tablet Take 1 tablet (10 mg) by mouth daily Past Week at am Yes Tomas Glass, [place] Yes    ondansetron (ZOFRAN ODT) 4 MG ODT tab Take 1 tablet (4 mg) by mouth every 6 hours as needed for nausea 12/16/2023 at am Yes Braydon Kim DO     oxyCODONE (ROXICODONE) 5 MG tablet Take 1-2 tablets (5-10 mg) by mouth every 4 hours as needed for moderate pain Past Week at am Yes [place], Bradley MILLER PA-C

## 2023-12-17 NOTE — PROGRESS NOTES
[facility]    Medicine Progress Note - Hospitalist Service    Date of Admission:  12/16/2023    Assessment & Plan   38-year-old male with PMH of severe obesity,hypertension, DM-2 and recent admission for umbilical hernia (11/22/23-11/30/23), s/p exploratory laparotomy with repair of incisional ventral hernia with mesh who presented with concern regarding wound infection.     Post op wound infection  --Foul smelling discharge coming out of the surgical wound. It looks like the retention sutures- 1st, 2nd and 4th are cutting through the skin and subcutaneous tissue. Surrounding skin looks erythematous, definitely looking worse that when he was discharged on 11/30/23. Concern is if the mesh underneath is infected.   --CT showed inflammation with edema and scattered locules of gas throughout the ventral laparatomy incision line continuing deep to the abdominal wall  --Culture was positive for cutibacterium acne. He was treated with 5 days of IV zosyn here. Seen at surgery clinic on 12/11/23 and started on PO augmentin.   --Repeat wound culture - swab with polymicrobial gonzález.  Continue IV zosyn  --Gen surgery consulted  -- ID is following     Essential hypertension  --Resume home meds     DM-2  --A1c 6.5 as of 11/22/23  --Start accucheck and sliding scale insulin     Constipation  --Start laxatives     Morbid obesity  --Body mass index is 54.39 kg/m .        Diet: Combination Diet Regular Diet Adult; Moderate Consistent Carb (60 g CHO per Meal) Diet    DVT Prophylaxis: Ambulate every shift  Duvall Catheter: Not present  Lines: None     Cardiac Monitoring: None  Code Status: Full Code      Clinically Significant Risk Factors Present on Admission              # Hypoalbuminemia: Lowest albumin = 3.4 g/dL at 12/17/2023  7:28 AM, will monitor as appropriate     # Hypertension: Noted on problem list     # DMII: A1C = 6.5 % (Ref range: <5.7 %) within past 6 months    # Severe Obesity: Estimated body  "mass index is 54.39 kg/m  as calculated from the following:    Height as of this encounter: 1.803 m (5' 11\").    Weight as of this encounter: 176.9 kg (390 lb).       # Financial/Environmental Concerns: other (see comments) (\"I would like to find out about transportation assistance. I was told I don't qualify by my insurance. I was told to fill out [employer] paperwork, but I haven't gotten around to it yet\".)         Disposition Plan      Expected Discharge Date: 12/18/2023      Destination: home with family;home with help/services              Maxine Crandall MD  Hospitalist Service  [facility]  Securely message with [employer] (more info)  Text page via [employer] Paging/Directory   ______________________________________________________________________    Interval History   Very uncomfortable laying on a cot   No significant abdominal pain  He is not sure if there is any significant change in redness or drainage from his abdominal wounds  No fevers or chills    Physical Exam   Vital Signs: Temp: 98.8  F (37.1  C) Temp src: Oral BP: 139/71 Pulse: 85   Resp: 16 SpO2: 94 % O2 Device: None (Room air)    Weight: 390 lbs 0 oz    General Appearance: Severely obese male in no acute distress  Respiratory: Respirations unlabored, lungs are clear  Cardiovascular: Regular rate and rhythm.  Normal S1-S2  GI: Abdomen obese, abdominal incisions with surrounding erythema and purulent drainage    Medical Decision Making       50 MINUTES SPENT BY ME on the date of service doing chart review, history, exam, documentation & further activities per the note.  MANAGEMENT DISCUSSED with the following over the past 24 hours: Patient and nursing staff       Data     I have personally reviewed the following data over the past 24 hrs:    9.2  \   9.8 (L)   / 342     134 (L) 96 (L) 9.1 /  112 (H)   4.5 29 0.72 \     ALT: 23 AST: 25 AP: 104 TBILI: 0.3   ALB: 3.4 (L) TOT PROTEIN: 7.4 LIPASE: N/A     Procal: N/A CRP: " 104.70 (H) Lactic Acid: 1.2         Imaging results reviewed over the past 24 hrs:   Recent Results (from the past 24 hour(s))   CT Abdomen Pelvis w Contrast    Narrative    EXAM: CT ABDOMEN PELVIS W CONTRAST  LOCATION: [facility]  DATE: 12/16/2023    INDICATION: Ventral hernia repair with concern for possible underlying postsurgical abscess in the surgical wound  COMPARISON: CT from 11/22/2023  TECHNIQUE: CT scan of the abdomen and pelvis was performed following injection of IV contrast. Multiplanar reformats were obtained. Dose reduction techniques were used.  CONTRAST: isovue 370 90ml    FINDINGS:   LOWER CHEST: Normal.    HEPATOBILIARY: Extensive hepatic steatosis. The liver measures 22 cm craniocaudal. The gallbladder is within normal limits.    PANCREAS: Normal.    SPLEEN: Normal.    ADRENAL GLANDS: Normal.    KIDNEYS/BLADDER: Normal.    BOWEL: No bowel obstruction or free air. There is a loop of small bowel which is closely apposed to an area of the anterior abdominal wall which demonstrates extensive inflammatory change as seen on axial image 160.    LYMPH NODES: Normal.    VASCULATURE: Unremarkable.    PELVIC ORGANS: Normal.    MUSCULOSKELETAL: Midline ventral laparotomy incision. Ill-defined areas of fluid and gas within the deeper aspects of the incision line, with inflammatory stranding and thickening of the anterior abdominal wall in this region. Of note, fluid is markedly   diminished compared to prior CT. No acute osseous findings.      Impression    IMPRESSION:   1.  Inflammation with edema and scattered locules of gas throughout the ventral laparotomy incision line continuing deep to the abdominal wall which is thickened in this region, possibly representing ongoing infection. The overall extent of abdominal   wall fluid is markedly improved compared to what was present on the prior CT, however. No discrete, drainable abscess at this time.

## 2023-12-17 NOTE — ED NOTES
"[facility] ED Handoff Report    ED Chief Complaint: Post op complications    ED Diagnosis:  (T81.31XA) Dehiscence of operative wound, initial encounter  Comment: Dressing to wound  Plan: Abx and GI consult     (L03.90) Cellulitis, unspecified cellulitis site  Comment:   Plan:        PMH:    Past Medical History:   Diagnosis Date    Abdominal hernia     Cellulitis 05/2016    right index finger    HTN (hypertension) 07/10/2018    Migraines     Obesity     Pure hypercholesterolemia     Sleep apnea         Code Status:  Prior     Falls Risk: Yes Band: Applied    Current Living Situation/Residence: lives alone     Elimination Status: Continent: Yes     Activity Level: SBA w/ walker    Patients Preferred Language:  English     Needed: No    Vital Signs:  /62   Pulse 98   Temp 98.9  F (37.2  C) (Oral)   Resp 18   Ht 1.803 m (5' 11\")   Wt (!) 176.9 kg (390 lb)   SpO2 95%   BMI 54.39 kg/m       Cardiac Rhythm: NSR    Pain Score: 5/10    Is the Patient Confused:  No    Last Food or Drink: NA    Focused Assessment:  pt alert and oriented x4. New dehicence to abdomenal surgical site post hernia repair. Abx given.     Tests Performed: Done: Labs    Treatments Provided:  see mar    Family Dynamics/Concerns: No    Family Updated On Visitor Policy: No    Plan of Care Communicated to Family: No    Who Was Updated about Plan of Care: NA    Belongings Checklist Done and Signed by Patient: No    Belongings Sent with Patient: NA    Medications sent with patient: REAL    Covid: asymptomatic , NA    Additional Information: REAL    RN: Leora Baker RN 12/16/2023 11:53 PM        "

## 2023-12-17 NOTE — ED TRIAGE NOTES
Patient advised by home health nurse to be seen for evaluation of possible post incisional hernia infection. Stated site has redness and starting to pull apart at bottom on incision. Had surgery at [facility].     Triage Assessment (Adult)       Row Name 12/16/23 0335          Triage Assessment    Airway WDL WDL        Respiratory WDL    Respiratory WDL WDL        Skin Circulation/Temperature WDL    Skin Circulation/Temperature WDL X  redness surrounding incision and starting to dehise around bottom of incision        Cardiac WDL    Cardiac WDL WDL        Peripheral/Neurovascular WDL    Peripheral Neurovascular WDL WDL        Cognitive/Neuro/Behavioral WDL    Cognitive/Neuro/Behavioral WDL WDL

## 2023-12-17 NOTE — PROGRESS NOTES
Patient states he doesn't have a CPAP nor went to sleep study but do plan to do a sleep study once out of hospital. He refuse to try hospital CPAP.     Jennifer Valentine, RT

## 2023-12-17 NOTE — PHARMACY-VANCOMYCIN DOSING SERVICE
"Pharmacy Vancomycin Initial Note  Date of Service 2023  Patient's  1985  38 year old, male    Indication: Postoperative Infection and Skin and Soft Tissue Infection    Current estimated CrCl = Estimated Creatinine Clearance: 228 mL/min (based on SCr of 0.72 mg/dL).    Creatinine for last 3 days  2023:  8:51 PM Creatinine 0.71 mg/dL  2023:  7:28 AM Creatinine 0.72 mg/dL    Recent Vancomycin Level(s) for last 3 days  No results found for requested labs within last 3 days.      Vancomycin IV Administrations (past 72 hours)        No vancomycin orders with administrations in past 72 hours.                    Nephrotoxins and other renal medications (From now, onward)      Start     Dose/Rate Route Frequency Ordered Stop    23 2300  vancomycin (VANCOCIN) 1,500 mg in sodium chloride 0.9 % 250 mL intermittent infusion         1,500 mg  over 90 Minutes Intravenous EVERY 12 HOURS 23 1200      23 1200  vancomycin (VANCOCIN) 2,500 mg in sodium chloride 0.9 % 500 mL intermittent infusion         20 mg/kg × 176.9 kg  over 2 Hours Intravenous ONCE 23 1151      23 0800  lisinopril (ZESTRIL) tablet 10 mg        Note to Pharmacy: PTA Sig:Take 1 tablet (10 mg) by mouth daily      10 mg Oral DAILY 23 0518      23 0600  piperacillin-tazobactam (ZOSYN) 3.375 g vial to attach to  mL bag        Note to Pharmacy: For [facility], [facility] and [facility]: For Zosyn-naive patients, use the \"Zosyn initial dose + extended infusion\" order panel.    3.375 g  over 240 Minutes Intravenous EVERY 8 HOURS 23 0001              Contrast Orders - past 72 hours (72h ago, onward)      Start     Dose/Rate Route Frequency Stop    23 2300  iopamidol (ISOVUE-370) solution 90 mL         90 mL Intravenous ONCE 23 2235            [employer] Prediction of Planned Initial Vancomycin Regimen  Loading dose: 2500 mg at 12:00 2023.  Regimen: 1500 mg IV every 12 hours.  Start time: " 00:00 on 12/18/2023  Exposure target: AUC24 (range)400-600 mg/L.hr   AUC24,ss: 538 mg/L.hr  Probability of AUC24 > 400: 71 %  Ctrough,ss: 13.6 mg/L  Probability of Ctrough,ss > 20: 33 %  Probability of nephrotoxicity (Lodise SANTO 2009): 9 %        Plan:  Start vancomycin 2500 mg IV x1, followed by 1500 mg q12h.   Vancomycin monitoring method: AUC  Vancomycin therapeutic monitoring goal: 400-600 mg*h/L  Pharmacy will check vancomycin levels as appropriate in 1-3 Days.    Serum creatinine levels will be ordered daily for the first week of therapy and at least twice weekly for subsequent weeks.      ALEX CASTANEDA, BERTH

## 2023-12-17 NOTE — CONSULTS
"Care Management Initial Consult    General Information  Assessment completed with: PatientEarnest  Type of CM/SW Visit: Initial Assessment    Primary Care Provider verified and updated as needed: Yes   Readmission within the last 30 days: previous discharge plan unsuccessful (11/13/23 - 11/18/23 and again 11/22/23 - 11/30/23.)   Return Category: Exacerbation of disease  Reason for Consult: discharge planning  Advance Care Planning: Advance Care Planning Reviewed: no concerns identified          Communication Assessment  Patient's communication style: spoken language (English or Bilingual)             Cognitive  Cognitive/Neuro/Behavioral: WDL                      Living Environment:   People in home: spouse, child(sherry), dependent  Earnest and wife Faustino and kids age 12 and 7 (\"our daughter is autistic and needs more help\")  Current living Arrangements: house (\"townhouse. I have 2 flights of steps if I go in the garage. I at least have to use 1 flight of steps and I haven't been leaving much lately\".)      Able to return to prior arrangements: yes       Family/Social Support:  Care provided by: self, spouse/significant other  Provides care for: child(sherry) (\"kids age 7 and 12\")  Marital Status:   Wife  Faustino       Description of Support System: Supportive, Involved    Support Assessment: Adequate family and caregiver support, Adequate social supports, Patient communicates needs well met    Current Resources:   Patient receiving home care services: Yes  Skilled Home Care Services: Skilled Nursing, Home Health Aid, Physical Therapy, Occupational Therapy ([facility])  Community Resources: Home Care, DME  Equipment currently used at home: walker, rolling (FWW)  Supplies currently used at home: Wound Care Supplies (\"I don't have a CPAP and I don't have any diabetes equipment\".)    Employment/Financial:  Employment Status:  (\"I was working part time, but I haven't been able to work since my first surgery " "just over a month ago. I want to try to work again, but I probably won't be able to. I have been told I should figure out disability paperwork\".)     Employment/ Comments: \"no  history\"  Financial Concerns: other (see comments) (\"I would like to find out about transportation assistance. I was told I don't qualify by my insurance. I was told to fill out [employer] paperwork, but I haven't gotten around to it yet\".)   Referral to Financial Worker: No       Does the patient's insurance plan have a 3 day qualifying hospital stay waiver?  No      Functional Status:  Prior to admission patient needed assistance:   Dependent ADLs:: Ambulation-walker, Bathing  Dependent IADLs:: Cleaning, Laundry, Shopping, Transportation       Mental Health Status:          Chemical Dependency Status:                Values/Beliefs:  Spiritual, Cultural Beliefs, Taoism Practices, Values that affect care:                 Additional Information:  Earnest lives in a townhouse with his wife Faustino and kids age 12 and 7 and our 2 cats (\"our daughter is autistic and needs more help\"). \"The townhouse has 2 flights of steps if I go in the garage. I at least have to use 1 flight of steps inside. I haven't been leaving much lately\".    He is independent with most ADLs and gets help with most IADLs. He uses a FWW for mobility.    He has help from [facility] RN, PT, OT, and HHA. \"I have gotten bathing help from the [facility] Aide twice so far. The RN has started coming every other day for wound cares lately. PT and OT are coming about twice a week.\"    \"I was working part time, but I haven't been able to work since my first surgery just over a month ago. I want to try to work again, but I probably won't be able to. I have been told I should figure out disability paperwork. My wife is working full time.\" I talked to him a little about disability and next steps. He also told me, \"I would like to find out about " "transportation assistance. I was told I don't qualify by my insurance. I was told to fill out [employer] paperwork, but I haven't gotten around to it yet\". We talked about next steps for this also.    Unknown discharge needs at this time. May need home IV antibiotics depending on hospital course.    Family to transport at discharge.    CM to follow for medical progression of care, discharge recommendations, and final discharge plan.    Melissa Arnold RN    " no

## 2023-12-17 NOTE — CONSULTS
Consultation - INFECTIOUS DISEASE CONSULTATION  Ady Godinez ,  1985, MRN [id number]      Cellulitis, unspecified cellulitis site [L03.90]    PCP: Elizabeth Black, [phone]   Code status:  Full Code               Assessment:  Post operative infection: purulent discharge from retention sutures with surrounding erythema. Swab is polymicrobial. On zosyn. CT with inflammation, edema, and gas possible infection. Surgery consulted.   Comorbid conditions affecting immune system: DM2, morbid obesity     Principal Problem:    Cellulitis, unspecified cellulitis site  Active Problems:    Dehiscence of operative wound, initial encounter        Recommendations:   - zosyn  - vanc  - follow cultures  - surgery has been consulted, likely needs some sort of source control.   - further recommendations to follow clinical course  - ID will follow, thanks    Nida Eaton MD  [facility]  [phone]       HPI:    Ady Godinez is a 38 year old male. History is provided by patient and chart.  Recently hospitalized for umbilical hernia -. Had ex lap with repair of incisional ventral hernia with mesh. A swab from  grew c acnes.   Surgery clinic  with erythema noted around retention sutures. Started on augmentin.   Presented  with worsening signs of infection, foul smelling drainage, worsening erythema. Low fever at home. CT with concerns for ongoing infection but no drainable abscess.   He states that since he got here, the foul smell has improved. Tolerating antibiotics. Overall has pain from being on gurney. Some loose stools with antibiotics.       Chief complaint: Principal Problem:    Cellulitis, unspecified cellulitis site  Active Problems:    Dehiscence of operative wound, initial encounter      Medical History  Active Ambulatory Problems     Diagnosis Date Noted    Headache 2021    Umbilical hernia without obstruction and without gangrene  09/10/2021    Morbid obesity (H) 09/10/2021    Hernia of anterior abdominal wall 11/13/2023    Nausea and vomiting, unspecified vomiting type 11/13/2023    HTN (hypertension) 07/10/2018    Mood disorder (H24) 09/29/2021    Prediabetes 11/22/2023    Pure hypercholesterolemia 02/16/2011    Sleep disturbance 09/29/2021    Vitamin D deficiency 02/16/2011    Vision disturbance 09/29/2021    Acute hypoxemic respiratory failure (H) 07/10/2018    Cellulitis 07/10/2018    Concussion with no loss of consciousness, sequela (H24) 09/29/2021    Convergence insufficiency 11/17/2021    Pain of left lower leg 07/07/2018    Positive D dimer 07/07/2018    Post concussion syndrome 09/29/2021    Sepsis (H) 07/07/2018    Vestibular dysfunction 09/29/2021     Resolved Ambulatory Problems     Diagnosis Date Noted    No Resolved Ambulatory Problems     Past Medical History:   Diagnosis Date    Abdominal hernia     Migraines     Obesity     Sleep apnea          Surgical History  He  has a past surgical history that includes IR Carotid Cerebral Angiogram Bilateral (04/15/2021); Lumbar Puncture Fluoro Guided Diagnostic (04/13/2021); appendectomy; Davinci Xi Herniorrhaphy Ventral (N/A, 11/14/2023); Lysis, Adhesions, Robot-Assisted, Laparoscopic, Using Da Shannan Xi (N/A, 11/14/2023); hernia repair; Laparotomy exploratory (N/A, 11/24/2023); and Herniorrhaphy ventral (N/A, 11/24/2023).       Social History  Reviewed, and he  reports that he has never smoked. He has never used smokeless tobacco. He reports that he does not currently use alcohol. He reports that he does not use drugs.        Family History  Reviewed and noncontributory to present problem, and family history includes Snoring in his father and mother.    Psychosocial Needs  Social History     Social History Narrative    Not on file     Additional psychosocial needs reviewed per nursing assessment.       No Known Allergies   (Not in a hospital admission)       Review of Systems:  A 12  point comprehensive review of systems was negative except as noted. Physical Exam:  Temp:  [98.8  F (37.1  C)-98.9  F (37.2  C)] 98.8  F (37.1  C)  Pulse:  [85-99] 85  Resp:  [16-18] 16  BP: (120-145)/(55-75) 139/71  SpO2:  [94 %-98 %] 94 %    GEN: alert and oriented x3, mild distress  HEAD: atraumatic  ENT: moist membranes, no thrush, anicteric sclera   NECK: supple, no nuchal rigidity  CARDIOVASCULAR: regular rate and rhythm, no murmurs, rubs, or gallops  PULMONARY: lungs clear to ausculation bilaterally  ABDOMEN: purulent drainage from incision with surrounding erythema.   SKIN: no rashes or lesions. No stigma of endocarditis  PSYCH: grossly intact  MUSCULOSKELETAL: no synovitis               Pertinent Labs  personally reviewed.   CBC RESULTS:   Recent Labs   Lab Test 12/17/23  0728   WBC 9.2   RBC 3.56*   HGB 9.8*   HCT 32.3*   MCV 91   MCH 27.5   MCHC 30.3*   RDW 14.5           Last Comprehensive Metabolic Panel:  Sodium   Date Value Ref Range Status   12/17/2023 134 (L) 135 - 145 mmol/L Final     Comment:     Reference intervals for this test were updated on 09/26/2023 to more accurately reflect our healthy population. There may be differences in the flagging of prior results with similar values performed with this method. Interpretation of those prior results can be made in the context of the updated reference intervals.    04/14/2021 141 133 - 144 mmol/L Final     Potassium   Date Value Ref Range Status   12/17/2023 4.5 3.4 - 5.3 mmol/L Final   03/21/2022 4.5 3.5 - 5.0 mmol/L Final   04/14/2021 3.9 3.4 - 5.3 mmol/L Final     Chloride   Date Value Ref Range Status   12/17/2023 96 (L) 98 - 107 mmol/L Final   03/21/2022 102 98 - 107 mmol/L Final   04/14/2021 107 94 - 109 mmol/L Final     Carbon Dioxide   Date Value Ref Range Status   04/14/2021 30 20 - 32 mmol/L Final     Carbon Dioxide (CO2)   Date Value Ref Range Status   12/17/2023 29 22 - 29 mmol/L Final   03/21/2022 26 22 - 31 mmol/L Final     Anion  Gap   Date Value Ref Range Status   12/17/2023 9 7 - 15 mmol/L Final   03/21/2022 12 5 - 18 mmol/L Final   04/14/2021 4 3 - 14 mmol/L Final     Glucose   Date Value Ref Range Status   12/17/2023 120 (H) 70 - 99 mg/dL Final   03/21/2022 153 (H) 70 - 125 mg/dL Final   04/14/2021 107 (H) 70 - 99 mg/dL Final     GLUCOSE BY METER POCT   Date Value Ref Range Status   12/17/2023 119 (H) 70 - 99 mg/dL Final     Urea Nitrogen   Date Value Ref Range Status   12/17/2023 9.1 6.0 - 20.0 mg/dL Final   03/21/2022 13 8 - 22 mg/dL Final   04/14/2021 16 7 - 30 mg/dL Final     Creatinine   Date Value Ref Range Status   12/17/2023 0.72 0.67 - 1.17 mg/dL Final   04/14/2021 0.86 0.66 - 1.25 mg/dL Final     GFR Estimate   Date Value Ref Range Status   12/17/2023 >90 >60 mL/min/1.73m2 Final   04/14/2021 >90 >60 mL/min/[1.73_m2] Final     Comment:     Non  GFR Calc  Starting 12/18/2018, serum creatinine based estimated GFR (eGFR) will be   calculated using the Chronic Kidney Disease Epidemiology Collaboration   (CKD-EPI) equation.       Calcium   Date Value Ref Range Status   12/17/2023 9.6 8.6 - 10.0 mg/dL Final   04/14/2021 8.6 8.5 - 10.1 mg/dL Final       CRP   Date Value Ref Range Status   07/16/2018 1.9 (H) 0.0 - 0.8 mg/dL Final        The following microbiology studies were personally reviewed:  Culture Micro   Date Value Ref Range Status   02/14/2010   Final    No Salmonella, Shigella, Campylobacter or E coli 0157 isolated.       Urine Studies    Recent Labs   Lab Test 04/13/21  0933 07/07/18  0427   LEUKEST Negative Negative       Vancomycin Levels    Recent Labs   Lab Test 07/09/18  1743   VANCOMYCIN 18.2       MICROBIOLOGY DATA:    All cultures:  7-Day Micro Results       Collected Updated Procedure Result Status      12/17/2023 0102 12/17/2023 1055 Wound Aerobic Bacterial Culture Routine With Gram Stain [[id number]]   (Abnormal)   Wound from Abdomen    Preliminary result Component Value   Gram Stain Result 3+  Gram positive cocci  [P]     1+ Gram positive bacilli  [P]     1+ Gram negative bacilli  [P]     3+ WBC seen  [P]     Predominantly PMNs               12/17/2023 0102 12/17/2023 0105 Anaerobic Bacterial Culture Routine [[id number]]   Abscess from Abdomen    In process Component Value   No component results               12/16/2023 2113 12/16/2023 2119 Blood Culture Line, venous [[id number]]   Blood from Line, venous    In process Component Value   No component results               12/16/2023 2051 12/16/2023 2119 Blood Culture Line, venous [[id number]]   Blood from Line, venous    In process Component Value   No component results                        Pertinent Radiology  personally reviewed.     CT Abdomen Pelvis w Contrast    Result Date: 12/16/2023  EXAM: CT ABDOMEN PELVIS W CONTRAST LOCATION: [facility] DATE: 12/16/2023 INDICATION: Ventral hernia repair with concern for possible underlying postsurgical abscess in the surgical wound COMPARISON: CT from 11/22/2023 TECHNIQUE: CT scan of the abdomen and pelvis was performed following injection of IV contrast. Multiplanar reformats were obtained. Dose reduction techniques were used. CONTRAST: isovue 370 90ml FINDINGS: LOWER CHEST: Normal. HEPATOBILIARY: Extensive hepatic steatosis. The liver measures 22 cm craniocaudal. The gallbladder is within normal limits. PANCREAS: Normal. SPLEEN: Normal. ADRENAL GLANDS: Normal. KIDNEYS/BLADDER: Normal. BOWEL: No bowel obstruction or free air. There is a loop of small bowel which is closely apposed to an area of the anterior abdominal wall which demonstrates extensive inflammatory change as seen on axial image 160. LYMPH NODES: Normal. VASCULATURE: Unremarkable. PELVIC ORGANS: Normal. MUSCULOSKELETAL: Midline ventral laparotomy incision. Ill-defined areas of fluid and gas within the deeper aspects of the incision line, with inflammatory stranding and thickening of the anterior abdominal wall in  this region. Of note, fluid is markedly diminished compared to prior CT. No acute osseous findings.     IMPRESSION: 1.  Inflammation with edema and scattered locules of gas throughout the ventral laparotomy incision line continuing deep to the abdominal wall which is thickened in this region, possibly representing ongoing infection. The overall extent of abdominal wall fluid is markedly improved compared to what was present on the prior CT, however. No discrete, drainable abscess at this time.

## 2023-12-17 NOTE — H&P
"ADMISSION HISTORY & PHYSICAL    CODE STATUS:  Elizabeth Samuel, [id number]    Patient YOB: 1985  Admit date: 12/16/2023  Date of Service: 12/16/2023    ASSESSMENT AND PLAN:  Patient is a 38 year old male with PMH significant for morbid obesity, hypertension, DM-2 and recent admission for umbilical hernia (11/22/23-11/30/23), s/p exploratory laparotomy with repair of incisional ventral hernia with mesh who presented to [facility] ED for concern regarding wound infection.    Post op wound infection  --Foul smelling discharge coming out of the surgical wound. It looks like the retention sutures- 1st, 2nd and 4th are cutting through the skin and subcutaneous tissue. Surrounding skin looks erythematous, definitely looking worse that when he was discharged on 11/30/23. Concern is if the mesh underneath is infected.   --CT showed inflammation with edema and scattered locules of gas throughout the ventral laparatomy incision line continuing deep to the abdominal wall  --Culture was positive for cutibacterium acne. He was treated with 5 days of IV zosyn here. Seen at surgery clinic on 12/11/23 and started on PO augmentin.   --Repeat culture. Start IV zosyn  --May need to remove those retention sutures. Consult gen surgery  --Cont ID. WOC consult    Essential hypertension  --Resume home meds    DM-2  --A1c 6.5 as of 11/22/23  --Start accucheck and sliding scale insulin    Constipation  --Start laxatives    Morbid obesity  --Body mass index is 54.39 kg/m .    Clinically Significant Risk Factors Present on Admission                  # Hypertension: Noted on problem list     # DMII: A1C = 6.5 % (Ref range: <5.7 %) within past 6 months    # Severe Obesity: Estimated body mass index is 54.39 kg/m  as calculated from the following:    Height as of this encounter: 1.803 m (5' 11\").    Weight as of this encounter: 176.9 kg (390 lb).              Disposition:  -Anticipated Length of Stay in midnights and medical " necessity (including a midnight in the Emergency Department after triage if applicable): >2      CHIEF COMPLAINT:  Post op wound infection    HISTORY OF PRESENTING ILLNESS:  Patient is a 38 year old male with PMH significant for morbid obesity, hypertension, DM-2 and recent admission for umbilical hernia (11/22/23-11/30/23), s/p exploratory laparotomy with repair of incisional ventral hernia with mesh who presented to [facility] ED for concern regarding wound infection.    Patient reports doing fairly well up until about a wk ago when he started noticing foul smelling drainage from the wound. He was seen at surgery clinic on 12/11/23 when the drains were removed and was prescribed PO augmentin. Since starting augmentin, he states the skin redness is somewhat better, but the foul smelling discharge has not gotten any better. He endorses having low grade temp of 100.4F at home.     PMH/PSH:  Patient Active Problem List   Diagnosis    Headache    Umbilical hernia without obstruction and without gangrene    Morbid obesity (H)    Hernia of anterior abdominal wall    Nausea and vomiting, unspecified vomiting type    HTN (hypertension)    Mood disorder (H24)    Prediabetes    Pure hypercholesterolemia    Sleep disturbance    Vitamin D deficiency    Vision disturbance    Acute hypoxemic respiratory failure (H)    Cellulitis    Concussion with no loss of consciousness, sequela (H24)    Convergence insufficiency    Pain of left lower leg    Positive D dimer    Post concussion syndrome    Sepsis (H)    Vestibular dysfunction    Dehiscence of operative wound, initial encounter    Cellulitis, unspecified cellulitis site       Past Medical History:   Diagnosis Date    Abdominal hernia     Cellulitis 05/2016    right index finger    HTN (hypertension) 07/10/2018    Migraines     Obesity     Pure hypercholesterolemia     Sleep apnea         Past Surgical History:   Procedure Laterality Date    APPENDECTOMY      childhood.    KANU GALARZA  HERNIORRHAPHY VENTRAL N/A 11/14/2023    Procedure: HERNIORRHAPHY WITH MESH, VENTRAL, ROBOT-ASSISTED, LAPAROSCOPIC, USING DA ALBERT XI;REPAIR OF INCARCERATED HERNIA.;  Surgeon: Braydon Kim DO;  Location: [facility] OR    HERNIA REPAIR      HERNIORRHAPHY VENTRAL N/A 11/24/2023    Procedure: REPAIR OF INCISIONAL VENTRAL HERNIA.;  Surgeon: Braydon Kim DO;  Location: [facility] OR    IR CAROTID CEREBRAL ANGIOGRAM BILATERAL  04/15/2021    LAPAROTOMY EXPLORATORY N/A 11/24/2023    Procedure: EXPLORATORY LAPAROTOMY WITH;  Surgeon: Braydon Kim DO;  Location: [facility] OR    LUMBAR PUNCTURE FLUORO GUIDED DIAGNOSTIC  04/13/2021    LYSIS, ADHESIONS, ROBOT-ASSISTED, LAPAROSCOPIC, USING DA ALBERT XI N/A 11/14/2023    Procedure: LYSIS OF ADHESIONS;  Surgeon: Braydon Kim DO;  Location: [facility] OR          ALLERGIES:  No Known Allergies    MEDICATIONS:  Reviewed.  Current Facility-Administered Medications   Medication    piperacillin-tazobactam (ZOSYN) 3.375 g vial to attach to  mL bag     Current Outpatient Medications   Medication    acetaminophen (TYLENOL) 325 MG tablet    amoxicillin-clavulanate (AUGMENTIN) 875-125 MG tablet    docusate sodium (COLACE) 100 MG capsule    lisinopril (ZESTRIL) 10 MG tablet    ondansetron (ZOFRAN ODT) 4 MG ODT tab    oxyCODONE (ROXICODONE) 5 MG tablet       Current Facility-Administered Medications:     piperacillin-tazobactam (ZOSYN) 3.375 g vial to attach to  mL bag, 3.375 g, Intravenous, Once, Ken Hatfield DO, 3.375 g at 12/16/23 7764    Current Outpatient Medications:     acetaminophen (TYLENOL) 325 MG tablet, Take 2 tablets (650 mg) by mouth every 6 hours as needed for mild pain, Disp: 90 tablet, Rfl: 0    amoxicillin-clavulanate (AUGMENTIN) 875-125 MG tablet, Take 1 tablet by mouth 2 times daily for 10 days, Disp: 20 tablet, Rfl: 0    docusate sodium (COLACE) 100 MG capsule, Take 1 capsule (100 mg) by mouth 2 times daily, Disp: 30 capsule, Rfl: 0     "lisinopril (ZESTRIL) 10 MG tablet, Take 1 tablet (10 mg) by mouth daily, Disp: 30 tablet, Rfl: 1    ondansetron (ZOFRAN ODT) 4 MG ODT tab, Take 1 tablet (4 mg) by mouth every 6 hours as needed for nausea, Disp: 8 tablet, Rfl: 0    oxyCODONE (ROXICODONE) 5 MG tablet, Take 1-2 tablets (5-10 mg) by mouth every 4 hours as needed for moderate pain, Disp: 25 tablet, Rfl: 0   Scheduled Meds:   piperacillin-tazobactam  3.375 g Intravenous Once     Continuous Infusions:  PRN Meds:.    SOCIAL HISTORY:  Social History     Socioeconomic History    Marital status:      Spouse name: Not on file    Number of children: Not on file    Years of education: Not on file    Highest education level: Not on file   Occupational History    Not on file   Tobacco Use    Smoking status: Never    Smokeless tobacco: Never   Substance and Sexual Activity    Alcohol use: Not Currently     Comment: Very rare    Drug use: Never    Sexual activity: Yes     Partners: Female   Other Topics Concern    Not on file   Social History Narrative    Not on file     Social Determinants of Health     Financial Resource Strain: Not on file   Food Insecurity: Not on file   Transportation Needs: Not on file   Physical Activity: Not on file   Stress: Not on file   Social Connections: Not on file   Interpersonal Safety: Not on file   Housing Stability: Not on file       FAMILY HISTORY:  Family History   Problem Relation Age of Onset    Snoring Mother     Snoring Father         ROS:  12 point review of systems reviewed and is negative except for what has already been mentioned in HPI.       PHYSICAL EXAM:  GENRL:  Not in acute distress   /62   Pulse 98   Temp 98.9  F (37.2  C) (Oral)   Resp 18   Ht 1.803 m (5' 11\")   Wt (!) 176.9 kg (390 lb)   SpO2 95%   BMI 54.39 kg/m    No intake/output data recorded.  No intake/output data recorded.  HEENT: NC/AT      Eyes-  Pupil round and reactive to light bilaterally       Neck- supple, no JVP elevation,       " "Sclera- anicteric      Oropharynx- moist and pink  CHEST: Clear to auscultation bilateral anteriorly, no ronchi or wheezing  HEART: S1S2 normal, regular.   ABDMN: Soft. Obese abdomen. Midline surgical incision with 4 retentions sutures in place. The 1st, 2nd and 4th sutures are cutting through the skin, draining foul smelling yellowish discharge. The distal end of the wound seems to have some dehiscence.   EXTRM: 1-2+ pedal edema (chronic)  INTGM: No skin rash, no cyanosis or clubbing  MUSCULOSKELETAL: no joint tenderness or swelling on upper and lower extremities  NEURO: Alert and awake. Cranial nerves II-XII grossly intact. No focal neurological deficit.      DIAGNOSTIC DATA:    Recent Labs   Lab 12/16/23 2051   WBC 10.8   HGB 10.9*   HCT 34.7*          Recent Labs   Lab 12/16/23 2051      CO2 30*   BUN 8.8       No results for input(s): \"INR\" in the last 168 hours.    Recent Labs   Lab 12/16/23 2051      CO2 30*   BUN 8.8       [unfilled]    CT Abdomen Pelvis w Contrast    Result Date: 12/16/2023  EXAM: CT ABDOMEN PELVIS W CONTRAST LOCATION: [facility] DATE: 12/16/2023 INDICATION: Ventral hernia repair with concern for possible underlying postsurgical abscess in the surgical wound COMPARISON: CT from 11/22/2023 TECHNIQUE: CT scan of the abdomen and pelvis was performed following injection of IV contrast. Multiplanar reformats were obtained. Dose reduction techniques were used. CONTRAST: isovue 370 90ml FINDINGS: LOWER CHEST: Normal. HEPATOBILIARY: Extensive hepatic steatosis. The liver measures 22 cm craniocaudal. The gallbladder is within normal limits. PANCREAS: Normal. SPLEEN: Normal. ADRENAL GLANDS: Normal. KIDNEYS/BLADDER: Normal. BOWEL: No bowel obstruction or free air. There is a loop of small bowel which is closely apposed to an area of the anterior abdominal wall which demonstrates extensive inflammatory change as seen on axial image 160. LYMPH NODES: " Normal. VASCULATURE: Unremarkable. PELVIC ORGANS: Normal. MUSCULOSKELETAL: Midline ventral laparotomy incision. Ill-defined areas of fluid and gas within the deeper aspects of the incision line, with inflammatory stranding and thickening of the anterior abdominal wall in this region. Of note, fluid is markedly diminished compared to prior CT. No acute osseous findings.     IMPRESSION: 1.  Inflammation with edema and scattered locules of gas throughout the ventral laparotomy incision line continuing deep to the abdominal wall which is thickened in this region, possibly representing ongoing infection. The overall extent of abdominal wall fluid is markedly improved compared to what was present on the prior CT, however. No discrete, drainable abscess at this time.    Abd/pelvis CT,  IV  contrast only TRAUMA / AAA    Result Date: 11/22/2023  EXAM: CT ABDOMEN PELVIS W CONTRAST LOCATION: [facility] DATE: 11/22/2023 INDICATION: Intractable nausea and vomiting since recent hernia repair. Leukocytosis.  COMPARISON: 11/13/2023 TECHNIQUE: CT scan of the abdomen and pelvis was performed following injection of IV contrast. Multiplanar reformats were obtained. Dose reduction techniques were used. CONTRAST: 100 mL Isovue 370 FINDINGS: LOWER CHEST: Normal. HEPATOBILIARY: Normal. PANCREAS: Normal. SPLEEN: Normal. ADRENAL GLANDS: Normal. KIDNEYS/BLADDER: Normal. BOWEL: Interval ventral abdominal hernia repair since prior study. Fluid distention of the proximal small bowel is seen with gradual tapering and tethering towards the anterior abdominal wall, where a gas/fluid collection is present measuring approximately 17 x 6 x 17.5 cm. LYMPH NODES: Normal. VASCULATURE: Unremarkable. PELVIC ORGANS: Normal. MUSCULOSKELETAL: Normal.     IMPRESSION: 1.  Large gas/fluid collection in the anterior abdominal wall suspicious for abscess. 2.  Fluid distended proximal small bowel with gradual tapering and tethering towards  the anterior abdomen, indeterminate. Ileus is slightly favored in this immediate postoperative period over partial obstruction.      Patient's new lab studies reviewed personally.  Patient's new radiology reports reviewed personally.  I personally viewed and personally interpreted patient's EKG:     Note created using dragon voice recognition software.  Errors in spelling or words which seems out of context are unintentional.  Sounds alike errors may have escaped editing.     12/16/2023  Tomas Glass MD  HOSPITALIST, [facility]  PAGER NO. [phone]

## 2023-12-17 NOTE — ED PROVIDER NOTES
EMERGENCY DEPARTMENT ENCOUNTER      NAME: Ady Godinez  AGE: 38 year old male  YOB: 1985  MRN: [id number]  EVALUATION DATE & TIME: 2023  7:56 PM    PCP: Elizabeth Black    ED PROVIDER: Ken Hatfield D.O.      Chief Complaint   Patient presents with    Post-op Problem       FINAL IMPRESSION:  1. Dehiscence of operative wound, initial encounter    2. Cellulitis, unspecified cellulitis site        ED COURSE & MEDICAL DECISION MAKIN:17 PM I met with the patient to gather history and to perform my initial exam. I discussed the plan for care while in the Emergency Department.  9:54 PM I spoke with Dr. Nunez, general surgery.   11:04 PM I ordered Zosyn for patient. Patient reports he has been on Augmentin and has had purulent drainage as well as his wound has been opening.   11:28 PM I spoke with Dr. Glass, hospitalist.          Pertinent Labs & Imaging studies reviewed. (See chart for details)  38 year old male presents to the Emergency Department for evaluation of concern for infection of abdominal surgical wound.  Patient eventually did report that he noticed dehiscence of the wound which is new.  Does report that the redness has gotten somewhat improved, but the pain has been dramatically increasing as well as a purulent drainage.  Does have multiple Penrose drains in place due to previous infection with abscess.  Is currently taking Augmentin at home.  Does not show any signs of sepsis, and CT imaging today does not show any evidence of new accumulation of abscess.  There is definitive evidence of infection.  However with the report that his wound is now dehiscing, and I have no previous pictures of the wound to look at to determine how significant this is, will admit for IV antibiotics and likely evaluation in the morning by general surgery.  Patient was agreeable with this plan.  Discussed with hospitalist who agreed to the admission.    Medical Decision  Making    History:  Supplemental history from: Documented in chart, if applicable  External Record(s) reviewed: Documented in chart, if applicable. and Inpatient Record: [facility] 11/22-11/30/23    Work Up:  Chart documentation includes differential considered and any EKGs or imaging independently interpreted by provider, where specified.  In additional to work up documented, I considered the following work up: Documented in chart, if applicable.    External consultation:  Discussion of management with another provider: Documented in chart, if applicable and General Surgery and Hospitalist    Complicating factors:  Care impacted by chronic illness: Hypertension  Care affected by social determinants of health: N/A    Disposition considerations: Admit.    At the conclusion of the encounter I discussed the results of all of the tests and the disposition. The questions were answered. The patient or family acknowledged understanding and was agreeable with the care plan.        HPI    Patient information was obtained from: Patient    Use of : N/A        Ady Godinez is a 38 year old male who presents with post-op problem.     Per Chart Review: Patient was seen here from 11/22-11/30/23 for an umbilical hernia without obstruction. Patient had a exploratory laparotomy with repair of incisional ventral hernia with mesh, drain placement on 11/24. Was on Zosyn for five days and controlled pain with oxycodone. Was discharged home with follow up with surgeon.     Patient reports that he had abdominal surgery by Dr. Kim on 11/24/23 for ventral hernia repair. Five days ago he noted redness had started around his drains and sutures. His surgery team started him on antibiotics at that time. Since he thinks the redness has slightly gone down but when he was getting his dressing redone today by his nurse they noted an odor from the wound resulting in a visit today. Patient had a fever of 104.5 three days ago that  resolved. No fever otherwise. He does have pain with movement. Denies any other complaints at this time.       REVIEW OF SYSTEMS  Constitutional: Fever resolved. Denies chills, weight loss or weakness  Eyes:  No pain, discharge, redness  HENT:  Denies sore throat, ear pain, congestion  Respiratory: No SOB, wheeze or cough  Cardiovascular:  No CP, palpitations  GI: Post-op surgery site with redness and odor. Denies abdominal pain, nausea, vomiting, diarrhea  : Denies dysuria, hematuria  Musculoskeletal:  Denies any new muscle/joint pain, swelling or loss of function.  Skin:  Denies rash, pallor  Neurologic:  Denies headache, focal weakness or sensory changes  Lymph: Denies swollen nodes    All other systems negative unless noted in HPI.    PAST MEDICAL HISTORY:  Past Medical History:   Diagnosis Date    Abdominal hernia     Cellulitis 05/2016    right index finger    HTN (hypertension) 07/10/2018    Migraines     Obesity     Pure hypercholesterolemia     Sleep apnea        PAST SURGICAL HISTORY:  Past Surgical History:   Procedure Laterality Date    APPENDECTOMY      childhood.    DAVINCI XI HERNIORRHAPHY VENTRAL N/A 11/14/2023    Procedure: HERNIORRHAPHY WITH MESH, VENTRAL, ROBOT-ASSISTED, LAPAROSCOPIC, USING DA ALBERT XI;REPAIR OF INCARCERATED HERNIA.;  Surgeon: Braydon Kim DO;  [place] [facility] [place]    [place]PAIR      HERNIORRHAPHY VENTRAL N/A 11/24/2023    Procedure: REPAIR OF INCISIONAL VENTRAL HERNIA.;  Surgeon: Braydon Kim DO;  [place] [facility] [place]    IR CAROTID CEREBRAL ANGIOGRAM BILATERAL  04/15/2021    LAPAROTOMY EXPLORATORY N/A 11/24/2023    Procedure: EXPLORATORY LAPAROTOMY WITH;  Surgeon: Braydon Kim DO;  [place] [facility] [place]    LUMBAR PUNCTURE FLUORO GUIDED DIAGNOSTIC  04/13/2021    LYSIS, ADHESIONS, ROBOT-ASSISTED, LAPAROSCOPIC, USING DA ALBERT XI N/A 11/14/2023    Procedure: LYSIS OF ADHESIONS;  Surgeon: Braydon Kim DO;  [place] [facility] [place]         CURRENT MEDICATIONS:   "  Current Facility-Administered Medications   Medication    piperacillin-tazobactam (ZOSYN) 3.375 g vial to attach to  mL bag     Current Outpatient Medications   Medication    acetaminophen (TYLENOL) 325 MG tablet    amoxicillin-clavulanate (AUGMENTIN) 875-125 MG tablet    docusate sodium (COLACE) 100 MG capsule    lisinopril (ZESTRIL) 10 MG tablet    ondansetron (ZOFRAN ODT) 4 MG ODT tab    oxyCODONE (ROXICODONE) 5 MG tablet         ALLERGIES:  No Known Allergies    FAMILY HISTORY:  Family History   Problem Relation Age of Onset    Snoring Mother     Snoring Father        SOCIAL HISTORY:  Social History     Socioeconomic History    Marital status:    Tobacco Use    Smoking status: Never    Smokeless tobacco: Never   Substance and Sexual Activity    Alcohol use: Not Currently     Comment: Very rare    Drug use: Never    Sexual activity: Yes     Partners: Female       VITALS:  Patient Vitals for the past 24 hrs:   BP Temp Temp src Pulse Resp SpO2 Height Weight   12/16/23 2318 -- -- -- 98 -- 95 % -- --   12/16/23 2245 127/62 -- -- 96 -- 96 % -- --   12/16/23 2215 120/55 -- -- 98 -- 97 % -- --   12/16/23 2137 -- -- -- 99 -- 97 % -- --   12/16/23 2130 134/75 -- -- 96 -- 97 % -- --   12/16/23 2116 132/61 -- -- 99 -- 98 % -- --   12/16/23 1912 (!) 145/69 98.9  F (37.2  C) Oral 95 18 -- 1.803 m (5' 11\") (!) 176.9 kg (390 lb)       PHYSICAL EXAM    VITAL SIGNS: /62   Pulse 98   Temp 98.9  F (37.2  C) (Oral)   Resp 18   Ht 1.803 m (5' 11\")   Wt (!) 176.9 kg (390 lb)   SpO2 95%   BMI 54.39 kg/m      General Appearance: Well-appearing, well-nourished, no acute distress   Head:  Normocephalic, without obvious abnormality, atraumatic  Eyes:  PERRL, conjunctiva/corneas clear, EOM's intact,  ENT:  Lips, mucosa, and tongue normal, membranes are moist without pallor  Neck:  Normal ROM, symmetrical, trachea midline    Cardio:  Regular rate and rhythm, no murmur, rub or gallop, 2+ pulses symmetric in all " extremities  Pulm:  Clear to auscultation bilaterally, respirations unlabored,  Abdomen:  Soft, non-tender, no rebound or guarding. See picture.  Musculoskeletal: Full ROM, no edema, no cyanosis, good ROM of major joints  Integument:  Warm, Dry, No erythema, No rash.    Neurologic:  Alert & oriented.  No focal deficits appreciated.  Ambulatory.  Psychiatric:  Affect normal, Judgment normal, Mood normal.          LABS  Results for orders placed or performed during the hospital encounter of 12/16/23 (from the past 24 hour(s))   CBC with platelets + differential    Narrative    The following orders were created for panel order CBC with platelets + differential.  Procedure                               Abnormality         Status                     ---------                               -----------         ------                     CBC with platelets and d...[[id number]]  Abnormal            Final result                 Please view results for these tests on the individual orders.   Basic metabolic panel   Result Value Ref Range    Sodium 137 135 - 145 mmol/L    Potassium 4.4 3.4 - 5.3 mmol/L    Chloride 98 98 - 107 mmol/L    Carbon Dioxide (CO2) 30 (H) 22 - 29 mmol/L    Anion Gap 9 7 - 15 mmol/L    Urea Nitrogen 8.8 6.0 - 20.0 mg/dL    Creatinine 0.71 0.67 - 1.17 mg/dL    GFR Estimate >90 >60 mL/min/1.73m2    Calcium 9.4 8.6 - 10.0 mg/dL    Glucose 107 (H) 70 - 99 mg/dL   Lactic acid whole blood   Result Value Ref Range    Lactic Acid 1.2 0.7 - 2.0 mmol/L   [place] Draw    Narrative    The following orders were created for panel order [place] Draw.  Procedure                               Abnormality         Status                     ---------                               -----------         ------                     Extra Blue Top Tube[[id number]]                              Final result               Extra Red Top Tube[[id number]]                               Final result               Extra Green Top  (Lithium...[[id number]]                                                 Extra Purple Top Tube[[id number]]                                                         Please view results for these tests on the individual orders.   CBC with platelets and differential   Result Value Ref Range    WBC Count 10.8 4.0 - 11.0 10e3/uL    RBC Count 3.85 (L) 4.40 - 5.90 10e6/uL    Hemoglobin 10.9 (L) 13.3 - 17.7 g/dL    Hematocrit 34.7 (L) 40.0 - 53.0 %    MCV 90 78 - 100 fL    MCH 28.3 26.5 - 33.0 pg    MCHC 31.4 (L) 31.5 - 36.5 g/dL    RDW 14.6 10.0 - 15.0 %    Platelet Count 365 150 - 450 10e3/uL    % Neutrophils 64 %    % Lymphocytes 21 %    % Monocytes 7 %    % Eosinophils 7 %    % Basophils 0 %    % Immature Granulocytes 1 %    NRBCs per 100 WBC 0 <1 /100    Absolute Neutrophils 6.9 1.6 - 8.3 10e3/uL    Absolute Lymphocytes 2.3 0.8 - 5.3 10e3/uL    Absolute Monocytes 0.7 0.0 - 1.3 10e3/uL    Absolute Eosinophils 0.8 (H) 0.0 - 0.7 10e3/uL    Absolute Basophils 0.0 0.0 - 0.2 10e3/uL    Absolute Immature Granulocytes 0.1 <=0.4 10e3/uL    Absolute NRBCs 0.0 10e3/uL   Extra Blue Top Tube   Result Value Ref Range    Hold Specimen JIC    Extra Red Top Tube   Result Value Ref Range    Hold Specimen JIC    CT Abdomen Pelvis w Contrast    Narrative    EXAM: CT ABDOMEN PELVIS W CONTRAST  LOCATION: [facility]  DATE: 12/16/2023    INDICATION: Ventral hernia repair with concern for possible underlying postsurgical abscess in the surgical wound  COMPARISON: CT from 11/22/2023  TECHNIQUE: CT scan of the abdomen and pelvis was performed following injection of IV contrast. Multiplanar reformats were obtained. Dose reduction techniques were used.  CONTRAST: isovue 370 90ml    FINDINGS:   LOWER CHEST: Normal.    HEPATOBILIARY: Extensive hepatic steatosis. The liver measures 22 cm craniocaudal. The gallbladder is within normal limits.    PANCREAS: Normal.    SPLEEN: Normal.    ADRENAL GLANDS: Normal.    KIDNEYS/BLADDER:  Normal.    BOWEL: No bowel obstruction or free air. There is a loop of small bowel which is closely apposed to an area of the anterior abdominal wall which demonstrates extensive inflammatory change as seen on axial image 160.    LYMPH NODES: Normal.    VASCULATURE: Unremarkable.    PELVIC ORGANS: Normal.    MUSCULOSKELETAL: Midline ventral laparotomy incision. Ill-defined areas of fluid and gas within the deeper aspects of the incision line, with inflammatory stranding and thickening of the anterior abdominal wall in this region. Of note, fluid is markedly   diminished compared to prior CT. No acute osseous findings.      Impression    IMPRESSION:   1.  Inflammation with edema and scattered locules of gas throughout the ventral laparotomy incision line continuing deep to the abdominal wall which is thickened in this region, possibly representing ongoing infection. The overall extent of abdominal   wall fluid is markedly improved compared to what was present on the prior CT, however. No discrete, drainable abscess at this time.         RADIOLOGY  CT Abdomen Pelvis w Contrast   Final Result   IMPRESSION:    1.  Inflammation with edema and scattered locules of gas throughout the ventral laparotomy incision line continuing deep to the abdominal wall which is thickened in this region, possibly representing ongoing infection. The overall extent of abdominal    wall fluid is markedly improved compared to what was present on the prior CT, however. No discrete, drainable abscess at this time.               MEDICATIONS GIVEN IN THE EMERGENCY:  Medications   piperacillin-tazobactam (ZOSYN) 3.375 g vial to attach to  mL bag (3.375 g Intravenous $New Bag 12/16/23 3193)   iopamidol (ISOVUE-370) solution 90 mL (90 mLs Intravenous $Given 12/16/23 2235)       NEW PRESCRIPTIONS STARTED AT TODAY'S ER VISIT  New Prescriptions    No medications on file        I, Rigoberto Wetzel am serving as a scribe to document services personally  performed by Ken Hatfield D.O., based on my observations and the provider's statements to me.  I, Ken Hatfield D.O., attest that Rigoberto Wetzel is acting in a scribe capacity, has observed my performance of the services and has documented them in accordance with my direction.     Ken Hatfiedl D.O.  Emergency Medicine  [facility] EMERGENCY DEPARTMENT  [address] [place] [id number] [phone]  Dept: [phone]       Ken Hatfield, DO  12/17/23 0007

## 2023-12-18 LAB
GLUCOSE BLDC GLUCOMTR-MCNC: 103 MG/DL (ref 70–99)
GLUCOSE BLDC GLUCOMTR-MCNC: 106 MG/DL (ref 70–99)
GLUCOSE BLDC GLUCOMTR-MCNC: 109 MG/DL (ref 70–99)
GLUCOSE BLDC GLUCOMTR-MCNC: 124 MG/DL (ref 70–99)

## 2023-12-18 PROCEDURE — 258N000003 HC RX IP 258 OP 636

## 2023-12-18 PROCEDURE — 250N000013 HC RX MED GY IP 250 OP 250 PS 637: Performed by: INTERNAL MEDICINE

## 2023-12-18 PROCEDURE — 250N000011 HC RX IP 250 OP 636: Performed by: INTERNAL MEDICINE

## 2023-12-18 PROCEDURE — 99232 SBSQ HOSP IP/OBS MODERATE 35: CPT

## 2023-12-18 PROCEDURE — 96366 THER/PROPH/DIAG IV INF ADDON: CPT

## 2023-12-18 PROCEDURE — 99231 SBSQ HOSP IP/OBS SF/LOW 25: CPT

## 2023-12-18 PROCEDURE — G0463 HOSPITAL OUTPT CLINIC VISIT: HCPCS | Mod: 25

## 2023-12-18 PROCEDURE — 99232 SBSQ HOSP IP/OBS MODERATE 35: CPT | Performed by: STUDENT IN AN ORGANIZED HEALTH CARE EDUCATION/TRAINING PROGRAM

## 2023-12-18 PROCEDURE — 96376 TX/PRO/DX INJ SAME DRUG ADON: CPT

## 2023-12-18 PROCEDURE — G0378 HOSPITAL OBSERVATION PER HR: HCPCS

## 2023-12-18 PROCEDURE — 250N000011 HC RX IP 250 OP 636: Mod: JZ

## 2023-12-18 PROCEDURE — 82962 GLUCOSE BLOOD TEST: CPT

## 2023-12-18 PROCEDURE — 258N000003 HC RX IP 258 OP 636: Performed by: INTERNAL MEDICINE

## 2023-12-18 PROCEDURE — 96375 TX/PRO/DX INJ NEW DRUG ADDON: CPT

## 2023-12-18 PROCEDURE — 250N000011 HC RX IP 250 OP 636: Performed by: STUDENT IN AN ORGANIZED HEALTH CARE EDUCATION/TRAINING PROGRAM

## 2023-12-18 RX ORDER — OXYCODONE HYDROCHLORIDE 5 MG/1
5-10 TABLET ORAL EVERY 4 HOURS PRN
Status: DISCONTINUED | OUTPATIENT
Start: 2023-12-18 | End: 2023-12-20 | Stop reason: HOSPADM

## 2023-12-18 RX ADMIN — MEROPENEM 2 G: 1 INJECTION, POWDER, FOR SOLUTION INTRAVENOUS at 12:59

## 2023-12-18 RX ADMIN — OXYCODONE HYDROCHLORIDE 5 MG: 5 TABLET ORAL at 22:51

## 2023-12-18 RX ADMIN — HYDROMORPHONE HYDROCHLORIDE 0.4 MG: 1 INJECTION, SOLUTION INTRAMUSCULAR; INTRAVENOUS; SUBCUTANEOUS at 12:07

## 2023-12-18 RX ADMIN — OXYCODONE HYDROCHLORIDE 5 MG: 5 TABLET ORAL at 00:36

## 2023-12-18 RX ADMIN — MEROPENEM 2 G: 1 INJECTION, POWDER, FOR SOLUTION INTRAVENOUS at 20:34

## 2023-12-18 RX ADMIN — OXYCODONE HYDROCHLORIDE 5 MG: 5 TABLET ORAL at 16:40

## 2023-12-18 RX ADMIN — VANCOMYCIN HYDROCHLORIDE 1500 MG: 5 INJECTION, POWDER, LYOPHILIZED, FOR SOLUTION INTRAVENOUS at 00:37

## 2023-12-18 RX ADMIN — HYDROMORPHONE HYDROCHLORIDE 0.4 MG: 1 INJECTION, SOLUTION INTRAMUSCULAR; INTRAVENOUS; SUBCUTANEOUS at 20:06

## 2023-12-18 RX ADMIN — OXYCODONE HYDROCHLORIDE 5 MG: 5 TABLET ORAL at 11:34

## 2023-12-18 RX ADMIN — VANCOMYCIN HYDROCHLORIDE 1500 MG: 5 INJECTION, POWDER, LYOPHILIZED, FOR SOLUTION INTRAVENOUS at 11:44

## 2023-12-18 RX ADMIN — DOCUSATE SODIUM 100 MG: 100 CAPSULE, LIQUID FILLED ORAL at 20:06

## 2023-12-18 RX ADMIN — DOCUSATE SODIUM 100 MG: 100 CAPSULE, LIQUID FILLED ORAL at 08:26

## 2023-12-18 RX ADMIN — VANCOMYCIN HYDROCHLORIDE 1500 MG: 5 INJECTION, POWDER, LYOPHILIZED, FOR SOLUTION INTRAVENOUS at 22:57

## 2023-12-18 RX ADMIN — LISINOPRIL 10 MG: 5 TABLET ORAL at 08:28

## 2023-12-18 RX ADMIN — PIPERACILLIN AND TAZOBACTAM 3.38 G: 3; .375 INJECTION, POWDER, FOR SOLUTION INTRAVENOUS at 06:07

## 2023-12-18 ASSESSMENT — ACTIVITIES OF DAILY LIVING (ADL)
ADLS_ACUITY_SCORE: 37
ADLS_ACUITY_SCORE: 35
ADLS_ACUITY_SCORE: 37

## 2023-12-18 NOTE — PLAN OF CARE
Problem: Adult Inpatient Plan of Care  Goal: Plan of Care Review  Description: The Plan of Care Review/Shift note should be completed every shift.  The Outcome Evaluation is a brief statement about your assessment that the patient is improving, declining, or no change.  This information will be displayed automatically on your shift  note.  Outcome: Progressing

## 2023-12-18 NOTE — PLAN OF CARE
Problem: Adult Inpatient Plan of Care  Goal: Absence of Hospital-Acquired Illness or Injury  Intervention: Identify and Manage Fall Risk  Recent Flowsheet Documentation  Taken 12/17/2023 2000 by Kaylee Wyatt RN  Safety Promotion/Fall Prevention:   activity supervised   assistive device/personal items within reach   nonskid shoes/slippers when out of bed  Taken 12/17/2023 1600 by Kaylee Wyatt RN  Safety Promotion/Fall Prevention:   activity supervised   assistive device/personal items within reach   nonskid shoes/slippers when out of bed  Taken 12/17/2023 1200 by Kaylee Wyatt RN  Safety Promotion/Fall Prevention:   activity supervised   assistive device/personal items within reach   nonskid shoes/slippers when out of bed  Intervention: Prevent and Manage VTE (Venous Thromboembolism) Risk  Recent Flowsheet Documentation  Taken 12/17/2023 2000 by Kaylee Wyatt RN  VTE Prevention/Management: patient refused intervention  Taken 12/17/2023 1600 by Kaylee Wyatt RN  VTE Prevention/Management: patient refused intervention  Taken 12/17/2023 1200 by Kaylee Wyatt RN  VTE Prevention/Management: patient refused intervention   Goal Outcome Evaluation:    Pt has been tolerating IV abx without adverse effect. Up ind without concern. C/o pain effectively relieved by prn analgesia. Utilizes call light appropriately and is able to verbalize needs effectively,

## 2023-12-18 NOTE — PROGRESS NOTES
General Surgery Progress Note:    Hospital Day # 0    ASSESSMENT:     1. Dehiscence of operative wound, initial encounter    2. Cellulitis, unspecified cellulitis site        Ady Godinez is a 38 year old male s/p exploratory laparotomy with repair of recurrent incisional ventral hernia, placement of drain 11/24. Explanted original mesh and placed new mesh. Drain removed 12/11 and PO augmentin 10 day course prescribed. Current infection foul smelling with loose retention sutures, purulent drainage. IR drain placement vs. Surgical intervention. Original incarcerated hernia repair 11/14. No leukocytosis, afebrile with stable vitals, no blood culture growth at 12h. Culture multiple aerobic positive. Removed retention sutures and staples today, dressed wound with vash solution. IR consult, no drain recommended.    PLAN:   -IV ABX per ID  -Okay for regular diet  -IR consult not recommending drain, no surgical intervention planned at this time  -Continue to wear abdominal binder   -Medical management per primary team    SUBJECTIVE:   Ady Godinez was seen on rounds. Patient states he is doing well, was seen at our clinic 12/11 and drains removed, started PO augmentin 10 day course. Patient states he was taking the antibiotic at home. Overall felt well, noticed redness around incisions Thursday and foul smell Saturday. Home nursing changing dressings once daily. Overall patient denies severe pain. Patient denies severe increase in pain, fevers, chills, changes in bowel.     Patient describes sharp pain with removal of retention sutures and burning sensation, he states he is very anxious multiple times.    Patient Vitals for the past 24 hrs:   BP Temp Temp src Pulse Resp SpO2   12/18/23 0825 (!) 141/70 98.7  F (37.1  C) Oral 87 18 96 %   12/18/23 0400 120/67 97.9  F (36.6  C) Oral 86 18 95 %   12/18/23 0000 (!) 144/71 98  F (36.7  C) Oral 97 20 96 %   12/17/23 1544 121/68 98.7  F (37.1  C) Oral 92 18 98 %        Physical Exam:  General: patient seen resting in the chair in no acute distress, pleasant, anxious  Resp: no increased work of breathing, breathing comfortably on room air  Abdomen: Soft abdomen without peritoneal signs upon light palpation. Purulent, foul smelling drainage immediately seen upon removal of bandage at the midline incision sites and drain incision sites. Erythema and induration surrounding. Wound dehiscence apparent at the midline and worse in the bottom staple line, with few to no staples in original positioning.     Tenderness and pain with retention suture removal, patient tolerated well. All visualized staples removed and all retention sutures with drains. Vash solution applied on 4x4s and new bandage applied.     Extremities: warm and well-perfused    Admission on 12/16/2023   Component Date Value    Sodium 12/16/2023 137     Potassium 12/16/2023 4.4     Chloride 12/16/2023 98     Carbon Dioxide (CO2) 12/16/2023 30 (H)     Anion Gap 12/16/2023 9     Urea Nitrogen 12/16/2023 8.8     Creatinine 12/16/2023 0.71     GFR Estimate 12/16/2023 >90     Calcium 12/16/2023 9.4     Glucose 12/16/2023 107 (H)     Lactic Acid 12/16/2023 1.2     Culture 12/16/2023 No growth after 12 hours     Culture 12/16/2023 No growth after 12 hours     WBC Count 12/16/2023 10.8     RBC Count 12/16/2023 3.85 (L)     Hemoglobin 12/16/2023 10.9 (L)     Hematocrit 12/16/2023 34.7 (L)     MCV 12/16/2023 90     MCH 12/16/2023 28.3     MCHC 12/16/2023 31.4 (L)     RDW 12/16/2023 14.6     Platelet Count 12/16/2023 365     % Neutrophils 12/16/2023 64     % Lymphocytes 12/16/2023 21     % Monocytes 12/16/2023 7     % Eosinophils 12/16/2023 7     % Basophils 12/16/2023 0     % Immature Granulocytes 12/16/2023 1     NRBCs per 100 WBC 12/16/2023 0     Absolute Neutrophils 12/16/2023 6.9     Absolute Lymphocytes 12/16/2023 2.3     Absolute Monocytes 12/16/2023 0.7     Absolute Eosinophils 12/16/2023 0.8 (H)     Absolute Basophils  12/16/2023 0.0     Absolute Immature Granul* 12/16/2023 0.1     Absolute NRBCs 12/16/2023 0.0     Hold Specimen 12/16/2023 JIC     Hold Specimen 12/16/2023 JIC     Culture 12/17/2023 Culture in progress     Culture 12/17/2023 1+ Enterobacter cloacae complex (A)     Culture 12/17/2023 4+ Staphylococcus aureus (A)     Gram Stain Result 12/17/2023 3+ Gram positive cocci (A)     Gram Stain Result 12/17/2023 1+ Gram positive bacilli (A)     Gram Stain Result 12/17/2023 1+ Gram negative bacilli (A)     Gram Stain Result 12/17/2023 3+ WBC seen (A)     Sodium 12/17/2023 134 (L)     Potassium 12/17/2023 4.5     Carbon Dioxide (CO2) 12/17/2023 29     Anion Gap 12/17/2023 9     Urea Nitrogen 12/17/2023 9.1     Creatinine 12/17/2023 0.72     GFR Estimate 12/17/2023 >90     Calcium 12/17/2023 9.6     Chloride 12/17/2023 96 (L)     Glucose 12/17/2023 120 (H)     Alkaline Phosphatase 12/17/2023 104     AST 12/17/2023 25     ALT 12/17/2023 23     Protein Total 12/17/2023 7.4     Albumin 12/17/2023 3.4 (L)     Bilirubin Total 12/17/2023 0.3     WBC Count 12/17/2023 9.2     RBC Count 12/17/2023 3.56 (L)     Hemoglobin 12/17/2023 9.8 (L)     Hematocrit 12/17/2023 32.3 (L)     MCV 12/17/2023 91     MCH 12/17/2023 27.5     MCHC 12/17/2023 30.3 (L)     RDW 12/17/2023 14.5     Platelet Count 12/17/2023 342     GLUCOSE BY METER POCT 12/17/2023 119 (H)     CRP Inflammation 12/17/2023 104.70 (H)     GLUCOSE BY METER POCT 12/17/2023 112 (H)     GLUCOSE BY METER POCT 12/17/2023 119 (H)     GLUCOSE BY METER POCT 12/17/2023 116 (H)     GLUCOSE BY METER POCT 12/18/2023 106 (H)         PRAKASH Fernandez [facility] Surgery  [address] [place] [id number]

## 2023-12-18 NOTE — CONSULTS
[facility]  WOC Nurse Inpatient Assessment     Consulted for: Abdomen    Summary: surgical pt of Dr Kim    Patient History (according to provider note(s):      Assessment:    -Patient has a hernia repair done by Dr. Kim he had infection they pulled this mesh replaced this mesh now he has another infection he has, loose retention sutures and is draining through the sutures.  He had drains but these were pulled a week ago.      Plan:    IV antibiotics  Admitted for observation and care  IR will be consulted apart tomorrow most likely for drain placement  May need to have mesh explanted       Assessment:      Wound location: ABD    12/18    Last photo: 12/18  Wound due to: Surgical Wound  Wound history/plan of care: hernia repir, retention sutures eroding into abdominal skin  Wound base: 50 % slough, 50 % non-granular tissue     Palpation of the wound bed: boggy      Drainage: moderate     Description of drainage: serosanguinous and purulent     Measurements (length x width x depth, in cm): 10  x 4  x  1.6 cm entire area with 4 eroded retention sutures      Tunneling: N/A     Undermining: N/A  Periwound skin: Erythema- blanchable      Color: red      Temperature: warm  Odor: mild  Pain: mild and during dressing change, throbbing and constant  Pain interventions prior to dressing change: slow and gentle cares   Treatment goal: Infection control/prevention and Protection  STATUS: initial assessment  Supplies ordered: ordered vashe    Awaiting decision by surgical team     Treatment Plan:     Abdomen  Moisten roll gauze with vashe, fold back and forth over the irritated sutures  Cover with abd pads and tape  Change BID + PRN if soiled, saturated, falls off    Orders: Written    RECOMMEND PRIMARY TEAM ORDER: Surgical consult  Education provided: plan of care, wound progress, and Infection prevention   Discussed plan of care with: Patient and Nurse  WOC nurse follow-up plan: weekly  Notify WOC if  wound(s) deteriorate.  Nursing to notify the Provider(s) and re-consult the [facility] Nurse if new skin concern.    DATA:     Current support surface: Standard  Other: recliner  Containment of urine/stool: Continent of bladder and Continent of bowel  BMI: Body mass index is 54.39 kg/m .   Active diet order: Orders Placed This Encounter      NPO per Anesthesia Guidelines for Procedure/Surgery Except for: Meds, Ice Chips     Output: I/O last 3 completed shifts:  In: 740 [P.O.:240; IV Piggyback:500]  Out: -      Labs:   Recent Labs   Lab 12/17/23  0728   ALBUMIN 3.4*   HGB 9.8*   WBC 9.2     Pressure injury risk assessment:   Sensory Perception: 4-->no impairment  Moisture: 3-->occasionally moist  Activity: 3-->walks occasionally  Mobility: 3-->slightly limited  Nutrition: 3-->adequate  Friction and Shear: 2-->potential problem  Samy Score: 18    OLLIE Quijano RN CWOCN  Pager no longer in use, please contact through [employer] group: [facility] [email or web address]

## 2023-12-18 NOTE — PROGRESS NOTES
Infectious Disease Progress Note    Assessment/Plan    Assessment:  Post operative infection: purulent discharge from retention sutures with surrounding erythema. Swab is polymicrobial. On zosyn. CT with inflammation, edema, and gas possible infection--no drainable abscess.   Comorbid conditions affecting immune system: DM2, morbid obesity     Cultures so far have S aureus, E cloacae and C striatum.  The C striatum will need to be treated with vancomycin.            Recommendations:   - change zosyn to meropenem  - vanc  - follow cultures  - surgery has been consulted, likely needs some sort of source control. Says sutures removed.  - further recommendations to follow clinical course  - ID will follow, thanks     Principal Problem:    Cellulitis, unspecified cellulitis site  Active Problems:    Dehiscence of operative wound, initial encounter      ALEXEY NAIK MD  [phone]      Subjective  Just got narcotics.  Says sutures have been removed.     Breathing ok. Looking forward to eating.     Objective    Vital signs in last 24 hours  Temp:  [97.9  F (36.6  C)-98.7  F (37.1  C)] 98.7  F (37.1  C)  Pulse:  [86-97] 87  Resp:  [18-20] 18  BP: (120-144)/(67-71) 141/70  SpO2:  [95 %-98 %] 96 %  Wt Readings from Last 3 Encounters:   12/16/23 (!) 176.9 kg (390 lb)   11/24/23 (!) 182.8 kg (403 lb)   11/13/23 (!) 190.5 kg (420 lb)           Intake/Output last 3 shifts  I/O last 3 completed shifts:  In: 740 [P.O.:240; IV Piggyback:500]  Out: -   Intake/Output this shift:  No intake/output data recorded.    Review of Systems   Pertinent items are noted in HPI., otherwise negative.    Physical Exam  GEN: alert and oriented x3, mild distress  HEAD: atraumatic  ENT: moist membranes, no thrush, anicteric sclera   NECK: supple, no nuchal rigidity  CARDIOVASCULAR: regular rate and rhythm, no murmurs, rubs, or gallops  PULMONARY: lungs clear to ausculation bilaterally  ABDOMEN: wound dressed  SKIN: no rashes or lesions. No  stigma of endocarditis  PSYCH: grossly intact  MUSCULOSKELETAL: no synovitis    Pertinent Labs   Lab Results: personally reviewed.     Recent Labs   Lab 12/17/23  0728 12/16/23 2051   WBC 9.2 10.8   HGB 9.8* 10.9*   HCT 32.3* 34.7*    365        Recent Labs   Lab 12/17/23  0728 12/16/23 2051   * 137   CO2 29 30*   BUN 9.1 8.8     Culture Micro   Date Value Ref Range Status   02/14/2010   Final    No Salmonella, Shigella, Campylobacter or E coli 0157 isolated.     Creatinine   Date Value Ref Range Status   12/17/2023 0.72 0.67 - 1.17 mg/dL Final   04/14/2021 0.86 0.66 - 1.25 mg/dL Final     7-Day Micro Results       Collected Updated Procedure Result Status      12/17/2023 0102 12/18/2023 1043 Wound Aerobic Bacterial Culture Routine With Gram Stain [[id number]]   (Abnormal)   Wound from Abdomen    Preliminary result Component Value   Culture Culture in progress  [P]     1+ Enterobacter cloacae complex  [P]     4+ Staphylococcus aureus  [P]     4+ Corynebacterium striatum  [P]     Identification obtained by MALDI-TOF mass spectrometry research use only database. Test characteristics determined and verified by the Infect[facility] Diseases Diagnostic Laboratory.  Susceptibilities not routinely done, refer to antibiogram to view typical susceptibility profiles   Gram Stain Result 3+ Gram positive cocci  [P]     1+ Gram positive bacilli  [P]     1+ Gram negative bacilli  [P]     3+ WBC seen  [P]     Predominantly PMNs               12/17/2023 0102 12/18/2023 1032 Anaerobic Bacterial Culture Routine [[id number]]   Abscess from Abdomen    Preliminary result Component Value   Culture No anaerobic organisms isolated after 1 day  [P]                12/16/2023 2113 12/18/2023 1005 Blood Culture Line, venous [[id number]]   Blood from Line, venous    Preliminary result Component Value   Culture No growth after 1 day  [P]                12/16/2023 2051 12/18/2023 1005 Blood Culture Line, venous [[id number]]    Blood from Line, venous    Preliminary result Component Value   Culture No growth after 1 day  [P]                        Pertinent Radiology   Radiology Results:   CT Abdomen Pelvis w Contrast    Result Date: 12/16/2023  EXAM: CT ABDOMEN PELVIS W CONTRAST LOCATION: [facility] DATE: 12/16/2023 INDICATION: Ventral hernia repair with concern for possible underlying postsurgical abscess in the surgical wound COMPARISON: CT from 11/22/2023 TECHNIQUE: CT scan of the abdomen and pelvis was performed following injection of IV contrast. Multiplanar reformats were obtained. Dose reduction techniques were used. CONTRAST: isovue 370 90ml FINDINGS: LOWER CHEST: Normal. HEPATOBILIARY: Extensive hepatic steatosis. The liver measures 22 cm craniocaudal. The gallbladder is within normal limits. PANCREAS: Normal. SPLEEN: Normal. ADRENAL GLANDS: Normal. KIDNEYS/BLADDER: Normal. BOWEL: No bowel obstruction or free air. There is a loop of small bowel which is closely apposed to an area of the anterior abdominal wall which demonstrates extensive inflammatory change as seen on axial image 160. LYMPH NODES: Normal. VASCULATURE: Unremarkable. PELVIC ORGANS: Normal. MUSCULOSKELETAL: Midline ventral laparotomy incision. Ill-defined areas of fluid and gas within the deeper aspects of the incision line, with inflammatory stranding and thickening of the anterior abdominal wall in this region. Of note, fluid is markedly diminished compared to prior CT. No acute osseous findings.     IMPRESSION: 1.  Inflammation with edema and scattered locules of gas throughout the ventral laparotomy incision line continuing deep to the abdominal wall which is thickened in this region, possibly representing ongoing infection. The overall extent of abdominal wall fluid is markedly improved compared to what was present on the prior CT, however. No discrete, drainable abscess at this time.    Abd/pelvis CT,  IV  contrast only TRAUMA /  AAA    Result Date: 11/22/2023  EXAM: CT ABDOMEN PELVIS W CONTRAST LOCATION: [facility] DATE: 11/22/2023 INDICATION: Intractable nausea and vomiting since recent hernia repair. Leukocytosis.  COMPARISON: 11/13/2023 TECHNIQUE: CT scan of the abdomen and pelvis was performed following injection of IV contrast. Multiplanar reformats were obtained. Dose reduction techniques were used. CONTRAST: 100 mL Isovue 370 FINDINGS: LOWER CHEST: Normal. HEPATOBILIARY: Normal. PANCREAS: Normal. SPLEEN: Normal. ADRENAL GLANDS: Normal. KIDNEYS/BLADDER: Normal. BOWEL: Interval ventral abdominal hernia repair since prior study. Fluid distention of the proximal small bowel is seen with gradual tapering and tethering towards the anterior abdominal wall, where a gas/fluid collection is present measuring approximately 17 x 6 x 17.5 cm. LYMPH NODES: Normal. VASCULATURE: Unremarkable. PELVIC ORGANS: Normal. MUSCULOSKELETAL: Normal.     IMPRESSION: 1.  Large gas/fluid collection in the anterior abdominal wall suspicious for abscess. 2.  Fluid distended proximal small bowel with gradual tapering and tethering towards the anterior abdomen, indeterminate. Ileus is slightly favored in this immediate postoperative period over partial obstruction.    XR Chest 2 Views    Result Date: 11/16/2023  EXAM: XR CHEST 2 VIEWS LOCATION: [facility] DATE: 11/16/2023 INDICATION: Hypoxia. COMPARISON: Chest CTA 07/07/2018     IMPRESSION: Negative chest. No interval change.    CT Abdomen Pelvis w/o Contrast    Result Date: 11/13/2023  EXAM: CT ABDOMEN PELVIS W/O CONTRAST LOCATION: [facility] DATE: 11/13/2023 INDICATION: painful unreducible abd hernia with n v today COMPARISON: CT abdomen and pelvis 05/23/2023 TECHNIQUE: CT scan of the abdomen and pelvis was performed without IV contrast. Multiplanar reformats were obtained. Dose reduction techniques were used. CONTRAST: None. FINDINGS:  LOWER CHEST: Normal. HEPATOBILIARY: Hepatomegaly measuring 23 cm in length. Diffuse fatty infiltration of the liver. Moderate distention of the gallbladder. PANCREAS: Normal. SPLEEN: Normal. ADRENAL GLANDS: Normal. KIDNEYS/BLADDER: Normal. No renal calculi or hydronephrosis. No bladder stones. BOWEL: There are 2 adjacent ventral wall hernias again visualized, with the more superior midline fascial defect measuring 5 cm in width, with herniation of a few mildly distended small bowel loops, increased in caliber from the prior study. The more inferior midline ventral wall fascial defect at the level of the umbilicus measures 4.8 cm in width, also with a few mildly distended small bowel loops, also increased from previous. The small bowel loops within the peritoneal cavity are all of normal caliber. No inflammatory changes. Appendectomy. LYMPH NODES: Normal. VASCULATURE: Unremarkable. PELVIC ORGANS: Normal. MUSCULOSKELETAL: Hypertrophic changes lower thoracic spine.     IMPRESSION: 1.  Midline ventral wall hernias as seen previously, now both containing slightly distended small bowel loops. Currently no evidence for small bowel obstruction, however this could be intermittent. 2.  Hepatomegaly and diffuse fatty infiltration of the liver.

## 2023-12-18 NOTE — PLAN OF CARE
Problem: Adult Inpatient Plan of Care  Goal: Absence of Hospital-Acquired Illness or Injury  Intervention: Identify and Manage Fall Risk  Recent Flowsheet Documentation  Taken 12/17/2023 2000 by Kaylee Wyatt RN  Safety Promotion/Fall Prevention:   activity supervised   assistive device/personal items within reach   nonskid shoes/slippers when out of bed  Taken 12/17/2023 1600 by Kaylee Wyatt RN  Safety Promotion/Fall Prevention:   activity supervised   assistive device/personal items within reach   nonskid shoes/slippers when out of bed  Taken 12/17/2023 1200 by Kaylee Wyatt RN  Safety Promotion/Fall Prevention:   activity supervised   assistive device/personal items within reach   nonskid shoes/slippers when out of bed  Intervention: Prevent and Manage VTE (Venous Thromboembolism) Risk  Recent Flowsheet Documentation  Taken 12/17/2023 2000 by Kaylee Wyatt RN  VTE Prevention/Management: patient refused intervention  Taken 12/17/2023 1600 by Kaylee Wyatt RN  VTE Prevention/Management: patient refused intervention  Taken 12/17/2023 1200 by Kaylee Wyatt RN  VTE Prevention/Management: patient refused intervention   Goal Outcome Evaluation:

## 2023-12-18 NOTE — PROGRESS NOTES
[facility]    Medicine Progress Note - Hospitalist Service    Date of Admission:  12/16/2023    Assessment & Plan   38-year-old male with PMH of severe obesity,hypertension, DM-2 and recent admission for umbilical hernia (11/22/23-11/30/23), s/p exploratory laparotomy with repair of incisional ventral hernia with mesh who presented with concern regarding wound infection.     Post op wound infection  --Foul smelling discharge coming out of the surgical wound. It looks like the retention sutures- 1st, 2nd and 4th are cutting through the skin and subcutaneous tissue. Surrounding skin looks erythematous, definitely looking worse that when he was discharged on 11/30/23. Concern is if the mesh underneath is infected.   --CT showed inflammation with edema and scattered locules of gas throughout the ventral laparatomy incision line continuing deep to the abdominal wall  --Culture was positive for cutibacterium acne. He was treated with 5 days of IV zosyn here. Seen at surgery clinic on 12/11/23 and started on PO augmentin.   --Repeat wound culture - swab with polymicrobial gonzález- E. Cloacae, S. Aureus, C. striatum  --Gen surgery consult appreciated  -- IR consult appreciated: 12/18: no drainable abscess  -- ID is following- c/w vanco and switch zosyn to merrem on 12/18  -- wound care consulted on 12/18     Essential hypertension  --Resume home meds     DM-2  --A1c 6.5 as of 11/22/23  --accucheck and sliding scale insulin     Constipation  -- laxatives     Morbid obesity  --Body mass index is 54.39 kg/m .          Diet: Combination Diet Regular Diet Adult; Moderate Consistent Carb (60 g CHO per Meal) Diet    DVT Prophylaxis: Pneumatic Compression Devices  Duvall Catheter: Not present  Lines: None     Cardiac Monitoring: None  Code Status: Full Code      Clinically Significant Risk Factors Present on Admission              # Hypoalbuminemia: Lowest albumin = 3.4 g/dL at 12/17/2023  7:28 AM, will  "monitor as appropriate     # Hypertension: Noted on problem list     # DMII: A1C = 6.5 % (Ref range: <5.7 %) within past 6 months    # Severe Obesity: Estimated body mass index is 54.39 kg/m  as calculated from the following:    Height as of this encounter: 1.803 m (5' 11\").    Weight as of this encounter: 176.9 kg (390 lb).       # Financial/Environmental Concerns: other (see comments) (\"I would like to find out about transportation assistance. I was told I don't qualify by my insurance. I was told to fill out [employer] paperwork, but I haven't gotten around to it yet\".)         Disposition Plan     Expected Discharge Date: 12/18/2023      Destination: home with family;home with help/services              Britney Angulo MD  Hospitalist Service  [facility]  Securely message with [email or web address] (more info)  Text page via [employer] Paging/Directory   ______________________________________________________________________    Interval History   Patient is new to me today.  Patient is seen and examined at bedside.  Has purulent discharge. Denied fever, chills    Physical Exam   Vital Signs: Temp: 98.7  F (37.1  C) Temp src: Oral BP: (!) 141/70 Pulse: 87   Resp: 18 SpO2: 96 % O2 Device: None (Room air)    Weight: 390 lbs 0 oz    GEN: Alert and oriented. Not in acute distress.  HEENT: Atraumatic, mucous membrane- moist and pink.  Chest: Bilateral air entry.  CVS: S1S2 regular.   Abdomen: Soft. Purulent discharge from surgical wound. No organomegaly. No guarding or rigidity. Bowel sounds active.   Extremities: No pedal edema.  CNS: No involuntary movements.  Skin: no cyanosis or clubbing.     Medical Decision Making             Data     "

## 2023-12-18 NOTE — CONSULTS
[facility] IR consult.    Consult to consider drain placement into abdominal wall infection.    I reviewed CT 12/16/2023.  No drainable abscess.

## 2023-12-19 ENCOUNTER — HOME INFUSION (PRE-WILLOW HOME INFUSION) (OUTPATIENT)
Dept: PHARMACY | Facility: CLINIC | Age: 38
End: 2023-12-19

## 2023-12-19 LAB
ANION GAP SERPL CALCULATED.3IONS-SCNC: 9 MMOL/L (ref 7–15)
BASOPHILS # BLD AUTO: 0 10E3/UL (ref 0–0.2)
BASOPHILS NFR BLD AUTO: 0 %
BUN SERPL-MCNC: 8.6 MG/DL (ref 6–20)
CALCIUM SERPL-MCNC: 9.2 MG/DL (ref 8.6–10)
CHLORIDE SERPL-SCNC: 101 MMOL/L (ref 98–107)
CREAT SERPL-MCNC: 0.82 MG/DL (ref 0.67–1.17)
DEPRECATED HCO3 PLAS-SCNC: 28 MMOL/L (ref 22–29)
EGFRCR SERPLBLD CKD-EPI 2021: >90 ML/MIN/1.73M2
EOSINOPHIL # BLD AUTO: 0.7 10E3/UL (ref 0–0.7)
EOSINOPHIL NFR BLD AUTO: 8 %
ERYTHROCYTE [DISTWIDTH] IN BLOOD BY AUTOMATED COUNT: 14.6 % (ref 10–15)
GLUCOSE BLDC GLUCOMTR-MCNC: 106 MG/DL (ref 70–99)
GLUCOSE BLDC GLUCOMTR-MCNC: 111 MG/DL (ref 70–99)
GLUCOSE BLDC GLUCOMTR-MCNC: 114 MG/DL (ref 70–99)
GLUCOSE BLDC GLUCOMTR-MCNC: 117 MG/DL (ref 70–99)
GLUCOSE SERPL-MCNC: 108 MG/DL (ref 70–99)
HCT VFR BLD AUTO: 32.6 % (ref 40–53)
HGB BLD-MCNC: 9.9 G/DL (ref 13.3–17.7)
IMM GRANULOCYTES # BLD: 0 10E3/UL
IMM GRANULOCYTES NFR BLD: 0 %
IRON BINDING CAPACITY (ROCHE): 154 UG/DL (ref 240–430)
IRON SATN MFR SERPL: 13 % (ref 15–46)
IRON SERPL-MCNC: 20 UG/DL (ref 61–157)
LYMPHOCYTES # BLD AUTO: 2 10E3/UL (ref 0.8–5.3)
LYMPHOCYTES NFR BLD AUTO: 23 %
MCH RBC QN AUTO: 27.5 PG (ref 26.5–33)
MCHC RBC AUTO-ENTMCNC: 30.4 G/DL (ref 31.5–36.5)
MCV RBC AUTO: 91 FL (ref 78–100)
MONOCYTES # BLD AUTO: 0.6 10E3/UL (ref 0–1.3)
MONOCYTES NFR BLD AUTO: 7 %
NEUTROPHILS # BLD AUTO: 5.3 10E3/UL (ref 1.6–8.3)
NEUTROPHILS NFR BLD AUTO: 62 %
NRBC # BLD AUTO: 0 10E3/UL
NRBC BLD AUTO-RTO: 0 /100
PLATELET # BLD AUTO: 345 10E3/UL (ref 150–450)
POTASSIUM SERPL-SCNC: 3.9 MMOL/L (ref 3.4–5.3)
RBC # BLD AUTO: 3.6 10E6/UL (ref 4.4–5.9)
SODIUM SERPL-SCNC: 138 MMOL/L (ref 135–145)
VANCOMYCIN SERPL-MCNC: 15.6 UG/ML
WBC # BLD AUTO: 8.6 10E3/UL (ref 4–11)

## 2023-12-19 PROCEDURE — 272N000450 HC KIT 4FR POWER PICC SINGLE LUMEN

## 2023-12-19 PROCEDURE — 120N000001 HC R&B MED SURG/OB

## 2023-12-19 PROCEDURE — 99232 SBSQ HOSP IP/OBS MODERATE 35: CPT | Performed by: STUDENT IN AN ORGANIZED HEALTH CARE EDUCATION/TRAINING PROGRAM

## 2023-12-19 PROCEDURE — 83550 IRON BINDING TEST: CPT | Performed by: STUDENT IN AN ORGANIZED HEALTH CARE EDUCATION/TRAINING PROGRAM

## 2023-12-19 PROCEDURE — 250N000011 HC RX IP 250 OP 636: Performed by: INTERNAL MEDICINE

## 2023-12-19 PROCEDURE — G0378 HOSPITAL OBSERVATION PER HR: HCPCS

## 2023-12-19 PROCEDURE — 99231 SBSQ HOSP IP/OBS SF/LOW 25: CPT | Performed by: SURGERY

## 2023-12-19 PROCEDURE — 36415 COLL VENOUS BLD VENIPUNCTURE: CPT | Performed by: STUDENT IN AN ORGANIZED HEALTH CARE EDUCATION/TRAINING PROGRAM

## 2023-12-19 PROCEDURE — 258N000003 HC RX IP 258 OP 636

## 2023-12-19 PROCEDURE — 80048 BASIC METABOLIC PNL TOTAL CA: CPT | Performed by: STUDENT IN AN ORGANIZED HEALTH CARE EDUCATION/TRAINING PROGRAM

## 2023-12-19 PROCEDURE — 36569 INSJ PICC 5 YR+ W/O IMAGING: CPT

## 2023-12-19 PROCEDURE — 82728 ASSAY OF FERRITIN: CPT | Performed by: STUDENT IN AN ORGANIZED HEALTH CARE EDUCATION/TRAINING PROGRAM

## 2023-12-19 PROCEDURE — 82962 GLUCOSE BLOOD TEST: CPT

## 2023-12-19 PROCEDURE — 80202 ASSAY OF VANCOMYCIN: CPT | Performed by: STUDENT IN AN ORGANIZED HEALTH CARE EDUCATION/TRAINING PROGRAM

## 2023-12-19 PROCEDURE — 96376 TX/PRO/DX INJ SAME DRUG ADON: CPT

## 2023-12-19 PROCEDURE — 99232 SBSQ HOSP IP/OBS MODERATE 35: CPT

## 2023-12-19 PROCEDURE — 250N000011 HC RX IP 250 OP 636: Mod: JZ

## 2023-12-19 PROCEDURE — 250N000013 HC RX MED GY IP 250 OP 250 PS 637: Performed by: INTERNAL MEDICINE

## 2023-12-19 PROCEDURE — 258N000003 HC RX IP 258 OP 636: Performed by: INTERNAL MEDICINE

## 2023-12-19 PROCEDURE — 85025 COMPLETE CBC W/AUTO DIFF WBC: CPT | Performed by: STUDENT IN AN ORGANIZED HEALTH CARE EDUCATION/TRAINING PROGRAM

## 2023-12-19 PROCEDURE — 250N000009 HC RX 250

## 2023-12-19 RX ORDER — LIDOCAINE 40 MG/G
CREAM TOPICAL
Status: DISCONTINUED | OUTPATIENT
Start: 2023-12-19 | End: 2023-12-20 | Stop reason: HOSPADM

## 2023-12-19 RX ADMIN — MEROPENEM 2 G: 1 INJECTION, POWDER, FOR SOLUTION INTRAVENOUS at 03:52

## 2023-12-19 RX ADMIN — OXYCODONE HYDROCHLORIDE 5 MG: 5 TABLET ORAL at 16:45

## 2023-12-19 RX ADMIN — MEROPENEM 2 G: 1 INJECTION, POWDER, FOR SOLUTION INTRAVENOUS at 20:03

## 2023-12-19 RX ADMIN — OXYCODONE HYDROCHLORIDE 10 MG: 5 TABLET ORAL at 03:51

## 2023-12-19 RX ADMIN — DOCUSATE SODIUM 100 MG: 100 CAPSULE, LIQUID FILLED ORAL at 08:47

## 2023-12-19 RX ADMIN — LIDOCAINE HYDROCHLORIDE 2 ML: 10 INJECTION, SOLUTION EPIDURAL; INFILTRATION; INTRACAUDAL; PERINEURAL at 12:50

## 2023-12-19 RX ADMIN — LISINOPRIL 10 MG: 5 TABLET ORAL at 08:47

## 2023-12-19 RX ADMIN — VANCOMYCIN HYDROCHLORIDE 1500 MG: 5 INJECTION, POWDER, LYOPHILIZED, FOR SOLUTION INTRAVENOUS at 13:40

## 2023-12-19 RX ADMIN — MEROPENEM 2 G: 1 INJECTION, POWDER, FOR SOLUTION INTRAVENOUS at 13:56

## 2023-12-19 RX ADMIN — DOCUSATE SODIUM 100 MG: 100 CAPSULE, LIQUID FILLED ORAL at 20:02

## 2023-12-19 RX ADMIN — VANCOMYCIN HYDROCHLORIDE 1500 MG: 5 INJECTION, POWDER, LYOPHILIZED, FOR SOLUTION INTRAVENOUS at 22:54

## 2023-12-19 ASSESSMENT — ACTIVITIES OF DAILY LIVING (ADL)
ADLS_ACUITY_SCORE: 37

## 2023-12-19 NOTE — PROGRESS NOTES
Therapy: IV abx  Insurance: [facility]     100% coverage for IV abx     In reference to admission on 12/16/23 to check IV abx coverage    Please contact Intake with any questions, [phone] or In Basket pool, [facility] ([phone]).

## 2023-12-19 NOTE — PROGRESS NOTES
"General Surgery Progress Note:    Hospital Day # 0    ASSESSMENT:   1. Incisional ventral hernia    2. Cellulitis, unspecified cellulitis site        Ady Godinez is a 38 year old male senting with cellulitis and breakdown of the skin over the retention suture sites.  Patient also presenting with positive wound cultures for multi organisms.  Patient remains clinically stable with no leukocytosis.    PLAN:   -From the surgical standpoint, the patient can be discharged when he is medically stable.    - Patient will require antibiotics per ID.  If patient can be discharged with oral antibiotics, that would also help with his discharge process.  -Diet as tolerated  -Patient can continue with daily activity as tolerated  -Surgery team following with you until discharge.  -Local wound care to the surgical sites.    SUBJECTIVE:   Ady Godinez was seen on rounds.  Patient states that he has been passing gas having bowel movements.  No fevers no chills.  No chest pain or shortness of breath.  He is able to ambulate but he does have some abdominal pain during ambulation.  Patient tolerating diet orally.    Patient Vitals for the past 24 hrs:   BP Temp Temp src Pulse Resp SpO2 Height Weight   12/19/23 0740 137/74 98.9  F (37.2  C) Oral 82 20 95 % -- --   12/19/23 0340 134/84 98.6  F (37  C) Oral 87 20 97 % -- --   12/18/23 2352 131/69 99  F (37.2  C) Oral 98 20 93 % -- --   12/18/23 1920 (!) 141/69 97.9  F (36.6  C) Oral 105 20 93 % -- --   12/18/23 1507 117/57 97.7  F (36.5  C) Oral 106 18 95 % 1.803 m (5' 11\") (!) 175.6 kg (387 lb 3.2 oz)       Physical Exam:  General: NAD, pleasant  CV:RRR  LUNGS:Normal respiratory effort, no accessory muscle use  ABD: Soft, nondistended, appropriate tenderness to palpation around surgical sites.  Less drainage on dressing changes today.  No crepitus.   EXT:no CCE        Braydon Kim, DO      Braydon Kim, DO  General Surgeon  [facility],  " [address] [address]?  Office: [phone]  Employed by [facility]  Pager: [phone]

## 2023-12-19 NOTE — PROGRESS NOTES
Infectious Disease Progress Note    Assessment/Plan    Assessment:  Post operative infection: purulent discharge from retention sutures with surrounding erythema. Swab is polymicrobial. On zosyn. CT with inflammation, edema, and gas possible infection--no drainable abscess. No further surgery planned.   Comorbid conditions affecting immune system: DM2, morbid obesity     Cultures so far have S aureus, E cloacae and C striatum.  The C striatum will need to be treated with vancomycin. Still waiting on sensitivities of other organisms.       Recommendations:   - change zosyn to meropenem  - vancomycin for C striatum.  Susceptibilities requested and is a send out to [place].  Usually ONLY susceptible to vancomycin.   - follow cultures    Will place picc line, and could maybe look at discharge home as soon as 12/21.  Will need at least 2 weeks of iv antibiotics, possibly more.      Principal Problem:    Cellulitis, unspecified cellulitis site  Active Problems:    Dehiscence of operative wound, initial encounter      ALEXEY NAIK MD  [phone]      Subjective  Feels better.     Objective    Vital signs in last 24 hours  Temp:  [97.7  F (36.5  C)-99  F (37.2  C)] 98.9  F (37.2  C)  Pulse:  [] 82  Resp:  [18-20] 20  BP: (117-141)/(57-84) 137/74  SpO2:  [93 %-97 %] 95 %  Wt Readings from Last 3 Encounters:   12/18/23 (!) 175.6 kg (387 lb 3.2 oz)   11/24/23 (!) 182.8 kg (403 lb)   11/13/23 (!) 190.5 kg (420 lb)           Intake/Output last 3 shifts  I/O last 3 completed shifts:  In: 363 [I.V.:363]  Out: -   Intake/Output this shift:  I/O this shift:  In: 240 [P.O.:240]  Out: -     Review of Systems   Pertinent items are noted in HPI., otherwise negative.    Physical Exam  GEN: alert and oriented x3, mild distress  HEAD: atraumatic  ENT: moist membranes, no thrush, anicteric sclera   NECK: supple, no nuchal rigidity  CARDIOVASCULAR: regular rate and rhythm, no murmurs, rubs, or gallops  PULMONARY: lungs clear to  ausculation bilaterally  ABDOMEN: wound dressed  SKIN: no rashes or lesions. No stigma of endocarditis  PSYCH: grossly intact  MUSCULOSKELETAL: no synovitis    Pertinent Labs   Lab Results: personally reviewed.     Recent Labs   Lab 12/19/23  0726 12/17/23 0728 12/16/23 2051   WBC 8.6 9.2 10.8   HGB 9.9* 9.8* 10.9*   HCT 32.6* 32.3* 34.7*    342 365        Recent Labs   Lab 12/19/23  0726 12/17/23 0728 12/16/23 2051    134* 137   CO2 28 29 30*   BUN 8.6 9.1 8.8     Culture Micro   Date Value Ref Range Status   02/14/2010   Final    No Salmonella, Shigella, Campylobacter or E coli 0157 isolated.     Creatinine   Date Value Ref Range Status   12/19/2023 0.82 0.67 - 1.17 mg/dL Final   04/14/2021 0.86 0.66 - 1.25 mg/dL Final     7-Day Micro Results       Collected Updated Procedure Result Status      12/17/2023 0102 12/19/2023 1037 Wound Aerobic Bacterial Culture Routine With Gram Stain [[id number]]     (Abnormal)   Wound from Abdomen    Preliminary result Component Value   Culture 1+ Enterobacter cloacae complex  [P]     4+ Staphylococcus aureus  [P]     4+ Corynebacterium striatum  [P]     Identification obtained by MALDI-TOF mass spectrometry research use only database. Test characteristics determined and verified by the [facility].  Susceptibilities not routinely done, refer to antibiogram to view typical susceptibility profiles   Gram Stain Result 3+ Gram positive cocci  [P]     1+ Gram positive bacilli  [P]     1+ Gram negative bacilli  [P]     3+ WBC seen  [P]     Predominantly PMNs        Susceptibility        Enterobacter cloacae complex      DARYL      Amikacin <=2 ug/mL Susceptible  [*]       Ampicillin  Resistant  [1]       Ampicillin/ Sulbactam  Resistant  [1]       Cefazolin  Resistant  [*,1]       Cefepime <=1 ug/mL Susceptible      Cefoxitin  Resistant  [*,1]       Ceftazidime <=1 ug/mL Susceptible      Ceftriaxone <=1 ug/mL Susceptible      Ciprofloxacin  <=0.25 ug/mL Susceptible      Gentamicin <=1 ug/mL Susceptible      Levofloxacin <=0.12 ug/mL Susceptible      Meropenem <=0.25 ug/mL Susceptible      Nitrofurantoin 64 ug/mL Intermediate  [*]       Piperacillin/Tazobactam <=4 ug/mL Susceptible      Tobramycin <=1 ug/mL Susceptible      Trimethoprim/Sulfamethoxazole <=1/19 ug/mL Susceptible                   [*]  Suppressed Antibiotic     [1]  Intrinsically Resistant                   12/17/2023 0102 12/19/2023 1032 Anaerobic Bacterial Culture Routine [[id number]]   Abscess from Abdomen    Preliminary result Component Value   Culture No anaerobic organisms isolated after 2 days  [P]                12/16/2023 2113 12/19/2023 1005 Blood Culture Line, venous [[id number]]   Blood from Line, venous    Preliminary result Component Value   Culture No growth after 2 days  [P]                12/16/2023 2051 12/19/2023 1005 Blood Culture Line, venous [[id number]]   Blood from Line, venous    Preliminary result Component Value   Culture No growth after 2 days  [P]                        Pertinent Radiology   Radiology Results:   CT Abdomen Pelvis w Contrast    Result Date: 12/16/2023  EXAM: CT ABDOMEN PELVIS W CONTRAST LOCATION: [facility] DATE: 12/16/2023 INDICATION: Ventral hernia repair with concern for possible underlying postsurgical abscess in the surgical wound COMPARISON: CT from 11/22/2023 TECHNIQUE: CT scan of the abdomen and pelvis was performed following injection of IV contrast. Multiplanar reformats were obtained. Dose reduction techniques were used. CONTRAST: isovue 370 90ml FINDINGS: LOWER CHEST: Normal. HEPATOBILIARY: Extensive hepatic steatosis. The liver measures 22 cm craniocaudal. The gallbladder is within normal limits. PANCREAS: Normal. SPLEEN: Normal. ADRENAL GLANDS: Normal. KIDNEYS/BLADDER: Normal. BOWEL: No bowel obstruction or free air. There is a loop of small bowel which is closely apposed to an area of the anterior  abdominal wall which demonstrates extensive inflammatory change as seen on axial image 160. LYMPH NODES: Normal. VASCULATURE: Unremarkable. PELVIC ORGANS: Normal. MUSCULOSKELETAL: Midline ventral laparotomy incision. Ill-defined areas of fluid and gas within the deeper aspects of the incision line, with inflammatory stranding and thickening of the anterior abdominal wall in this region. Of note, fluid is markedly diminished compared to prior CT. No acute osseous findings.     IMPRESSION: 1.  Inflammation with edema and scattered locules of gas throughout the ventral laparotomy incision line continuing deep to the abdominal wall which is thickened in this region, possibly representing ongoing infection. The overall extent of abdominal wall fluid is markedly improved compared to what was present on the prior CT, however. No discrete, drainable abscess at this time.    Abd/pelvis CT,  IV  contrast only TRAUMA / AAA    Result Date: 11/22/2023  EXAM: CT ABDOMEN PELVIS W CONTRAST LOCATION: [facility] DATE: 11/22/2023 INDICATION: Intractable nausea and vomiting since recent hernia repair. Leukocytosis.  COMPARISON: 11/13/2023 TECHNIQUE: CT scan of the abdomen and pelvis was performed following injection of IV contrast. Multiplanar reformats were obtained. Dose reduction techniques were used. CONTRAST: 100 mL Isovue 370 FINDINGS: LOWER CHEST: Normal. HEPATOBILIARY: Normal. PANCREAS: Normal. SPLEEN: Normal. ADRENAL GLANDS: Normal. KIDNEYS/BLADDER: Normal. BOWEL: Interval ventral abdominal hernia repair since prior study. Fluid distention of the proximal small bowel is seen with gradual tapering and tethering towards the anterior abdominal wall, where a gas/fluid collection is present measuring approximately 17 x 6 x 17.5 cm. LYMPH NODES: Normal. VASCULATURE: Unremarkable. PELVIC ORGANS: Normal. MUSCULOSKELETAL: Normal.     IMPRESSION: 1.  Large gas/fluid collection in the anterior abdominal wall  suspicious for abscess. 2.  Fluid distended proximal small bowel with gradual tapering and tethering towards the anterior abdomen, indeterminate. Ileus is slightly favored in this immediate postoperative period over partial obstruction.    XR Chest 2 Views    Result Date: 11/16/2023  EXAM: XR CHEST 2 VIEWS LOCATION: [facility] DATE: 11/16/2023 INDICATION: Hypoxia. COMPARISON: Chest CTA 07/07/2018     IMPRESSION: Negative chest. No interval change.    CT Abdomen Pelvis w/o Contrast    Result Date: 11/13/2023  EXAM: CT ABDOMEN PELVIS W/O CONTRAST LOCATION: [facility] DATE: 11/13/2023 INDICATION: painful unreducible abd hernia with n v today COMPARISON: CT abdomen and pelvis 05/23/2023 TECHNIQUE: CT scan of the abdomen and pelvis was performed without IV contrast. Multiplanar reformats were obtained. Dose reduction techniques were used. CONTRAST: None. FINDINGS: LOWER CHEST: Normal. HEPATOBILIARY: Hepatomegaly measuring 23 cm in length. Diffuse fatty infiltration of the liver. Moderate distention of the gallbladder. PANCREAS: Normal. SPLEEN: Normal. ADRENAL GLANDS: Normal. KIDNEYS/BLADDER: Normal. No renal calculi or hydronephrosis. No bladder stones. BOWEL: There are 2 adjacent ventral wall hernias again visualized, with the more superior midline fascial defect measuring 5 cm in width, with herniation of a few mildly distended small bowel loops, increased in caliber from the prior study. The more inferior midline ventral wall fascial defect at the level of the umbilicus measures 4.8 cm in width, also with a few mildly distended small bowel loops, also increased from previous. The small bowel loops within the peritoneal cavity are all of normal caliber. No inflammatory changes. Appendectomy. LYMPH NODES: Normal. VASCULATURE: Unremarkable. PELVIC ORGANS: Normal. MUSCULOSKELETAL: Hypertrophic changes lower thoracic spine.     IMPRESSION: 1.  Midline ventral wall hernias  as seen previously, now both containing slightly distended small bowel loops. Currently no evidence for small bowel obstruction, however this could be intermittent. 2.  Hepatomegaly and diffuse fatty infiltration of the liver.

## 2023-12-19 NOTE — PROGRESS NOTES
Care Management Follow Up    Length of Stay (days): 0    Expected Discharge Date: 12/21/2023     Concerns to be Addressed: discharge planning     Patient plan of care discussed at interdisciplinary rounds: Yes    Anticipated Discharge Disposition:  Home with home care and home infusion for IV antibiotics     Anticipated Discharge Services:  PT, OT, RN, home infusion  Anticipated Discharge DME:      Patient/family educated on Medicare website which has current facility and service quality ratings:  yes  Education Provided on the Discharge Plan:  yes  Patient/Family in Agreement with the Plan:  yes    Referrals Placed by CM/FL[facility]:  [place] [facility]  Private pay costs discussed: Not applicable    Additional Information:  SW met  with pt in pt room, introduced self and role. Pt has a 12 year old and 7 year old and is hoping to get home soon. SW explained the referral to [place] home [facility] he stated understanding    SILVIA Jorgensen

## 2023-12-19 NOTE — PROCEDURES
"PICC Line Insertion Procedure Note  Pt. Name: Ady Godinez  MRN:        [id number]  Procedure: Insertion of a  single Lumen  4 fr  Bard SOLO (valved) Power PICC, Lot number [id number]    Indications: antibiotics    Contraindications : none    Procedure Details   Patient identified with 2 identifiers and \"Time Out\" conducted.  .     Central line insertion bundle followed: hand hygeine performed prior to procedure, site cleansed with cholraprep, hat, mask, sterile gloves,sterile gown worn, patient draped with maximum barrier head to toe drape, sterile field maintained.    The vein was assessed and found to be compressible and of adequate size. 2 ml 1% Lidocaine administered sq to the insertion site. A 4  Fr PICC was inserted into the BASILIC vein of the right arm with ultrasound guidance. 1 attempt(s) required to access vein.   Catheter threaded without difficulty. Good blood return noted.    Modified Seldinger Technique used for insertion.    The 8 sharps that are included in the PICC insertion kit were accounted for and disposed of in the sharps container prior to breakdown of the sterile field.    Catheter secured with Statlock, biopatch and Tegaderm dressing applied.    Findings:  Total catheter length  47 cm, with 0 cm exposed. Mid upper arm circumference is 38.5 cm. Catheter was flushed with 20 cc NS. Patient  tolerated procedure well.    Tip placement verified by [employer] . Tip placement in the SVC.    CLABSI prevention brochure left at bedside.    Patient's primary RN notified PICC is ready for use.    Comments:            Beth Mora, RN [facility]    "

## 2023-12-19 NOTE — UTILIZATION REVIEW
Admission Status; Secondary Review Determination   Under the authority of the Utilization Management Committee, the utilization review process indicated a secondary review on Ady Godinez. The review outcome is based on review of the medical records, discussions with staff, and applying clinical experience noted on the date of the review.   (x) Inpatient Status Appropriate - This patient's medical care is consistent with medical management for inpatient care and reasonable inpatient medical practice.     RATIONALE FOR DETERMINATION   Ady Godinez is a 38 yr old male with obesity, DM2, HTN and umbilical hernia s/p exploratory lap with repair of hernia with mesh who presented with concern for wound infection.   Foul smelling discharge out of wound and retention sutures coming through skin and subcutaneous tissue.  Concern for mesh infection.  Polymicrobial infection E Cloacae, S Aureus and C striatum.  IR assessed and no drainable abscess, ID with vanco and now merrem (was zosyn).  Will need at least 2 weeks IV abx or longer pending course.  Will require ongoing stay while sensitivities are performed as C striatum usually ONLY susceptible to vanco.  Medically complex.    At the time of admission with the information available to the attending physician more than 2 nights Hospital complex care was anticipated, based on patient risk of adverse outcome if treated as outpatient and complex care required. Inpatient admission is appropriate based on the Medicare guidelines.   The information on this document is developed by the utilization review team in order for the business office to ensure compliance. This only denotes the appropriateness of proper admission status and does not reflect the quality of care rendered.   The definitions of Inpatient Status and Observation Status used in making the determination above are those provided in the CMS Coverage Manual, Chapter 1 and Chapter 6, section 70.4.    Sincerely,   Loreta Marie MD  Utilization Review  Physician Advisor  [facility]

## 2023-12-19 NOTE — PHARMACY-VANCOMYCIN DOSING SERVICE
Pharmacy Vancomycin Note  Date of Service 2023  Patient's  1985   38 year old, male    Indication: Skin and Soft Tissue Infection  Day of Therapy: 3  Current vancomycin regimen:  1500 mg IV q12h  Current vancomycin monitoring method: AUC  Current vancomycin therapeutic monitoring goal: 400-600 mg*h/L    Insight[employer] Prediction of Current Vancomycin Regimen  Regimen: 1500 mg IV every 12 hours.  Start time: 10:57 on 2023  Exposure target: AUC24 (range)400-600 mg/L.hr   AUC24,ss: 472 mg/L.hr  Probability of AUC24 > 400: 85 %  Ctrough,ss: 10.4 mg/L  Probability of Ctrough,ss > 20: 0 %  Probability of nephrotoxicity (Lodise SANTO ): 6 %      Current estimated CrCl = Estimated Creatinine Clearance: 199.4 mL/min (based on SCr of 0.82 mg/dL).    Creatinine for last 3 days  2023:  8:51 PM Creatinine 0.71 mg/dL  2023:  7:28 AM Creatinine 0.72 mg/dL  2023:  7:26 AM Creatinine 0.82 mg/dL    Recent Vancomycin Levels (past 3 days)  2023:  7:26 AM Vancomycin 15.6 ug/mL    Vancomycin IV Administrations (past 72 hours)                     vancomycin (VANCOCIN) 1,500 mg in sodium chloride 0.9 % 250 mL intermittent infusion (mg) 1,500 mg New Bag 23 2257     1,500 mg New Bag  1144     1,500 mg New Bag  0037    vancomycin (VANCOCIN) 2,500 mg in sodium chloride 0.9 % 500 mL intermittent infusion (mg) 2,500 mg New Bag 23 1251                    Nephrotoxins and other renal medications (From now, onward)      Start     Dose/Rate Route Frequency Ordered Stop    23 2300  vancomycin (VANCOCIN) 1,500 mg in sodium chloride 0.9 % 250 mL intermittent infusion         1,500 mg  over 90 Minutes Intravenous EVERY 12 HOURS 23 1200      23 0800  lisinopril (ZESTRIL) tablet 10 mg        Note to Pharmacy: PTA Sig:Take 1 tablet (10 mg) by mouth daily      10 mg Oral DAILY 23 0518                 Contrast Orders - past 72 hours (72h ago, onward)      Start     Dose/Rate  Route Frequency Stop    12/16/23 2300  iopamidol (ISOVUE-370) solution 90 mL         90 mL Intravenous ONCE 12/16/23 2235            Interpretation of levels and current regimen:  Vancomycin level is reflective of -600    Has serum creatinine changed greater than 50% in last 72 hours: No      Renal Function: Stable    InsightRX Prediction of Planned New Vancomycin Regimen  Regimen: 1500 mg IV every 12 hours.  Start time: 10:57 on 12/19/2023  Exposure target: AUC24 (range)400-600 mg/L.hr   AUC24,ss: 472 mg/L.hr  Probability of AUC24 > 400: 85 %  Ctrough,ss: 10.4 mg/L  Probability of Ctrough,ss > 20: 0 %  Probability of nephrotoxicity (Lodise SANTO 2009): 6 %      Plan:  Continue Current Dose  Vancomycin monitoring method: AUC  Vancomycin therapeutic monitoring goal: 400-600 mg*h/L  Pharmacy will check vancomycin levels as appropriate in 3-5 Days.    Sweta Gaspar [facility]

## 2023-12-19 NOTE — PROGRESS NOTES
[facility]    Medicine Progress Note - Hospitalist Service    Date of Admission:  12/16/2023    Assessment & Plan   38-year-old male with PMH of severe obesity,hypertension, DM-2 and recent admission for umbilical hernia (11/22/23-11/30/23), s/p exploratory laparotomy with repair of incisional ventral hernia with mesh who presented with concern regarding wound infection.     Post op wound infection  --Foul smelling discharge coming out of the surgical wound. It looks like the retention sutures- 1st, 2nd and 4th are cutting through the skin and subcutaneous tissue. Surrounding skin looks erythematous, definitely looking worse that when he was discharged on 11/30/23. Concern is if the mesh underneath is infected.   --CT showed inflammation with edema and scattered locules of gas throughout the ventral laparatomy incision line continuing deep to the abdominal wall  --Culture was positive for cutibacterium acne. He was treated with 5 days of IV zosyn here. Seen at surgery clinic on 12/11/23 and started on PO augmentin.   --Repeat wound culture - swab with polymicrobial gonzález- E. Cloacae, S. Aureus, C. striatum  --Gen surgery consult appreciated  -- IR consult appreciated: 12/18: no drainable abscess  -- ID is following- c/w vanco and switch zosyn to merrem on 12/18  -- wound care consulted on 12/18  -- 12/19 C. Striatum susceptibility requested by ID. PICC line placement planned.     Essential hypertension  --Resumed home lisinopril     DM-2  --A1c 6.5 as of 11/22/23  --accucheck and sliding scale insulin     Constipation  -- laxatives     Normocytic Anemia, mild  -- Iron panel    Morbid obesity  --Body mass index is 54.39 kg/m .          Diet: Combination Diet Regular Diet Adult; Moderate Consistent Carb (60 g CHO per Meal) Diet    DVT Prophylaxis: Pneumatic Compression Devices  Duvall Catheter: Not present  Lines: None     Cardiac Monitoring: None  Code Status: Full Code      Clinically  "Significant Risk Factors Present on Admission              # Hypoalbuminemia: Lowest albumin = 3.4 g/dL at 12/17/2023  7:28 AM, will monitor as appropriate     # Hypertension: Noted on problem list     # DMII: A1C = 6.5 % (Ref range: <5.7 %) within past 6 months    # Severe Obesity: Estimated body mass index is 54 kg/m  as calculated from the following:    Height as of this encounter: 1.803 m (5' 11\").    Weight as of this encounter: 175.6 kg (387 lb 3.2 oz).         # Financial/Environmental Concerns: other (see comments) (\"I would like to find out about transportation assistance. I was told I don't qualify by my insurance. I was told to fill out [employer] paperwork, but I haven't gotten around to it yet\".)         Disposition Plan     Expected Discharge Date: 12/19/2023      Destination: home with family;home with help/services              Britney Angulo MD  Hospitalist Service  [facility]  Securely message with [employer] (more info)  Text page via [employer] Paging/Directory   ______________________________________________________________________    Interval History   Patient is seen and examined at bedside.  Has purulent discharge. Denied fever, chills    Physical Exam   Vital Signs: Temp: 98.9  F (37.2  C) Temp src: Oral BP: 137/74 Pulse: 82   Resp: 20 SpO2: 95 % O2 Device: None (Room air)    Weight: 387 lbs 3.2 oz    GEN: Alert and oriented. Not in acute distress.  HEENT: Atraumatic, mucous membrane- moist and pink.  Chest: Bilateral air entry.  CVS: S1S2 regular.   Abdomen: Soft. Purulent discharge from surgical wound. Dressing in place. No organomegaly. No guarding or rigidity. Bowel sounds active.   Extremities: No pedal edema.  CNS: No involuntary movements.  Skin: no cyanosis or clubbing.     Medical Decision Making             Data     "

## 2023-12-19 NOTE — PROGRESS NOTES
[facility]    Referral received from [facility], for IV antibiotics.    Benefits verified. Pt has 100% coverage for IV antibiotics under his  PMAP plan.    Writer will speak with pt to review benefits, home infusion and to offer choice of agency/home infusion provider. (Pt is already open to [facility], so they will resume home care services at discharge).    Thank you for the referral.    Celina Madera RN, BSN  [facility] Liaison  [phone] (Mon through Fri, 8:00 am-5:00 pm)  [phone] (Office)

## 2023-12-19 NOTE — PLAN OF CARE
PRIMARY DIAGNOSIS: ACUTE PAIN  OUTPATIENT/OBSERVATION GOALS TO BE MET BEFORE DISCHARGE:  1. Pain Status: Pain free.    2. Return to near baseline physical activity: No    3. Cleared for discharge by consultants (if involved): No    Discharge Planner Nurse   Safe discharge environment identified: No  Barriers to discharge: Yes       Entered by: Dayanara Mcclure RN 12/19/2023 3:54 AM     Please review provider order for any additional goals.   Nurse to notify provider when observation goals have been met and patient is ready for discharge.Goal Outcome Evaluation:

## 2023-12-19 NOTE — PLAN OF CARE
"  Problem: Adult Inpatient Plan of Care  Goal: Plan of Care Review  Description: The Plan of Care Review/Shift note should be completed every shift.  The Outcome Evaluation is a brief statement about your assessment that the patient is improving, declining, or no change.  This information will be displayed automatically on your shift  note.  12/18/2023 2323 by Patti Billingsley RN  Outcome: Progressing  12/18/2023 2316 by Patti Billingsley RN  Outcome: Progressing  Goal: Patient-Specific Goal (Individualized)  Description: You can add care plan individualizations to a care plan. Examples of Individualization might be:  \"Parent requests to be called daily at 9am for status\", \"I have a hard time hearing out of my right ear\", or \"Do not touch me to wake me up as it startles  me\".  Outcome: Progressing  Goal: Absence of Hospital-Acquired Illness or Injury  Outcome: Progressing  Intervention: Identify and Manage Fall Risk  Recent Flowsheet Documentation  Taken 12/18/2023 1600 by Patti Billingsley RN  Safety Promotion/Fall Prevention: activity supervised  Intervention: Prevent Skin Injury  Recent Flowsheet Documentation  Taken 12/18/2023 1600 by Patti Billingsley RN  Skin Protection: adhesive use limited  Device Skin Pressure Protection: absorbent pad utilized/changed  Goal: Optimal Comfort and Wellbeing  Outcome: Progressing  Intervention: Monitor Pain and Promote Comfort  Recent Flowsheet Documentation  Taken 12/18/2023 1640 by Patti Billingsley RN  Pain Management Interventions: medication (see MAR)  Goal: Readiness for Transition of Care  Outcome: Progressing     Problem: Comorbidity Management  Goal: Blood Glucose Levels Within Targeted Range  12/18/2023 2323 by Patti Billingsley RN  Outcome: Progressing  12/18/2023 2316 by Patti Billingsley RN  Outcome: Progressing     Problem: Infection  Goal: Absence of Infection Signs and Symptoms  12/18/2023 2323 by Patti Billingsley RN  Outcome: " Progressing  12/18/2023 2316 by Patti Billingsley, RN  Outcome: Progressing   Goal Outcome Evaluation:  PRIMARY DIAGNOSIS: ACUTE PAIN  OUTPATIENT/OBSERVATION GOALS TO BE MET BEFORE DISCHARGE:  1. Pain Status: Improved but still requiring IV narcotics.    2. Return to near baseline physical activity: No    3. Cleared for discharge by consultants (if involved): No    Discharge Planner Nurse   Safe discharge environment identified: No  Barriers to discharge: Yes       Entered by: Patti Billingsley RN 12/18/2023 11:24 PM     Please review provider order for any additional goals.   Nurse to notify provider when observation goals have been met and patient is ready for discharge.

## 2023-12-19 NOTE — PLAN OF CARE
Goal Outcome Evaluation:      Problem: Adult Inpatient Plan of Care  Goal: Plan of Care Review  Description: The Plan of Care Review/Shift note should be completed every shift.  The Outcome Evaluation is a brief statement about your assessment that the patient is improving, declining, or no change.  This information will be displayed automatically on your shift  note.  Outcome: Progressing     Problem: Comorbidity Management  Goal: Blood Glucose Levels Within Targeted Range  Outcome: Progressing     Problem: Infection  Goal: Absence of Infection Signs and Symptoms  Outcome: Progressing  PRIMARY DIAGNOSIS: SOFT TISSUE INFECTIONS  OUTPATIENT/OBSERVATION GOALS TO BE MET BEFORE DISCHARGE:  Vitals sign stable or return to baseline: Yes    Tolerating oral antibiotics or has home infusion set up if applicable:  IV at this time.    Pain status: Improved but still requiring IV narcotics.    Return to near baseline physical activity: No    Discharge Planner Nurse   Safe discharge environment identified: No  Barriers to discharge: Yes       Entered by: Patti Billingsley RN 12/18/2023 11:19 PM     Please review provider order for any additional goals.     Nurse to notify provider when observation goals have been met and patient is ready for discharge.    Patient was having break through pain with max pain med dosing. MD notified and gave sliding scale dosage. Patient agreed with plan. Independent to bathroom with walker. Extender placed on bed. BS have been within normal range so no insulin given. Lotion applied to dry, flaky feet/ankles bilat.

## 2023-12-19 NOTE — PLAN OF CARE
"PRIMARY DIAGNOSIS: \"GENERIC\" NURSING  OUTPATIENT/OBSERVATION GOALS TO BE MET BEFORE DISCHARGE:  ADLs back to baseline: No    Activity and level of assistance: Up with standby assistance and use walker.    Pain status: Improved-controlled with oral pain medications.    Return to near baseline physical activity: No     Discharge Planner Nurse   Safe discharge environment identified: No  Barriers to discharge: Yes       Entered by: Dayanara Mcclure RN 12/19/2023 7:35 AM     Please review provider order for any additional goals.   Nurse to notify provider when observation goals have been met and patient is ready for discharge.Goal Outcome Evaluation:                        "

## 2023-12-20 VITALS
BODY MASS INDEX: 44.1 KG/M2 | OXYGEN SATURATION: 99 % | TEMPERATURE: 97.5 F | DIASTOLIC BLOOD PRESSURE: 77 MMHG | WEIGHT: 315 LBS | HEIGHT: 71 IN | HEART RATE: 85 BPM | SYSTOLIC BLOOD PRESSURE: 143 MMHG | RESPIRATION RATE: 20 BRPM

## 2023-12-20 LAB
ANION GAP SERPL CALCULATED.3IONS-SCNC: 9 MMOL/L (ref 7–15)
BACTERIA ABSC ANAEROBE+AEROBE CULT: NORMAL
BACTERIA WND CULT: ABNORMAL
BASOPHILS # BLD AUTO: 0 10E3/UL (ref 0–0.2)
BASOPHILS NFR BLD AUTO: 0 %
BUN SERPL-MCNC: 9.3 MG/DL (ref 6–20)
CALCIUM SERPL-MCNC: 9.4 MG/DL (ref 8.6–10)
CHLORIDE SERPL-SCNC: 103 MMOL/L (ref 98–107)
CREAT SERPL-MCNC: 0.74 MG/DL (ref 0.67–1.17)
DEPRECATED HCO3 PLAS-SCNC: 30 MMOL/L (ref 22–29)
EGFRCR SERPLBLD CKD-EPI 2021: >90 ML/MIN/1.73M2
EOSINOPHIL # BLD AUTO: 0.6 10E3/UL (ref 0–0.7)
EOSINOPHIL NFR BLD AUTO: 6 %
ERYTHROCYTE [DISTWIDTH] IN BLOOD BY AUTOMATED COUNT: 14.6 % (ref 10–15)
FERRITIN SERPL-MCNC: 215 NG/ML (ref 31–409)
GLUCOSE BLDC GLUCOMTR-MCNC: 105 MG/DL (ref 70–99)
GLUCOSE BLDC GLUCOMTR-MCNC: 110 MG/DL (ref 70–99)
GLUCOSE SERPL-MCNC: 111 MG/DL (ref 70–99)
GRAM STAIN RESULT: ABNORMAL
HCT VFR BLD AUTO: 32.2 % (ref 40–53)
HGB BLD-MCNC: 9.9 G/DL (ref 13.3–17.7)
IMM GRANULOCYTES # BLD: 0 10E3/UL
IMM GRANULOCYTES NFR BLD: 1 %
LYMPHOCYTES # BLD AUTO: 1.8 10E3/UL (ref 0.8–5.3)
LYMPHOCYTES NFR BLD AUTO: 20 %
MCH RBC QN AUTO: 27.8 PG (ref 26.5–33)
MCHC RBC AUTO-ENTMCNC: 30.7 G/DL (ref 31.5–36.5)
MCV RBC AUTO: 90 FL (ref 78–100)
MONOCYTES # BLD AUTO: 0.5 10E3/UL (ref 0–1.3)
MONOCYTES NFR BLD AUTO: 6 %
NEUTROPHILS # BLD AUTO: 5.8 10E3/UL (ref 1.6–8.3)
NEUTROPHILS NFR BLD AUTO: 67 %
NRBC # BLD AUTO: 0 10E3/UL
NRBC BLD AUTO-RTO: 0 /100
PLATELET # BLD AUTO: 360 10E3/UL (ref 150–450)
POTASSIUM SERPL-SCNC: 4.2 MMOL/L (ref 3.4–5.3)
RBC # BLD AUTO: 3.56 10E6/UL (ref 4.4–5.9)
SODIUM SERPL-SCNC: 142 MMOL/L (ref 135–145)
WBC # BLD AUTO: 8.8 10E3/UL (ref 4–11)

## 2023-12-20 PROCEDURE — 250N000013 HC RX MED GY IP 250 OP 250 PS 637: Performed by: INTERNAL MEDICINE

## 2023-12-20 PROCEDURE — 258N000003 HC RX IP 258 OP 636

## 2023-12-20 PROCEDURE — 250N000011 HC RX IP 250 OP 636: Mod: JZ

## 2023-12-20 PROCEDURE — 80048 BASIC METABOLIC PNL TOTAL CA: CPT | Performed by: STUDENT IN AN ORGANIZED HEALTH CARE EDUCATION/TRAINING PROGRAM

## 2023-12-20 PROCEDURE — 99232 SBSQ HOSP IP/OBS MODERATE 35: CPT

## 2023-12-20 PROCEDURE — 99231 SBSQ HOSP IP/OBS SF/LOW 25: CPT | Performed by: NURSE PRACTITIONER

## 2023-12-20 PROCEDURE — 99239 HOSP IP/OBS DSCHRG MGMT >30: CPT | Performed by: STUDENT IN AN ORGANIZED HEALTH CARE EDUCATION/TRAINING PROGRAM

## 2023-12-20 PROCEDURE — 250N000013 HC RX MED GY IP 250 OP 250 PS 637

## 2023-12-20 PROCEDURE — 85025 COMPLETE CBC W/AUTO DIFF WBC: CPT | Performed by: STUDENT IN AN ORGANIZED HEALTH CARE EDUCATION/TRAINING PROGRAM

## 2023-12-20 RX ORDER — LEVOFLOXACIN 500 MG/1
500 TABLET, FILM COATED ORAL DAILY
Qty: 9 TABLET | Refills: 0 | Status: SHIPPED | OUTPATIENT
Start: 2023-12-21 | End: 2023-12-30

## 2023-12-20 RX ORDER — DOXYCYCLINE 100 MG/1
100 CAPSULE ORAL EVERY 12 HOURS SCHEDULED
Qty: 10 CAPSULE | Refills: 0 | Status: DISCONTINUED | OUTPATIENT
Start: 2023-12-20 | End: 2023-12-20 | Stop reason: HOSPADM

## 2023-12-20 RX ORDER — DOXYCYCLINE 100 MG/1
100 CAPSULE ORAL EVERY 12 HOURS
Qty: 19 CAPSULE | Refills: 0 | Status: SHIPPED | OUTPATIENT
Start: 2023-12-20 | End: 2023-12-30

## 2023-12-20 RX ORDER — METRONIDAZOLE 500 MG/1
500 TABLET ORAL 3 TIMES DAILY
Qty: 29 TABLET | Refills: 0 | Status: SHIPPED | OUTPATIENT
Start: 2023-12-20 | End: 2023-12-30

## 2023-12-20 RX ORDER — LEVOFLOXACIN 500 MG/1
500 TABLET, FILM COATED ORAL DAILY
Qty: 10 TABLET | Refills: 0 | Status: DISCONTINUED | OUTPATIENT
Start: 2023-12-20 | End: 2023-12-20 | Stop reason: HOSPADM

## 2023-12-20 RX ORDER — METRONIDAZOLE 500 MG/1
500 TABLET ORAL 3 TIMES DAILY
Qty: 30 TABLET | Refills: 0 | Status: DISCONTINUED | OUTPATIENT
Start: 2023-12-20 | End: 2023-12-20 | Stop reason: HOSPADM

## 2023-12-20 RX ADMIN — DOXYCYCLINE 100 MG: 100 CAPSULE ORAL at 12:17

## 2023-12-20 RX ADMIN — DOCUSATE SODIUM 100 MG: 100 CAPSULE, LIQUID FILLED ORAL at 08:32

## 2023-12-20 RX ADMIN — OXYCODONE HYDROCHLORIDE 5 MG: 5 TABLET ORAL at 04:03

## 2023-12-20 RX ADMIN — OXYCODONE HYDROCHLORIDE 10 MG: 5 TABLET ORAL at 08:32

## 2023-12-20 RX ADMIN — LEVOFLOXACIN 500 MG: 500 TABLET, FILM COATED ORAL at 12:17

## 2023-12-20 RX ADMIN — METRONIDAZOLE 500 MG: 500 TABLET ORAL at 12:17

## 2023-12-20 RX ADMIN — MEROPENEM 2 G: 1 INJECTION, POWDER, FOR SOLUTION INTRAVENOUS at 03:55

## 2023-12-20 RX ADMIN — LISINOPRIL 10 MG: 5 TABLET ORAL at 08:32

## 2023-12-20 ASSESSMENT — ACTIVITIES OF DAILY LIVING (ADL)
ADLS_ACUITY_SCORE: 37
ADLS_ACUITY_SCORE: 20
ADLS_ACUITY_SCORE: 37
ADLS_ACUITY_SCORE: 28
ADLS_ACUITY_SCORE: 37

## 2023-12-20 NOTE — PROGRESS NOTES
"PRIMARY DIAGNOSIS: \"GENERIC\" NURSING  OUTPATIENT/OBSERVATION GOALS TO BE MET BEFORE DISCHARGE:  ADLs back to baseline: Yes    Activity and level of assistance: Ambulating independently.    Pain status: Improved-controlled with oral pain medications.    Return to near baseline physical activity: Yes     Discharge Planner Nurse   Safe discharge environment identified: Yes  Barriers to discharge: Yesneed IV access for at home ABX       Entered by: Carlin Rocha RN 12/19/2023 8:00 PM     Please review provider order for any additional goals.   Nurse to notify provider when observation goals have been met and patient is ready for discharge.  "

## 2023-12-20 NOTE — PROGRESS NOTES
Care Management Follow Up    Length of Stay (days): 1    Expected Discharge Date: 12/22/2023     Concerns to be Addressed: discharge planning     Patient plan of care discussed at interdisciplinary rounds: Yes    Anticipated Discharge Disposition:  Home with home care     Anticipated Discharge Services:  RN, PT,  OT, HHA  Anticipated Discharge DME:      Patient/family educated on Medicare website which has current facility and service quality ratings:  yes  Education Provided on the Discharge Plan:  yes  Patient/Family in Agreement with the Plan:  yes    Referrals Placed by CM/SW:    Private pay costs discussed: Not applicable    Additional Information:  Per hospitatist, pt cultures not back yet, plan for pt to change to oral antibiotics with anticipation of not needing IV antibiotics at discharge. Pt plans to return home with wife to transport. Pt aware and agreeable to home care.     SILVIA Jorgensen

## 2023-12-20 NOTE — PLAN OF CARE
Goal Outcome Evaluation:    Alert and oriented. Calls appropriately.   Pain controlled with oxycodone prn.  Dressing changed per surgery team today.  Changed to oral antibiotics.  Did gag a little and thought he was going to throw up the pills. He was able to hold them down. Tolerating diet.

## 2023-12-20 NOTE — PROGRESS NOTES
"PRIMARY DIAGNOSIS: \"GENERIC\" NURSING  OUTPATIENT/OBSERVATION GOALS TO BE MET BEFORE DISCHARGE:  ADLs back to baseline: Yes    Activity and level of assistance: Ambulating independently.    Pain status: Improved-controlled with oral pain medications.    Return to near baseline physical activity: Yes     Discharge Planner Nurse   Safe discharge environment identified: Yes  Barriers to discharge: Yesneed IV access for at home treatment       Entered by: Carlin Rocha RN 12/19/2023 7:59 PM     Please review provider order for any additional goals.   Nurse to notify provider when observation goals have been met and patient is ready for discharge.  "

## 2023-12-20 NOTE — PROGRESS NOTES
Infectious Disease Progress Note    Assessment/Plan    Assessment:  Post operative infection: purulent discharge from retention sutures with surrounding erythema. Swab is polymicrobial. On zosyn. CT with inflammation, edema, and gas possible infection--no drainable abscess. No further surgery planned.   Comorbid conditions affecting immune system: DM2, morbid obesity     Cultures so far have S aureus, E cloacae and C striatum.     Unusually, the C striatum is sensitive to several po antibiotics--so will NOT need IV antibiotics at discharge!     Recommendations:   Can change antibiotics to po levofloxacin, metronidazole and doxycycline, all for 10 days.    Can discontinue picc at discharge.      Principal Problem:    Cellulitis, unspecified cellulitis site  Active Problems:    Dehiscence of operative wound, initial encounter      ALEXEY NAIK MD  [phone]      Subjective  Feels better.     Objective    Vital signs in last 24 hours  Temp:  [97.5  F (36.4  C)-98.8  F (37.1  C)] 97.5  F (36.4  C)  Pulse:  [72-92] 72  Resp:  [18-20] 20  BP: (132-149)/(69-78) 149/77  SpO2:  [93 %-99 %] 93 %  Wt Readings from Last 3 Encounters:   12/18/23 (!) 175.6 kg (387 lb 3.2 oz)   11/24/23 (!) 182.8 kg (403 lb)   11/13/23 (!) 190.5 kg (420 lb)       Intake/Output last 3 shifts  I/O last 3 completed shifts:  In: 710 [P.O.:710]  Out: -   Intake/Output this shift:  I/O this shift:  In: 240 [P.O.:240]  Out: -     Review of Systems   Pertinent items are noted in HPI., otherwise negative.    Physical Exam  GEN: alert and oriented x3, mild distress  HEAD: atraumatic  ENT: moist membranes, no thrush, anicteric sclera   NECK: supple, no nuchal rigidity  CARDIOVASCULAR: regular rate and rhythm, no murmurs, rubs, or gallops  PULMONARY: lungs clear to ausculation bilaterally  ABDOMEN: wound dressed  SKIN: no rashes or lesions. No stigma of endocarditis  PSYCH: grossly intact  MUSCULOSKELETAL: no synovitis    Pertinent Labs   Lab  Results: personally reviewed.     Recent Labs   Lab 12/20/23  0607 12/19/23  0726 12/17/23  0728   WBC 8.8 8.6 9.2   HGB 9.9* 9.9* 9.8*   HCT 32.2* 32.6* 32.3*    345 342        Recent Labs   Lab 12/20/23  0607 12/19/23  0726 12/17/23  0728    138 134*   CO2 30* 28 29   BUN 9.3 8.6 9.1     Culture Micro   Date Value Ref Range Status   02/14/2010   Final    No Salmonella, Shigella, Campylobacter or E coli 0157 isolated.     Creatinine   Date Value Ref Range Status   12/20/2023 0.74 0.67 - 1.17 mg/dL Final   04/14/2021 0.86 0.66 - 1.25 mg/dL Final     7-Day Micro Results       Collected Updated Procedure Result Status      12/17/2023 0102 12/20/2023 1040 Wound Aerobic Bacterial Culture Routine With Gram Stain [[id number]]     (Abnormal)   Wound from Abdomen    Final result Component Value   Culture 1+ Enterobacter cloacae complex    4+ Staphylococcus aureus    4+ Corynebacterium striatum    Identification obtained by MALDI-TOF mass spectrometry research use only database. Test characteristics determined and verified by the [facility].  Susceptibilities not routinely done, refer to antibiogram to view typical susceptibility profiles   Gram Stain Result 3+ Gram positive cocci    1+ Gram positive bacilli    1+ Gram negative bacilli    3+ WBC seen    Predominantly PMNs        Susceptibility        Enterobacter cloacae complex      DARYL      Amikacin <=2 ug/mL Susceptible  [*]       Ampicillin  Resistant  [1]       Ampicillin/ Sulbactam  Resistant  [1]       Cefazolin  Resistant  [*,1]       Cefepime <=1 ug/mL Susceptible      Cefoxitin  Resistant  [*,1]       Ceftazidime <=1 ug/mL Susceptible      Ceftriaxone <=1 ug/mL Susceptible      Ciprofloxacin <=0.25 ug/mL Susceptible      Gentamicin <=1 ug/mL Susceptible      Levofloxacin <=0.12 ug/mL Susceptible      Meropenem <=0.25 ug/mL Susceptible      Nitrofurantoin 64 ug/mL Intermediate  [*]       Piperacillin/Tazobactam <=4 ug/mL  Susceptible      Tobramycin <=1 ug/mL Susceptible      Trimethoprim/Sulfamethoxazole <=1/19 ug/mL Susceptible                   [*]  Suppressed Antibiotic     [1]  Intrinsically Resistant              Susceptibility        Staphylococcus aureus      DARYL      Ciprofloxacin <=0.5 ug/mL Susceptible  [*]       Clindamycin 0.25 ug/mL Susceptible      Daptomycin 1 ug/mL Susceptible      Doxycycline <=0.5 ug/mL Susceptible      Erythromycin <=0.25 ug/mL Susceptible      Gentamicin <=0.5 ug/mL Susceptible      Inducible macrolide resistance test Negative ug/mL Negative  [*]       Levofloxacin 0.25 ug/mL Susceptible  [*]       Linezolid 2 ug/mL Susceptible  [*]       Moxifloxacin <=0.25 ug/mL Susceptible  [*]       Nitrofurantoin <=16 ug/mL Susceptible  [*]       Oxacillin 1 ug/mL Susceptible  [1]       PBP2A Negative ug/mL  [*]        Rifampin <=0.5 ug/mL Susceptible  [*]       Tetracycline <=1 ug/mL Susceptible      Tigecycline <=0.12 ug/mL Susceptible  [*]       Trimethoprim/Sulfamethoxazole <=0.5/9.5 ug/mL Susceptible      Vancomycin 1 ug/mL Susceptible                   [*]  Suppressed Antibiotic     [1]  Oxacillin susceptible isolates are susceptible to cephalosporins (example: cefazolin and cephalexin) and beta lactam combination agents. Oxacillin resistant isolates are resistant to these agents.              Susceptibility        Corynebacterium striatum      DARYL      Ceftriaxone 2 ug/mL Intermediate      Clindamycin >256 ug/mL Resistant      Doxycycline 0.125 ug/mL Susceptible      Penicillin 0.25 ug/mL Intermediate      Trimethoprim/Sulfamethoxazole 0.190 ug/mL Susceptible      Vancomycin 1.0 ug/mL Susceptible                           12/17/2023 0102 12/20/2023 1026 Anaerobic Bacterial Culture Routine [[id number]]   Abscess from Abdomen    Final result Component Value   Culture 4+ Mixed Aerobic and Anaerobic gonzález               12/16/2023 2113 12/20/2023 1005 Blood Culture Line, venous [[id number]]   Blood  from Line, venous    Preliminary result Component Value   Culture No growth after 3 days  [P]                12/16/2023 2051 12/20/2023 1005 Blood Culture Line, venous [[id number]]   Blood from Line, venous    Preliminary result Component Value   Culture No growth after 3 days  [P]                        Pertinent Radiology   Radiology Results:   CT Abdomen Pelvis w Contrast    Result Date: 12/16/2023  EXAM: CT ABDOMEN PELVIS W CONTRAST LOCATION: [facility] DATE: 12/16/2023 INDICATION: Ventral hernia repair with concern for possible underlying postsurgical abscess in the surgical wound COMPARISON: CT from 11/22/2023 TECHNIQUE: CT scan of the abdomen and pelvis was performed following injection of IV contrast. Multiplanar reformats were obtained. Dose reduction techniques were used. CONTRAST: isovue 370 90ml FINDINGS: LOWER CHEST: Normal. HEPATOBILIARY: Extensive hepatic steatosis. The liver measures 22 cm craniocaudal. The gallbladder is within normal limits. PANCREAS: Normal. SPLEEN: Normal. ADRENAL GLANDS: Normal. KIDNEYS/BLADDER: Normal. BOWEL: No bowel obstruction or free air. There is a loop of small bowel which is closely apposed to an area of the anterior abdominal wall which demonstrates extensive inflammatory change as seen on axial image 160. LYMPH NODES: Normal. VASCULATURE: Unremarkable. PELVIC ORGANS: Normal. MUSCULOSKELETAL: Midline ventral laparotomy incision. Ill-defined areas of fluid and gas within the deeper aspects of the incision line, with inflammatory stranding and thickening of the anterior abdominal wall in this region. Of note, fluid is markedly diminished compared to prior CT. No acute osseous findings.     IMPRESSION: 1.  Inflammation with edema and scattered locules of gas throughout the ventral laparotomy incision line continuing deep to the abdominal wall which is thickened in this region, possibly representing ongoing infection. The overall extent of  abdominal wall fluid is markedly improved compared to what was present on the prior CT, however. No discrete, drainable abscess at this time.    Abd/pelvis CT,  IV  contrast only TRAUMA / AAA    Result Date: 11/22/2023  EXAM: CT ABDOMEN PELVIS W CONTRAST LOCATION: [facility] DATE: 11/22/2023 INDICATION: Intractable nausea and vomiting since recent hernia repair. Leukocytosis.  COMPARISON: 11/13/2023 TECHNIQUE: CT scan of the abdomen and pelvis was performed following injection of IV contrast. Multiplanar reformats were obtained. Dose reduction techniques were used. CONTRAST: 100 mL Isovue 370 FINDINGS: LOWER CHEST: Normal. HEPATOBILIARY: Normal. PANCREAS: Normal. SPLEEN: Normal. ADRENAL GLANDS: Normal. KIDNEYS/BLADDER: Normal. BOWEL: Interval ventral abdominal hernia repair since prior study. Fluid distention of the proximal small bowel is seen with gradual tapering and tethering towards the anterior abdominal wall, where a gas/fluid collection is present measuring approximately 17 x 6 x 17.5 cm. LYMPH NODES: Normal. VASCULATURE: Unremarkable. PELVIC ORGANS: Normal. MUSCULOSKELETAL: Normal.     IMPRESSION: 1.  Large gas/fluid collection in the anterior abdominal wall suspicious for abscess. 2.  Fluid distended proximal small bowel with gradual tapering and tethering towards the anterior abdomen, indeterminate. Ileus is slightly favored in this immediate postoperative period over partial obstruction.    XR Chest 2 Views    Result Date: 11/16/2023  EXAM: XR CHEST 2 VIEWS LOCATION: [facility] DATE: 11/16/2023 INDICATION: Hypoxia. COMPARISON: Chest CTA 07/07/2018     IMPRESSION: Negative chest. No interval change.    CT Abdomen Pelvis w/o Contrast    Result Date: 11/13/2023  EXAM: CT ABDOMEN PELVIS W/O CONTRAST LOCATION: [facility] DATE: 11/13/2023 INDICATION: painful unreducible abd hernia with n v today COMPARISON: CT abdomen and pelvis  05/23/2023 TECHNIQUE: CT scan of the abdomen and pelvis was performed without IV contrast. Multiplanar reformats were obtained. Dose reduction techniques were used. CONTRAST: None. FINDINGS: LOWER CHEST: Normal. HEPATOBILIARY: Hepatomegaly measuring 23 cm in length. Diffuse fatty infiltration of the liver. Moderate distention of the gallbladder. PANCREAS: Normal. SPLEEN: Normal. ADRENAL GLANDS: Normal. KIDNEYS/BLADDER: Normal. No renal calculi or hydronephrosis. No bladder stones. BOWEL: There are 2 adjacent ventral wall hernias again visualized, with the more superior midline fascial defect measuring 5 cm in width, with herniation of a few mildly distended small bowel loops, increased in caliber from the prior study. The more inferior midline ventral wall fascial defect at the level of the umbilicus measures 4.8 cm in width, also with a few mildly distended small bowel loops, also increased from previous. The small bowel loops within the peritoneal cavity are all of normal caliber. No inflammatory changes. Appendectomy. LYMPH NODES: Normal. VASCULATURE: Unremarkable. PELVIC ORGANS: Normal. MUSCULOSKELETAL: Hypertrophic changes lower thoracic spine.     IMPRESSION: 1.  Midline ventral wall hernias as seen previously, now both containing slightly distended small bowel loops. Currently no evidence for small bowel obstruction, however this could be intermittent. 2.  Hepatomegaly and diffuse fatty infiltration of the liver.

## 2023-12-20 NOTE — DISCHARGE SUMMARY
"[facility]  Hospitalist Discharge Summary      Date of Admission:  12/16/2023  Date of Discharge:  12/20/2023  Discharging Provider: Britney Angulo MD  Discharge Service: Hospitalist Service    Discharge Diagnoses   Post op abdominal infection    Clinically Significant Risk Factors     # DMII: A1C = 6.5 % (Ref range: <5.7 %) within past 6 months    # Severe Obesity: Estimated body mass index is 53.86 kg/m  as calculated from the following:    Height as of this encounter: 1.803 m (5' 11\").    Weight as of this encounter: 175.2 kg (386 lb 3.2 oz).       Follow-ups Needed After Discharge   Follow-up Appointments     Follow-up and recommended labs and tests       Follow up with primary care provider (PCP), Elizabeth Black, within 7   days for hospital follow- up and regarding new diagnosis.  No labs needed.   A1c: 6.5%- defer to PCP regarding initiation of Diabetes treatment. Follow   up in a week with Surgery (Dr. Kim) for wound check            Unresulted Labs Ordered in the Past 30 Days of this Admission       Date and Time Order Name Status Description    12/16/2023  8:19 PM Blood Culture Line, venous Preliminary     12/16/2023  8:19 PM Blood Culture Line, venous Preliminary         These results will be followed up by PCP    Discharge Disposition   Discharged to home  Condition at discharge: Stable    Hospital Course   38-year-old male with PMH of severe obesity,hypertension, DM-2 and recent admission for umbilical hernia (11/22/23-11/30/23), s/p exploratory laparotomy with repair of incisional ventral hernia with mesh who presented with concern regarding wound infection.     Post op abdominal wound infection  --Foul smelling discharge coming out of the surgical wound. It looks like the retention sutures- 1st, 2nd and 4th are cutting through the skin and subcutaneous tissue. Surrounding skin looks erythematous, definitely looking worse that when he was discharged on 11/30/23. Concern " is if the mesh underneath is infected.   --CT showed inflammation with edema and scattered locules of gas throughout the ventral laparatomy incision line continuing deep to the abdominal wall  --Culture was positive for cutibacterium acne. He was treated with 5 days of IV zosyn here. Seen at surgery clinic on 12/11/23 and started on PO augmentin.   --Repeat wound culture - swab with polymicrobial gonzález- E. Cloacae, S. Aureus, C. striatum  --Gen surgery consult appreciated  -- IR consult appreciated: 12/18: no drainable abscess  -- ID is following- c/w vanco and switch zosyn to merrem on 12/18  -- wound care consulted on 12/18  -- 12/19 C. Striatum susceptibility requested by ID. PICC line placement planned.  -- 12/20: sensitivity is back. Going home on po antibiotics. RN to remove PICC line on discharge.  Essential hypertension  --Resumed home lisinopril  DM-2  --A1c 6.5 as of 11/22/23  --accucheck and sliding scale insulin  -- initiation of oral hypoglycemics initiation deferred to PCP  Constipation  -- laxatives  Normocytic Anemia, mild  -- LUIS  -- defer initiation of iron supplement to PCP. Pt is on doxycycline currently.  Morbid obesity  --Body mass index is 54.39 kg/m .  Patient is clinically and hemodynamically stable for discharge to home. Medication reconciliation was done. Medications sent to patient's preferred pharmacy. Patient stated that he is comfortable with wound care. Follow up appointment and recommendations as shown below. Patient verbalized understanding and agreed to plan of care. All questions answered.    Consultations This Hospital Stay   INFECTIOUS DISEASES IP CONSULT  SURGERY GENERAL IP CONSULT  WOUND OSTOMY CONTINENCE NURSE  IP CONSULT  PHARMACY TO DOSE VANCO  CARE MANAGEMENT / SOCIAL WORK IP CONSULT  INTERVENTIONAL RADIOLOGY ADULT/PEDS IP CONSULT  VASCULAR ACCESS ADULT IP CONSULT    Code Status   Full Code    Time Spent on this Encounter   I, Britney Angulo MD, personally saw the  patient today and spent greater than 30 minutes discharging this patient.       Britney Angulo MD  [place] [id number] [facility] [address]  Phone: [phone]  Fax: [phone]  ______________________________________________________________________    Physical Exam   Vital Signs: Temp: 97.5  F (36.4  C) Temp src: Oral BP: (!) 143/77 (RN Notified) Pulse: 85   Resp: 20 SpO2: 99 % O2 Device: None (Room air)    Weight: 386 lbs 3.2 oz  GEN: Alert and oriented. Not in acute distress.  HEENT: Atraumatic, mucous membrane- moist and pink.  Chest: Bilateral air entry.  CVS: S1S2 regular.   Abdomen: Soft. Purulent discharge from surgical wound. Dressing in place  No organomegaly. No guarding or rigidity. Bowel sounds active.   Extremities: No pedal edema.  CNS: No involuntary movements.  Skin: no cyanosis or clubbing.        Primary Care Physician   Elizabeth Black    Discharge Orders      Home Care Referral      Reason for your hospital stay    Abdominal wound infection     Activity    Your activity upon discharge: activity as tolerated     Wound care and dressings    Instructions to care for your wound at home: as directed.  1. Moisten roll gauze with vashe, fold back and forth over the irritated sutures  2. Cover with abd pads and tape  3. Change twice a day + as needed if soiled, saturated, falls off     Follow-up and recommended labs and tests     Follow up with primary care provider (PCP), Elizabeth Black, within 7 days for hospital follow- up and regarding new diagnosis.  No labs needed. A1c: 6.5%- defer to PCP regarding initiation of Diabetes treatment. Follow up in a week with Surgery (Dr. Kim) for wound check     Diet    Follow this diet upon discharge: Orders Placed This Encounter      Combination Diet Regular Diet Adult; Moderate Consistent Carb (60 g CHO per Meal) Diet       Significant Results and Procedures       Discharge Medications   Current Discharge  Medication List        START taking these medications    Details   doxycycline monohydrate (MONODOX) 100 MG capsule Take 1 capsule (100 mg) by mouth every 12 hours for 19 doses  Qty: 19 capsule, Refills: 0    Associated Diagnoses: Cellulitis of abdominal wall      levofloxacin (LEVAQUIN) 500 MG tablet Take 1 tablet (500 mg) by mouth daily for 9 doses  Qty: 9 tablet, Refills: 0    Associated Diagnoses: Cellulitis of abdominal wall      metroNIDAZOLE (FLAGYL) 500 MG tablet Take 1 tablet (500 mg) by mouth 3 times daily for 29 doses  Qty: 29 tablet, Refills: 0    Associated Diagnoses: Cellulitis of abdominal wall           CONTINUE these medications which have NOT CHANGED    Details   acetaminophen (TYLENOL) 325 MG tablet Take 2 tablets (650 mg) by mouth every 6 hours as needed for mild pain  Qty: 90 tablet, Refills: 0    Associated Diagnoses: Acute intractable headache, unspecified headache type      docusate sodium (COLACE) 100 MG capsule Take 1 capsule (100 mg) by mouth 2 times daily  Qty: 30 capsule, Refills: 0    Associated Diagnoses: Recurrent ventral incisional hernia      lisinopril (ZESTRIL) 10 MG tablet Take 1 tablet (10 mg) by mouth daily  Qty: 30 tablet, Refills: 1    Associated Diagnoses: Essential hypertension      ondansetron (ZOFRAN ODT) 4 MG ODT tab Take 1 tablet (4 mg) by mouth every 6 hours as needed for nausea  Qty: 8 tablet, Refills: 0    Associated Diagnoses: Nausea with vomiting      oxyCODONE (ROXICODONE) 5 MG tablet Take 1-2 tablets (5-10 mg) by mouth every 4 hours as needed for moderate pain  Qty: 25 tablet, Refills: 0    Associated Diagnoses: Recurrent ventral incisional hernia           STOP taking these medications       amoxicillin-clavulanate (AUGMENTIN) 875-125 MG tablet Comments:   Reason for Stopping:             Allergies   No Known Allergies

## 2023-12-20 NOTE — PLAN OF CARE
Problem: Infection  Goal: Absence of Infection Signs and Symptoms  Outcome: Progressing     Problem: Pain Acute  Goal: Optimal Pain Control and Function  Outcome: Progressing  Intervention: Prevent or Manage Pain  Recent Flowsheet Documentation  Taken 12/20/2023 0013 by Glen Rosado, RN  Medication Review/Management: medications reviewed  Taken 12/19/2023 2005 by Glen Rosado, RN  Medication Review/Management: medications reviewed   Goal Outcome Evaluation:       Alert and oriented x4. Independently with walker to the bathroom. Wound dressing on the abdomen dry and intact. Complained of abdominal discomfort PRN Oxycodone given with good affect. IV antibiotic given per order. PICC line intact and flushed. No significant changes noted this shift. VSS on RA.

## 2023-12-21 ENCOUNTER — PATIENT OUTREACH (OUTPATIENT)
Dept: CARE COORDINATION | Facility: CLINIC | Age: 38
End: 2023-12-21
Payer: COMMERCIAL

## 2023-12-21 NOTE — PROGRESS NOTES
"Clinic Care Coordination Contact  [facility]: Post-Discharge Note  SITUATION                                                      Admission:    Admission Date: 12/16/23   Reason for Admission: Cellulitis, unspecified cellulitis site  Discharge:   Discharge Date: 12/20/23  Discharge Diagnosis: Cellulitis, unspecified cellulitis site    BACKGROUND                                                      Per hospital discharge summary and inpatient provider notes:    38-year-old male with PMH of severe obesity,hypertension, DM-2 and recent admission for umbilical hernia (11/22/23-11/30/23), s/p exploratory laparotomy with repair of incisional ventral hernia with mesh who presented with concern regarding wound infection.     ASSESSMENT           Discharge Assessment  How are you doing now that you are home?: \" Better just tired \"  How are your symptoms? (Red Flag symptoms escalate to triage hotline per guidelines): Improved  Do you feel your condition is stable enough to be safe at home until your provider visit?: Yes  Does the patient have questions regarding their discharge instructions? : No  Were you started on any new medications or were there changes to any of your previous medications? : Yes  Does the patient have all of their medications?: Yes  Do you have questions regarding any of your medications? : No  Do you have all of your needed medical supplies or equipment (DME)?  (i.e. oxygen tank, CPAP, cane, etc.): Yes  Discharge follow-up appointment scheduled within 14 calendar days? : Yes  Discharge Follow Up Appointment Date: 12/26/23  Discharge Follow Up Appointment Scheduled with?: Specialty Care Provider    Post-op ([facility] Only)  If the patient had a surgery or procedure, do they have any questions for a nurse?: No             PLAN                                                      Outpatient Plan:    Your activity upon discharge: activity as tolerated          Wound care and dressings     Instructions " to care for your wound at home: as directed.  1.         Moisten roll gauze with vashe, fold back and forth over the irritated sutures  2.         Cover with abd pads and tape  3.         Change twice a day + as needed if soiled, saturated, falls off          Follow-up and recommended labs and tests      Follow up with primary care provider (PCP), Elizabeth Black, within 7 days for hospital follow- up and regarding new diagnosis.  No labs needed. A1c: 6.5%- defer to PCP regarding initiation of Diabetes treatment. Follow up in a week with Surgery (Dr. Kim) for wound check          Diet     Follow this diet upon discharge: Orders Placed This Encounter      Combination Diet Regular Diet Adult; Moderate Consistent Carb (60 g CHO per Meal) Diet                   Future Appointments   Date Time Provider Department Center   12/26/2023 10:30 AM Braydon Kim DO [facility]         For any urgent concerns, please contact our 24 hour nurse triage line: [phone] ([phone])         Charmaine Hinton MA

## 2023-12-22 LAB
BACTERIA BLD CULT: NO GROWTH
BACTERIA BLD CULT: NO GROWTH

## 2023-12-26 ENCOUNTER — OFFICE VISIT (OUTPATIENT)
Dept: SURGERY | Facility: CLINIC | Age: 38
End: 2023-12-26
Payer: COMMERCIAL

## 2023-12-26 DIAGNOSIS — L03.311 CELLULITIS OF ABDOMINAL WALL: ICD-10-CM

## 2023-12-26 DIAGNOSIS — S30.1XXS ABDOMINAL WALL SEROMA, SEQUELA: Primary | ICD-10-CM

## 2023-12-26 PROCEDURE — 99213 OFFICE O/P EST LOW 20 MIN: CPT | Performed by: SURGERY

## 2023-12-26 NOTE — PROGRESS NOTES
General Surgery Clinic - Postop follow up  Ady Godinez MRN# [id number]   Age/Sex: 38 year old male YOB: 1985     Reason for visit: Post op visit                           Assessment and Plan:   Assessment:  Recurrent incisional ventral hernia  Cellulitis around surgical incision  Infected seroma  Severe morbid obesity    38-year-old male presenting for wound rechecks from cellulitis as well as from the retention sutures that were placed due to the recurrent incisional ventral hernia.  Patient had status post exploratory laparotomy on 11/24/2023 for a recurrent incisional ventral hernia.  Patient is recovering well and progressing as expected from the infected seroma and cellulitis after the retention sutures were removed.  Patient remains with a normal leukocytosis on repeat CBC on 12/20/2023.    Plan:  -Continue with local wound care to his retention suture sites.  The most inferior of the retention suture site is the deepest.  There is some exposed subcutaneous fat.  There is some mild granulation tissue within the surgical site as well.  The superior retention suture sites are healing in well with good granulation tissue development.      The inferior wound from the retention site continues to have delayed healing, I may request patient have a wound VAC placement by the wound care team.  I will make this determination on his follow-up in 2 weeks.    -Continue with antibiotics per ID.  Appreciate their recommendations  -Continue with diet as tolerated.  -Patient states that he is awaiting for the holidays to be completed before reaching out to his [facility] bariatric team.          Chief Complaint:     Chief Complaint   Patient presents with    Post-Op - General Surgery        History is obtained from the patient    HPI:   Ady Godinez is a 38 year old male who presents to the general surgery team for reevaluation regarding his cellulitis and surgical wounds from the retention  sutures.  Patient states he is doing well.  No fevers no chills.  He has been tolerating more diet.  He is still losing weight.  Patient has normal bowel function.  He states that he has not contacted his bariatric [facility] team yet.  He plans to do so after the holidays.  He has no further complaints.          Past Medical History:     Past Medical History:   Diagnosis Date    Abdominal hernia     Cellulitis 05/2016    right index finger    HTN (hypertension) 07/10/2018    Migraines     Obesity     Pure hypercholesterolemia     Sleep apnea               Past Surgical History:     Past Surgical History:   Procedure Laterality Date    APPENDECTOMY      childhood.    DAVINCI XI HERNIORRHAPHY VENTRAL N/A 11/14/2023    Procedure: HERNIORRHAPHY WITH MESH, VENTRAL, ROBOT-ASSISTED, LAPAROSCOPIC, USING DA ALBERT XI;REPAIR OF INCARCERATED HERNIA.;  Surgeon: Braydon Kim DO;  Location: [facility] OR    HERNIA REPAIR      HERNIORRHAPHY VENTRAL N/A 11/24/2023    Procedure: REPAIR OF INCISIONAL VENTRAL HERNIA.;  Surgeon: Braydon Kim DO;  Location: [facility] OR    IR CAROTID CEREBRAL ANGIOGRAM BILATERAL  04/15/2021    LAPAROTOMY EXPLORATORY N/A 11/24/2023    Procedure: EXPLORATORY LAPAROTOMY WITH;  Surgeon: Braydon Kim DO;  Location: [facility] OR    LUMBAR PUNCTURE FLUORO GUIDED DIAGNOSTIC  04/13/2021    LYSIS, ADHESIONS, ROBOT-ASSISTED, LAPAROSCOPIC, USING DA ALBERT XI N/A 11/14/2023    Procedure: LYSIS OF ADHESIONS;  Surgeon: Braydon Kim DO;  Location: [facility] OR             Social History:    reports that he has never smoked. He has never used smokeless tobacco. He reports that he does not currently use alcohol. He reports that he does not use drugs.           Family History:     Family History   Problem Relation Age of Onset    Snoring Mother     Snoring Father               Allergies:   No Known Allergies           Medications:     Prior to Admission medications    Medication Sig Start Date End Date  Taking? Authorizing Provider   acetaminophen (TYLENOL) 325 MG tablet Take 2 tablets (650 mg) by mouth every 6 hours as needed for mild pain 4/16/21  Yes Diana Fish PA-C   doxycycline monohydrate (MONODOX) 100 MG capsule Take 1 capsule (100 mg) by mouth every 12 hours for 19 doses 12/20/23 12/30/23 Yes Britney Angulo MD   levofloxacin (LEVAQUIN) 500 MG tablet Take 1 tablet (500 mg) by mouth daily for 9 doses 12/21/23 12/30/23 Yes Britney Angulo MD   metroNIDAZOLE (FLAGYL) 500 MG tablet Take 1 tablet (500 mg) by mouth 3 times daily for 29 doses 12/20/23 12/30/23 Yes Britney Angulo MD   docusate sodium (COLACE) 100 MG capsule Take 1 capsule (100 mg) by mouth 2 times daily  Patient not taking: Reported on 12/26/2023 11/30/23   Braydon Kim,    lisinopril (ZESTRIL) 10 MG tablet Take 1 tablet (10 mg) by mouth daily 12/1/23   Tomas Glass MBBS   oxyCODONE (ROXICODONE) 5 MG tablet Take 1-2 tablets (5-10 mg) by mouth every 4 hours as needed for moderate pain  Patient not taking: Reported on 12/26/2023 11/29/23   Catalina, Bradley MILLER PA-C              Review of Systems:   A 12 point Review of Systems is negative other than noted in the HPI            Physical Exam:   No data found.     [unfilled]   Constitutional:   awake, alert, cooperative, no apparent distress, and appears stated age       Eyes:   PERRL, conjunctiva/corneas clear, EOM's intact; no scleral edema or icterus noted        ENT:   Normocephalic, without obvious abnormality, atraumatic, Lips, mucosa, and tongue normal        Hematologic / Lymphatic:   No lymphadenopathy       Lungs:   Normal respiratory effort, no accessory muscle use       Cardiovascular:   Regular rate and rhythm       Abdomen:   Soft, nondistended, nontender to palpation.  Patient has surgical wounds that are healing with good granulation in the 2 retention suture sites superiorly.  The inferior retention suture site has mild granulation in the lateral  aspects.  There are exposed fatty tissues seen deep to the wound.  Proving erythema.  There is no purulent drainage.       Musculoskeletal:   No obvious swelling, bruising or deformity       Skin:   Skin color and texture normal for patient, no rashes or lesions                        Data:       DO Braydon Mejia,   General Surgeon  [facility]  Surgery [address] [address]?  Office: [phone]  Employed by - [facility] [employer]  Pager: [phone]

## 2023-12-26 NOTE — LETTER
12/26/2023         RE: Ady Godinez  [address]  [facility] [address]        Dear Colleague,    Thank you for referring your patient, Ady Godinez, to the [facility] SURGERY CLINIC AND BARIATRICS CARE [place]. Please see a copy of my visit note below.    General Surgery Clinic - Postop follow up  Ady Godinez MRN# [id number]   Age/Sex: 38 year old male YOB: 1985     Reason for visit: Post op visit                           Assessment and Plan:   Assessment:  Recurrent incisional ventral hernia  Cellulitis around surgical incision  Infected seroma  Severe morbid obesity    38-year-old male presenting for wound rechecks from cellulitis as well as from the retention sutures that were placed due to the recurrent incisional ventral hernia.  Patient had status post exploratory laparotomy on 11/24/2023 for a recurrent incisional ventral hernia.  Patient is recovering well and progressing as expected from the infected seroma and cellulitis after the retention sutures were removed.  Patient remains with a normal leukocytosis on repeat CBC on 12/20/2023.    Plan:  -Continue with local wound care to his retention suture sites.  The most inferior of the retention suture site is the deepest.  There is some exposed subcutaneous fat.  There is some mild granulation tissue within the surgical site as well.  The superior retention suture sites are healing in well with good granulation tissue development.      The inferior wound from the retention site continues to have delayed healing, I may request patient have a wound VAC placement by the wound care team.  I will make this determination on his follow-up in 2 weeks.    -Continue with antibiotics per ID.  Appreciate their recommendations  -Continue with diet as tolerated.  -Patient states that he is awaiting for the holidays to be completed before reaching out to his [facility] bariatric team.          Chief Complaint:     Chief  Complaint   Patient presents with     Post-Op - General Surgery        History is obtained from the patient    HPI:   Ady Godinez is a 38 year old male who presents to the general surgery team for reevaluation regarding his cellulitis and surgical wounds from the retention sutures.  Patient states he is doing well.  No fevers no chills.  He has been tolerating more diet.  He is still losing weight.  Patient has normal bowel function.  He states that he has not contacted his bariatric [facility] team yet.  He plans to do so after the holidays.  He has no further complaints.          Past Medical History:     Past Medical History:   Diagnosis Date     Abdominal hernia      Cellulitis 05/2016    right index finger     HTN (hypertension) 07/10/2018     Migraines      Obesity      Pure hypercholesterolemia      Sleep apnea               Past Surgical History:     Past Surgical History:   Procedure Laterality Date     APPENDECTOMY      childhood.     DAVINCI XI HERNIORRHAPHY VENTRAL N/A 11/14/2023    Procedure: HERNIORRHAPHY WITH MESH, VENTRAL, ROBOT-ASSISTED, LAPAROSCOPIC, USING DA ALBERT XI;REPAIR OF INCARCERATED HERNIA.;  Surgeon: Braydon Kim DO;  Location: [facility] OR     HERNIA REPAIR       HERNIORRHAPHY VENTRAL N/A 11/24/2023    Procedure: REPAIR OF INCISIONAL VENTRAL HERNIA.;  Surgeon: Braydon Kim DO;  Location: [facility] OR     IR CAROTID CEREBRAL ANGIOGRAM BILATERAL  04/15/2021     LAPAROTOMY EXPLORATORY N/A 11/24/2023    Procedure: EXPLORATORY LAPAROTOMY WITH;  Surgeon: Braydon Kim DO;  Location: [facility] OR     LUMBAR PUNCTURE FLUORO GUIDED DIAGNOSTIC  04/13/2021     LYSIS, ADHESIONS, ROBOT-ASSISTED, LAPAROSCOPIC, USING DA ALBERT XI N/A 11/14/2023    Procedure: LYSIS OF ADHESIONS;  Surgeon: Braydon Kim DO;  Location: [facility] OR             Social History:    reports that he has never smoked. He has never used smokeless tobacco. He reports that he does not currently use alcohol.  He reports that he does not use drugs.           Family History:     Family History   Problem Relation Age of Onset     Snoring Mother      Snoring Father               Allergies:   No Known Allergies           Medications:     Prior to Admission medications    Medication Sig Start Date End Date Taking? Authorizing Provider   acetaminophen (TYLENOL) 325 MG tablet Take 2 tablets (650 mg) by mouth every 6 hours as needed for mild pain 4/16/21  Yes Diana Fish PA-C   doxycycline monohydrate (MONODOX) 100 MG capsule Take 1 capsule (100 mg) by mouth every 12 hours for 19 doses 12/20/23 12/30/23 Yes Britney Angulo MD   levofloxacin (LEVAQUIN) 500 MG tablet Take 1 tablet (500 mg) by mouth daily for 9 doses 12/21/23 12/30/23 Yes Britney Angulo MD   metroNIDAZOLE (FLAGYL) 500 MG tablet Take 1 tablet (500 mg) by mouth 3 times daily for 29 doses 12/20/23 12/30/23 Yes Britney Angulo MD   docusate sodium (COLACE) 100 MG capsule Take 1 capsule (100 mg) by mouth 2 times daily  Patient not taking: Reported on 12/26/2023 11/30/23   Braydon Kim DO   lisinopril (ZESTRIL) 10 MG tablet Take 1 tablet (10 mg) by mouth daily 12/1/23   Tomas Glass MBBS   oxyCODONE (ROXICODONE) 5 MG tablet Take 1-2 tablets (5-10 mg) by mouth every 4 hours as needed for moderate pain  Patient not taking: Reported on 12/26/2023 11/29/23   Bradley Arnold PA-C              Review of Systems:   A 12 point Review of Systems is negative other than noted in the HPI            Physical Exam:   No data found.     [unfilled]   Constitutional:   awake, alert, cooperative, no apparent distress, and appears stated age       Eyes:   PERRL, conjunctiva/corneas clear, EOM's intact; no scleral edema or icterus noted        ENT:   Normocephalic, without obvious abnormality, atraumatic, Lips, mucosa, and tongue normal        Hematologic / Lymphatic:   No lymphadenopathy       Lungs:   Normal respiratory effort, no accessory muscle  use       Cardiovascular:   Regular rate and rhythm       Abdomen:   Soft, nondistended, nontender to palpation.  Patient has surgical wounds that are healing with good granulation in the 2 retention suture sites superiorly.  The inferior retention suture site has mild granulation in the lateral aspects.  There are exposed fatty tissues seen deep to the wound.  Proving erythema.  There is no purulent drainage.       Musculoskeletal:   No obvious swelling, bruising or deformity       Skin:   Skin color and texture normal for patient, no rashes or lesions                        Data:       DO Braydon Mejia DO  General Surgeon  [facility]  Surgery [address] [address]?  Office: [phone]  Employed by - [employer]  Pager: [phone]     Again, thank you for allowing me to participate in the care of your patient.        Sincerely,        Braydon Kim DO

## 2024-01-09 ENCOUNTER — OFFICE VISIT (OUTPATIENT)
Dept: SURGERY | Facility: CLINIC | Age: 39
End: 2024-01-09
Payer: COMMERCIAL

## 2024-01-09 DIAGNOSIS — T81.31XD DEHISCENCE OF OPERATIVE WOUND, SUBSEQUENT ENCOUNTER: Primary | ICD-10-CM

## 2024-01-09 PROCEDURE — 99212 OFFICE O/P EST SF 10 MIN: CPT | Performed by: SURGERY

## 2024-01-09 RX ORDER — HYDROCODONE BITARTRATE AND ACETAMINOPHEN 5; 325 MG/1; MG/1
TABLET ORAL
COMMUNITY
Start: 2023-11-30 | End: 2024-01-30

## 2024-01-09 NOTE — PROGRESS NOTES
General Surgery Clinic   Ady Godinez MRN# [id number]   Age/Sex: 38 year old male YOB: 1985     Reason for visit: Abdominal surgical wounds                           Assessment and Plan:   Assessment:  1.  Surgical wound from previous hernia retention suture sites  2.  History of recurrent incisional ventral hernia    38-year-old male wit open surgical wounds from previous hernia retention suture sites.  Patient has good healing tissue over the surgical sites.    Plan:  -Continue with local wound care to the area.  I did debride the fibrinous tissue from the most inferior surgical wound.    -Patient is to continue with Vashe and local wound care to the area.    -Patient is to follow-up in 3 weeks.             Chief Complaint:     Chief Complaint   Patient presents with    Post-Op - General Surgery        History is obtained from the patient    HPI:   Ady Godinez is a 38 year old male who presents to the general surgery team today for another wound follow-up.  Patient states abdominal wounds are healing well.  Patient's home health care nurses now coming every other day instead of every day.  Patient has no fevers no chills no new complaints.          Past Medical History:     Past Medical History:   Diagnosis Date    Abdominal hernia     Cellulitis 05/2016    right index finger    HTN (hypertension) 07/10/2018    Migraines     Obesity     Pure hypercholesterolemia     Sleep apnea               Past Surgical History:     Past Surgical History:   Procedure Laterality Date    APPENDECTOMY      childhood.    DAVINCI XI HERNIORRHAPHY VENTRAL N/A 11/14/2023    Procedure: HERNIORRHAPHY WITH MESH, VENTRAL, ROBOT-ASSISTED, LAPAROSCOPIC, USING DA ALBERT XI;REPAIR OF INCARCERATED HERNIA.;  Surgeon: Braydon Kim DO;  Location: [facility] OR    HERNIA REPAIR      HERNIORRHAPHY VENTRAL N/A 11/24/2023    Procedure: REPAIR OF INCISIONAL VENTRAL HERNIA.;  Surgeon: Braydon Kim DO;  Location: [facility] OR    IR CAROTID CEREBRAL ANGIOGRAM BILATERAL  04/15/2021    LAPAROTOMY EXPLORATORY N/A 11/24/2023    Procedure: EXPLORATORY LAPAROTOMY WITH;  Surgeon: Braydon Kim DO;  Location: [facility] OR    LUMBAR PUNCTURE FLUORO GUIDED DIAGNOSTIC  04/13/2021    LYSIS, ADHESIONS, ROBOT-ASSISTED, LAPAROSCOPIC, USING DA ALBERT XI N/A 11/14/2023    Procedure: LYSIS OF ADHESIONS;  Surgeon: Braydon Kim DO;  Location: [facility] OR             Social History:    reports that he has never smoked. He has never used smokeless tobacco. He reports that he does not currently use alcohol. He reports that he does not use drugs.           Family History:     Family History   Problem Relation Age of Onset    Snoring Mother     Snoring Father               Allergies:   No Known Allergies           Medications:     Prior to Admission medications    Medication Sig Start Date End Date Taking? Authorizing Provider   acetaminophen (TYLENOL) 325 MG tablet Take 2 tablets (650 mg) by mouth every 6 hours as needed for mild pain 4/16/21  Yes Diana Fish PA-C   docusate sodium (COLACE) 100 MG capsule Take 1 capsule (100 mg) by mouth 2 times daily  Patient not taking: Reported on 12/26/2023 11/30/23   Braydon Kim DO   HYDROcodone-acetaminophen (NORCO) 5-325 MG tablet Take by mouth.  Patient not taking: Reported on 1/9/2024 11/30/23   Reported, Patient   lisinopril (ZESTRIL) 10 MG tablet Take 1 tablet (10 mg) by mouth daily  Patient not taking: Reported on 1/9/2024 12/1/23   Tomas Glass, [place]   oxyCODONE (ROXICODONE) 5 MG tablet Take 1-2 tablets (5-10 mg) by mouth every 4 hours as needed for moderate pain  Patient not taking: Reported on 12/26/2023 11/29/23   Catalina, Bradley MILLER PA-C              Review of Systems:   A 12 point Review of Systems is negative other than noted in the HPI            Physical Exam:   No data found.     [unfilled]   Constitutional:   awake, alert, cooperative, no apparent distress, and  appears stated age       Eyes:   PERRL, conjunctiva/corneas clear, EOM's intact; no scleral edema or icterus noted        ENT:   Normocephalic, without obvious abnormality, atraumatic, Lips, mucosa, and tongue normal        Hematologic / Lymphatic:   No lymphadenopathy       Lungs:   Normal respiratory effort, no accessory muscle use       Cardiovascular:   Regular rate and rhythm       Abdomen:   Soft, nondistended, nontender to palpation.      Surgical wounds are healing well.  The most superior surgical site has a 1 x 1 cm x 1 cm opening.  There is no significant drainage from the area.    The middle surgical wound is healing well with good granulation tissue.  The surgical wound bed is very superficial.      The most inferior surgical wound measures approximately 4 x 1 cm x 1 and half centimeter.  There is some fibrinous tissue from the surrounding tissue.  There is minimal drainage from the site.  Minimal tenderness to palpation.       Musculoskeletal:   No obvious swelling, bruising or deformity       Skin:   Skin color and texture normal for patient, no rashes or lesions              Data:          DO Braydon Mejia DO  General Surgeon  [facility]  Surgery [facility] - [address] [address]?  Office: [phone]  Employed by - [employer]  Pager: [phone]

## 2024-01-09 NOTE — LETTER
1/9/2024         RE: Ady Godinez  [address]  [facility] [address]        Dear Colleague,    Thank you for referring your patient, Ady Godinez, to the [facility] SURGERY CLINIC AND BARIATRICS CARE [place]. Please see a copy of my visit note below.    General Surgery Clinic   Ady Godinez MRN# [id number]   Age/Sex: 38 year old male YOB: 1985     Reason for visit: Abdominal surgical wounds                           Assessment and Plan:   Assessment:  1.  Surgical wound from previous hernia retention suture sites  2.  History of recurrent incisional ventral hernia    38-year-old male wit open surgical wounds from previous hernia retention suture sites.  Patient has good healing tissue over the surgical sites.    Plan:  -Continue with local wound care to the area.  I did debride the fibrinous tissue from the most inferior surgical wound.    -Patient is to continue with Vashe and local wound care to the area.    -Patient is to follow-up in 3 weeks.             Chief Complaint:     Chief Complaint   Patient presents with     Post-Op - General Surgery        History is obtained from the patient    HPI:   Ady Godinez is a 38 year old male who presents to the general surgery team today for another wound follow-up.  Patient states abdominal wounds are healing well.  Patient's home health care nurses now coming every other day instead of every day.  Patient has no fevers no chills no new complaints.          Past Medical History:     Past Medical History:   Diagnosis Date     Abdominal hernia      Cellulitis 05/2016    right index finger     HTN (hypertension) 07/10/2018     Migraines      Obesity      Pure hypercholesterolemia      Sleep apnea               Past Surgical History:     Past Surgical History:   Procedure Laterality Date     APPENDECTOMY      childhood.     KANU GALARZA HERNIORRHAPHY VENTRAL N/A 11/14/2023    Procedure: HERNIORRHAPHY WITH MESH, VENTRAL,  ROBOT-ASSISTED, LAPAROSCOPIC, USING DA ALBERT XI;REPAIR OF INCARCERATED HERNIA.;  Surgeon: Braydon Kim DO;  Location: [facility] OR     HERNIA REPAIR       HERNIORRHAPHY VENTRAL N/A 11/24/2023    Procedure: REPAIR OF INCISIONAL VENTRAL HERNIA.;  Surgeon: Braydon Kim DO;  Location: [facility] OR     IR CAROTID CEREBRAL ANGIOGRAM BILATERAL  04/15/2021     LAPAROTOMY EXPLORATORY N/A 11/24/2023    Procedure: EXPLORATORY LAPAROTOMY WITH;  Surgeon: Braydon Kim DO;  Location: [facility] OR     LUMBAR PUNCTURE FLUORO GUIDED DIAGNOSTIC  04/13/2021     LYSIS, ADHESIONS, ROBOT-ASSISTED, LAPAROSCOPIC, USING DA ALBERT XI N/A 11/14/2023    Procedure: LYSIS OF ADHESIONS;  Surgeon: Braydon Kim DO;  Location: [facility] OR             Social History:    reports that he has never smoked. He has never used smokeless tobacco. He reports that he does not currently use alcohol. He reports that he does not use drugs.           Family History:     Family History   Problem Relation Age of Onset     Snoring Mother      Snoring Father               Allergies:   No Known Allergies           Medications:     Prior to Admission medications    Medication Sig Start Date End Date Taking? Authorizing Provider   acetaminophen (TYLENOL) 325 MG tablet Take 2 tablets (650 mg) by mouth every 6 hours as needed for mild pain 4/16/21  Yes Diana Fish PA-C   docusate sodium (COLACE) 100 MG capsule Take 1 capsule (100 mg) by mouth 2 times daily  Patient not taking: Reported on 12/26/2023 11/30/23   Braydon Kim DO   HYDROcodone-acetaminophen (NORCO) 5-325 MG tablet Take by mouth.  Patient not taking: Reported on 1/9/2024 11/30/23   Reported, Patient   lisinopril (ZESTRIL) 10 MG tablet Take 1 tablet (10 mg) by mouth daily  Patient not taking: Reported on 1/9/2024 12/1/23   Tomas Glass MBBS   oxyCODONE (ROXICODONE) 5 MG tablet Take 1-2 tablets (5-10 mg) by mouth every 4 hours as needed for moderate pain  Patient not taking:  Reported on 12/26/2023 11/29/23   Catalina, Bradley MILLER PA-C              Review of Systems:   A 12 point Review of Systems is negative other than noted in the HPI            Physical Exam:   No data found.     [unfilled]   Constitutional:   awake, alert, cooperative, no apparent distress, and appears stated age       Eyes:   PERRL, conjunctiva/corneas clear, EOM's intact; no scleral edema or icterus noted        ENT:   Normocephalic, without obvious abnormality, atraumatic, Lips, mucosa, and tongue normal        Hematologic / Lymphatic:   No lymphadenopathy       Lungs:   Normal respiratory effort, no accessory muscle use       Cardiovascular:   Regular rate and rhythm       Abdomen:   Soft, nondistended, nontender to palpation.      Surgical wounds are healing well.  The most superior surgical site has a 1 x 1 cm x 1 cm opening.  There is no significant drainage from the area.    The middle surgical wound is healing well with good granulation tissue.  The surgical wound bed is very superficial.      The most inferior surgical wound measures approximately 4 x 1 cm x 1 and half centimeter.  There is some fibrinous tissue from the surrounding tissue.  There is minimal drainage from the site.  Minimal tenderness to palpation.       Musculoskeletal:   No obvious swelling, bruising or deformity       Skin:   Skin color and texture normal for patient, no rashes or lesions              Data:          DO Braydon Mejia DO  General Surgeon  [facility]  Surgery [address] [address]?  Office: [phone]  Employed by - [employer]  Pager: [phone]         Again, thank you for allowing me to participate in the care of your patient.        Sincerely,        Braydon Kim DO

## 2024-01-29 PROBLEM — G40.909 EPILEPSY, UNSPECIFIED, NOT INTRACTABLE, WITHOUT STATUS EPILEPTICUS (H): Status: ACTIVE | Noted: 2024-01-29

## 2024-01-29 PROBLEM — E11.9 TYPE 2 DIABETES MELLITUS WITHOUT COMPLICATIONS (H): Status: ACTIVE | Noted: 2024-01-29

## 2024-01-30 ENCOUNTER — APPOINTMENT (OUTPATIENT)
Dept: CT IMAGING | Facility: HOSPITAL | Age: 39
DRG: 871 | End: 2024-01-30
Attending: FAMILY MEDICINE
Payer: COMMERCIAL

## 2024-01-30 ENCOUNTER — HOSPITAL ENCOUNTER (INPATIENT)
Facility: HOSPITAL | Age: 39
LOS: 12 days | Discharge: HOME | DRG: 871 | End: 2024-02-11
Attending: FAMILY MEDICINE | Admitting: INTERNAL MEDICINE
Payer: COMMERCIAL

## 2024-01-30 DIAGNOSIS — T14.8XXA OPEN WOUND: Primary | ICD-10-CM

## 2024-01-30 DIAGNOSIS — A41.9 SEPSIS WITHOUT ACUTE ORGAN DYSFUNCTION, DUE TO UNSPECIFIED ORGANISM (H): ICD-10-CM

## 2024-01-30 DIAGNOSIS — L03.311 ABDOMINAL WALL CELLULITIS: ICD-10-CM

## 2024-01-30 LAB
ALBUMIN SERPL BCG-MCNC: 3.9 G/DL (ref 3.5–5.2)
ALBUMIN UR-MCNC: NEGATIVE MG/DL
ALP SERPL-CCNC: 113 U/L (ref 40–150)
ALT SERPL W P-5'-P-CCNC: 48 U/L (ref 0–70)
ANION GAP SERPL CALCULATED.3IONS-SCNC: 14 MMOL/L (ref 7–15)
APPEARANCE UR: CLEAR
AST SERPL W P-5'-P-CCNC: 47 U/L (ref 0–45)
BASOPHILS # BLD AUTO: 0 10E3/UL (ref 0–0.2)
BASOPHILS NFR BLD AUTO: 0 %
BILIRUB DIRECT SERPL-MCNC: <0.2 MG/DL (ref 0–0.3)
BILIRUB SERPL-MCNC: 0.2 MG/DL
BILIRUB UR QL STRIP: NEGATIVE
BUN SERPL-MCNC: 10.8 MG/DL (ref 6–20)
CALCIUM SERPL-MCNC: 9.2 MG/DL (ref 8.6–10)
CHLORIDE SERPL-SCNC: 102 MMOL/L (ref 98–107)
COLOR UR AUTO: ABNORMAL
CREAT SERPL-MCNC: 0.87 MG/DL (ref 0.67–1.17)
DEPRECATED HCO3 PLAS-SCNC: 24 MMOL/L (ref 22–29)
EGFRCR SERPLBLD CKD-EPI 2021: >90 ML/MIN/1.73M2
EOSINOPHIL # BLD AUTO: 0.1 10E3/UL (ref 0–0.7)
EOSINOPHIL NFR BLD AUTO: 1 %
ERYTHROCYTE [DISTWIDTH] IN BLOOD BY AUTOMATED COUNT: 14.5 % (ref 10–15)
FLUAV RNA SPEC QL NAA+PROBE: NEGATIVE
FLUBV RNA RESP QL NAA+PROBE: NEGATIVE
GLUCOSE SERPL-MCNC: 152 MG/DL (ref 70–99)
GLUCOSE UR STRIP-MCNC: NEGATIVE MG/DL
HCT VFR BLD AUTO: 40.8 % (ref 40–53)
HGB BLD-MCNC: 12.6 G/DL (ref 13.3–17.7)
HGB UR QL STRIP: NEGATIVE
HOLD SPECIMEN: NORMAL
IMM GRANULOCYTES # BLD: 0.1 10E3/UL
IMM GRANULOCYTES NFR BLD: 1 %
KETONES UR STRIP-MCNC: NEGATIVE MG/DL
LACTATE SERPL-SCNC: 2.3 MMOL/L (ref 0.7–2)
LACTATE SERPL-SCNC: 2.6 MMOL/L (ref 0.7–2)
LACTATE SERPL-SCNC: 3.2 MMOL/L (ref 0.7–2)
LEUKOCYTE ESTERASE UR QL STRIP: NEGATIVE
LIPASE SERPL-CCNC: 13 U/L (ref 13–60)
LYMPHOCYTES # BLD AUTO: 1 10E3/UL (ref 0.8–5.3)
LYMPHOCYTES NFR BLD AUTO: 5 %
MCH RBC QN AUTO: 26.8 PG (ref 26.5–33)
MCHC RBC AUTO-ENTMCNC: 30.9 G/DL (ref 31.5–36.5)
MCV RBC AUTO: 87 FL (ref 78–100)
MONOCYTES # BLD AUTO: 0.5 10E3/UL (ref 0–1.3)
MONOCYTES NFR BLD AUTO: 3 %
MUCOUS THREADS #/AREA URNS LPF: PRESENT /LPF
NEUTROPHILS # BLD AUTO: 16.7 10E3/UL (ref 1.6–8.3)
NEUTROPHILS NFR BLD AUTO: 90 %
NITRATE UR QL: NEGATIVE
NRBC # BLD AUTO: 0 10E3/UL
NRBC BLD AUTO-RTO: 0 /100
PH UR STRIP: 6.5 [PH] (ref 5–7)
PLATELET # BLD AUTO: 343 10E3/UL (ref 150–450)
POTASSIUM SERPL-SCNC: 3.9 MMOL/L (ref 3.4–5.3)
PROCALCITONIN SERPL IA-MCNC: 0.11 NG/ML
PROT SERPL-MCNC: 8.4 G/DL (ref 6.4–8.3)
RBC # BLD AUTO: 4.71 10E6/UL (ref 4.4–5.9)
RBC URINE: <1 /HPF
RSV RNA SPEC NAA+PROBE: NEGATIVE
SARS-COV-2 RNA RESP QL NAA+PROBE: NEGATIVE
SODIUM SERPL-SCNC: 140 MMOL/L (ref 135–145)
SP GR UR STRIP: 1.02 (ref 1–1.03)
SQUAMOUS EPITHELIAL: <1 /HPF
UROBILINOGEN UR STRIP-MCNC: <2 MG/DL
WBC # BLD AUTO: 18.4 10E3/UL (ref 4–11)
WBC URINE: 0 /HPF

## 2024-01-30 PROCEDURE — 80076 HEPATIC FUNCTION PANEL: CPT | Performed by: FAMILY MEDICINE

## 2024-01-30 PROCEDURE — 250N000011 HC RX IP 250 OP 636: Performed by: FAMILY MEDICINE

## 2024-01-30 PROCEDURE — 83605 ASSAY OF LACTIC ACID: CPT | Performed by: FAMILY MEDICINE

## 2024-01-30 PROCEDURE — 120N000001 HC R&B MED SURG/OB

## 2024-01-30 PROCEDURE — 96375 TX/PRO/DX INJ NEW DRUG ADDON: CPT

## 2024-01-30 PROCEDURE — 258N000003 HC RX IP 258 OP 636: Performed by: FAMILY MEDICINE

## 2024-01-30 PROCEDURE — 87040 BLOOD CULTURE FOR BACTERIA: CPT | Performed by: FAMILY MEDICINE

## 2024-01-30 PROCEDURE — 99292 CRITICAL CARE ADDL 30 MIN: CPT

## 2024-01-30 PROCEDURE — 87637 SARSCOV2&INF A&B&RSV AMP PRB: CPT | Performed by: FAMILY MEDICINE

## 2024-01-30 PROCEDURE — 96361 HYDRATE IV INFUSION ADD-ON: CPT

## 2024-01-30 PROCEDURE — 84145 PROCALCITONIN (PCT): CPT | Performed by: FAMILY MEDICINE

## 2024-01-30 PROCEDURE — 71260 CT THORAX DX C+: CPT

## 2024-01-30 PROCEDURE — 85025 COMPLETE CBC W/AUTO DIFF WBC: CPT | Performed by: FAMILY MEDICINE

## 2024-01-30 PROCEDURE — 83690 ASSAY OF LIPASE: CPT | Performed by: FAMILY MEDICINE

## 2024-01-30 PROCEDURE — 99291 CRITICAL CARE FIRST HOUR: CPT | Mod: 25

## 2024-01-30 PROCEDURE — 36415 COLL VENOUS BLD VENIPUNCTURE: CPT | Performed by: FAMILY MEDICINE

## 2024-01-30 PROCEDURE — 96365 THER/PROPH/DIAG IV INF INIT: CPT | Mod: 59

## 2024-01-30 PROCEDURE — 81001 URINALYSIS AUTO W/SCOPE: CPT | Performed by: FAMILY MEDICINE

## 2024-01-30 PROCEDURE — 96360 HYDRATION IV INFUSION INIT: CPT

## 2024-01-30 PROCEDURE — 99223 1ST HOSP IP/OBS HIGH 75: CPT | Performed by: INTERNAL MEDICINE

## 2024-01-30 PROCEDURE — 250N000013 HC RX MED GY IP 250 OP 250 PS 637: Performed by: FAMILY MEDICINE

## 2024-01-30 RX ORDER — CEFAZOLIN SODIUM 1 G/50ML
2500 SOLUTION INTRAVENOUS ONCE
Status: COMPLETED | OUTPATIENT
Start: 2024-01-30 | End: 2024-01-30

## 2024-01-30 RX ORDER — PIPERACILLIN SODIUM, TAZOBACTAM SODIUM 3; .375 G/15ML; G/15ML
3.38 INJECTION, POWDER, LYOPHILIZED, FOR SOLUTION INTRAVENOUS ONCE
Status: COMPLETED | OUTPATIENT
Start: 2024-01-30 | End: 2024-01-30

## 2024-01-30 RX ORDER — IOPAMIDOL 755 MG/ML
100 INJECTION, SOLUTION INTRAVASCULAR ONCE
Status: COMPLETED | OUTPATIENT
Start: 2024-01-30 | End: 2024-01-30

## 2024-01-30 RX ORDER — KETOROLAC TROMETHAMINE 15 MG/ML
15 INJECTION, SOLUTION INTRAMUSCULAR; INTRAVENOUS ONCE
Status: COMPLETED | OUTPATIENT
Start: 2024-01-30 | End: 2024-01-30

## 2024-01-30 RX ORDER — ACETAMINOPHEN 325 MG/1
975 TABLET ORAL ONCE
Status: COMPLETED | OUTPATIENT
Start: 2024-01-30 | End: 2024-01-30

## 2024-01-30 RX ADMIN — VANCOMYCIN HYDROCHLORIDE 2500 MG: 5 INJECTION, POWDER, LYOPHILIZED, FOR SOLUTION INTRAVENOUS at 21:57

## 2024-01-30 RX ADMIN — SODIUM CHLORIDE 2000 ML: 9 INJECTION, SOLUTION INTRAVENOUS at 18:02

## 2024-01-30 RX ADMIN — ACETAMINOPHEN 975 MG: 325 TABLET ORAL at 18:03

## 2024-01-30 RX ADMIN — IOPAMIDOL 100 ML: 755 INJECTION, SOLUTION INTRAVENOUS at 20:31

## 2024-01-30 RX ADMIN — KETOROLAC TROMETHAMINE 15 MG: 15 INJECTION, SOLUTION INTRAMUSCULAR; INTRAVENOUS at 20:29

## 2024-01-30 RX ADMIN — PIPERACILLIN AND TAZOBACTAM 3.38 G: 3; .375 INJECTION, POWDER, FOR SOLUTION INTRAVENOUS at 20:33

## 2024-01-30 RX ADMIN — SODIUM CHLORIDE 1000 ML: 9 INJECTION, SOLUTION INTRAVENOUS at 21:49

## 2024-01-30 ASSESSMENT — ACTIVITIES OF DAILY LIVING (ADL)
ADLS_ACUITY_SCORE: 35

## 2024-01-30 NOTE — ED TRIAGE NOTES
Patient comes from home.  Has a history of a hernia repair in November with post op abscess.  Has a home health aide come in daily to change the dressing.  Today after he was getting a shower with his home health aide, he started to have flu like symptoms, chill, headache.  Patient shaky and appears very anxious in triage. (Medics report that the home health aide expressed to them that she feels he may have undiagnosed mental health issues).

## 2024-01-30 NOTE — ED PROVIDER NOTES
EMERGENCY DEPARTMENT ENCOUNTER      NAME: Ady Godinez  AGE: 38 year old male  YOB: 1985  MRN: [id number]  EVALUATION DATE & TIME: 1/30/2024  5:25 PM    PCP: Elizabeth Black    ED PROVIDER: Ralph Serrano M.D.    Chief Complaint   Patient presents with    Flu Symptoms       FINAL IMPRESSION:  1. Abdominal wall cellulitis    2. Sepsis without acute organ dysfunction, due to unspecified organism (H)        ED COURSE & MEDICAL DECISION MAKING:   Pertinent Labs & Imaging studies personally reviewed and interpreted by me. (See chart for details)  5:40 PM Patient seen and examined, reviewed recent hospital admission from December 16 to the 20th when patient was seen with cellulitis of the abdominal wall and postoperative wound infection, treated with IV antibiotics and transition to oral Augmentin for discharge.  Patient presents today with shaking chills, fever, increased lower abdominal pain starting today.  On exam he is tachycardic with borderline fever, marked tenderness of the lower pannus.  Patient was initially seen in the chair and I was unable to fully examine his low abdomen under the pannus but concern for possible cellulitis or infection.  Influenza or COVID also possible.  7:55 PM labs ordered and independently interpreted by me with elevated lactate, IV fluids were initiated.  Additional labs independently interpreted by me with reassuring basic metabolic panel, slight elevated AST but otherwise normal hepatic panel, lipase normal.  White blood cell count 18.4 which in conjunction with fever and tachycardia and lactate, concern for sepsis.  Broad-spectrum antibiotics are ordered, CT scan is pending.  8:50 PM CT chest interpreted by me does not demonstrate any acute findings.  CT scan of the abdomen pelvis independently interpreted by me demonstrates continued inflammatory changes of the abdominal wall.  With fever, shaking rigors, tachycardia, elevated lactate and elevated white  blood cell count is likely represents active infection, no other source found.  Plan for admission.  9:45 PM patient rechecked and updated with plan for admission.  Remains tachycardic, additional fluids ordered.  9:57 PM Care discussed with Dr. Glass.  Patient with history abdominal wall cellulitis who presents today with sepsis of unknown source, increased pain around his abdominal wounds but no increased induration or redness.  Suspect cellulitis and absence of other findings for infection, CT scan of the chest negative for pneumonia, COVID and influenza negative, urinalysis not consistent with infection.    At the conclusion of the encounter I discussed the results of all of the tests and the disposition. The questions were answered. The patient or family acknowledged understanding and was agreeable with the care plan.     Medical Decision Making  Obtained supplemental history:Supplemental history obtained?: No  Reviewed external records: External records reviewed?: Documented in chart  Care impacted by chronic illness:Diabetes and Hypertension  Care significantly affected by social determinants of health:N/A  Did you consider but not order tests?: Work up considered but not performed and documented in chart, if applicable  Did you interpret images independently?: Independent interpretation of ECG and images noted in documentation, when applicable.  Consultation discussion with other provider:Did you involve another provider (consultant, , pharmacy, etc.)?: I discussed the care with another health care provider, see documentation for details.  Admit.      PROCEDURES:     Critical Care   Performed by: Dr Ralph Serrano  Total critical care time: 120 minutes  Critical care was necessary to treat or prevent imminent or life-threatening deterioration of the following conditions: Sepsis, multiple fluid boluses, two antibiotics  Critical care was time spent personally by me on the following activities:  "development of treatment plan with patient or surrogate, discussions with consultants, examination of patient, evaluation of patient's response to treatment, obtaining history from patient or surrogate, ordering and performing treatments and interventions, ordering and review of laboratory studies, ordering and review of radiographic studies, re-evaluation of patient's condition and monitoring for potential decompensation.  Critical care time was exclusive of separately billable procedures and treating other patients.      MEDICATIONS GIVEN IN THE EMERGENCY:  Medications   vancomycin (VANCOCIN) 2,500 mg in sodium chloride 0.9 % 500 mL intermittent infusion (2,500 mg Intravenous $New Bag 1/30/24 2157)   sodium chloride 0.9% BOLUS 2,000 mL (0 mLs Intravenous Stopped 1/30/24 2130)   acetaminophen (TYLENOL) tablet 975 mg (975 mg Oral $Given 1/30/24 1803)   piperacillin-tazobactam (ZOSYN) 3.375 g vial to attach to  mL bag (0 g Intravenous Stopped 1/30/24 2120)   ketorolac (TORADOL) injection 15 mg (15 mg Intravenous $Given 1/30/24 2029)   iopamidol (ISOVUE-370) solution 100 mL (100 mLs Intravenous $Given 1/30/24 2031)   sodium chloride 0.9% BOLUS 1,000 mL (1,000 mLs Intravenous $New Bag 1/30/24 2149)       =================================================================    HPI    Patient information was obtained from: Patient      Ady Godinez is a 38 year old male with a pertinent history of severe obesity, HTN, and DM II who presents to this ED by EMS for evaluation of back pain.    Patient reports a sudden onset of low back pain, headache, \"burning\" sensation in his eyes, and tremors while his home aid was assisting him changing his bandages. No cough, nausea, vomiting, or diarrhea. Uses walker at baseline. No ibuprofen or Tylenol today. Also complains of lower abdominal pain near the site of his incisions.    REVIEW OF SYSTEMS   All other systems reviewed and are negative unless otherwise stated in " HPI.    PAST MEDICAL HISTORY:  Past Medical History:   Diagnosis Date    Abdominal hernia     Cellulitis 05/2016    right index finger    HTN (hypertension) 07/10/2018    Migraines     Obesity     Pure hypercholesterolemia     Sleep apnea        PAST SURGICAL HISTORY:  Past Surgical History:   Procedure Laterality Date    APPENDECTOMY      childhood.    DAVINCI XI HERNIORRHAPHY VENTRAL N/A 11/14/2023    Procedure: HERNIORRHAPHY WITH MESH, VENTRAL, ROBOT-ASSISTED, LAPAROSCOPIC, USING DA ALBERT XI;REPAIR OF INCARCERATED HERNIA.;  Surgeon: Braydon Kim DO;  Location: [facility] OR    HERNIA REPAIR      HERNIORRHAPHY VENTRAL N/A 11/24/2023    Procedure: REPAIR OF INCISIONAL VENTRAL HERNIA.;  Surgeon: Braydon Kim DO;  Location: [facility] OR    IR CAROTID CEREBRAL ANGIOGRAM BILATERAL  04/15/2021    LAPAROTOMY EXPLORATORY N/A 11/24/2023    Procedure: EXPLORATORY LAPAROTOMY WITH;  Surgeon: Braydon Kim DO;  Location: [facility] OR    LUMBAR PUNCTURE FLUORO GUIDED DIAGNOSTIC  04/13/2021    LYSIS, ADHESIONS, ROBOT-ASSISTED, LAPAROSCOPIC, USING DA ALBERT XI N/A 11/14/2023    Procedure: LYSIS OF ADHESIONS;  Surgeon: Braydon Kim DO;  Location: [facility] OR       CURRENT MEDICATIONS:    Current Facility-Administered Medications   Medication    vancomycin (VANCOCIN) 2,500 mg in sodium chloride 0.9 % 500 mL intermittent infusion     Current Outpatient Medications   Medication    acetaminophen (TYLENOL) 325 MG tablet       ALLERGIES:  No Known Allergies    FAMILY HISTORY:  Family History   Problem Relation Age of Onset    Snoring Mother     Snoring Father        SOCIAL HISTORY:   Social History     Socioeconomic History    Marital status:    Tobacco Use    Smoking status: Never    Smokeless tobacco: Never   Substance and Sexual Activity    Alcohol use: Not Currently     Comment: Very rare    Drug use: Never    Sexual activity: Yes     Partners: Female       VITALS:  BP (!) 175/74   Pulse 110   Temp (!) 101.3  " F (38.5  C) (Oral)   Resp 22   Ht 1.803 m (5' 11\")   Wt (!) 175.1 kg (386 lb)   SpO2 99%   BMI 53.84 kg/m      PHYSICAL EXAM:  Physical Exam  Vitals and nursing note reviewed.   Constitutional:       Appearance: Normal appearance.   HENT:      Head: Normocephalic and atraumatic.      Right Ear: External ear normal.      Left Ear: External ear normal.      Nose: Nose normal.      Mouth/Throat:      Mouth: Mucous membranes are moist.   Eyes:      Extraocular Movements: Extraocular movements intact.      Conjunctiva/sclera: Conjunctivae normal.      Pupils: Pupils are equal, round, and reactive to light.   Cardiovascular:      Rate and Rhythm: Regular rhythm. Tachycardia present.   Pulmonary:      Effort: Pulmonary effort is normal.      Breath sounds: Normal breath sounds. No wheezing or rales.   Abdominal:      General: Abdomen is flat. There is no distension.      Palpations: Abdomen is soft.      Tenderness: There is no guarding.      Comments: Suprapubic panus tenderness.    Musculoskeletal:         General: Normal range of motion.      Cervical back: Normal range of motion and neck supple.      Right lower leg: No edema.      Left lower leg: No edema.   Lymphadenopathy:      Cervical: No cervical adenopathy.   Skin:     General: Skin is warm and dry.   Neurological:      General: No focal deficit present.      Mental Status: He is alert and oriented to person, place, and time. Mental status is at baseline.      Comments: No gross focal neurologic deficits   Psychiatric:         Mood and Affect: Mood normal.         Behavior: Behavior normal.         Thought Content: Thought content normal.          LAB:  All pertinent labs reviewed and interpreted.  Results for orders placed or performed during the hospital encounter of 01/30/24   CT Chest/Abdomen/Pelvis w Contrast    Impression    IMPRESSION:  1.  Nearly resolved gas at the laparotomy site since 12/16/2023. No findings of an abscess. Nothing seen to " explain patient's sepsis clinically.   Basic metabolic panel   Result Value Ref Range    Sodium 140 135 - 145 mmol/L    Potassium 3.9 3.4 - 5.3 mmol/L    Chloride 102 98 - 107 mmol/L    Carbon Dioxide (CO2) 24 22 - 29 mmol/L    Anion Gap 14 7 - 15 mmol/L    Urea Nitrogen 10.8 6.0 - 20.0 mg/dL    Creatinine 0.87 0.67 - 1.17 mg/dL    GFR Estimate >90 >60 mL/min/1.73m2    Calcium 9.2 8.6 - 10.0 mg/dL    Glucose 152 (H) 70 - 99 mg/dL   Lactic acid whole blood   Result Value Ref Range    Lactic Acid 3.2 (H) 0.7 - 2.0 mmol/L   Result Value Ref Range    Procalcitonin 0.11 <0.50 ng/mL   Symptomatic Influenza A/B, RSV, & SARS-CoV2 PCR (COVID-19) Nasopharyngeal    Specimen: Nasopharyngeal; Swab   Result Value Ref Range    Influenza A PCR Negative Negative    Influenza B PCR Negative Negative    RSV PCR Negative Negative    SARS CoV2 PCR Negative Negative   UA with Microscopic reflex to Culture    Specimen: Urine, Midstream   Result Value Ref Range    Color Urine Light Yellow Colorless, Straw, Light Yellow, Yellow    Appearance Urine Clear Clear    Glucose Urine Negative Negative mg/dL    Bilirubin Urine Negative Negative    Ketones Urine Negative Negative mg/dL    Specific Gravity Urine 1.017 1.001 - 1.030    Blood Urine Negative Negative    pH Urine 6.5 5.0 - 7.0    Protein Albumin Urine Negative Negative mg/dL    Urobilinogen Urine <2.0 <2.0 mg/dL    Nitrite Urine Negative Negative    Leukocyte Esterase Urine Negative Negative    Mucus Urine Present (A) None Seen /LPF    RBC Urine <1 <=2 /HPF    WBC Urine 0 <=5 /HPF    Squamous Epithelials Urine <1 <=1 /HPF   CBC with platelets and differential   Result Value Ref Range    WBC Count 18.4 (H) 4.0 - 11.0 10e3/uL    RBC Count 4.71 4.40 - 5.90 10e6/uL    Hemoglobin 12.6 (L) 13.3 - 17.7 g/dL    Hematocrit 40.8 40.0 - 53.0 %    MCV 87 78 - 100 fL    MCH 26.8 26.5 - 33.0 pg    MCHC 30.9 (L) 31.5 - 36.5 g/dL    RDW 14.5 10.0 - 15.0 %    Platelet Count 343 150 - 450 10e3/uL    %  Neutrophils 90 %    % Lymphocytes 5 %    % Monocytes 3 %    % Eosinophils 1 %    % Basophils 0 %    % Immature Granulocytes 1 %    NRBCs per 100 WBC 0 <1 /100    Absolute Neutrophils 16.7 (H) 1.6 - 8.3 10e3/uL    Absolute Lymphocytes 1.0 0.8 - 5.3 10e3/uL    Absolute Monocytes 0.5 0.0 - 1.3 10e3/uL    Absolute Eosinophils 0.1 0.0 - 0.7 10e3/uL    Absolute Basophils 0.0 0.0 - 0.2 10e3/uL    Absolute Immature Granulocytes 0.1 <=0.4 10e3/uL    Absolute NRBCs 0.0 10e3/uL   Extra Blue Top Tube   Result Value Ref Range    Hold Specimen JIC    Extra Red Top Tube   Result Value Ref Range    Hold Specimen JIC    Lactic acid whole blood   Result Value Ref Range    Lactic Acid 2.3 (H) 0.7 - 2.0 mmol/L   Result Value Ref Range    Lipase 13 13 - 60 U/L   Hepatic function panel   Result Value Ref Range    Protein Total 8.4 (H) 6.4 - 8.3 g/dL    Albumin 3.9 3.5 - 5.2 g/dL    Bilirubin Total 0.2 <=1.2 mg/dL    Alkaline Phosphatase 113 40 - 150 U/L    AST 47 (H) 0 - 45 U/L    ALT 48 0 - 70 U/L    Bilirubin Direct <0.20 0.00 - 0.30 mg/dL   Extra Red Top Tube   Result Value Ref Range    Hold Specimen JIC    Extra Green Top (Lithium Heparin) Tube   Result Value Ref Range    Hold Specimen JIC    Lactic acid whole blood   Result Value Ref Range    Lactic Acid 2.6 (H) 0.7 - 2.0 mmol/L       RADIOLOGY:  Reviewed all pertinent imaging. Please see official radiology report.  CT Chest/Abdomen/Pelvis w Contrast   Final Result   IMPRESSION:   1.  Nearly resolved gas at the laparotomy site since 12/16/2023. No findings of an abscess. Nothing seen to explain patient's sepsis clinically.          I, Soham Torres, am serving as a scribe to document services personally performed by Dr. Serrano based on my observation and the provider's statements to me. I, Ralph Serrano MD attest that Soham Torres is acting in a scribe capacity, has observed my performance of the services and has documented them in accordance with my direction.    Ralph DONNELLY  Zach CARPENTER  Emergency Medicine  [facility] EMERGENCY DEPARTMENT  [facility] [facility] [id number] [phone]  Dept: [phone]       Ralph Serrano MD  01/30/24 2338       Ralph Serrano MD  01/31/24 0742

## 2024-01-31 ENCOUNTER — APPOINTMENT (OUTPATIENT)
Dept: ULTRASOUND IMAGING | Facility: HOSPITAL | Age: 39
DRG: 871 | End: 2024-01-31
Attending: INTERNAL MEDICINE
Payer: COMMERCIAL

## 2024-01-31 LAB
ALBUMIN SERPL BCG-MCNC: 3.1 G/DL (ref 3.5–5.2)
ALP SERPL-CCNC: 78 U/L (ref 40–150)
ALT SERPL W P-5'-P-CCNC: 34 U/L (ref 0–70)
ANION GAP SERPL CALCULATED.3IONS-SCNC: 9 MMOL/L (ref 7–15)
AST SERPL W P-5'-P-CCNC: 24 U/L (ref 0–45)
BILIRUB SERPL-MCNC: 0.4 MG/DL
BUN SERPL-MCNC: 9.4 MG/DL (ref 6–20)
CALCIUM SERPL-MCNC: 7.7 MG/DL (ref 8.6–10)
CHLORIDE SERPL-SCNC: 105 MMOL/L (ref 98–107)
CREAT SERPL-MCNC: 0.91 MG/DL (ref 0.67–1.17)
DEPRECATED HCO3 PLAS-SCNC: 24 MMOL/L (ref 22–29)
EGFRCR SERPLBLD CKD-EPI 2021: >90 ML/MIN/1.73M2
ERYTHROCYTE [DISTWIDTH] IN BLOOD BY AUTOMATED COUNT: 14.8 % (ref 10–15)
GLUCOSE BLDC GLUCOMTR-MCNC: 109 MG/DL (ref 70–99)
GLUCOSE SERPL-MCNC: 138 MG/DL (ref 70–99)
HCT VFR BLD AUTO: 36.4 % (ref 40–53)
HGB BLD-MCNC: 11.5 G/DL (ref 13.3–17.7)
LACTATE SERPL-SCNC: 1.2 MMOL/L (ref 0.7–2)
LACTATE SERPL-SCNC: 2.7 MMOL/L (ref 0.7–2)
LDH SERPL L TO P-CCNC: 132 U/L (ref 0–250)
MCH RBC QN AUTO: 27.6 PG (ref 26.5–33)
MCHC RBC AUTO-ENTMCNC: 31.6 G/DL (ref 31.5–36.5)
MCV RBC AUTO: 87 FL (ref 78–100)
PLATELET # BLD AUTO: 252 10E3/UL (ref 150–450)
POTASSIUM SERPL-SCNC: 3.6 MMOL/L (ref 3.4–5.3)
PROT SERPL-MCNC: 6.8 G/DL (ref 6.4–8.3)
RBC # BLD AUTO: 4.17 10E6/UL (ref 4.4–5.9)
SODIUM SERPL-SCNC: 138 MMOL/L (ref 135–145)
WBC # BLD AUTO: 20.2 10E3/UL (ref 4–11)

## 2024-01-31 PROCEDURE — 82374 ASSAY BLOOD CARBON DIOXIDE: CPT | Performed by: INTERNAL MEDICINE

## 2024-01-31 PROCEDURE — 258N000003 HC RX IP 258 OP 636: Performed by: STUDENT IN AN ORGANIZED HEALTH CARE EDUCATION/TRAINING PROGRAM

## 2024-01-31 PROCEDURE — 120N000001 HC R&B MED SURG/OB

## 2024-01-31 PROCEDURE — 250N000011 HC RX IP 250 OP 636: Performed by: INTERNAL MEDICINE

## 2024-01-31 PROCEDURE — 87040 BLOOD CULTURE FOR BACTERIA: CPT | Performed by: INTERNAL MEDICINE

## 2024-01-31 PROCEDURE — 85027 COMPLETE CBC AUTOMATED: CPT | Performed by: INTERNAL MEDICINE

## 2024-01-31 PROCEDURE — 250N000013 HC RX MED GY IP 250 OP 250 PS 637: Performed by: STUDENT IN AN ORGANIZED HEALTH CARE EDUCATION/TRAINING PROGRAM

## 2024-01-31 PROCEDURE — 36415 COLL VENOUS BLD VENIPUNCTURE: CPT | Performed by: FAMILY MEDICINE

## 2024-01-31 PROCEDURE — 250N000011 HC RX IP 250 OP 636: Mod: JZ | Performed by: INTERNAL MEDICINE

## 2024-01-31 PROCEDURE — 36415 COLL VENOUS BLD VENIPUNCTURE: CPT | Performed by: INTERNAL MEDICINE

## 2024-01-31 PROCEDURE — 250N000011 HC RX IP 250 OP 636: Performed by: STUDENT IN AN ORGANIZED HEALTH CARE EDUCATION/TRAINING PROGRAM

## 2024-01-31 PROCEDURE — 83605 ASSAY OF LACTIC ACID: CPT | Performed by: INTERNAL MEDICINE

## 2024-01-31 PROCEDURE — 82962 GLUCOSE BLOOD TEST: CPT

## 2024-01-31 PROCEDURE — 250N000013 HC RX MED GY IP 250 OP 250 PS 637: Performed by: INTERNAL MEDICINE

## 2024-01-31 PROCEDURE — 99233 SBSQ HOSP IP/OBS HIGH 50: CPT | Performed by: STUDENT IN AN ORGANIZED HEALTH CARE EDUCATION/TRAINING PROGRAM

## 2024-01-31 PROCEDURE — 83615 LACTATE (LD) (LDH) ENZYME: CPT | Performed by: STUDENT IN AN ORGANIZED HEALTH CARE EDUCATION/TRAINING PROGRAM

## 2024-01-31 PROCEDURE — 99222 1ST HOSP IP/OBS MODERATE 55: CPT | Performed by: INTERNAL MEDICINE

## 2024-01-31 PROCEDURE — 258N000003 HC RX IP 258 OP 636

## 2024-01-31 PROCEDURE — 83605 ASSAY OF LACTIC ACID: CPT | Performed by: FAMILY MEDICINE

## 2024-01-31 PROCEDURE — 82040 ASSAY OF SERUM ALBUMIN: CPT | Performed by: INTERNAL MEDICINE

## 2024-01-31 PROCEDURE — 76705 ECHO EXAM OF ABDOMEN: CPT

## 2024-01-31 PROCEDURE — 258N000003 HC RX IP 258 OP 636: Performed by: INTERNAL MEDICINE

## 2024-01-31 RX ORDER — CEFAZOLIN SODIUM 1 G/50ML
1250 SOLUTION INTRAVENOUS EVERY 12 HOURS
Status: COMPLETED | OUTPATIENT
Start: 2024-01-31 | End: 2024-02-01

## 2024-01-31 RX ORDER — HEPARIN SODIUM 5000 [USP'U]/.5ML
5000 INJECTION, SOLUTION INTRAVENOUS; SUBCUTANEOUS EVERY 8 HOURS
Status: DISCONTINUED | OUTPATIENT
Start: 2024-01-31 | End: 2024-02-04

## 2024-01-31 RX ORDER — NICOTINE POLACRILEX 4 MG
15-30 LOZENGE BUCCAL
Status: DISCONTINUED | OUTPATIENT
Start: 2024-01-31 | End: 2024-02-11 | Stop reason: HOSPADM

## 2024-01-31 RX ORDER — ACETAMINOPHEN 325 MG/1
325 TABLET ORAL ONCE
Status: COMPLETED | OUTPATIENT
Start: 2024-01-31 | End: 2024-01-31

## 2024-01-31 RX ORDER — PIPERACILLIN SODIUM, TAZOBACTAM SODIUM 3; .375 G/15ML; G/15ML
3.38 INJECTION, POWDER, LYOPHILIZED, FOR SOLUTION INTRAVENOUS EVERY 8 HOURS
Status: DISCONTINUED | OUTPATIENT
Start: 2024-01-31 | End: 2024-01-31

## 2024-01-31 RX ORDER — IBUPROFEN 200 MG
400 TABLET ORAL ONCE
Status: COMPLETED | OUTPATIENT
Start: 2024-01-31 | End: 2024-01-31

## 2024-01-31 RX ORDER — DEXTROSE MONOHYDRATE 25 G/50ML
25-50 INJECTION, SOLUTION INTRAVENOUS
Status: DISCONTINUED | OUTPATIENT
Start: 2024-01-31 | End: 2024-02-11 | Stop reason: HOSPADM

## 2024-01-31 RX ORDER — KETOROLAC TROMETHAMINE 30 MG/ML
30 INJECTION, SOLUTION INTRAMUSCULAR; INTRAVENOUS ONCE
Status: COMPLETED | OUTPATIENT
Start: 2024-01-31 | End: 2024-01-31

## 2024-01-31 RX ORDER — SODIUM CHLORIDE 9 MG/ML
INJECTION, SOLUTION INTRAVENOUS CONTINUOUS
Status: ACTIVE | OUTPATIENT
Start: 2024-01-31 | End: 2024-02-01

## 2024-01-31 RX ORDER — AMOXICILLIN 250 MG
2 CAPSULE ORAL 2 TIMES DAILY PRN
Status: DISCONTINUED | OUTPATIENT
Start: 2024-01-31 | End: 2024-02-11 | Stop reason: HOSPADM

## 2024-01-31 RX ORDER — ACETAMINOPHEN 325 MG/1
650 TABLET ORAL EVERY 6 HOURS PRN
Status: DISCONTINUED | OUTPATIENT
Start: 2024-01-31 | End: 2024-01-31

## 2024-01-31 RX ORDER — MEROPENEM 1 G/1
1 INJECTION, POWDER, FOR SOLUTION INTRAVENOUS EVERY 8 HOURS
Status: DISCONTINUED | OUTPATIENT
Start: 2024-01-31 | End: 2024-02-11 | Stop reason: HOSPADM

## 2024-01-31 RX ORDER — ACETAMINOPHEN 325 MG/1
975 TABLET ORAL EVERY 6 HOURS PRN
Status: DISCONTINUED | OUTPATIENT
Start: 2024-01-31 | End: 2024-02-11 | Stop reason: HOSPADM

## 2024-01-31 RX ORDER — AMOXICILLIN 250 MG
1 CAPSULE ORAL 2 TIMES DAILY PRN
Status: DISCONTINUED | OUTPATIENT
Start: 2024-01-31 | End: 2024-02-11 | Stop reason: HOSPADM

## 2024-01-31 RX ORDER — LIDOCAINE 40 MG/G
CREAM TOPICAL
Status: DISCONTINUED | OUTPATIENT
Start: 2024-01-31 | End: 2024-02-11 | Stop reason: HOSPADM

## 2024-01-31 RX ORDER — CALCIUM CARBONATE 500 MG/1
1000 TABLET, CHEWABLE ORAL 4 TIMES DAILY PRN
Status: DISCONTINUED | OUTPATIENT
Start: 2024-01-31 | End: 2024-02-11 | Stop reason: HOSPADM

## 2024-01-31 RX ORDER — ONDANSETRON 2 MG/ML
4 INJECTION INTRAMUSCULAR; INTRAVENOUS EVERY 6 HOURS PRN
Status: DISCONTINUED | OUTPATIENT
Start: 2024-01-31 | End: 2024-02-11 | Stop reason: HOSPADM

## 2024-01-31 RX ADMIN — KETOROLAC TROMETHAMINE 30 MG: 30 INJECTION, SOLUTION INTRAMUSCULAR; INTRAVENOUS at 02:57

## 2024-01-31 RX ADMIN — ACETAMINOPHEN 975 MG: 325 TABLET ORAL at 15:52

## 2024-01-31 RX ADMIN — SODIUM CHLORIDE 125 ML/HR: 9 INJECTION, SOLUTION INTRAVENOUS at 22:12

## 2024-01-31 RX ADMIN — SODIUM CHLORIDE 1000 ML: 9 INJECTION, SOLUTION INTRAVENOUS at 03:40

## 2024-01-31 RX ADMIN — ACETAMINOPHEN 650 MG: 325 TABLET ORAL at 02:35

## 2024-01-31 RX ADMIN — ONDANSETRON 4 MG: 2 INJECTION INTRAMUSCULAR; INTRAVENOUS at 21:05

## 2024-01-31 RX ADMIN — SODIUM CHLORIDE: 9 INJECTION, SOLUTION INTRAVENOUS at 11:52

## 2024-01-31 RX ADMIN — VANCOMYCIN HYDROCHLORIDE 1250 MG: 5 INJECTION, POWDER, LYOPHILIZED, FOR SOLUTION INTRAVENOUS at 10:54

## 2024-01-31 RX ADMIN — ACETAMINOPHEN 650 MG: 325 TABLET ORAL at 10:12

## 2024-01-31 RX ADMIN — PIPERACILLIN AND TAZOBACTAM 3.38 G: 3; .375 INJECTION, POWDER, FOR SOLUTION INTRAVENOUS at 01:28

## 2024-01-31 RX ADMIN — HEPARIN SODIUM 5000 UNITS: 10000 INJECTION, SOLUTION INTRAVENOUS; SUBCUTANEOUS at 02:36

## 2024-01-31 RX ADMIN — HEPARIN SODIUM 5000 UNITS: 10000 INJECTION, SOLUTION INTRAVENOUS; SUBCUTANEOUS at 08:44

## 2024-01-31 RX ADMIN — MEROPENEM 1 G: 1 INJECTION, POWDER, FOR SOLUTION INTRAVENOUS at 18:48

## 2024-01-31 RX ADMIN — ONDANSETRON 4 MG: 2 INJECTION INTRAMUSCULAR; INTRAVENOUS at 01:28

## 2024-01-31 RX ADMIN — SODIUM CHLORIDE: 9 INJECTION, SOLUTION INTRAVENOUS at 01:29

## 2024-01-31 RX ADMIN — HEPARIN SODIUM 5000 UNITS: 10000 INJECTION, SOLUTION INTRAVENOUS; SUBCUTANEOUS at 18:49

## 2024-01-31 RX ADMIN — SODIUM CHLORIDE 500 ML: 9 INJECTION, SOLUTION INTRAVENOUS at 20:09

## 2024-01-31 RX ADMIN — ACETAMINOPHEN 325 MG: 325 TABLET ORAL at 12:03

## 2024-01-31 RX ADMIN — VANCOMYCIN HYDROCHLORIDE 1250 MG: 5 INJECTION, POWDER, LYOPHILIZED, FOR SOLUTION INTRAVENOUS at 21:11

## 2024-01-31 RX ADMIN — SODIUM CHLORIDE 1000 ML: 9 INJECTION, SOLUTION INTRAVENOUS at 09:33

## 2024-01-31 RX ADMIN — IBUPROFEN 400 MG: 200 TABLET, FILM COATED ORAL at 21:04

## 2024-01-31 RX ADMIN — ONDANSETRON 4 MG: 2 INJECTION INTRAMUSCULAR; INTRAVENOUS at 10:08

## 2024-01-31 RX ADMIN — ACETAMINOPHEN 975 MG: 325 TABLET ORAL at 22:03

## 2024-01-31 RX ADMIN — PIPERACILLIN AND TAZOBACTAM 3.38 G: 3; .375 INJECTION, POWDER, FOR SOLUTION INTRAVENOUS at 09:39

## 2024-01-31 RX ADMIN — MEROPENEM 1 G: 1 INJECTION, POWDER, FOR SOLUTION INTRAVENOUS at 11:11

## 2024-01-31 ASSESSMENT — ACTIVITIES OF DAILY LIVING (ADL)
ADLS_ACUITY_SCORE: 37
ADLS_ACUITY_SCORE: 35
ADLS_ACUITY_SCORE: 37

## 2024-01-31 NOTE — PHARMACY-VANCOMYCIN DOSING SERVICE
"Pharmacy Vancomycin Initial Note  Date of Service 2024  Patient's  1985  38 year old, male    Indication: Sepsis    Current estimated CrCl = Estimated Creatinine Clearance: 187.6 mL/min (based on SCr of 0.87 mg/dL).    Creatinine for last 3 days  2024:  6:06 PM Creatinine 0.87 mg/dL    Recent Vancomycin Level(s) for last 3 days  No results found for requested labs within last 3 days.      Vancomycin IV Administrations (past 72 hours)                     vancomycin (VANCOCIN) 2,500 mg in sodium chloride 0.9 % 500 mL intermittent infusion (mg) 2,500 mg New Bag 24 215                    Nephrotoxins and other renal medications (From now, onward)      Start     Dose/Rate Route Frequency Ordered Stop    24 0200  piperacillin-tazobactam (ZOSYN) 3.375 g vial to attach to  mL bag        Note to Pharmacy: For [facility], [facility] and [facility]: For Zosyn-naive patients, use the \"Zosyn initial dose + extended infusion\" order panel.    3.375 g  over 240 Minutes Intravenous EVERY 8 HOURS 24 0103              Contrast Orders - past 72 hours (72h ago, onward)      Start     Dose/Rate Route Frequency Stop    24 2100  iopamidol (ISOVUE-370) solution 100 mL         100 mL Intravenous ONCE 24            [employer] Prediction of Planned Initial Vancomycin Regimen  Loading dose: 2500 mg IV on 23 at 21:57  Regimen: 1250 mg IV every 12 hours.  Start time: 10:00 on 2024  Exposure target: AUC24 (range)400-600 mg/L.hr   AUC24,ss: 448 mg/L.hr  Probability of AUC24 > 400: 58 %  Ctrough,ss: 11.3 mg/L  Probability of Ctrough,ss > 20: 25 %  Probability of nephrotoxicity (Lodise SANTO ): 7 %          Plan:  Continue with vancomycin  1250 mg IV q12h.   Vancomycin monitoring method: AUC  Vancomycin therapeutic monitoring goal: 400-600 mg*h/L  Pharmacy will check vancomycin levels as appropriate in 1-3 Days.    Serum creatinine levels will be ordered a minimum of twice weekly.      Jayda" MONIE Ayon, [facility]

## 2024-01-31 NOTE — PROGRESS NOTES
[facility]    Medicine Progress Note - Hospitalist Service    Date of Admission:  1/30/2024    Assessment & Plan   Patient is a 38 year old male with PMH significant for morbid obesity, hypertension, DM2 and recent admission for umbilical hernia (11/22/23-11/30/23), s/p exploratory laparotomy with repair of incisional ventral hernia with mesh, re-admitted on 12/16/23-12/20/23 for concern regarding post-op wound infection who was sent to our ED by the home health nurse for concern of sepsis.     Severe sepsis with lactic acidosis  -Patient presents with fever, chills, and found to have lactic acidosis (elevated at 3.2), leucocytosis and tachycardia  -No clear source of infection found, however suspect ongoing abdominal wall infection. The lower abdominal wall has some redness and induration around the panus.   -CT chest/abd/pelvis showed- nearly resolved gas at the laparotomy site since 12/16/2023. No findings of an abscess. Nothing seen to explain patient's sepsis clinically.   -UA- negative. COVID-19, flu- negative. Pro-javon normal   -Received 3L of NS bolus in ED. Cont NS @ 125ml/hr  -will bolus additional 1L this AM  -Blood culture pending  -ID consult, continue vanc and meropenem  -further diagnostics pending above     Elevated AST  -due to acute inflammation, follow-up on AM CMP  -if worsening or sx will obtain further diagnostics      Hypertension   DM-II, A1c 6.5  -Cont home meds, insulin          Diet: Combination Diet Regular Diet Adult; Moderate Consistent Carb (60 g CHO per Meal) Diet    DVT Prophylaxis: Heparin   Duvall Catheter: Not present  Lines: None     Cardiac Monitoring: None  Code Status: Full Code      Clinically Significant Risk Factors Present on Admission                  # Hypertension: Noted on problem list     # DMII: A1C = 6.5 % (Ref range: <5.7 %) within past 6 months    # Severe Obesity: Estimated body mass index is 53.84 kg/m  as calculated from the following:     "Height as of this encounter: 1.803 m (5' 11\").    Weight as of this encounter: 175.1 kg (386 lb).       # Financial/Environmental Concerns:           Disposition Plan      Expected Discharge Date: 02/01/2024                    Gilbert Allen DO  Hospitalist Service  [facility]  Securely message with [employer] (more info)  Text page via [email or web address] Paging/Directory   ______________________________________________________________________    Interval History   Feeling about the same this AM. Noted headache and fevers at home.     Physical Exam   Vital Signs: Temp: 97.7  F (36.5  C) Temp src: Oral BP: 103/57 Pulse: 88   Resp: 22 SpO2: 99 % O2 Device: Nasal cannula Oxygen Delivery: 1 LPM  Weight: 386 lbs 0 oz    General Appearance: Mild distress, non diaphoretic   Respiratory: CTAB  Cardiovascular: RRR  GI: No pain  Skin: erythema throughout abdominal pannus, warm to touch without fluctuations   Other: No sharp neck pain with flexion     Medical Decision Making       60 MINUTES SPENT BY ME on the date of service doing chart review, history, exam, documentation & further activities per the note.      Data     I have personally reviewed the following data over the past 24 hrs:    20.2 (H)  \   11.5 (L)   / 252     138 105 9.4 /  138 (H)   3.6 24 0.91 \     ALT: 34 AST: 24 AP: 78 TBILI: 0.4   ALB: 3.1 (L) TOT PROTEIN: 6.8 LIPASE: N/A     Procal: N/A CRP: N/A Lactic Acid: 1.2       Ferritin:  N/A % Retic:  N/A LDH:  132       Imaging results reviewed over the past 24 hrs:   Recent Results (from the past 24 hour(s))   CT Chest/Abdomen/Pelvis w Contrast    Narrative    EXAM: CT CHEST/ABDOMEN/PELVIS W CONTRAST  LOCATION: [facility]  DATE: 1/30/2024    INDICATION: Sepsis  COMPARISON: CT of the abdomen and pelvis 12/16/2023  TECHNIQUE: CT scan of the chest, abdomen, and pelvis was performed following injection of IV contrast. Multiplanar reformats were obtained. Dose reduction techniques " were used.   CONTRAST: isovue 370 100ml    FINDINGS:   LUNGS AND PLEURA: Normal.    MEDIASTINUM/AXILLAE: Normal.    CORONARY ARTERY CALCIFICATION: None.    HEPATOBILIARY: Normal.    PANCREAS: Normal.    SPLEEN: Normal.    ADRENAL GLANDS: Normal.    KIDNEYS/BLADDER: Normal.    BOWEL: Normal.    LYMPH NODES: Normal.    VASCULATURE: Unremarkable.    PELVIC ORGANS: Normal.    MUSCULOSKELETAL: Since 12/16/2023 the prior seen gas associated with the laparotomy site has nearly resolved there is no fluid collections seen to suggest an abscess.      Impression    IMPRESSION:  1.  Nearly resolved gas at the laparotomy site since 12/16/2023. No findings of an abscess. Nothing seen to explain patient's sepsis clinically.   US Soft Tissue Abdominal Wall or Lower Back    Narrative    US SOFT TISSUE ABDOMINAL WALL OR LOWER BACK  1/31/2024 12:52 PM CST    INDICATION: Erythema, large pannus; evaluate abscess.  COMPARISON: Previous day CT.    FINDINGS: Along the area of interest, subcutaneous edema present, though no focal collection or abscess.

## 2024-01-31 NOTE — PROGRESS NOTES
"ED RESPIRATORY CARE NOTE    Discussed CPAP with Pt.  He does not use CPAP at home currently; he has not yet had a sleep study.  Will continue to monitor SpO2 and place oxygen as needed.  Pt is currently on room air.  SpO2 93%      /57 (BP Location: Left arm, Cuff Size: Adult Large)   Pulse 100   Temp (!) 102.8  F (39.3  C) (Oral)   Resp 24   Ht 1.803 m (5' 11\")   Wt (!) 175.1 kg (386 lb)   SpO2 99%   BMI 53.84 kg/m       Myles Moore, RT    "

## 2024-01-31 NOTE — H&P
"ADMISSION HISTORY & PHYSICAL    CODE STATUS:  Elizabeth Samuel, [phone]    Patient YOB: 1985  Admit date: 1/30/2024  Date of Service: 1/30/2024    ASSESSMENT AND PLAN:  Patient is a 38 year old male with PMH significant for morbid obesity, hypertension, DM-2 and recent admission for umbilical hernia (11/22/23-11/30/23), s/p exploratory laparotomy with repair of incisional ventral hernia with mesh, re-admitted on 12/16/23-12/20/23 for concern regarding post-op wound infection who was sent to our ED by the home health nurse for concern of sepsis.      Sepsis:  -Patient presents with fever, chills, and found to have lactic acidosis, leucocytosis and tachycardia. Having shaking chills in ED during my visit.   -No clear source of infection found, however suspect ongoing abdominal wall infection. The lower abdominal wall has some redness and induration around the panus.   -CT chest/abd/pelvis showed- nearly resolved gas at the laparotomy site since 12/16/2023. No findings of an abscess. Nothing seen to explain patient's sepsis clinically.   -UA- negative. COVID-19, flu- negative  -Received 3L of NS bolus in ED. Cont NS @ 125ml/hr  -Blood culture pending. Checked procal- normal.   -Cont with IV vanc/zosyn for now  -ID consult    Hypertension   DM-II, A1c 6.5  -Cont home meds    Clinically Significant Risk Factors Present on Admission                  # Hypertension: Noted on problem list     # DMII: A1C = 6.5 % (Ref range: <5.7 %) within past 6 months    # Severe Obesity: Estimated body mass index is 53.84 kg/m  as calculated from the following:    Height as of this encounter: 1.803 m (5' 11\").    Weight as of this encounter: 175.1 kg (386 lb).       # Financial/Environmental Concerns:           Disposition:  -Anticipated Length of Stay in midnights and medical necessity (including a midnight in the Emergency Department after triage if applicable): >2      CHIEF COMPLAINT:  Abdominal wall " cellulitis    HISTORY OF PRESENTING ILLNESS:  Patient is a 38 year old male with PMH significant for morbid obesity, hypertension, DM-2 and recent admission for umbilical hernia (11/22/23-11/30/23), s/p exploratory laparotomy with repair of incisional ventral hernia with mesh, re-admitted on 12/16/23-12/20/23 for concern regarding post-op wound infection who was sent back to our ED by the home health nurse for concern of sepsis.    Patient was apparently doing fairly well until today when he started to notice fever associated with shaking chills. He denies having any cough. Denies any diarrhea. Denies any urinary symptoms, he still have 2 small wounds in the abdomen. The wounds are packed. No induration around the wound.     PMH/PSH:  Patient Active Problem List   Diagnosis    Headache    Umbilical hernia without obstruction and without gangrene    Morbid obesity (H)    Hernia of anterior abdominal wall    Nausea and vomiting, unspecified vomiting type    HTN (hypertension)    Mood disorder (H24)    Prediabetes    Pure hypercholesterolemia    Sleep disturbance    Vitamin D deficiency    Vision disturbance    Acute hypoxemic respiratory failure (H)    Cellulitis    Concussion with no loss of consciousness, sequela (H24)    Convergence insufficiency    Pain of left lower leg    Positive D dimer    Post concussion syndrome    Sepsis (H)    Vestibular dysfunction    Dehiscence of operative wound, initial encounter    Cellulitis, unspecified cellulitis site    Epilepsy, unspecified, not intractable, without status epilepticus (H)    Type 2 diabetes mellitus without complications (H)    Abdominal wall cellulitis       Past Medical History:   Diagnosis Date    Abdominal hernia     Cellulitis 05/2016    right index finger    HTN (hypertension) 07/10/2018    Migraines     Obesity     Pure hypercholesterolemia     Sleep apnea         Past Surgical History:   Procedure Laterality Date    APPENDECTOMY      childhood.    KANU  XI HERNIORRHAPHY VENTRAL N/A 11/14/2023    Procedure: HERNIORRHAPHY WITH MESH, VENTRAL, ROBOT-ASSISTED, LAPAROSCOPIC, USING DA ALBERT XI;REPAIR OF INCARCERATED HERNIA.;  Surgeon: Braydon Kim DO;  Location: [facility] [place]    HERNIA REPAIR      HERNIORRHAPHY VENTRAL N/A 11/24/2023    Procedure: REPAIR OF INCISIONAL VENTRAL HERNIA.;  Surgeon: Bryadon Kim DO;  Location: [facility] [place] OR    IR CAROTID CEREBRAL ANGIOGRAM BILATERAL  04/15/2021    LAPAROTOMY EXPLORATORY N/A 11/24/2023    Procedure: EXPLORATORY LAPAROTOMY WITH;  Surgeon: Braydon Kim DO;  Location: [facility] [place] OR    LUMBAR PUNCTURE FLUORO GUIDED DIAGNOSTIC  04/13/2021    LYSIS, ADHESIONS, ROBOT-ASSISTED, LAPAROSCOPIC, USING DA ALBERT XI N/A 11/14/2023    Procedure: LYSIS OF ADHESIONS;  Surgeon: Braydon Kim DO;  Location: [facility]          ALLERGIES:  No Known Allergies    MEDICATIONS:  Reviewed.  Current Facility-Administered Medications   Medication    sodium chloride 0.9% BOLUS 1,000 mL    vancomycin (VANCOCIN) 2,500 mg in sodium chloride 0.9 % 500 mL intermittent infusion     Current Outpatient Medications   Medication    acetaminophen (TYLENOL) 325 MG tablet    docusate sodium (COLACE) 100 MG capsule    HYDROcodone-acetaminophen (NORCO) 5-325 MG tablet    lisinopril (ZESTRIL) 10 MG tablet    oxyCODONE (ROXICODONE) 5 MG tablet       Current Facility-Administered Medications:     sodium chloride 0.9% BOLUS 1,000 mL, 1,000 mL, Intravenous, Once, Ralph Serrano MD, Last Rate: 1,000 mL/hr at 01/30/24 2149, 1,000 mL at 01/30/24 2149    vancomycin (VANCOCIN) 2,500 mg in sodium chloride 0.9 % 500 mL intermittent infusion, 2,500 mg, Intravenous, Once, Ralph Serrano MD, 2,500 mg at 01/30/24 2157    Current Outpatient Medications:     acetaminophen (TYLENOL) 325 MG tablet, Take 2 tablets (650 mg) by mouth every 6 hours as needed for mild pain, Disp: 90 tablet, Rfl: 0    docusate sodium (COLACE) 100 MG capsule, Take 1 capsule (100 mg) by  "mouth 2 times daily (Patient not taking: Reported on 12/26/2023), Disp: 30 capsule, Rfl: 0    HYDROcodone-acetaminophen (NORCO) 5-325 MG tablet, Take by mouth. (Patient not taking: Reported on 1/9/2024), Disp: , Rfl:     lisinopril (ZESTRIL) 10 MG tablet, Take 1 tablet (10 mg) by mouth daily (Patient not taking: Reported on 1/9/2024), Disp: 30 tablet, Rfl: 1    oxyCODONE (ROXICODONE) 5 MG tablet, Take 1-2 tablets (5-10 mg) by mouth every 4 hours as needed for moderate pain (Patient not taking: Reported on 12/26/2023), Disp: 25 tablet, Rfl: 0   Scheduled Meds:   sodium chloride 0.9%  1,000 mL Intravenous Once    vancomycin  2,500 mg Intravenous Once     Continuous Infusions:  PRN Meds:.    SOCIAL HISTORY:  Social History     Socioeconomic History    Marital status:      Spouse name: Not on file    Number of children: Not on file    Years of education: Not on file    Highest education level: Not on file   Occupational History    Not on file   Tobacco Use    Smoking status: Never    Smokeless tobacco: Never   Substance and Sexual Activity    Alcohol use: Not Currently     Comment: Very rare    Drug use: Never    Sexual activity: Yes     Partners: Female   Other Topics Concern    Not on file   Social History Narrative    Not on file     Social Determinants of Health     Financial Resource Strain: Not on file   Food Insecurity: Not on file   Transportation Needs: Not on file   Physical Activity: Not on file   Stress: Not on file   Social Connections: Not on file   Interpersonal Safety: Not on file   Housing Stability: Not on file       FAMILY HISTORY:  Family History   Problem Relation Age of Onset    Snoring Mother     Snoring Father         ROS:  12 point review of systems reviewed and is negative except for what has already been mentioned in HPI.       PHYSICAL EXAM:  GENRL:  Not in acute distress   /63   Pulse 117   Temp (!) 101.3  F (38.5  C) (Oral)   Resp 22   Ht 1.803 m (5' 11\")   Wt (!) 175.1 kg " "(386 lb)   SpO2 96%   BMI 53.84 kg/m    No intake/output data recorded.  I/O this shift:  In: 2100 [IV Piggyback:2100]  Out: 350 [Urine:350]  HEENT: NC/AT      Eyes-  Pupil round and reactive to light bilaterally       Neck- supple, no JVP elevation,       Sclera- anicteric      Oropharynx- moist and pink  CHEST: Clear to auscultation bilateral anteriorly, no ronchi or wheezing  HEART: S1S2 normal, regular.   ABDMN: Soft. Obese abdomen. 2 small packed wounds in the abdomen. Some redness and induration in the lower abdomen around the panus  EXTRM: No pedal edema   INTGM: No skin rash, no cyanosis or clubbing  NEURO: Alert and awake. Cranial nerves II-XII grossly intact. No focal neurological deficit.      DIAGNOSTIC DATA:    Recent Labs   Lab 01/30/24  1803   WBC 18.4*   HGB 12.6*   HCT 40.8          Recent Labs   Lab 01/30/24  1806      CO2 24   BUN 10.8       No results for input(s): \"INR\" in the last 168 hours.    Recent Labs   Lab 01/30/24  1806      CO2 24   BUN 10.8   ALBUMIN 3.9   ALKPHOS 113   ALT 48   AST 47*       [unfilled]    CT Chest/Abdomen/Pelvis w Contrast    Result Date: 1/30/2024  EXAM: CT CHEST/ABDOMEN/PELVIS W CONTRAST LOCATION: [facility] DATE: 1/30/2024 INDICATION: Sepsis COMPARISON: CT of the abdomen and pelvis 12/16/2023 TECHNIQUE: CT scan of the chest, abdomen, and pelvis was performed following injection of IV contrast. Multiplanar reformats were obtained. Dose reduction techniques were used. CONTRAST: isovue 370 100ml FINDINGS: LUNGS AND PLEURA: Normal. MEDIASTINUM/AXILLAE: Normal. CORONARY ARTERY CALCIFICATION: None. HEPATOBILIARY: Normal. PANCREAS: Normal. SPLEEN: Normal. ADRENAL GLANDS: Normal. KIDNEYS/BLADDER: Normal. BOWEL: Normal. LYMPH NODES: Normal. VASCULATURE: Unremarkable. PELVIC ORGANS: Normal. MUSCULOSKELETAL: Since 12/16/2023 the prior seen gas associated with the laparotomy site has nearly resolved there is no fluid " collections seen to suggest an abscess.     IMPRESSION: 1.  Nearly resolved gas at the laparotomy site since 12/16/2023. No findings of an abscess. Nothing seen to explain patient's sepsis clinically.      Patient's new lab studies reviewed personally.  Patient's new radiology reports reviewed personally.  I personally viewed and personally interpreted patient's EKG:     Note created using dragon voice recognition software.  Errors in spelling or words which seems out of context are unintentional.  Sounds alike errors may have escaped editing.     01/30/2024  Tomas Glass MD  HOSPITALIST, [facility]  PAGER NO. [phone]

## 2024-01-31 NOTE — CONSULTS
Consultation - Infectious Disease  [facility]  Ady Godinez,  1985, MRN [id number]    Admitting Dx: Abdominal wall cellulitis [L03.311]    PCP: Elizabeth Black, [phone]       ASSESSMENT   38-year-old man with a history of recent umbilical hernia repair admitted with sepsis.  Concern for abdominal wall infection    Status postumbilical hernia repair.  Presenting in November with abdominal pain and incarcerated hernia.  Underwent repair with mesh on 2023.  Return to the hospital with drainage from the wound sites concerning for infected seroma.  Status post exploratory laparotomy on 2023  Postop wound infection.  Hospitalized in December with postop wound infection.  No drainable abscess.  Wound cultures were polymicrobial.  Discharged with levofloxacin, Flagyl, and doxycycline.  Sepsis.  Acute onset of chills and fever yesterday.  No other associated symptoms.  Increased redness over the abdomen.  Chronic ulcers without recent history of worsening.  CT scan of the abdomen without any abscesses.  Abdominal wall cellulitis versus panniculitis.  Significant geographic erythema over the abdomen, very tender.  No obvious purulence from wounds.  Mesh infection is in the differential, but no obvious fluid collections around the surgical site    Principal Problem:    Abdominal wall cellulitis       PLAN   -Broaden antibiotic coverage with vancomycin and meropenem  -Abdominal wall ultrasound to rule out any underlying abscess  -Monitor response to antibiotics  -If not improving, will need repeat imaging and possibly biopsy  -repeat blood cultures today  -Trend white count.  Check CRP  -Follow-up on blood cultures      Thank you for this consult. Will follow.    Jamal Smyth MD  [facility]  Direct messaging: [employer] Paging  On-Call ID provider: [phone], option: 9      ===========================================      Chief Complaint   Abdominal wall  cellulitis       HPI     We have been requested by Gilbert Allen DO to evaluate Ady Godinez for the above.    History obtained by patient    Ady Godinez is a 38 year old man with recent history of abdominal surgery complicated by infection, presented to the ER with acute onset of chills.  The patient is known to the ID service for recent hospitalization for postop wound infection.  He underwent repair of incarcerated umbilical hernia on 11/14.  This was complicated by infected seroma and underwent exploratory laparotomy on 11/24.  He was readmitted from 12/16 to 12/20 with postop wound infection with purulent drainage.  He was seen by ID.  Wound cultures grew Enterobacter cloacae, MSSA, and corynebacterium stratum.  He was discharged with levofloxacin, Flagyl, and doxycycline.  He had improvement after leaving the hospital.  He continues to have limitations due to exhaustion and abdominal pain.  He has not been able to return to work.  He is able to manage most of his daily activities, but gets help from his wife and a home health nurse.  He has 2 wounds that are improving.  They are managed by home health nurse and packed regularly.  He was at his current baseline up until yesterday when he developed acute onset of chills.  His home health nurse recommended he go to the hospital.  In the ER he was found to have fever and leukocytosis.  He says he has increasing erythema around his abdominal wall.  He has been coughing more since yesterday.  Vomiting last night and again this morning.  CT scan without any obvious findings for pneumonia or intra-abdominal infection.          Review of Systems   Ten systems reviewed and negative except for what is noted in the HPI       Medical History  Past Medical History:   Diagnosis Date    Abdominal hernia     Cellulitis 05/2016    right index finger    HTN (hypertension) 07/10/2018    Migraines     Obesity     Pure hypercholesterolemia     Sleep apnea      "Surgical History  He  has a past surgical history that includes IR Carotid Cerebral Angiogram Bilateral (04/15/2021); Lumbar Puncture Fluoro Guided Diagnostic (04/13/2021); appendectomy; Davinci Xi Herniorrhaphy Ventral (N/A, 11/14/2023); Lysis, Adhesions, Robot-Assisted, Laparoscopic, Using Da Shannan Xi (N/A, 11/14/2023); hernia repair; Laparotomy exploratory (N/A, 11/24/2023); and Herniorrhaphy ventral (N/A, 11/24/2023).     Social History  Reviewed, and he  reports that he has never smoked. He has never used smokeless tobacco. He reports that he does not currently use alcohol. He reports that he does not use drugs.  Social History     Social History Narrative    Not on file     Family History  family history includes Snoring in his father and mother.  family history reviewed and is not pertinent to the presenting problem.            Allergies   No Known Allergies      Antibiotics   Vancomycin 1/30-  Zosyn 1/30-    Previous:  None      Physical Exam     Temp:  [97.7  F (36.5  C)-103  F (39.4  C)] 98.8  F (37.1  C)  Pulse:  [] 97  Resp:  [20-22] 20  BP: ()/(53-80) 113/57  SpO2:  [94 %-100 %] 99 %    /57   Pulse 97   Temp 98.8  F (37.1  C) (Oral)   Resp 20   Ht 1.803 m (5' 11\")   Wt (!) 175.1 kg (386 lb)   SpO2 99%   BMI 53.84 kg/m      GENERAL:  well-developed, well-nourished, lying in bed in no acute distress.   HENT:  Head is normocephalic, atraumatic. Oropharynx is moist without exudates or ulcers.  EYES:  Eyes have anicteric sclerae without conjunctival injection or stigmata of endocarditis.   NECK:  Supple.  LUNGS:  Clear to auscultation.  CARDIOVASCULAR:  Regular rate and rhythm with no murmurs, gallops or rubs.  ABDOMEN:  Normal bowel sounds, soft.  Large pannus.  2 packed wounds near the midline.  Erythema in a geographic distribution over the abdomen and lower pannus with induration and tenderness  EXT: Extremities warm and without edema.  SKIN:  No acute rashes. No stigmata of " "endocarditis.  Dry flaky skin over both feet  NEUROLOGIC:  Grossly nonfocal.      Cultures   1/30 blood cultures x 2: Pending    Susceptibility data from last 90 days.  Collected Specimen Info Organism Ampicillin Ampicillin/Sulbactam Cefepime Ceftazidime Ceftriaxone Ciprofloxacin Clindamycin Daptomycin Doxycycline Erythromycin Gentamicin Levofloxacin Meropenem Oxacillin Penicillin   12/17/23 Wound from Abdomen Enterobacter cloacae complex R R  S  S  S  S      S  S  S       Staphylococcus aureus        S  S  S  S  S    S      Corynebacterium striatum      I   R   S       I   12/17/23 Abscess from Abdomen Mixed Aerobic and Anaerobic gonzález                  11/24/23 Tissue from Abdomen Cutibacterium (Propionibacterium) acnes                    Collected Specimen Info Organism Piperacillin/Tazobactam Tetracycline Tobramycin Trimethoprim/Sulfamethoxazole  Vancomycin   12/17/23 Wound from Abdomen Enterobacter cloacae complex  S   S  S      Staphylococcus aureus   S   S  S     Corynebacterium striatum     S  S   12/17/23 Abscess from Abdomen Mixed Aerobic and Anaerobic gonzález        11/24/23 Tissue from Abdomen Cutibacterium (Propionibacterium) acnes             Laboratory results     Recent Labs   Lab 01/31/24  0728 01/30/24  1803   WBC 20.2* 18.4*   HGB 11.5* 12.6*    343       Recent Labs   Lab 01/31/24  0728 01/30/24  1806    140   CO2 24 24   BUN 9.4 10.8   ALBUMIN 3.1* 3.9   ALKPHOS 78 113   ALT 34 48   AST 24 47*       No results for input(s): \"CRPI\", \"SED\" in the last 168 hours.        Imaging   Radiology results reviewed    CT Chest/Abdomen/Pelvis w Contrast    Result Date: 1/30/2024  EXAM: CT CHEST/ABDOMEN/PELVIS W CONTRAST [facility]ATION: [facility] DATE: 1/30/2024 INDICATION: Sepsis COMPARISON: CT of the abdomen and pelvis 12/16/2023 TECHNIQUE: CT scan of the chest, abdomen, and pelvis was performed following injection of IV contrast. Multiplanar reformats were obtained. Dose " reduction techniques were used. CONTRAST: isovue 370 100ml FINDINGS: LUNGS AND PLEURA: Normal. MEDIASTINUM/AXILLAE: Normal. CORONARY ARTERY CALCIFICATION: None. HEPATOBILIARY: Normal. PANCREAS: Normal. SPLEEN: Normal. ADRENAL GLANDS: Normal. KIDNEYS/BLADDER: Normal. BOWEL: Normal. LYMPH NODES: Normal. VASCULATURE: Unremarkable. PELVIC ORGANS: Normal. MUSCULOSKELETAL: Since 12/16/2023 the prior seen gas associated with the laparotomy site has nearly resolved there is no fluid collections seen to suggest an abscess.     IMPRESSION: 1.  Nearly resolved gas at the laparotomy site since 12/16/2023. No findings of an abscess. Nothing seen to explain patient's sepsis clinically.      Data reviewed today: I reviewed all medications, new labs and imaging results over the last 24 hours. I personally reviewed the abdominal CT image(s) showing no abscess .  The patient's care was discussed with the Bedside Nurse, Patient, and Primary team.

## 2024-02-01 LAB
BASOPHILS # BLD AUTO: 0 10E3/UL (ref 0–0.2)
BASOPHILS NFR BLD AUTO: 0 %
CREAT SERPL-MCNC: 0.66 MG/DL (ref 0.67–1.17)
CRP SERPL-MCNC: 247 MG/L
EGFRCR SERPLBLD CKD-EPI 2021: >90 ML/MIN/1.73M2
EOSINOPHIL # BLD AUTO: 0 10E3/UL (ref 0–0.7)
EOSINOPHIL NFR BLD AUTO: 0 %
ERYTHROCYTE [DISTWIDTH] IN BLOOD BY AUTOMATED COUNT: 15 % (ref 10–15)
GLUCOSE BLDC GLUCOMTR-MCNC: 108 MG/DL (ref 70–99)
GLUCOSE BLDC GLUCOMTR-MCNC: 109 MG/DL (ref 70–99)
GLUCOSE BLDC GLUCOMTR-MCNC: 112 MG/DL (ref 70–99)
GLUCOSE BLDC GLUCOMTR-MCNC: 113 MG/DL (ref 70–99)
GLUCOSE BLDC GLUCOMTR-MCNC: 118 MG/DL (ref 70–99)
GLUCOSE BLDC GLUCOMTR-MCNC: 119 MG/DL (ref 70–99)
GLUCOSE BLDC GLUCOMTR-MCNC: 132 MG/DL (ref 70–99)
HCT VFR BLD AUTO: 37 % (ref 40–53)
HGB BLD-MCNC: 11.1 G/DL (ref 13.3–17.7)
IMM GRANULOCYTES # BLD: 0.1 10E3/UL
IMM GRANULOCYTES NFR BLD: 1 %
LACTATE SERPL-SCNC: 0.9 MMOL/L (ref 0.7–2)
LYMPHOCYTES # BLD AUTO: 1.1 10E3/UL (ref 0.8–5.3)
LYMPHOCYTES NFR BLD AUTO: 7 %
MCH RBC QN AUTO: 26.7 PG (ref 26.5–33)
MCHC RBC AUTO-ENTMCNC: 30 G/DL (ref 31.5–36.5)
MCV RBC AUTO: 89 FL (ref 78–100)
MONOCYTES # BLD AUTO: 0.6 10E3/UL (ref 0–1.3)
MONOCYTES NFR BLD AUTO: 3 %
NEUTROPHILS # BLD AUTO: 15.6 10E3/UL (ref 1.6–8.3)
NEUTROPHILS NFR BLD AUTO: 89 %
NRBC # BLD AUTO: 0 10E3/UL
NRBC BLD AUTO-RTO: 0 /100
PLATELET # BLD AUTO: 262 10E3/UL (ref 150–450)
RBC # BLD AUTO: 4.15 10E6/UL (ref 4.4–5.9)
VANCOMYCIN SERPL-MCNC: 5.2 UG/ML
WBC # BLD AUTO: 17.4 10E3/UL (ref 4–11)

## 2024-02-01 PROCEDURE — 258N000003 HC RX IP 258 OP 636: Performed by: INTERNAL MEDICINE

## 2024-02-01 PROCEDURE — 250N000011 HC RX IP 250 OP 636: Performed by: INTERNAL MEDICINE

## 2024-02-01 PROCEDURE — 82565 ASSAY OF CREATININE: CPT | Performed by: INTERNAL MEDICINE

## 2024-02-01 PROCEDURE — 99233 SBSQ HOSP IP/OBS HIGH 50: CPT | Performed by: STUDENT IN AN ORGANIZED HEALTH CARE EDUCATION/TRAINING PROGRAM

## 2024-02-01 PROCEDURE — 80202 ASSAY OF VANCOMYCIN: CPT | Performed by: STUDENT IN AN ORGANIZED HEALTH CARE EDUCATION/TRAINING PROGRAM

## 2024-02-01 PROCEDURE — 86140 C-REACTIVE PROTEIN: CPT | Performed by: INTERNAL MEDICINE

## 2024-02-01 PROCEDURE — 250N000011 HC RX IP 250 OP 636: Performed by: STUDENT IN AN ORGANIZED HEALTH CARE EDUCATION/TRAINING PROGRAM

## 2024-02-01 PROCEDURE — 258N000003 HC RX IP 258 OP 636: Performed by: STUDENT IN AN ORGANIZED HEALTH CARE EDUCATION/TRAINING PROGRAM

## 2024-02-01 PROCEDURE — 83605 ASSAY OF LACTIC ACID: CPT | Performed by: STUDENT IN AN ORGANIZED HEALTH CARE EDUCATION/TRAINING PROGRAM

## 2024-02-01 PROCEDURE — 93005 ELECTROCARDIOGRAM TRACING: CPT | Performed by: STUDENT IN AN ORGANIZED HEALTH CARE EDUCATION/TRAINING PROGRAM

## 2024-02-01 PROCEDURE — 120N000001 HC R&B MED SURG/OB

## 2024-02-01 PROCEDURE — 82962 GLUCOSE BLOOD TEST: CPT

## 2024-02-01 PROCEDURE — 36415 COLL VENOUS BLD VENIPUNCTURE: CPT | Performed by: STUDENT IN AN ORGANIZED HEALTH CARE EDUCATION/TRAINING PROGRAM

## 2024-02-01 PROCEDURE — 85025 COMPLETE CBC W/AUTO DIFF WBC: CPT | Performed by: INTERNAL MEDICINE

## 2024-02-01 PROCEDURE — 99232 SBSQ HOSP IP/OBS MODERATE 35: CPT | Performed by: INTERNAL MEDICINE

## 2024-02-01 PROCEDURE — 250N000013 HC RX MED GY IP 250 OP 250 PS 637: Performed by: STUDENT IN AN ORGANIZED HEALTH CARE EDUCATION/TRAINING PROGRAM

## 2024-02-01 RX ORDER — SODIUM CHLORIDE 9 MG/ML
INJECTION, SOLUTION INTRAVENOUS CONTINUOUS
Status: DISCONTINUED | OUTPATIENT
Start: 2024-02-01 | End: 2024-02-02

## 2024-02-01 RX ADMIN — VANCOMYCIN HYDROCHLORIDE 1250 MG: 5 INJECTION, POWDER, LYOPHILIZED, FOR SOLUTION INTRAVENOUS at 09:09

## 2024-02-01 RX ADMIN — VANCOMYCIN HYDROCHLORIDE 1500 MG: 5 INJECTION, POWDER, LYOPHILIZED, FOR SOLUTION INTRAVENOUS at 22:06

## 2024-02-01 RX ADMIN — ONDANSETRON 4 MG: 2 INJECTION INTRAMUSCULAR; INTRAVENOUS at 15:51

## 2024-02-01 RX ADMIN — SODIUM CHLORIDE: 9 INJECTION, SOLUTION INTRAVENOUS at 14:58

## 2024-02-01 RX ADMIN — HEPARIN SODIUM 5000 UNITS: 10000 INJECTION, SOLUTION INTRAVENOUS; SUBCUTANEOUS at 18:04

## 2024-02-01 RX ADMIN — HEPARIN SODIUM 5000 UNITS: 10000 INJECTION, SOLUTION INTRAVENOUS; SUBCUTANEOUS at 00:53

## 2024-02-01 RX ADMIN — MEROPENEM 1 G: 1 INJECTION, POWDER, FOR SOLUTION INTRAVENOUS at 03:28

## 2024-02-01 RX ADMIN — VANCOMYCIN HYDROCHLORIDE 1500 MG: 5 INJECTION, POWDER, LYOPHILIZED, FOR SOLUTION INTRAVENOUS at 14:49

## 2024-02-01 RX ADMIN — PROCHLORPERAZINE EDISYLATE 5 MG: 5 INJECTION INTRAMUSCULAR; INTRAVENOUS at 21:09

## 2024-02-01 RX ADMIN — ACETAMINOPHEN 975 MG: 325 TABLET ORAL at 09:09

## 2024-02-01 RX ADMIN — ONDANSETRON 4 MG: 2 INJECTION INTRAMUSCULAR; INTRAVENOUS at 06:19

## 2024-02-01 RX ADMIN — ACETAMINOPHEN 975 MG: 325 TABLET ORAL at 15:10

## 2024-02-01 RX ADMIN — MEROPENEM 1 G: 1 INJECTION, POWDER, FOR SOLUTION INTRAVENOUS at 19:32

## 2024-02-01 RX ADMIN — MEROPENEM 1 G: 1 INJECTION, POWDER, FOR SOLUTION INTRAVENOUS at 12:21

## 2024-02-01 RX ADMIN — HEPARIN SODIUM 5000 UNITS: 10000 INJECTION, SOLUTION INTRAVENOUS; SUBCUTANEOUS at 09:09

## 2024-02-01 ASSESSMENT — ACTIVITIES OF DAILY LIVING (ADL)
ADLS_ACUITY_SCORE: 37
ADLS_ACUITY_SCORE: 40
ADLS_ACUITY_SCORE: 37
ADLS_ACUITY_SCORE: 37
FALL_HISTORY_WITHIN_LAST_SIX_MONTHS: NO
ADLS_ACUITY_SCORE: 37
ADLS_ACUITY_SCORE: 40
ADLS_ACUITY_SCORE: 37
ADLS_ACUITY_SCORE: 40

## 2024-02-01 NOTE — PROGRESS NOTES
Infectious Diseases Progress Note  [facility]    Date of visit: 02/01/2024     ASSESSMENT   38-year-old man with a history of recent umbilical hernia repair admitted with sepsis.  Concern for abdominal wall infection    Status postumbilical hernia repair.  Presenting in November with abdominal pain and incarcerated hernia.  Underwent repair with mesh on 11/14/2023.  Return to the hospital with drainage from the wound sites concerning for infected seroma.  Status post exploratory laparotomy on 11/24/2023  Postop wound infection.  Hospitalized in December with postop wound infection.  No drainable abscess.  Wound cultures were polymicrobial.  Discharged with levofloxacin, Flagyl, and doxycycline.  Sepsis.  Acute onset of chills and fever 1 day prior to admission.  No other associated symptoms.  Increased redness over the abdomen.  Chronic ulcers without recent history of worsening.  CT scan of the abdomen without any abscesses.  Abdominal wall cellulitis versus panniculitis.  Significant geographic erythema over the abdomen, very tender.  No obvious purulence from wounds.  Mesh infection is in the differential, but no obvious fluid collections around the surgical site. Soft tissue u/s without new findings. High CRP     Principal Problem:    Abdominal wall cellulitis       PLAN   -continue vancomycin and meropenem  -follow-up on blood cultures  -If not improving, will need repeat imaging and possibly biopsy  -Trend white count      Jamal Smyth MD  [facility]  Direct messaging: [employer] Paging  On-Call ID provider: [phone], option: 9      ===========================================    SUBJECTIVE / INTERVAL HISTORY:     Transferred to  just now. Ongoing headache, very painful, not responding to pain meds. Abd very uncomfortable with movement.    Fever this morning.      Antibiotics   Vancomycin 1/30-  Zosyn 1/30-    Previous:  None      Physical Exam     Temp:  [98.7  F  "(37.1  C)-102.8  F (39.3  C)] 99.1  F (37.3  C)  Pulse:  [] 104  Resp:  [18-24] 20  BP: (112-137)/(57-76) 130/59  SpO2:  [90 %-99 %] 90 %    /59 (BP Location: Left arm)   Pulse 104   Temp 99.1  F (37.3  C) (Oral)   Resp 20   Ht 1.803 m (5' 11\")   Wt (!) 175.1 kg (386 lb)   SpO2 90%   BMI 53.84 kg/m      GENERAL:  well-developed, well-nourished, lying in bed, uncomfortable  HENT:  Head is normocephalic, atraumatic.   NECK: supple  EYES:  Eyes have anicteric sclerae without conjunctival injection   LUNGS:  Clear to auscultation.  CARDIOVASCULAR:  Regular rate and rhythm with no murmurs, gallops or rubs.  ABDOMEN:  Normal bowel sounds, soft.  Large pannus.  2 packed wounds near the midline.  Erythema in a geographic distribution over the abdomen and lower pannus with induration and tenderness - about the same today  EXT: Extremities warm and without edema.  SKIN:  No acute rashes.  Dry flaky skin over both feet  NEUROLOGIC:  Grossly nonfocal.      Cultures   1/30 blood cultures x 2: no growth to date  1/31 blood culture x 2: no growth to date     Susceptibility data from last 90 days.  Collected Specimen Info Organism Ampicillin Ampicillin/Sulbactam Cefepime Ceftazidime Ceftriaxone Ciprofloxacin Clindamycin Daptomycin Doxycycline Erythromycin Gentamicin Levofloxacin Meropenem Oxacillin Penicillin   12/17/23 Wound from Abdomen Enterobacter cloacae complex R R  S  S  S  S      S  S  S       Staphylococcus aureus        S  S  S  S  S    S      Corynebacterium striatum      I   R   S       I   12/17/23 Abscess from Abdomen Mixed Aerobic and Anaerobic gonzález                  11/24/23 Tissue from Abdomen Cutibacterium (Propionibacterium) acnes                    Collected Specimen Info Organism Piperacillin/Tazobactam Tetracycline Tobramycin Trimethoprim/Sulfamethoxazole  Vancomycin   12/17/23 Wound from Abdomen Enterobacter cloacae complex  S   S  S      Staphylococcus aureus   S   S  S     Corynebacterium " striatum     S  S   12/17/23 Abscess from Abdomen Mixed Aerobic and Anaerobic gonzález        11/24/23 Tissue from Abdomen Cutibacterium (Propionibacterium) acnes               Pertinent Labs:     Recent Labs   Lab 02/01/24  0646 01/31/24  0728 01/30/24  1803   WBC 17.4* 20.2* 18.4*   HGB 11.1* 11.5* 12.6*    252 343       Recent Labs   Lab 01/31/24  0728 01/30/24  1806    140   CO2 24 24   BUN 9.4 10.8   ALBUMIN 3.1* 3.9   ALKPHOS 78 113   ALT 34 48   AST 24 47*       Recent Labs   Lab 02/01/24  0646   CRPI 247.00*           Imaging:     US Soft Tissue Abdominal Wall or Lower Back    Result Date: 1/31/2024  US SOFT TISSUE ABDOMINAL WALL OR LOWER BACK 1/31/2024 12:52 PM CST INDICATION: Erythema, large pannus; evaluate abscess. COMPARISON: Previous day CT. FINDINGS: Along the area of interest, subcutaneous edema present, though no focal collection or abscess.     CT Chest/Abdomen/Pelvis w Contrast    Result Date: 1/30/2024  EXAM: CT CHEST/ABDOMEN/PELVIS W CONTRAST LOCATION: [facility] DATE: 1/30/2024 INDICATION: Sepsis COMPARISON: CT of the abdomen and pelvis 12/16/2023 TECHNIQUE: CT scan of the chest, abdomen, and pelvis was performed following injection of IV contrast. Multiplanar reformats were obtained. Dose reduction techniques were used. CONTRAST: isovue 370 100ml FINDINGS: LUNGS AND PLEURA: Normal. MEDIASTINUM/AXILLAE: Normal. CORONARY ARTERY CALCIFICATION: None. HEPATOBILIARY: Normal. PANCREAS: Normal. SPLEEN: Normal. ADRENAL GLANDS: Normal. KIDNEYS/BLADDER: Normal. BOWEL: Normal. LYMPH NODES: Normal. VASCULATURE: Unremarkable. PELVIC ORGANS: Normal. MUSCULOSKELETAL: Since 12/16/2023 the prior seen gas associated with the laparotomy site has nearly resolved there is no fluid collections seen to suggest an abscess.     IMPRESSION: 1.  Nearly resolved gas at the laparotomy site since 12/16/2023. No findings of an abscess. Nothing seen to explain patient's sepsis  clinically.        Data reviewed today: I reviewed all medications, new labs and imaging results over the last 24 hours. I personally reviewed the soft tissue u/s image(s) showing no abscess .  The patient's care was discussed with the Bedside Nurse and Patient.     Mauc Instructions: By selecting yes to the question below the MAUC number will be added into the note.  This will be calculated automatically based on the diagnosis chosen, the size entered, the body zone selected (H,M,L) and the specific indications you chose. You will also have the option to override the Mohs AUC if you disagree with the automatically calculated number and this option is found in the Case Summary tab.

## 2024-02-01 NOTE — PROGRESS NOTES
[facility]    Medicine Progress Note - Hospitalist Service    Date of Admission:  1/30/2024    Assessment & Plan   Patient is a 38 year old male with PMH significant for morbid obesity, hypertension, DM2 and recent admission for umbilical hernia (11/22/23-11/30/23), s/p exploratory laparotomy with repair of incisional ventral hernia with mesh, re-admitted on 12/16/23-12/20/23 for concern regarding post-op wound infection who was sent to our ED by the home health nurse for concern of sepsis.     Severe sepsis with lactic acidosis  -Patient presents with fever, chills, and found to have lactic acidosis (elevated at 3.2), leucocytosis and tachycardia  -Suspect ongoing abdominal wall infection. The lower abdominal wall has some redness and induration around the panus. No abscess and resolving gas on CT.    -continue IVF  -Blood cultures pending  -ID consulted, continue vanc and meropenem  -watch closely for chris gangrene progression   -may need to discuss with gen surg pending course although no convincing evidence of deep tissue progression   -further diagnostics pending above    Tachycardia  -continued tachycardia secondary to above  -continue IVF and treat sepsis  -will check EKG to ensure sinus  -pt has been on DVT ppx while here but low functional status at baseline, consider DVT/PE workup pending response to sepsis treatment      Hypertension   DM-II, A1c 6.5  -Cont home meds, insulin          Diet: Combination Diet Regular Diet Adult; Moderate Consistent Carb (60 g CHO per Meal) Diet    DVT Prophylaxis: Heparin   Duvall Catheter: Not present  Lines: None     Cardiac Monitoring: None  Code Status: Full Code      Clinically Significant Risk Factors              # Hypoalbuminemia: Lowest albumin = 3.1 g/dL at 1/31/2024  7:28 AM, will monitor as appropriate     # Hypertension: Noted on problem list       # DMII: A1C = 6.5 % (Ref range: <5.7 %) within past 6 months, PRESENT ON ADMISSION  #  "Severe Obesity: Estimated body mass index is 53.84 kg/m  as calculated from the following:    Height as of this encounter: 1.803 m (5' 11\").    Weight as of this encounter: 175.1 kg (386 lb)., PRESENT ON ADMISSION       # Financial/Environmental Concerns:           Disposition Plan      Expected Discharge Date: 02/02/2024                    Gilbert Allen DO  Hospitalist Service  [facility]  Securely message with APR (more info)  Text page via [employer] Paging/Directory   ______________________________________________________________________    Interval History   Feels the same this morning with burning pain on abdomen. Noted headache and neck pain continued from yesterday without change. No tinnitus, visual changes, nausea, vomiting.      Physical Exam   Vital Signs: Temp: 98.7  F (37.1  C) Temp src: Oral BP: 121/65 Pulse: 113   Resp: 18 SpO2: 95 % O2 Device: None (Room air)    Weight: 386 lbs 0 oz    General Appearance: Mild distress, non diaphoretic   Respiratory: CTAB with decreased effort   Cardiovascular: Tachycardic, no murmur   GI: superficial pain throughout abdominal pannus with erythema and tender to touch, no erythema or skin changes of inguinal area   Skin: erythema and edema throughout abdominal pannus, warm to touch without fluctuations   Other: No sharp neck pain    Medical Decision Making       50 MINUTES SPENT BY ME on the date of service doing chart review, history, exam, documentation & further activities per the note.      Data     I have personally reviewed the following data over the past 24 hrs:    17.4 (H)  \   11.1 (L)   / 262     N/A N/A N/A /  108 (H)   N/A N/A 0.66 (L) \     Procal: N/A CRP: 247.00 (H) Lactic Acid: 0.9         Imaging results reviewed over the past 24 hrs:   No results found for this or any previous visit (from the past 24 hour(s)).    "

## 2024-02-01 NOTE — PROGRESS NOTES
"[facility] ED Handoff Report    Pt wanting to sleep most of morning. PRN tylenol given for an 8/10 headache and 100.6 temp. Temp recheck was 98.7, headache a little bit better per pt. Pt not having much of an appetite today.  and 108, no sliding scale insulin given. Pt A&O x4. Up to bathroom independently with four point walker.    ED Chief Complaint: Fevers    ED Diagnosis:  (L03.311) Abdominal wall cellulitis  Plan: IV abx    (A41.9) Sepsis without acute organ dysfunction, due to unspecified organism (H)  Plan: IV abx and tylenol       PMH:    Past Medical History:   Diagnosis Date    Abdominal hernia     Cellulitis 05/2016    right index finger    HTN (hypertension) 07/10/2018    Migraines     Obesity     Pure hypercholesterolemia     Sleep apnea         Code Status:  Full Code     Falls Risk: No Band: Not applicable    Current Living Situation/Residence: lives alone     Elimination Status: Continent: Yes     Activity Level: Independent with four point walker    Patients Preferred Language:  English     Needed: No    Vital Signs:  /65 (BP Location: Right arm)   Pulse 113   Temp 98.7  F (37.1  C) (Oral)   Resp 18   Ht 1.803 m (5' 11\")   Wt (!) 175.1 kg (386 lb)   SpO2 95%   BMI 53.84 kg/m       Pain Score: 7/10    Is the Patient Confused:  No    Last Food or Drink: 02/01/24 at 1200    Focused Assessment:  Abdomen reddened and outlined.     Tests Performed: Done: Labs and Imaging    Family Dynamics/Concerns: No    Family Updated On Visitor Policy: Yes    Plan of Care Communicated to Family: No    Belongings Sent with Patient: Phone and clothing    Medications sent with patient: Insulin pen and vanco    Covid: asymptomatic , negative    RN: LINDSAY DOWNEY RN 2/1/2024 1:53 PM        "

## 2024-02-01 NOTE — PHARMACY-VANCOMYCIN DOSING SERVICE
Pharmacy Vancomycin Note  Date of Service 2024  Patient's  1985   38 year old, male    Indication: Sepsis  Day of Therapy: 3  Current vancomycin regimen:  1250 mg IV q12h  Current vancomycin monitoring method: AUC  Current vancomycin therapeutic monitoring goal: 400-600 mg*h/L    [employer] Prediction of Current Vancomycin Regimen  Regimen: 1250 mg IV every 12 hours.  Start time: 21:09 on 2024  Exposure target: AUC24 (range)400-600 mg/L.hr   AUC24,ss: 297 mg/L.hr  Probability of AUC24 > 400: 10 %  Ctrough,ss: 3 mg/L  Probability of Ctrough,ss > 20: 0 %  Probability of nephrotoxicity (Lodise SANTO ): 3 %      Current estimated CrCl = Estimated Creatinine Clearance: 247.3 mL/min (A) (based on SCr of 0.66 mg/dL (L)).    Creatinine for last 3 days  2024:  6:06 PM Creatinine 0.87 mg/dL  2024:  7:28 AM Creatinine 0.91 mg/dL  2024:  6:46 AM Creatinine 0.66 mg/dL    Recent Vancomycin Levels (past 3 days)  2024:  6:46 AM Vancomycin 5.2 ug/mL    Vancomycin IV Administrations (past 72 hours)                     vancomycin (VANCOCIN) 1,250 mg in sodium chloride 0.9 % 250 mL intermittent infusion (mg) 1,250 mg New Bag 24 0909     1,250 mg New Bag 24 2111     1,250 mg New Bag  1054    vancomycin (VANCOCIN) 2,500 mg in sodium chloride 0.9 % 500 mL intermittent infusion (mg) 2,500 mg New Bag 24 2157                    Nephrotoxins and other renal medications (From now, onward)      Start     Dose/Rate Route Frequency Ordered Stop    24 1500  vancomycin (VANCOCIN) 1,500 mg in sodium chloride 0.9 % 250 mL intermittent infusion         1,500 mg  over 90 Minutes Intravenous EVERY 8 HOURS 24 1006                 Contrast Orders - past 72 hours (72h ago, onward)      Start     Dose/Rate Route Frequency Stop    24 2100  iopamidol (ISOVUE-370) solution 100 mL         100 mL Intravenous ONCE 24            Interpretation of levels and current  regimen:  Vancomycin level is reflective of AUC less than 400    Has serum creatinine changed greater than 50% in last 72 hours: No    Renal Function: Stable    InsightRX Prediction of Planned New Vancomycin Regimen  Regimen: 1500 mg IV every 8 hours.  Start time: 17:09 on 02/01/2024  Exposure target: AUC24 (range)400-600 mg/L.hr   AUC24,ss: 534 mg/L.hr  Probability of AUC24 > 400: 89 %  Ctrough,ss: 9.4 mg/L  Probability of Ctrough,ss > 20: 0 %  Probability of nephrotoxicity (Lodise SANTO 2009): 5 %      Plan:  Increase Dose to 1500 mg IV every 8 hours  Vancomycin monitoring method: AUC  Vancomycin therapeutic monitoring goal: 400-600 mg*h/L  Pharmacy will check vancomycin levels as appropriate in 1-3 Days.    Sweta Gaspar, [facility]

## 2024-02-02 LAB
ANION GAP SERPL CALCULATED.3IONS-SCNC: 9 MMOL/L (ref 7–15)
ATRIAL RATE - MUSE: 110 BPM
BUN SERPL-MCNC: 6.1 MG/DL (ref 6–20)
CALCIUM SERPL-MCNC: 7.6 MG/DL (ref 8.6–10)
CHLORIDE SERPL-SCNC: 100 MMOL/L (ref 98–107)
CREAT SERPL-MCNC: 0.69 MG/DL (ref 0.67–1.17)
DEPRECATED HCO3 PLAS-SCNC: 25 MMOL/L (ref 22–29)
DIASTOLIC BLOOD PRESSURE - MUSE: NORMAL MMHG
EGFRCR SERPLBLD CKD-EPI 2021: >90 ML/MIN/1.73M2
ERYTHROCYTE [DISTWIDTH] IN BLOOD BY AUTOMATED COUNT: 14.8 % (ref 10–15)
GLUCOSE BLDC GLUCOMTR-MCNC: 117 MG/DL (ref 70–99)
GLUCOSE BLDC GLUCOMTR-MCNC: 139 MG/DL (ref 70–99)
GLUCOSE BLDC GLUCOMTR-MCNC: 141 MG/DL (ref 70–99)
GLUCOSE SERPL-MCNC: 137 MG/DL (ref 70–99)
HCT VFR BLD AUTO: 31.7 % (ref 40–53)
HGB BLD-MCNC: 9.8 G/DL (ref 13.3–17.7)
INTERPRETATION ECG - MUSE: NORMAL
LACTATE SERPL-SCNC: 1 MMOL/L (ref 0.7–2)
MCH RBC QN AUTO: 26.8 PG (ref 26.5–33)
MCHC RBC AUTO-ENTMCNC: 30.9 G/DL (ref 31.5–36.5)
MCV RBC AUTO: 87 FL (ref 78–100)
P AXIS - MUSE: 58 DEGREES
PLATELET # BLD AUTO: 243 10E3/UL (ref 150–450)
POTASSIUM SERPL-SCNC: 3.1 MMOL/L (ref 3.4–5.3)
POTASSIUM SERPL-SCNC: 3.4 MMOL/L (ref 3.4–5.3)
POTASSIUM SERPL-SCNC: 3.4 MMOL/L (ref 3.4–5.3)
PR INTERVAL - MUSE: 136 MS
QRS DURATION - MUSE: 80 MS
QT - MUSE: 300 MS
QTC - MUSE: 406 MS
R AXIS - MUSE: 17 DEGREES
RBC # BLD AUTO: 3.66 10E6/UL (ref 4.4–5.9)
SODIUM SERPL-SCNC: 134 MMOL/L (ref 135–145)
SYSTOLIC BLOOD PRESSURE - MUSE: NORMAL MMHG
T AXIS - MUSE: 32 DEGREES
VENTRICULAR RATE- MUSE: 110 BPM
WBC # BLD AUTO: 14.8 10E3/UL (ref 4–11)

## 2024-02-02 PROCEDURE — 120N000001 HC R&B MED SURG/OB

## 2024-02-02 PROCEDURE — 250N000011 HC RX IP 250 OP 636: Performed by: STUDENT IN AN ORGANIZED HEALTH CARE EDUCATION/TRAINING PROGRAM

## 2024-02-02 PROCEDURE — 250N000013 HC RX MED GY IP 250 OP 250 PS 637: Performed by: STUDENT IN AN ORGANIZED HEALTH CARE EDUCATION/TRAINING PROGRAM

## 2024-02-02 PROCEDURE — 99233 SBSQ HOSP IP/OBS HIGH 50: CPT | Performed by: STUDENT IN AN ORGANIZED HEALTH CARE EDUCATION/TRAINING PROGRAM

## 2024-02-02 PROCEDURE — 250N000011 HC RX IP 250 OP 636: Performed by: INTERNAL MEDICINE

## 2024-02-02 PROCEDURE — 99232 SBSQ HOSP IP/OBS MODERATE 35: CPT | Performed by: INTERNAL MEDICINE

## 2024-02-02 PROCEDURE — 36415 COLL VENOUS BLD VENIPUNCTURE: CPT | Performed by: STUDENT IN AN ORGANIZED HEALTH CARE EDUCATION/TRAINING PROGRAM

## 2024-02-02 PROCEDURE — 83605 ASSAY OF LACTIC ACID: CPT | Performed by: STUDENT IN AN ORGANIZED HEALTH CARE EDUCATION/TRAINING PROGRAM

## 2024-02-02 PROCEDURE — 84132 ASSAY OF SERUM POTASSIUM: CPT | Performed by: STUDENT IN AN ORGANIZED HEALTH CARE EDUCATION/TRAINING PROGRAM

## 2024-02-02 PROCEDURE — 85027 COMPLETE CBC AUTOMATED: CPT | Performed by: STUDENT IN AN ORGANIZED HEALTH CARE EDUCATION/TRAINING PROGRAM

## 2024-02-02 PROCEDURE — 258N000003 HC RX IP 258 OP 636: Performed by: INTERNAL MEDICINE

## 2024-02-02 PROCEDURE — 80048 BASIC METABOLIC PNL TOTAL CA: CPT | Performed by: STUDENT IN AN ORGANIZED HEALTH CARE EDUCATION/TRAINING PROGRAM

## 2024-02-02 PROCEDURE — G0463 HOSPITAL OUTPT CLINIC VISIT: HCPCS

## 2024-02-02 PROCEDURE — 250N000013 HC RX MED GY IP 250 OP 250 PS 637

## 2024-02-02 RX ORDER — POTASSIUM CHLORIDE 1.5 G/1.58G
40 POWDER, FOR SOLUTION ORAL ONCE
Status: COMPLETED | OUTPATIENT
Start: 2024-02-02 | End: 2024-02-02

## 2024-02-02 RX ORDER — TRAZODONE HYDROCHLORIDE 50 MG/1
50 TABLET, FILM COATED ORAL ONCE
Status: COMPLETED | OUTPATIENT
Start: 2024-02-02 | End: 2024-02-02

## 2024-02-02 RX ORDER — POTASSIUM CHLORIDE 7.45 MG/ML
10 INJECTION INTRAVENOUS
Status: COMPLETED | OUTPATIENT
Start: 2024-02-02 | End: 2024-02-03

## 2024-02-02 RX ORDER — POTASSIUM CHLORIDE 1500 MG/1
40 TABLET, EXTENDED RELEASE ORAL ONCE
Status: COMPLETED | OUTPATIENT
Start: 2024-02-02 | End: 2024-02-02

## 2024-02-02 RX ORDER — KETOROLAC TROMETHAMINE 30 MG/ML
30 INJECTION, SOLUTION INTRAMUSCULAR; INTRAVENOUS ONCE
Status: COMPLETED | OUTPATIENT
Start: 2024-02-02 | End: 2024-02-02

## 2024-02-02 RX ADMIN — KETOROLAC TROMETHAMINE 30 MG: 30 INJECTION, SOLUTION INTRAMUSCULAR; INTRAVENOUS at 16:28

## 2024-02-02 RX ADMIN — ACETAMINOPHEN 975 MG: 325 TABLET ORAL at 15:49

## 2024-02-02 RX ADMIN — POTASSIUM CHLORIDE 40 MEQ: 1500 TABLET, EXTENDED RELEASE ORAL at 08:29

## 2024-02-02 RX ADMIN — POTASSIUM CHLORIDE 40 MEQ: 1.5 POWDER, FOR SOLUTION ORAL at 15:49

## 2024-02-02 RX ADMIN — ACETAMINOPHEN 975 MG: 325 TABLET ORAL at 08:30

## 2024-02-02 RX ADMIN — VANCOMYCIN HYDROCHLORIDE 1500 MG: 5 INJECTION, POWDER, LYOPHILIZED, FOR SOLUTION INTRAVENOUS at 06:28

## 2024-02-02 RX ADMIN — HEPARIN SODIUM 5000 UNITS: 10000 INJECTION, SOLUTION INTRAVENOUS; SUBCUTANEOUS at 01:20

## 2024-02-02 RX ADMIN — HEPARIN SODIUM 5000 UNITS: 10000 INJECTION, SOLUTION INTRAVENOUS; SUBCUTANEOUS at 08:32

## 2024-02-02 RX ADMIN — POTASSIUM CHLORIDE 10 MEQ: 7.46 INJECTION, SOLUTION INTRAVENOUS at 22:02

## 2024-02-02 RX ADMIN — MEROPENEM 1 G: 1 INJECTION, POWDER, FOR SOLUTION INTRAVENOUS at 11:15

## 2024-02-02 RX ADMIN — TRAZODONE HYDROCHLORIDE 50 MG: 50 TABLET ORAL at 01:20

## 2024-02-02 RX ADMIN — HEPARIN SODIUM 5000 UNITS: 10000 INJECTION, SOLUTION INTRAVENOUS; SUBCUTANEOUS at 17:51

## 2024-02-02 RX ADMIN — MEROPENEM 1 G: 1 INJECTION, POWDER, FOR SOLUTION INTRAVENOUS at 19:55

## 2024-02-02 RX ADMIN — VANCOMYCIN HYDROCHLORIDE 1500 MG: 5 INJECTION, POWDER, LYOPHILIZED, FOR SOLUTION INTRAVENOUS at 16:31

## 2024-02-02 RX ADMIN — MEROPENEM 1 G: 1 INJECTION, POWDER, FOR SOLUTION INTRAVENOUS at 02:16

## 2024-02-02 RX ADMIN — VANCOMYCIN HYDROCHLORIDE 1500 MG: 5 INJECTION, POWDER, LYOPHILIZED, FOR SOLUTION INTRAVENOUS at 23:52

## 2024-02-02 ASSESSMENT — ACTIVITIES OF DAILY LIVING (ADL)
ADLS_ACUITY_SCORE: 21
ADLS_ACUITY_SCORE: 38
ADLS_ACUITY_SCORE: 21
ADLS_ACUITY_SCORE: 21
ADLS_ACUITY_SCORE: 40
ADLS_ACUITY_SCORE: 21
ADLS_ACUITY_SCORE: 40
ADLS_ACUITY_SCORE: 38
ADLS_ACUITY_SCORE: 21
DEPENDENT_IADLS:: CLEANING;COOKING;LAUNDRY;SHOPPING;MEAL PREPARATION;TRANSPORTATION
ADLS_ACUITY_SCORE: 21

## 2024-02-02 NOTE — PROGRESS NOTES
[facility]    Medicine Progress Note - Hospitalist Service    Date of Admission:  1/30/2024    Assessment & Plan   Patient is a 38 year old male with PMH significant for morbid obesity, hypertension, DM2 and recent admission for umbilical hernia (11/22/23-11/30/23), s/p exploratory laparotomy with repair of incisional ventral hernia with mesh, re-admitted on 12/16/23-12/20/23 for concern regarding post-op wound infection who was sent to our ED by the home health nurse for concern of sepsis.     #Severe sepsis with lactic acidosis  #Abdominal wall cellulitis  Patient presents with fever, chills, and found to have lactic acidosis (elevated at 3.2), leucocytosis and tachycardia. Suspect ongoing abdominal wall infection. The lower abdominal wall has extensive redness and induration around the panus, though some of this is becoming chronic-appearing. No abscess and resolving gas on CT.    -ID consulted, continue vanc and meropenem  -Bcx NGTD  -Difficult to examine under pannus due to pain  -Watch closely for chris gangrene progression   -May need to discuss with gen surg pending course although no convincing evidence of deep tissue progression     #Sinus tachycardia  -continued tachycardia secondary to above  -Stop mIVF today and monitor  -pt has been on DVT ppx while here but low functional status at baseline, consider DVT/PE workup pending response to sepsis treatment      #Hypertension   #DM-II, A1c 6.5  -Cont home meds, insulin          Diet: Combination Diet Regular Diet Adult; Moderate Consistent Carb (60 g CHO per Meal) Diet    DVT Prophylaxis: Heparin   Duvall Catheter: Not present  Lines: None     Cardiac Monitoring: None  Code Status: Full Code      Clinically Significant Risk Factors        # Hypokalemia: Lowest K = 3.1 mmol/L in last 2 days, will replace as needed       # Hypoalbuminemia: Lowest albumin = 3.1 g/dL at 1/31/2024  7:28 AM, will monitor as appropriate     #  "Hypertension: Noted on problem list       # DMII: A1C = 6.5 % (Ref range: <5.7 %) within past 6 months, PRESENT ON ADMISSION  # Severe Obesity: Estimated body mass index is 53.84 kg/m  as calculated from the following:    Height as of this encounter: 1.803 m (5' 11\").    Weight as of this encounter: 175.1 kg (386 lb)., PRESENT ON ADMISSION       # Financial/Environmental Concerns: none         Disposition Plan      Expected Discharge Date: 02/03/2024      Destination: home with family  Discharge Comments: IV abx: ID following            TRESSA THORNTON MD  Hospitalist Service  [facility]  Securely message with [employer] (more info)  Text page via [employer] Paging/Directory   ______________________________________________________________________    Interval History   Feeling very tired today, though this is unchanged. Overall no new concerns. Feels stable.    Physical Exam   Vital Signs: Temp: 98.1  F (36.7  C) Temp src: Oral BP: 134/78 Pulse: 91   Resp: 24 SpO2: 96 % O2 Device: Nasal cannula Oxygen Delivery: 3 LPM  Weight: 386 lbs 0 oz    General Appearance: Mild distress, non diaphoretic   Respiratory: CTAB with decreased effort   Cardiovascular: Tachycardic, no murmur   GI: superficial pain throughout abdominal pannus with erythema and tender to touch, no erythema or skin changes of inguinal area   Skin: erythema and edema throughout abdominal pannus, warm to touch without fluctuations   Other: No sharp neck pain    Medical Decision Making       50 MINUTES SPENT BY ME on the date of service doing chart review, history, exam, documentation & further activities per the note.      Data     I have personally reviewed the following data over the past 24 hrs:    14.8 (H)  \   9.8 (L)   / 243     134 (L) 100 6.1 /  117 (H)   3.1 (L) 25 0.69 \     Procal: N/A CRP: N/A Lactic Acid: 1.0         Imaging results reviewed over the past 24 hrs:   No results found for this or any previous visit (from the " past 24 hour(s)).

## 2024-02-02 NOTE — CONSULTS
Care Management Initial Consult    General Information  Assessment completed with: Patient, Patient  Type of CM/SW Visit: Initial Assessment    Primary Care Provider verified and updated as needed: Yes   Readmission within the last 30 days: no previous admission in last 30 days      Reason for Consult: discharge planning  Advance Care Planning: Advance Care Planning Reviewed: verified with patient        Communication Assessment  Patient's communication style: spoken language (English or Bilingual)        Cognitive  Cognitive/Neuro/Behavioral: WDL                      Living Environment:   People in home: child(sherry), dependent, spouse  RABECCA  Current living Arrangements: house      Able to return to prior arrangements: yes     Family/Social Support:  Care provided by: self, spouse/significant other  Provides care for: child(sherry)  Marital Status:   Wife  SUKHDEV       Description of Support System: Supportive, Involved    Support Assessment: Adequate family and caregiver support    Current Resources:   Patient receiving home care services: Yes  Skilled Home Care Services: Skilled Nursing, Home Health Aid, Physical Therapy, Occupational Therapy  Community Resources: Home Care, DME  Equipment currently used at home:    Supplies currently used at home: None    Employment/Financial:  Employment Status: other (see comments) (On leave)        Financial Concerns: none   Referral to Financial Worker: No     Does the patient's insurance plan have a 3 day qualifying hospital stay waiver?  No    Lifestyle & Psychosocial Needs:  Social Determinants of Health     Food Insecurity: Not on file   Depression: Not on file   Housing Stability: Not on file   Tobacco Use: Low Risk  (12/16/2023)    Patient History     Smoking Tobacco Use: Never     Smokeless Tobacco Use: Never     Passive Exposure: Not on file   Financial Resource Strain: Not on file   Alcohol Use: Not on file   Transportation Needs: Not on file   Physical  Activity: Not on file   Interpersonal Safety: Not on file   Stress: Not on file   Social Connections: Not on file     Functional Status:  Prior to admission patient needed assistance:   Dependent ADLs:: Ambulation-walker, Bathing  Dependent IADLs:: Cleaning, Cooking, Laundry, Shopping, Meal Preparation, Transportation     Additional Information:  Writer met with patient at bedside to review role of care management services, discuss goals of care and assess need for any possible services at discharge. Patient alert, answering questions appropriately and engaged in the conversation. Patient reported no HCD. Lives with spouse and young children in a townhouse. Currently needs assist with ADLs and is dependent with IADLs. Was receiving home care services with [facility] for RN/PT/OT/HHA, but ended 1/30/24 (confirmed). On leave of absence from work. Not able to drive. No community resources and no DME. Goal is to return home. Transportation TBD.        Danielle Leger RN

## 2024-02-02 NOTE — DISCHARGE INSTRUCTIONS
Abdominal wound: Every other day      Cleanse wound with Vashe; gently dry  Moisten 2 x 2 gauze with Vashe and place into wound bed  Cover with Mepilex

## 2024-02-02 NOTE — PROGRESS NOTES
Infectious Diseases Progress Note  [facility]    Date of visit: 02/02/2024     ASSESSMENT   38-year-old man with a history of recent umbilical hernia repair admitted with sepsis.  Concern for abdominal wall infection    Status postumbilical hernia repair.  Presenting in November with abdominal pain and incarcerated hernia.  Underwent repair with mesh on 11/14/2023.  Return to the hospital with drainage from the wound sites concerning for infected seroma.  Status post exploratory laparotomy on 11/24/2023  Postop wound infection.  Hospitalized in December with postop wound infection.  No drainable abscess.  Wound cultures were polymicrobial.  Discharged with levofloxacin, Flagyl, and doxycycline.  Sepsis.  Acute onset of chills and fever 1 day prior to admission.  No other associated symptoms.  Increased redness over the abdomen.  Chronic ulcers without recent history of worsening.  CT scan of the abdomen without any abscesses.  Abdominal wall cellulitis versus panniculitis.  Significant geographic erythema over the abdomen, very tender.  No obvious purulence from wounds.  Mesh infection is in the differential, but no obvious fluid collections around the surgical site. Soft tissue u/s without new findings. High CRP. Fevers and wbc improving      Principal Problem:    Abdominal wall cellulitis       PLAN   -continue vancomycin and meropenem  -follow-up on blood cultures  -If not improving, will need repeat imaging and possibly biopsy. Seems to making slow progress on current coverage   -Trend white count      Jamal Smyth MD  [facility]  Direct messaging: [employer] Paging  On-Call ID provider: [phone], option: 9      ===========================================    SUBJECTIVE / INTERVAL HISTORY:     No fevers overnight. Vomit x 1 last night, and again this morning after taking potassium tab. Headache persists. Mild hypoxia this morning, started on nasal canula.      Antibiotics  "  Vancomycin 1/30-  Meropenem 1/31-    Previous:  Zosyn 1/30-1/31      Physical Exam     Temp:  [98.3  F (36.8  C)-100.6  F (38.1  C)] 98.3  F (36.8  C)  Pulse:  [104-118] 104  Resp:  [18-23] 20  BP: (130-140)/(59-73) 133/69  SpO2:  [87 %-97 %] 91 %    /69 (BP Location: Left arm)   Pulse 104   Temp 98.3  F (36.8  C) (Oral)   Resp 20   Ht 1.803 m (5' 11\")   Wt (!) 175.1 kg (386 lb)   SpO2 91%   BMI 53.84 kg/m      GENERAL:  well-developed, well-nourished, lying in bed, uncomfortable  HENT:  Head is normocephalic, atraumatic.   NECK: supple  EYES:  Eyes have anicteric sclerae without conjunctival injection   LUNGS:  Clear to auscultation.  CARDIOVASCULAR:  Regular rate and rhythm with no murmurs, gallops or rubs.  ABDOMEN:  Normal bowel sounds, soft.  Large pannus.  2 packed wounds near the midline.  Erythema in a geographic distribution over the abdomen and lower pannus with induration and tenderness. Upper lesions are better, but ongoing tenderness and erythema on lower area  EXT: Extremities warm and without edema.  SKIN:  No acute rashes.  Dry flaky skin over both feet  NEUROLOGIC:  Grossly nonfocal.      Cultures   1/30 blood cultures x 2: no growth to date  1/31 blood culture x 2: no growth to date     Susceptibility data from last 90 days.  Collected Specimen Info Organism Ampicillin Ampicillin/Sulbactam Cefepime Ceftazidime Ceftriaxone Ciprofloxacin Clindamycin Daptomycin Doxycycline Erythromycin Gentamicin Levofloxacin Meropenem Oxacillin Penicillin   12/17/23 Wound from Abdomen Enterobacter cloacae complex R R  S  S  S  S      S  S  S       Staphylococcus aureus        S  S  S  S  S    S      Corynebacterium striatum      I   R   S       I   12/17/23 Abscess from Abdomen Mixed Aerobic and Anaerobic gonzález                  11/24/23 Tissue from Abdomen Cutibacterium (Propionibacterium) acnes                    Collected Specimen Info Organism Piperacillin/Tazobactam Tetracycline Tobramycin " Trimethoprim/Sulfamethoxazole  Vancomycin   12/17/23 Wound from Abdomen Enterobacter cloacae complex  S   S  S      Staphylococcus aureus   S   S  S     Corynebacterium striatum     S  S   12/17/23 Abscess from Abdomen Mixed Aerobic and Anaerobic gonzález        11/24/23 Tissue from Abdomen Cutibacterium (Propionibacterium) acnes               Pertinent Labs:     Recent Labs   Lab 02/02/24  0637 02/01/24  0646 01/31/24  0728   WBC 14.8* 17.4* 20.2*   HGB 9.8* 11.1* 11.5*    262 252       Recent Labs   Lab 02/02/24  0637 01/31/24  0728 01/30/24  1806   * 138 140   CO2 25 24 24   BUN 6.1 9.4 10.8   ALBUMIN  --  3.1* 3.9   ALKPHOS  --  78 113   ALT  --  34 48   AST  --  24 47*       Recent Labs   Lab 02/01/24  0646   CRPI [id number]*           Imaging:     US Soft Tissue Abdominal Wall or Lower Back    Result Date: 1/31/2024  US SOFT TISSUE ABDOMINAL WALL OR LOWER BACK 1/31/2024 12:52 PM CST INDICATION: Erythema, large pannus; evaluate abscess. COMPARISON: Previous day CT. FINDINGS: Along the area of interest, subcutaneous edema present, though no focal collection or abscess.     CT Chest/Abdomen/Pelvis w Contrast    Result Date: 1/30/2024  EXAM: CT CHEST/ABDOMEN/PELVIS W CONTRAST LOCATION: [facility] DATE: 1/30/2024 INDICATION: Sepsis COMPARISON: CT of the abdomen and pelvis 12/16/2023 TECHNIQUE: CT scan of the chest, abdomen, and pelvis was performed following injection of IV contrast. Multiplanar reformats were obtained. Dose reduction techniques were used. CONTRAST: isovue 370 100ml FINDINGS: LUNGS AND PLEURA: Normal. MEDIASTINUM/AXILLAE: Normal. CORONARY ARTERY CALCIFICATION: None. HEPATOBILIARY: Normal. PANCREAS: Normal. SPLEEN: Normal. ADRENAL GLANDS: Normal. KIDNEYS/BLADDER: Normal. BOWEL: Normal. LYMPH NODES: Normal. VASCULATURE: Unremarkable. PELVIC ORGANS: Normal. MUSCULOSKELETAL: Since 12/16/2023 the prior seen gas associated with the laparotomy site has nearly resolved there  is no fluid collections seen to suggest an abscess.     IMPRESSION: 1.  Nearly resolved gas at the laparotomy site since 12/16/2023. No findings of an abscess. Nothing seen to explain patient's sepsis clinically.        Data reviewed today: I reviewed all medications, new labs and imaging results over the last 24 hours. I personally reviewed no images or EKG's today.  The patient's care was discussed with the Bedside Nurse and Patient.

## 2024-02-03 LAB
ANION GAP SERPL CALCULATED.3IONS-SCNC: 7 MMOL/L (ref 7–15)
BUN SERPL-MCNC: 7.5 MG/DL (ref 6–20)
CALCIUM SERPL-MCNC: 8.5 MG/DL (ref 8.6–10)
CHLORIDE SERPL-SCNC: 102 MMOL/L (ref 98–107)
CREAT SERPL-MCNC: 0.66 MG/DL (ref 0.67–1.17)
CREAT SERPL-MCNC: 0.66 MG/DL (ref 0.67–1.17)
CRP SERPL-MCNC: 256.7 MG/L
DEPRECATED HCO3 PLAS-SCNC: 30 MMOL/L (ref 22–29)
EGFRCR SERPLBLD CKD-EPI 2021: >90 ML/MIN/1.73M2
EGFRCR SERPLBLD CKD-EPI 2021: >90 ML/MIN/1.73M2
ERYTHROCYTE [DISTWIDTH] IN BLOOD BY AUTOMATED COUNT: 15 % (ref 10–15)
GLUCOSE BLDC GLUCOMTR-MCNC: 101 MG/DL (ref 70–99)
GLUCOSE BLDC GLUCOMTR-MCNC: 98 MG/DL (ref 70–99)
GLUCOSE BLDC GLUCOMTR-MCNC: 99 MG/DL (ref 70–99)
GLUCOSE SERPL-MCNC: 129 MG/DL (ref 70–99)
HCT VFR BLD AUTO: 33.7 % (ref 40–53)
HGB BLD-MCNC: 10.6 G/DL (ref 13.3–17.7)
MAGNESIUM SERPL-MCNC: 1.9 MG/DL (ref 1.7–2.3)
MCH RBC QN AUTO: 27.1 PG (ref 26.5–33)
MCHC RBC AUTO-ENTMCNC: 31.5 G/DL (ref 31.5–36.5)
MCV RBC AUTO: 86 FL (ref 78–100)
PLATELET # BLD AUTO: 297 10E3/UL (ref 150–450)
POTASSIUM SERPL-SCNC: 3.6 MMOL/L (ref 3.4–5.3)
RBC # BLD AUTO: 3.91 10E6/UL (ref 4.4–5.9)
SODIUM SERPL-SCNC: 139 MMOL/L (ref 135–145)
VANCOMYCIN SERPL-MCNC: 10.6 UG/ML
WBC # BLD AUTO: 15.6 10E3/UL (ref 4–11)

## 2024-02-03 PROCEDURE — 80202 ASSAY OF VANCOMYCIN: CPT | Performed by: STUDENT IN AN ORGANIZED HEALTH CARE EDUCATION/TRAINING PROGRAM

## 2024-02-03 PROCEDURE — 250N000011 HC RX IP 250 OP 636: Performed by: STUDENT IN AN ORGANIZED HEALTH CARE EDUCATION/TRAINING PROGRAM

## 2024-02-03 PROCEDURE — 82565 ASSAY OF CREATININE: CPT | Performed by: INTERNAL MEDICINE

## 2024-02-03 PROCEDURE — 120N000001 HC R&B MED SURG/OB

## 2024-02-03 PROCEDURE — 36415 COLL VENOUS BLD VENIPUNCTURE: CPT | Performed by: STUDENT IN AN ORGANIZED HEALTH CARE EDUCATION/TRAINING PROGRAM

## 2024-02-03 PROCEDURE — 86140 C-REACTIVE PROTEIN: CPT | Performed by: STUDENT IN AN ORGANIZED HEALTH CARE EDUCATION/TRAINING PROGRAM

## 2024-02-03 PROCEDURE — 258N000003 HC RX IP 258 OP 636: Performed by: INTERNAL MEDICINE

## 2024-02-03 PROCEDURE — 250N000013 HC RX MED GY IP 250 OP 250 PS 637: Performed by: STUDENT IN AN ORGANIZED HEALTH CARE EDUCATION/TRAINING PROGRAM

## 2024-02-03 PROCEDURE — 93010 ELECTROCARDIOGRAM REPORT: CPT | Performed by: INTERNAL MEDICINE

## 2024-02-03 PROCEDURE — 85027 COMPLETE CBC AUTOMATED: CPT | Performed by: STUDENT IN AN ORGANIZED HEALTH CARE EDUCATION/TRAINING PROGRAM

## 2024-02-03 PROCEDURE — 99233 SBSQ HOSP IP/OBS HIGH 50: CPT | Performed by: STUDENT IN AN ORGANIZED HEALTH CARE EDUCATION/TRAINING PROGRAM

## 2024-02-03 PROCEDURE — 250N000011 HC RX IP 250 OP 636

## 2024-02-03 PROCEDURE — 99232 SBSQ HOSP IP/OBS MODERATE 35: CPT | Performed by: STUDENT IN AN ORGANIZED HEALTH CARE EDUCATION/TRAINING PROGRAM

## 2024-02-03 PROCEDURE — 80048 BASIC METABOLIC PNL TOTAL CA: CPT | Performed by: STUDENT IN AN ORGANIZED HEALTH CARE EDUCATION/TRAINING PROGRAM

## 2024-02-03 PROCEDURE — 93005 ELECTROCARDIOGRAM TRACING: CPT

## 2024-02-03 PROCEDURE — 250N000011 HC RX IP 250 OP 636: Performed by: INTERNAL MEDICINE

## 2024-02-03 PROCEDURE — 83735 ASSAY OF MAGNESIUM: CPT | Performed by: STUDENT IN AN ORGANIZED HEALTH CARE EDUCATION/TRAINING PROGRAM

## 2024-02-03 RX ORDER — KETOROLAC TROMETHAMINE 15 MG/ML
15 INJECTION, SOLUTION INTRAMUSCULAR; INTRAVENOUS
Status: COMPLETED | OUTPATIENT
Start: 2024-02-03 | End: 2024-02-03

## 2024-02-03 RX ORDER — FLUCONAZOLE 100 MG/1
200 TABLET ORAL ONCE
Status: COMPLETED | OUTPATIENT
Start: 2024-02-03 | End: 2024-02-03

## 2024-02-03 RX ORDER — KETOROLAC TROMETHAMINE 30 MG/ML
30 INJECTION, SOLUTION INTRAMUSCULAR; INTRAVENOUS EVERY 6 HOURS
Status: COMPLETED | OUTPATIENT
Start: 2024-02-03 | End: 2024-02-05

## 2024-02-03 RX ORDER — CEFAZOLIN SODIUM 1 G/50ML
1750 SOLUTION INTRAVENOUS EVERY 8 HOURS
Status: DISCONTINUED | OUTPATIENT
Start: 2024-02-03 | End: 2024-02-05

## 2024-02-03 RX ORDER — FLUCONAZOLE 150 MG/1
600 TABLET ORAL ONCE
Status: COMPLETED | OUTPATIENT
Start: 2024-02-03 | End: 2024-02-03

## 2024-02-03 RX ORDER — IBUPROFEN 400 MG/1
400 TABLET, FILM COATED ORAL
Status: DISCONTINUED | OUTPATIENT
Start: 2024-02-03 | End: 2024-02-03

## 2024-02-03 RX ADMIN — HEPARIN SODIUM 5000 UNITS: 10000 INJECTION, SOLUTION INTRAVENOUS; SUBCUTANEOUS at 01:06

## 2024-02-03 RX ADMIN — FLUCONAZOLE 200 MG: 100 TABLET ORAL at 14:06

## 2024-02-03 RX ADMIN — POTASSIUM CHLORIDE 10 MEQ: 7.46 INJECTION, SOLUTION INTRAVENOUS at 00:04

## 2024-02-03 RX ADMIN — POTASSIUM CHLORIDE 10 MEQ: 7.46 INJECTION, SOLUTION INTRAVENOUS at 01:52

## 2024-02-03 RX ADMIN — KETOROLAC TROMETHAMINE 15 MG: 15 INJECTION, SOLUTION INTRAMUSCULAR; INTRAVENOUS at 02:47

## 2024-02-03 RX ADMIN — HEPARIN SODIUM 5000 UNITS: 10000 INJECTION, SOLUTION INTRAVENOUS; SUBCUTANEOUS at 17:57

## 2024-02-03 RX ADMIN — KETOROLAC TROMETHAMINE 30 MG: 30 INJECTION, SOLUTION INTRAMUSCULAR; INTRAVENOUS at 12:01

## 2024-02-03 RX ADMIN — MEROPENEM 1 G: 1 INJECTION, POWDER, FOR SOLUTION INTRAVENOUS at 02:47

## 2024-02-03 RX ADMIN — HEPARIN SODIUM 5000 UNITS: 10000 INJECTION, SOLUTION INTRAVENOUS; SUBCUTANEOUS at 09:25

## 2024-02-03 RX ADMIN — VANCOMYCIN HYDROCHLORIDE 1500 MG: 5 INJECTION, POWDER, LYOPHILIZED, FOR SOLUTION INTRAVENOUS at 09:25

## 2024-02-03 RX ADMIN — KETOROLAC TROMETHAMINE 30 MG: 30 INJECTION, SOLUTION INTRAMUSCULAR; INTRAVENOUS at 17:58

## 2024-02-03 RX ADMIN — MEROPENEM 1 G: 1 INJECTION, POWDER, FOR SOLUTION INTRAVENOUS at 19:46

## 2024-02-03 RX ADMIN — MEROPENEM 1 G: 1 INJECTION, POWDER, FOR SOLUTION INTRAVENOUS at 12:02

## 2024-02-03 RX ADMIN — FLUCONAZOLE 600 MG: 150 TABLET ORAL at 14:04

## 2024-02-03 RX ADMIN — ACETAMINOPHEN 975 MG: 325 TABLET ORAL at 09:39

## 2024-02-03 RX ADMIN — VANCOMYCIN HYDROCHLORIDE 1750 MG: 5 INJECTION, POWDER, LYOPHILIZED, FOR SOLUTION INTRAVENOUS at 16:07

## 2024-02-03 ASSESSMENT — ACTIVITIES OF DAILY LIVING (ADL)
ADLS_ACUITY_SCORE: 21

## 2024-02-03 NOTE — PROGRESS NOTES
[facility]    Medicine Progress Note - Hospitalist Service    Date of Admission:  1/30/2024    Assessment & Plan   Patient is a 38 year old male with PMH significant for morbid obesity, hypertension, DM2 and recent admission for umbilical hernia (11/22/23-11/30/23), s/p exploratory laparotomy with repair of incisional ventral hernia with mesh, re-admitted on 12/16/23-12/20/23 for concern regarding post-op wound infection who was sent to our ED by the home health nurse for concern of sepsis.     #Severe sepsis with lactic acidosis  #Abdominal wall cellulitis  Patient presents with fever, chills, and found to have lactic acidosis (elevated at 3.2), leucocytosis and tachycardia. Suspect ongoing abdominal wall infection. The lower abdominal wall has extensive redness and induration around the panus, though some of this is becoming chronic-appearing. No abscess and resolving gas on CT.    -ID consulted, continue vanc and meropenem  -WBC stagnant, concerning for underlying mesh infection, but erythema improving  -Bcx NGTD  -Examined under pannus 2/3 (difficult due to pain) and it was erythematous but no signs of skin break or ulceration  -Watch closely for chris gangrene progression   -May need to discuss with gen surg pending course, though surgery would want infection as controlled a possible before considering mesh removal so will hold off for now    #Sinus tachycardia  -continued tachycardia secondary to above  -Stop mIVF today and monitor  -pt has been on DVT ppx while here but low functional status at baseline, consider DVT/PE workup pending response to sepsis treatment      #Headache  Responsive to Toradol, continue q6h x2 days    #Hypertension   #DM-II, A1c 6.5  -Cont home meds, insulin          Diet: Combination Diet Regular Diet Adult; Moderate Consistent Carb (60 g CHO per Meal) Diet    DVT Prophylaxis: Heparin   Duvall Catheter: Not present  Lines: None     Cardiac Monitoring:  "None  Code Status: Full Code      Clinically Significant Risk Factors        # Hypokalemia: Lowest K = 3.1 mmol/L in last 2 days, will replace as needed       # Hypoalbuminemia: Lowest albumin = 3.1 g/dL at 1/31/2024  7:28 AM, will monitor as appropriate     # Hypertension: Noted on problem list       # DMII: A1C = 6.5 % (Ref range: <5.7 %) within past 6 months, PRESENT ON ADMISSION  # Severe Obesity: Estimated body mass index is 56.7 kg/m  as calculated from the following:    Height as of this encounter: 1.803 m (5' 11\").    Weight as of this encounter: 184.4 kg (406 lb 8.5 oz)., PRESENT ON ADMISSION       # Financial/Environmental Concerns: none         Disposition Plan      Expected Discharge Date: 02/05/2024      Destination: home with family  Discharge Comments: IV abx: ID following            TRESSA THORNTON MD  Hospitalist Service  [facility]  Securely message with [employer] (more info)  Text page via [employer] Paging/Directory   ______________________________________________________________________    Interval History   Still tired, but more awake for me today. Pain in abdomen is improving, but still there. Feeling like he can move around a little more.    Physical Exam   Vital Signs: Temp: 98.3  F (36.8  C) Temp src: Oral BP: 131/76 Pulse: 106   Resp: 18 SpO2: 96 % O2 Device: Nasal cannula Oxygen Delivery: 2 LPM  Weight: 406 lbs 8.45 oz    General Appearance: Mild distress, non diaphoretic   Respiratory: CTAB with decreased effort   Cardiovascular: Tachycardic, no murmur   GI: superficial pain throughout abdominal pannus with erythema and tender to touch, no erythema or skin changes of inguinal area   Skin: erythema and edema throughout abdominal pannus, warm to touch without fluctuations   Other: No sharp neck pain    Medical Decision Making       50 MINUTES SPENT BY ME on the date of service doing chart review, history, exam, documentation & further activities per the note.      Data "     I have personally reviewed the following data over the past 24 hrs:    15.6 (H)  \   10.6 (L)   / 297     139 102 7.5 /  101 (H)   3.6 30 (H) 0.66 (L); 0.66 (L) \     Procal: N/A CRP: 256.70 (H) Lactic Acid: N/A         Imaging results reviewed over the past 24 hrs:   No results found for this or any previous visit (from the past 24 hour(s)).

## 2024-02-03 NOTE — PROGRESS NOTES
"CLINICAL NUTRITION SERVICES - ASSESSMENT NOTE     Nutrition Prescription    RECOMMENDATIONS FOR MDs/PROVIDERS TO ORDER:  Daily multivitamin for healing    Malnutrition Status:    Unable to determine today    Recommendations already ordered by Registered Dietitian (RD):      Future/Additional Recommendations:  Will monitor po, labs, wt, need for diet ed     REASON FOR ASSESSMENT  Ady Godinez is a/an 38 year old male assessed by the dietitian for Admission Nutrition Risk Screen for eating poorly due to decreased appetite and weight loss (did not see weight loss in chart review)    Per chart, pt with PMH significant for morbid obesity, hypertension, DM2 and recent admission for umbilical hernia (11/22/23-11/30/23), s/p exploratory laparotomy with repair of incisional ventral hernia with mesh, re-admitted on 12/16/23-12/20/23 for concern regarding post-op wound infection who was sent to our ED by the home health nurse for concern of sepsi     NUTRITION HISTORY  Pt busy x 2 visits today    CURRENT NUTRITION ORDERS  Diet: 60 gm cho per meal  Intake/Tolerance: good intake, % of meals    LABS  Labs reviewed  Creat 0.66 L  Glucose 129  [id number]    MEDICATIONS  Medications reviewed  Diflucan  Novolog  IV abx  Kcl    ANTHROPOMETRICS  Height: 180.3 cm (5' 11\")  Most Recent Weight: (!) 184.4 kg (406 lb 8.5 oz)    IBW: 78.1 kg  BMI: Obesity Grade III BMI >40   56.7  Weight History:   Wt Readings from Last 3 Encounters:   02/03/24 (!) 184.4 kg (406 lb 8.5 oz)   12/20/23 (!) 175.2 kg (386 lb 3.2 oz)   11/24/23 (!) 182.8 kg (403 lb)       Dosing Weight: 105 kg, adjusted BW    ASSESSED NUTRITION NEEDS  Estimated Energy Needs: 2100 - 2625 kcals/day (20 - 25 kcals/kg)  Justification: Obese  Estimated Protein Needs: 105 -126 grams protein/day (1 - 1.2 grams of pro/kg)  Justification: Wound healing  Estimated Fluid Needs: 3150 mL/day (30 mL/kg) , or per provider  Justification: Maintenance    PHYSICAL FINDINGS  Per " chart,  Surgical abdominal wounds  GI tender abdomen, abdominal discomfort  Last BM 2/2/24  Emesis yesterday    MALNUTRITION:  % Weight Loss:  None noted  % Intake:  need nutrition hx  Subcutaneous Fat Loss:  need nfpe  Muscle Loss:  need nfpe  Fluid Retention:  None noted    Malnutrition Diagnosis: Unable to determine due to need nutrition hx and nfpe    NUTRITION DIAGNOSIS  Inadequate protein intake related to decreased intake per screen as evidenced by poor wound healing      INTERVENTIONS  Implementation  None at this time    Goals  Patient to continue to consume % of nutritionally adequate meals three times per day, or the equivalent with supplements/snacks.     Monitoring/Evaluation  Progress toward goals will be monitored and evaluated per protocol.

## 2024-02-03 NOTE — PROGRESS NOTES
Infectious Diseases Progress Note  [facility]    Date of visit: 02/03/2024     ASSESSMENT   38-year-old man with a history of recent umbilical hernia repair admitted with sepsis.  Concern for abdominal wall infection    Status postumbilical hernia repair.  Presenting in November with abdominal pain and incarcerated hernia.  Underwent repair with mesh on 11/14/2023.  Return to the hospital with drainage from the wound sites concerning for infected seroma.  Status post exploratory laparotomy on 11/24/2023  Postop wound infection.  Hospitalized in December with postop wound infection.  No drainable abscess.  Wound cultures were polymicrobial with MSSA, Enterobacter cloacae, and Corynebacterium striatum.  Discharged with levofloxacin, Flagyl, and doxycycline.  Sepsis.  Acute onset of chills and fever 1 day prior to admission.  No other associated symptoms.  Increased redness over the abdomen.  Chronic ulcers without recent history of worsening.  CT scan of the abdomen without any abscesses.  Abdominal wall cellulitis versus panniculitis.  Significant geographic erythema over the abdomen, very tender.  No obvious purulence from wounds.  Mesh infection is in the differential, but no obvious fluid collections around the surgical site. Soft tissue u/s without new findings. High CRP. Fevers and wbc improving.  Clinically improving.  Inguinal intertrigo.  Will add fluconazole.    Principal Problem:    Abdominal wall cellulitis       PLAN   -continue vancomycin and meropenem  -follow-up on blood cultures  -If not improving, will need repeat imaging and possibly biopsy. Seems to making slow progress on current coverage   -Trend white count  -Order fluconazole.  -With the recurrent nature of the infection, hardware infection is high on the differential diagnosis.      Annabel Olsen MD  [facility]  Direct messaging: [employer] Paging  On-Call ID provider: [phone], option:  "9      ===========================================    SUBJECTIVE / INTERVAL HISTORY:   Doing better today.  Continues to have some pain.    Antibiotics   Vancomycin 1/30-  Meropenem 1/31-    Previous:  Zosyn 1/30-1/31      Physical Exam     Temp:  [98.1  F (36.7  C)-98.9  F (37.2  C)] 98.3  F (36.8  C)  Pulse:  [] 106  Resp:  [18-24] 18  BP: (131-170)/(73-88) 170/88  SpO2:  [84 %-96 %] 92 %    BP (!) 170/88 (BP Location: Left arm)   Pulse 106   Temp 98.3  F (36.8  C) (Oral)   Resp 18   Ht 1.803 m (5' 11\")   Wt (!) 184.4 kg (406 lb 8.5 oz)   SpO2 92%   BMI 56.70 kg/m      GENERAL:  well-developed, well-nourished, lying in bed, uncomfortable  HENT:  Head is normocephalic, atraumatic.   NECK: supple  EYES:  Eyes have anicteric sclerae without conjunctival injection   LUNGS:  Clear to auscultation.  CARDIOVASCULAR:  Regular rate and rhythm with no murmurs, gallops or rubs.  ABDOMEN:  Normal bowel sounds, soft.  Large pannus.  2 packed wounds near the midline.  Erythema in a geographic distribution over the abdomen and lower pannus with induration and tenderness. Upper lesions are better, but ongoing tenderness and erythema on lower area  Candidal intertrigo  EXT: Extremities warm and without edema.  SKIN:  No acute rashes.  Dry flaky skin over both feet  NEUROLOGIC:  Grossly nonfocal.      Cultures   1/30 blood cultures x 2: no growth to date  1/31 blood culture x 2: no growth to date     Susceptibility data from last 90 days.  Collected Specimen Info Organism Ampicillin Ampicillin/Sulbactam Cefepime Ceftazidime Ceftriaxone Ciprofloxacin Clindamycin Daptomycin Doxycycline Erythromycin Gentamicin Levofloxacin Meropenem Oxacillin Penicillin   12/17/23 Wound from Abdomen Enterobacter cloacae complex R R  S  S  S  S      S  S  S       Staphylococcus aureus        S  S  S  S  S    S      Corynebacterium striatum      I   R   S       I   12/17/23 Abscess from Abdomen Mixed Aerobic and Anaerobic gonzález              "     11/24/23 Tissue from Abdomen Cutibacterium (Propionibacterium) acnes                    Collected Specimen Info Organism Piperacillin/Tazobactam Tetracycline Tobramycin Trimethoprim/Sulfamethoxazole  Vancomycin   12/17/23 Wound from Abdomen Enterobacter cloacae complex  S   S  S      Staphylococcus aureus   S   S  S     Corynebacterium striatum     S  S   12/17/23 Abscess from Abdomen Mixed Aerobic and Anaerobic gonzález        11/24/23 Tissue from Abdomen Cutibacterium (Propionibacterium) acnes               Pertinent Labs:     Recent Labs   Lab 02/03/24  0659 02/02/24  0637 02/01/24  0646   WBC 15.6* 14.8* 17.4*   HGB 10.6* 9.8* 11.1*    243 262       Recent Labs   Lab 02/03/24  0659 02/02/24  0637 01/31/24  0728 01/30/24  1806    134* 138 140   CO2 30* 25 24 24   BUN 7.5 6.1 9.4 10.8   ALBUMIN  --   --  3.1* 3.9   ALKPHOS  --   --  78 113   ALT  --   --  34 48   AST  --   --  24 47*       Recent Labs   Lab 02/03/24  0659 02/01/24  0646   CRPI 256.70* 247.00*           Imaging:     US Soft Tissue Abdominal Wall or Lower Back    Result Date: 1/31/2024  US SOFT TISSUE ABDOMINAL WALL OR LOWER BACK 1/31/2024 12:52 PM CST INDICATION: Erythema, large pannus; evaluate abscess. COMPARISON: Previous day CT. FINDINGS: Along the area of interest, subcutaneous edema present, though no focal collection or abscess.     CT Chest/Abdomen/Pelvis w Contrast    Result Date: 1/30/2024  EXAM: CT CHEST/ABDOMEN/PELVIS W CONTRAST LOCATION: [facility] DATE: 1/30/2024 INDICATION: Sepsis COMPARISON: CT of the abdomen and pelvis 12/16/2023 TECHNIQUE: CT scan of the chest, abdomen, and pelvis was performed following injection of IV contrast. Multiplanar reformats were obtained. Dose reduction techniques were used. CONTRAST: isovue 370 100ml FINDINGS: LUNGS AND PLEURA: Normal. MEDIASTINUM/AXILLAE: Normal. CORONARY ARTERY CALCIFICATION: None. HEPATOBILIARY: Normal. PANCREAS: Normal. SPLEEN: Normal. ADRENAL  GLANDS: Normal. KIDNEYS/BLADDER: Normal. BOWEL: Normal. LYMPH NODES: Normal. VASCULATURE: Unremarkable. PELVIC ORGANS: Normal. MUSCULOSKELETAL: Since 12/16/2023 the prior seen gas associated with the laparotomy site has nearly resolved there is no fluid collections seen to suggest an abscess.     IMPRESSION: 1.  Nearly resolved gas at the laparotomy site since 12/16/2023. No findings of an abscess. Nothing seen to explain patient's sepsis clinically.        Data reviewed today: I reviewed all medications, new labs and imaging results over the last 24 hours. I personally reviewed no images or EKG's today.  The patient's care was discussed with the Bedside Nurse and Patient.

## 2024-02-03 NOTE — PHARMACY-VANCOMYCIN DOSING SERVICE
Pharmacy Vancomycin Note  Date of Service February 3, 2024  Patient's  1985   38 year old, male    Indication: Sepsis  Day of Therapy: 5  Current vancomycin regimen:  1500 mg IV q8h  Current vancomycin monitoring method: AUC  Current vancomycin therapeutic monitoring goal: 400-600 mg*h/L    [employer] Prediction of Current Vancomycin Regimen  Regimen: 1500 mg IV every 8 hours.  Start time: 17:25 on 2024  Exposure target: AUC24 (range)400-600 mg/L.hr   AUC24,ss: 466 mg/L.hr  Probability of AUC24 > 400: 84 %  Ctrough,ss: 9 mg/L  Probability of Ctrough,ss > 20: 0 %  Probability of nephrotoxicity (Lodise SANTO ): 5 %      Current estimated CrCl = Estimated Creatinine Clearance: 255.2 mL/min (A) (based on SCr of 0.66 mg/dL (L)).    Creatinine for last 3 days  2024:  6:46 AM Creatinine 0.66 mg/dL  2024:  6:37 AM Creatinine 0.69 mg/dL  2/3/2024:  6:59 AM Creatinine 0.66 mg/dL;  6:59 AM Creatinine 0.66 mg/dL    Recent Vancomycin Levels (past 3 days)  2024:  6:46 AM Vancomycin 5.2 ug/mL  2/3/2024:  6:59 AM Vancomycin 10.6 ug/mL    Vancomycin IV Administrations (past 72 hours)                     vancomycin (VANCOCIN) 1,500 mg in sodium chloride 0.9 % 250 mL intermittent infusion (mg) 1,500 mg New Bag 24 0925     1,500 mg New Bag 24 2352     1,500 mg New Bag  1631     1,500 mg New Bag  0628     1,500 mg New Bag 24 2206     1,500 mg New Bag  1449    vancomycin (VANCOCIN) 1,250 mg in sodium chloride 0.9 % 250 mL intermittent infusion (mg) 1,250 mg New Bag 24 0909     1,250 mg New Bag 24 2111     1,250 mg New Bag  1054                    Nephrotoxins and other renal medications (From now, onward)      Start     Dose/Rate Route Frequency Ordered Stop    24 1600  vancomycin (VANCOCIN) 1,750 mg in sodium chloride 0.9 % 500 mL intermittent infusion         1,750 mg  over 2 Hours Intravenous EVERY 8 HOURS 24 0959      24 0052  ibuprofen (ADVIL/MOTRIN) tablet  400 mg         400 mg Oral ONCE PRN 02/03/24 0052                 Contrast Orders - past 72 hours (72h ago, onward)      None            Interpretation of levels and current regimen:  Vancomycin level is reflective of -600    Has serum creatinine changed greater than 50% in last 72 hours: No    Renal Function: Stable    [employer] Prediction of Planned New Vancomycin Regimen  Regimen: 1750 mg IV every 8 hours.  Start time: 17:25 on 02/03/2024  Exposure target: AUC24 (range)400-600 mg/L.hr   AUC24,ss: 543 mg/L.hr  Probability of AUC24 > 400: 98 %  Ctrough,ss: 11.1 mg/L  Probability of Ctrough,ss > 20: 0 %  Probability of nephrotoxicity (Lodise SANTO 2009): 7 %      Plan: AUC is therapeutic but on lower end and calculated trough is <10, will increase dose a little for high levels  Increase Dose to 1750mg iv q8h  Vancomycin monitoring method: AUC  Vancomycin therapeutic monitoring goal: 400-600 mg*h/L  Pharmacy will check vancomycin levels as appropriate in 1-3 Days.  Serum creatinine levels will be ordered daily for the first week of therapy and at least twice weekly for subsequent weeks.    Sanjiv Yee, [facility]

## 2024-02-04 ENCOUNTER — APPOINTMENT (OUTPATIENT)
Dept: CARDIOLOGY | Facility: HOSPITAL | Age: 39
DRG: 871 | End: 2024-02-04
Attending: STUDENT IN AN ORGANIZED HEALTH CARE EDUCATION/TRAINING PROGRAM
Payer: COMMERCIAL

## 2024-02-04 ENCOUNTER — APPOINTMENT (OUTPATIENT)
Dept: CT IMAGING | Facility: HOSPITAL | Age: 39
DRG: 871 | End: 2024-02-04
Payer: COMMERCIAL

## 2024-02-04 ENCOUNTER — APPOINTMENT (OUTPATIENT)
Dept: SPEECH THERAPY | Facility: HOSPITAL | Age: 39
DRG: 871 | End: 2024-02-04
Attending: STUDENT IN AN ORGANIZED HEALTH CARE EDUCATION/TRAINING PROGRAM
Payer: COMMERCIAL

## 2024-02-04 LAB
ANION GAP SERPL CALCULATED.3IONS-SCNC: 8 MMOL/L (ref 7–15)
ATRIAL RATE - MUSE: 91 BPM
BACTERIA BLD CULT: NO GROWTH
BACTERIA BLD CULT: NO GROWTH
BUN SERPL-MCNC: 7.6 MG/DL (ref 6–20)
CALCIUM SERPL-MCNC: 8.2 MG/DL (ref 8.6–10)
CHLORIDE SERPL-SCNC: 103 MMOL/L (ref 98–107)
CREAT SERPL-MCNC: 0.56 MG/DL (ref 0.67–1.17)
CRP SERPL-MCNC: 170.5 MG/L
DEPRECATED HCO3 PLAS-SCNC: 28 MMOL/L (ref 22–29)
DIASTOLIC BLOOD PRESSURE - MUSE: NORMAL MMHG
EGFRCR SERPLBLD CKD-EPI 2021: >90 ML/MIN/1.73M2
ERYTHROCYTE [DISTWIDTH] IN BLOOD BY AUTOMATED COUNT: 15.3 % (ref 10–15)
FLUAV RNA SPEC QL NAA+PROBE: NEGATIVE
FLUBV RNA RESP QL NAA+PROBE: NEGATIVE
GLUCOSE BLDC GLUCOMTR-MCNC: 110 MG/DL (ref 70–99)
GLUCOSE BLDC GLUCOMTR-MCNC: 112 MG/DL (ref 70–99)
GLUCOSE BLDC GLUCOMTR-MCNC: 157 MG/DL (ref 70–99)
GLUCOSE BLDC GLUCOMTR-MCNC: 85 MG/DL (ref 70–99)
GLUCOSE SERPL-MCNC: 105 MG/DL (ref 70–99)
HCT VFR BLD AUTO: 32.8 % (ref 40–53)
HGB BLD-MCNC: 10.3 G/DL (ref 13.3–17.7)
INTERPRETATION ECG - MUSE: NORMAL
L PNEUMO1 AG UR QL IA: NEGATIVE
MCH RBC QN AUTO: 27.2 PG (ref 26.5–33)
MCHC RBC AUTO-ENTMCNC: 31.4 G/DL (ref 31.5–36.5)
MCV RBC AUTO: 87 FL (ref 78–100)
MRSA DNA SPEC QL NAA+PROBE: NEGATIVE
NT-PROBNP SERPL-MCNC: 379 PG/ML (ref 0–450)
P AXIS - MUSE: 31 DEGREES
PLATELET # BLD AUTO: 301 10E3/UL (ref 150–450)
POTASSIUM SERPL-SCNC: 3.5 MMOL/L (ref 3.4–5.3)
PR INTERVAL - MUSE: 150 MS
QRS DURATION - MUSE: 82 MS
QT - MUSE: 350 MS
QTC - MUSE: 430 MS
R AXIS - MUSE: 18 DEGREES
RBC # BLD AUTO: 3.79 10E6/UL (ref 4.4–5.9)
RSV RNA SPEC NAA+PROBE: NEGATIVE
S PNEUM AG SPEC QL: NEGATIVE
SA TARGET DNA: NEGATIVE
SARS-COV-2 RNA RESP QL NAA+PROBE: NEGATIVE
SODIUM SERPL-SCNC: 139 MMOL/L (ref 135–145)
SYSTOLIC BLOOD PRESSURE - MUSE: NORMAL MMHG
T AXIS - MUSE: 27 DEGREES
VENTRICULAR RATE- MUSE: 91 BPM
WBC # BLD AUTO: 12.4 10E3/UL (ref 4–11)

## 2024-02-04 PROCEDURE — 86140 C-REACTIVE PROTEIN: CPT | Performed by: STUDENT IN AN ORGANIZED HEALTH CARE EDUCATION/TRAINING PROGRAM

## 2024-02-04 PROCEDURE — 250N000011 HC RX IP 250 OP 636: Performed by: STUDENT IN AN ORGANIZED HEALTH CARE EDUCATION/TRAINING PROGRAM

## 2024-02-04 PROCEDURE — 93306 TTE W/DOPPLER COMPLETE: CPT | Mod: 26 | Performed by: INTERNAL MEDICINE

## 2024-02-04 PROCEDURE — 99232 SBSQ HOSP IP/OBS MODERATE 35: CPT | Performed by: STUDENT IN AN ORGANIZED HEALTH CARE EDUCATION/TRAINING PROGRAM

## 2024-02-04 PROCEDURE — 92610 EVALUATE SWALLOWING FUNCTION: CPT | Mod: GN

## 2024-02-04 PROCEDURE — 87637 SARSCOV2&INF A&B&RSV AMP PRB: CPT | Performed by: STUDENT IN AN ORGANIZED HEALTH CARE EDUCATION/TRAINING PROGRAM

## 2024-02-04 PROCEDURE — 999N000208 ECHOCARDIOGRAM COMPLETE

## 2024-02-04 PROCEDURE — 250N000011 HC RX IP 250 OP 636: Mod: JZ | Performed by: INTERNAL MEDICINE

## 2024-02-04 PROCEDURE — 99233 SBSQ HOSP IP/OBS HIGH 50: CPT | Performed by: STUDENT IN AN ORGANIZED HEALTH CARE EDUCATION/TRAINING PROGRAM

## 2024-02-04 PROCEDURE — 87640 STAPH A DNA AMP PROBE: CPT | Performed by: STUDENT IN AN ORGANIZED HEALTH CARE EDUCATION/TRAINING PROGRAM

## 2024-02-04 PROCEDURE — C8929 TTE W OR WO FOL WCON,DOPPLER: HCPCS

## 2024-02-04 PROCEDURE — 258N000003 HC RX IP 258 OP 636: Performed by: INTERNAL MEDICINE

## 2024-02-04 PROCEDURE — 71275 CT ANGIOGRAPHY CHEST: CPT

## 2024-02-04 PROCEDURE — 80048 BASIC METABOLIC PNL TOTAL CA: CPT | Performed by: STUDENT IN AN ORGANIZED HEALTH CARE EDUCATION/TRAINING PROGRAM

## 2024-02-04 PROCEDURE — 87899 AGENT NOS ASSAY W/OPTIC: CPT | Performed by: STUDENT IN AN ORGANIZED HEALTH CARE EDUCATION/TRAINING PROGRAM

## 2024-02-04 PROCEDURE — 85027 COMPLETE CBC AUTOMATED: CPT | Performed by: STUDENT IN AN ORGANIZED HEALTH CARE EDUCATION/TRAINING PROGRAM

## 2024-02-04 PROCEDURE — 255N000002 HC RX 255 OP 636: Performed by: STUDENT IN AN ORGANIZED HEALTH CARE EDUCATION/TRAINING PROGRAM

## 2024-02-04 PROCEDURE — 83880 ASSAY OF NATRIURETIC PEPTIDE: CPT | Performed by: STUDENT IN AN ORGANIZED HEALTH CARE EDUCATION/TRAINING PROGRAM

## 2024-02-04 PROCEDURE — 120N000001 HC R&B MED SURG/OB

## 2024-02-04 PROCEDURE — 87641 MR-STAPH DNA AMP PROBE: CPT | Performed by: STUDENT IN AN ORGANIZED HEALTH CARE EDUCATION/TRAINING PROGRAM

## 2024-02-04 PROCEDURE — 250N000013 HC RX MED GY IP 250 OP 250 PS 637: Performed by: STUDENT IN AN ORGANIZED HEALTH CARE EDUCATION/TRAINING PROGRAM

## 2024-02-04 PROCEDURE — 36415 COLL VENOUS BLD VENIPUNCTURE: CPT | Performed by: STUDENT IN AN ORGANIZED HEALTH CARE EDUCATION/TRAINING PROGRAM

## 2024-02-04 RX ORDER — IOPAMIDOL 755 MG/ML
100 INJECTION, SOLUTION INTRAVASCULAR ONCE
Status: COMPLETED | OUTPATIENT
Start: 2024-02-04 | End: 2024-02-04

## 2024-02-04 RX ADMIN — MEROPENEM 1 G: 1 INJECTION, POWDER, FOR SOLUTION INTRAVENOUS at 12:20

## 2024-02-04 RX ADMIN — VANCOMYCIN HYDROCHLORIDE 1750 MG: 5 INJECTION, POWDER, LYOPHILIZED, FOR SOLUTION INTRAVENOUS at 00:12

## 2024-02-04 RX ADMIN — MEROPENEM 1 G: 1 INJECTION, POWDER, FOR SOLUTION INTRAVENOUS at 02:38

## 2024-02-04 RX ADMIN — KETOROLAC TROMETHAMINE 30 MG: 30 INJECTION, SOLUTION INTRAMUSCULAR; INTRAVENOUS at 18:17

## 2024-02-04 RX ADMIN — FLUCONAZOLE 400 MG: 150 TABLET ORAL at 09:19

## 2024-02-04 RX ADMIN — KETOROLAC TROMETHAMINE 30 MG: 30 INJECTION, SOLUTION INTRAMUSCULAR; INTRAVENOUS at 12:20

## 2024-02-04 RX ADMIN — KETOROLAC TROMETHAMINE 30 MG: 30 INJECTION, SOLUTION INTRAMUSCULAR; INTRAVENOUS at 00:12

## 2024-02-04 RX ADMIN — IOPAMIDOL 100 ML: 755 INJECTION, SOLUTION INTRAVENOUS at 04:21

## 2024-02-04 RX ADMIN — VANCOMYCIN HYDROCHLORIDE 1750 MG: 5 INJECTION, POWDER, LYOPHILIZED, FOR SOLUTION INTRAVENOUS at 09:18

## 2024-02-04 RX ADMIN — HEPARIN SODIUM 5000 UNITS: 10000 INJECTION, SOLUTION INTRAVENOUS; SUBCUTANEOUS at 09:19

## 2024-02-04 RX ADMIN — VANCOMYCIN HYDROCHLORIDE 1750 MG: 5 INJECTION, POWDER, LYOPHILIZED, FOR SOLUTION INTRAVENOUS at 16:24

## 2024-02-04 RX ADMIN — PERFLUTREN 3 ML: 6.52 INJECTION, SUSPENSION INTRAVENOUS at 14:30

## 2024-02-04 RX ADMIN — KETOROLAC TROMETHAMINE 30 MG: 30 INJECTION, SOLUTION INTRAMUSCULAR; INTRAVENOUS at 05:48

## 2024-02-04 RX ADMIN — MEROPENEM 1 G: 1 INJECTION, POWDER, FOR SOLUTION INTRAVENOUS at 19:54

## 2024-02-04 RX ADMIN — HEPARIN SODIUM 5000 UNITS: 10000 INJECTION, SOLUTION INTRAVENOUS; SUBCUTANEOUS at 01:15

## 2024-02-04 ASSESSMENT — ACTIVITIES OF DAILY LIVING (ADL)
ADLS_ACUITY_SCORE: 21

## 2024-02-04 NOTE — PROGRESS NOTES
Infectious Diseases Progress Note  [facility]    Date of visit: 02/04/2024     ASSESSMENT   38-year-old man with a history of recent umbilical hernia repair admitted with sepsis.  Concern for abdominal wall infection    Status postumbilical hernia repair.  Presenting in November with abdominal pain and incarcerated hernia.  Underwent repair with mesh on 11/14/2023.  Return to the hospital with drainage from the wound sites concerning for infected seroma.  Status post exploratory laparotomy on 11/24/2023  Postop wound infection.  Hospitalized in December with postop wound infection.  No drainable abscess.  Wound cultures were polymicrobial with MSSA, Enterobacter cloacae, and Corynebacterium striatum.  Discharged with levofloxacin, Flagyl, and doxycycline.  Sepsis.  Acute onset of chills and fever 1 day prior to admission.  No other associated symptoms.  Increased redness over the abdomen.  Chronic ulcers without recent history of worsening.  CT scan of the abdomen without any abscesses.  Abdominal wall cellulitis versus panniculitis.  Significant geographic erythema over the abdomen, very tender.  No obvious purulence from wounds.  Mesh infection is in the differential, but no obvious fluid collections around the surgical site. Soft tissue u/s without new findings. High CRP. Fevers and wbc improving.  Clinically improving.  Inguinal intertrigo.  On fluconazole.  Hypoxic respiratory failure.  CT scan with new bilateral infiltrate worse on the left.    Principal Problem:    Abdominal wall cellulitis       PLAN   -continue vancomycin and meropenem  -Sputum cultures.  -Check COVID-19, influenza AMB, RSV  -Monitor inflammatory markers  -If not improving, will need repeat imaging and possibly biopsy. Seems to making slow progress on current coverage   -Trend white count  -Continue fluconazole.  -With the recurrent nature of the infection, hardware infection is high on the differential diagnosis.      NikoNov[facility] Health Forsyth Medical Centershanell  "YOLY Olsen MD  [facility]  Direct messaging: [employer]aging  On-Call ID provider: [phone], option: 9      ===========================================    SUBJECTIVE / INTERVAL HISTORY:   Reported shortness of breath last night leading to CT PE protocol.  No PE but bilateral infiltrate worse on the left side.  Inflammatory markers improving.    Antibiotics   Vancomycin 1/30-  Meropenem 1/31-    Previous:  Zosyn 1/30-1/31      Physical Exam     Temp:  [97.6  F (36.4  C)-98.4  F (36.9  C)] 97.7  F (36.5  C)  Pulse:  [83-96] 83  Resp:  [20-24] 24  BP: (129-157)/(72-82) 157/82  SpO2:  [90 %-96 %] 95 %    BP (!) 157/82 (BP Location: Right arm)   Pulse 83   Temp 97.7  F (36.5  C) (Oral)   Resp 24   Ht 1.803 m (5' 11\")   Wt (!) 184.4 kg (406 lb 8.5 oz)   SpO2 95%   BMI 56.70 kg/m      GENERAL:  well-developed, well-nourished, lying in bed, uncomfortable  HENT:  Head is normocephalic, atraumatic.   NECK: supple  EYES:  Eyes have anicteric sclerae without conjunctival injection   LUNGS:  Clear to auscultation.  CARDIOVASCULAR:  Regular rate and rhythm with no murmurs, gallops or rubs.  ABDOMEN:  Normal bowel sounds, soft.  Large pannus.  2 packed wounds near the midline.  Erythema in a geographic distribution over the abdomen and lower pannus with induration and tenderness. Upper lesions are better, but ongoing tenderness and erythema on lower area  Candidal intertrigo  EXT: Extremities warm and without edema.  SKIN:  No acute rashes.  Dry flaky skin over both feet  NEUROLOGIC:  Grossly nonfocal.      Cultures   1/30 blood cultures x 2: no growth to date  1/31 blood culture x 2: no growth to date     Susceptibility data from last 90 days.  Collected Specimen Info Organism Ampicillin Ampicillin/Sulbactam Cefepime Ceftazidime Ceftriaxone Ciprofloxacin Clindamycin Daptomycin Doxycycline Erythromycin Gentamicin Levofloxacin Meropenem Oxacillin Penicillin   12/17/23 Wound from Abdomen " Enterobacter cloacae complex R R  S  S  S  S      S  S  S       Staphylococcus aureus        S  S  S  S  S    S      Corynebacterium striatum      I   R   S       I   12/17/23 Abscess from Abdomen Mixed Aerobic and Anaerobic gonzález                  11/24/23 Tissue from Abdomen Cutibacterium (Propionibacterium) acnes                    Collected Specimen Info Organism Piperacillin/Tazobactam Tetracycline Tobramycin Trimethoprim/Sulfamethoxazole  Vancomycin   12/17/23 Wound from Abdomen Enterobacter cloacae complex  S   S  S      Staphylococcus aureus   S   S  S     Corynebacterium striatum     S  S   12/17/23 Abscess from Abdomen Mixed Aerobic and Anaerobic gonzález        11/24/23 Tissue from Abdomen Cutibacterium (Propionibacterium) acnes               Pertinent Labs:     Recent Labs   Lab 02/04/24  1111 02/03/24  0659 02/02/24  0637   WBC 12.4* 15.6* 14.8*   HGB 10.3* 10.6* 9.8*    297 243       Recent Labs   Lab 02/04/24  1111 02/03/24  0659 02/02/24  0637 01/31/24  0728 01/30/24  1806    139 134* 138 140   CO2 28 30* 25 24 24   BUN 7.6 7.5 6.1 9.4 10.8   ALBUMIN  --   --   --  3.1* 3.9   ALKPHOS  --   --   --  78 113   ALT  --   --   --  34 48   AST  --   --   --  24 47*       Recent Labs   Lab 02/04/24  1111 02/03/24  0659 02/01/24  0646   CRPI 170.50* 256.70* 247.00*           Imaging:     US Soft Tissue Abdominal Wall or Lower Back    Result Date: 1/31/2024  US SOFT TISSUE ABDOMINAL WALL OR LOWER BACK 1/31/2024 12:52 PM CST INDICATION: Erythema, large pannus; evaluate abscess. COMPARISON: Previous day CT. FINDINGS: Along the area of interest, subcutaneous edema present, though no focal collection or abscess.     CT Chest/Abdomen/Pelvis w Contrast    Result Date: 1/30/2024  EXAM: CT CHEST/ABDOMEN/PELVIS W CONTRAST LOCATION: [facility] DATE: 1/30/2024 INDICATION: Sepsis COMPARISON: CT of the abdomen and pelvis 12/16/2023 TECHNIQUE: CT scan of the chest, abdomen, and pelvis was  performed following injection of IV contrast. Multiplanar reformats were obtained. Dose reduction techniques were used. CONTRAST: isovue 370 100ml FINDINGS: LUNGS AND PLEURA: Normal. MEDIASTINUM/AXILLAE: Normal. CORONARY ARTERY CALCIFICATION: None. HEPATOBILIARY: Normal. PANCREAS: Normal. SPLEEN: Normal. ADRENAL GLANDS: Normal. KIDNEYS/BLADDER: Normal. BOWEL: Normal. LYMPH NODES: Normal. VASCULATURE: Unremarkable. PELVIC ORGANS: Normal. MUSCULOSKELETAL: Since 12/16/2023 the prior seen gas associated with the laparotomy site has nearly resolved there is no fluid collections seen to suggest an abscess.     IMPRESSION: 1.  Nearly resolved gas at the laparotomy site since 12/16/2023. No findings of an abscess. Nothing seen to explain patient's sepsis clinically.      Order  CT Chest Pulmonary Embolism w Contrast [[id number]] (Order [id number])  Exam Information    Exam Date Exam Time Exam Date Exam Time Accession # Performing Department Results    2/4/24  4:27 AM 2/4/24  4:27 AM [id number] [facility] CT      PACS Images     Show images for CT Chest Pulmonary Embolism w Contrast     Study Result    Narrative & Impression   EXAM: CT CHEST PULMONARY EMBOLISM W CONTRAST  LOCATION: [facility]  DATE: 2/4/2024     INDICATION: Patient with surgeries 2 months ago   difficult healing. Admitted x2. Patient with SOB, tachycardia, and RLE swelling. Concern for PE.  COMPARISON: CT chest 01/30/2024.  TECHNIQUE: CT chest pulmonary angiogram during arterial phase injection of IV contrast. Multiplanar reformats and MIP reconstructions were performed. Dose reduction techniques were used.   CONTRAST: isovue 370 100ml     FINDINGS:  ANGIOGRAM CHEST: No pulmonary embolus. No aortic aneurysm or dissection.     LUNGS AND PLEURA: Extensive somewhat nodular consolidation throughout both lungs, right worse than left. Small right and trace left pleural effusions.     MEDIASTINUM/AXILLAE:  Normal.     CORONARY ARTERY CALCIFICATION: None.     UPPER ABDOMEN: Normal.     MUSCULOSKELETAL: Normal.                                                                      IMPRESSION:  1.  Extensive bilateral pneumonia, right worse than left.  2.  Small right and trace left pleural effusions.  3.  No pulmonary embolus.                                                                                                                                                                          Data reviewed today: I reviewed all medications, new labs and imaging results over the last 24 hours. I personally reviewed no images or EKG's today.  The patient's care was discussed with the Bedside Nurse and Patient.

## 2024-02-04 NOTE — PROGRESS NOTES
Patient complaining of increased shortness of breath. Paged house. VSS 96% on 2L NC denies chest pain.

## 2024-02-04 NOTE — PROGRESS NOTES
[facility]    Medicine Progress Note - Hospitalist Service    Date of Admission:  1/30/2024    Assessment & Plan   Patient is a 38 year old male with PMH significant for morbid obesity, hypertension, DM2 and recent admission for umbilical hernia (11/22/23-11/30/23), s/p exploratory laparotomy with repair of incisional ventral hernia with mesh, re-admitted on 12/16/23-12/20/23 for concern regarding post-op wound infection who was sent to our ED by the home health nurse for concern of sepsis.     #Severe sepsis with lactic acidosis  #Abdominal wall cellulitis  Patient presents with fever, chills, and found to have lactic acidosis (elevated at 3.2), leucocytosis and tachycardia. Suspect ongoing abdominal wall infection. The lower abdominal wall has extensive redness and induration around the panus, though some of this is becoming chronic-appearing. No abscess and resolving gas on CT.    -ID consulted, continue vanc and meropenem  -Fluconazole added 2/3 per ID to cover superficial fungal contribution  -WBC/CRP initially stagnant, concerning for underlying mesh infection, but erythema improving and now some improvement  -1/30 and 1/31 Bcx: NG  -Examined under pannus 2/3 (difficult due to pain) and it was erythematous but no signs of skin break or ulceration  -Watch closely for chris gangrene progression   -May need to discuss with gen surg pending course, though surgery would want infection as controlled a possible before considering mesh removal so will hold off for now    #Acute hypoxemic respiratory failure  #Hospital-acquired pneumonia  Patient with a few days of worsening SOB, now requiring 2L O2. CT PE 2/4 revealed no PE, but did show a new diffuse bilateral pneumonia. CT chest on 1/30 showed clear lungs. He has been on Vanc/meropenem the duration between scans. Blood cultures from 1/30 and 1/31 remain negative. Endocarditis seems less likely with prior negative cultures, but worrisome  "for a possible source problem/intermittent bacteremia especially with concern of mesh infection discussed above.  - ID following, consult pulmonology  - Continue broad antibiotics  - SLP consult placed  - TTE ordered; EKG yesterday without new block; check BNP  - Repeat blood cultures ordered - addendum: being told they can't get get a blood draw on him  - Re-check Fluvid (negative 1/30), check MRSA nares, urine legionella and strep pneumo urina Ag; further tests per pulm and ID    #Sinus tachycardia  Continued tachycardia secondary to above  -Stop mIVF today and monitor  -CT PE 2/4 negative for PE     #Headache  Responsive to Toradol, continue q6h x2 days    #Hypertension   #DM-II, A1c 6.5  -Cont home meds, insulin          Diet: Combination Diet Regular Diet Adult; Moderate Consistent Carb (60 g CHO per Meal) Diet    DVT Prophylaxis: SCDs, patient refusing chemical ppx, doesn't like the shots  Duvall Catheter: Not present  Lines: None     Cardiac Monitoring: None  Code Status: Full Code      Clinically Significant Risk Factors              # Hypoalbuminemia: Lowest albumin = 3.1 g/dL at 1/31/2024  7:28 AM, will monitor as appropriate     # Hypertension: Noted on problem list       # DMII: A1C = 6.5 % (Ref range: <5.7 %) within past 6 months   # Severe Obesity: Estimated body mass index is 56.7 kg/m  as calculated from the following:    Height as of this encounter: 1.803 m (5' 11\").    Weight as of this encounter: 184.4 kg (406 lb 8.5 oz).        # Financial/Environmental Concerns: none         Disposition Plan      Expected Discharge Date: 02/06/2024      Destination: home with family  Discharge Comments: IV abx: ID following            TRESSA THORNTON MD  Hospitalist Service  [facility]  Securely message with [employer] (more info)  Text page via [employer] Paging/Directory   ______________________________________________________________________    Interval History   Still tired, SOB is getting " worse. Now feels it is difficult to walk to the bathroom due to his breathing. Abdominal pain stable to slightly better.    Physical Exam   Vital Signs: Temp: 97.7  F (36.5  C) Temp src: Oral BP: (!) 157/82 Pulse: 83   Resp: 24 SpO2: 95 % O2 Device: None (Room air) Oxygen Delivery: 2 LPM  Weight: 406 lbs 8.45 oz    General Appearance: Mild distress, non diaphoretic   Respiratory: Mild diffuse crackles, but difficult to auscultate through thick chest wall  Cardiovascular: Tachycardic, no murmur   GI: superficial pain throughout abdominal pannus with erythema and tender to touch, no erythema or skin changes of inguinal area   Skin: erythema and edema throughout abdominal pannus, warm to touch without fluctuations   Other: No sharp neck pain    Medical Decision Making       50 MINUTES SPENT BY ME on the date of service doing chart review, history, exam, documentation & further activities per the note.      Data     I have personally reviewed the following data over the past 24 hrs:    12.4 (H)  \   10.3 (L)   / 301     139 103 7.6 /  105 (H)   3.5 28 0.56 (L) \     Procal: N/A CRP: 170.50 (H) Lactic Acid: N/A         Imaging results reviewed over the past 24 hrs:   Recent Results (from the past 24 hour(s))   CT Chest Pulmonary Embolism w Contrast    Narrative    EXAM: CT CHEST PULMONARY EMBOLISM W CONTRAST  LOCATION: [facility]  DATE: 2/4/2024    INDICATION: Patient with surgeries 2 months ago   difficult healing. Admitted x2. Patient with SOB, tachycardia, and RLE swelling. Concern for PE.  COMPARISON: CT chest 01/30/2024.  TECHNIQUE: CT chest pulmonary angiogram during arterial phase injection of IV contrast. Multiplanar reformats and MIP reconstructions were performed. Dose reduction techniques were used.   CONTRAST: isovue 370 100ml    FINDINGS:  ANGIOGRAM CHEST: No pulmonary embolus. No aortic aneurysm or dissection.    LUNGS AND PLEURA: Extensive somewhat nodular consolidation throughout  both lungs, right worse than left. Small right and trace left pleural effusions.    MEDIASTINUM/AXILLAE: Normal.    CORONARY ARTERY CALCIFICATION: None.    UPPER ABDOMEN: Normal.    MUSCULOSKELETAL: Normal.      Impression    IMPRESSION:  1.  Extensive bilateral pneumonia, right worse than left.  2.  Small right and trace left pleural effusions.  3.  No pulmonary embolus.

## 2024-02-04 NOTE — SIGNIFICANT EVENT
"Significant Event Note    Time of event: 11:23 PM February 3, 2024    Description of event:  Paged by nursing regarding patient experiencing new shortness of breath.     Patient is a 38 year old male with PMH significant for morbid obesity, hypertension, DM2 and recent admission for umbilical hernia (11/22/23-11/30/23), s/p exploratory laparotomy with repair of incisional ventral hernia with mesh, re-admitted on 12/16/23-12/20/23 for concern regarding post-op wound infection who was sent to our ED by the home health nurse for concern of sepsis.  Infectious diseases following and patient is currently on vancomycin and meropenem.    Patient has been on DVT prophylaxis during his stay however day team did have some concern for possible DVT versus PE in the setting of sinus tachycardia.  Patient did seem to improve with medical management today however tachycardia is returning along with this no shortness of breath    Went to bedside:  Patient reports that he has had shortness of breath for 2 to 3 days but it has been worsening significantly in the past couple hours for him.  He is unsure if this is related to his abdominal infection however he is feeling pretty scared right now.  He is not having any chest pain or heart palpitations at this time.  Does not endorse any lower extremity edema worsening.  He does have a painful cough that occurs if he breathes too quickly.  Denies pain on deep inspiration    Patient denies headache, fever, chills, chest pain, nausea, vomiting.      BP (!) 140/72   Pulse 96   Temp 98.4  F (36.9  C) (Oral)   Resp 20   Ht 1.803 m (5' 11\")   Wt (!) 184.4 kg (406 lb 8.5 oz)   SpO2 96%   BMI 56.70 kg/m    GENERAL: Mild distress and anxiety from shortness of breath.  Morbidly obese  EYES: Eyes grossly normal to inspection.  No discharge or erythema, or obvious scleral/conjunctival abnormalities.  RESP: Lung sounds very difficult to auscultate due to body habitus however no obvious wheezes " or crackles heard throughout lung fields.  CV: Borderline tachycardic rate but regular rhythm no murmur heard.  Extremities: Patient has 1+ pretibial edema up to the knees on the right side however no edema on the left side.  Patient has no popliteal tenderness or sign of a mass in the popliteal region bilaterally.  Abdomen: Soft, nontender, nondistended.  MSK: No gross deformity. Normal tone.  SKIN: Visible skin clear. No significant rash, abnormal pigmentation or lesions.  NEURO: Cranial nerves grossly intact.  Mentation and speech appropriate for age.  PSYCH: Mentation appears normal, affect normal, judgement and insight intact, normal speech and appearance well-groomed.        Plan:  Worsening dyspnea  Sepsis with lactic acidosis likely from abdominal walls cellulitis  Sinus tachycardia earlier today and borderline tachycardic right now  This patient with 2 to 3 days of shortness of breath and acute worsening of the shortness of breath in the past 2 to 3 hours I have concerns for potential DVT/PE developing.  Patient has been on DVT prophylaxis during his hospitalization however day team was worried about a possible DVT or PE through this in the setting of sinus tachycardia.  However tachycardia transiently improved with antibiotics and fluid management however tachycardia is now returning again along with worsening of shortness of breath.  Patient does not endorse pleuritic chest pain however he does have unequal lower extremity edema right worse than left.  Patient did have his right lower extremity hanging off the bed and so dependent edema from gravity could be an explanation however in the setting of a septic patient with surgeries in the past 2 months and complicated healing there is concern for potential PE.   EKG with normal sinus rhythm with a heart rate of 90.  Still awaiting CT PE run.  -CT PE run pending  -EKG NSR    Discussed with: bedside nurse    Boogie Castellanos MD    Addendum  5:08 AM  CTPE run  negative for PE. Does show extensive bilateral pneumonia R>L. Vital signs have been stable and patient continues to be on 2 LPM NC. No change needed at this time.   - Patient on vancomycin, meropenum, and fluconazole per ID recs

## 2024-02-04 NOTE — PROGRESS NOTES
"Speech-Language Pathology: Clinical Swallow Evaluation     02/04/24 1300   Appointment Info   Signing Clinician's Name / Credentials (SLP) Lakeisha Jade MA, CCC-SLP   General Information   Onset of Illness/Injury or Date of Surgery 01/30/24   Referring Physician Glen Dick MD   Pertinent History of Current Problem Per ordering provider \"Patient is a 38 year old male with PMH significant for morbid obesity, hypertension, DM2 and recent admission for umbilical hernia (11/22/23-11/30/23), s/p exploratory laparotomy with repair of incisional ventral hernia with mesh, re-admitted on 12/16/23-12/20/23 for concern regarding post-op wound infection who was sent to our ED by the home health nurse for concern of sepsis.     #Severe sepsis with lactic acidosis  #Abdominal wall cellulitis  Patient presents with fever, chills, and found to have lactic acidosis (elevated at 3.2), leucocytosis and tachycardia. Suspect ongoing abdominal wall infection. The lower abdominal wall has extensive redness and induration around the panus, though some of this is becoming chronic-appearing. No abscess and resolving gas on CT.    -ID consulted, continue vanc and meropenem  -Fluconazole added 2/3 per ID to cover superficial fungal contribution  -WBC/CRP initially stagnant, concerning for underlying mesh infection, but erythema improving and now some improvement  -1/30 and 1/31 Bcx: NG  -2/4 Bcx: NGTD  -Examined under pannus 2/3 (difficult due to pain) and it was erythematous but no signs of skin break or ulceration  -Watch closely for chris gangrene progression   -May need to discuss with gen surg pending course, though surgery would want infection as controlled a possible before considering mesh removal so will hold off for now     #Acute hypoxemic respiratory failure  #Hospital-acquired pneumonia  Patient with a few days of worsening SOB, now requiring 2L O2. CT PE 2/4 revealed no PE, but did show a new diffuse bilateral " "pneumonia. CT chest on 1/30 showed clear lungs. He has been on Vanc/meropenem the duration between scans. Blood cultures from 1/30 and 1/31 remain negative. Endocarditis seems less likely with prior negative cultures, but worrisome for a possible source problem/intermittent bacteremia especially with concern of mesh infection discussed above.  - ID following, consult pulmonology  - Continue broad antibiotics  - SLP consult placed  - TTE ordered; EKG yesterday without new block; check BNP  - Repeat blood cultures  - Re-check Fluvid (negative 1/30), check MRSA nares, urine legionella and strep pneumo urina Ag; further tests per pulm and ID\".   General Observations Alert and cooperative   Type of Evaluation   Type of Evaluation Swallow Evaluation   Oral Motor   Oral Musculature generally intact   Mucosal Quality good   Dentition (Oral Motor)   Dentition (Oral Motor) adequate dentition   Facial Symmetry (Oral Motor)   Facial Symmetry (Oral Motor) WNL   Lip Function (Oral Motor)   Lip Range of Motion (Oral Motor) WNL   Lip Strength (Oral Motor) WNL   Tongue Function (Oral Motor)   Tongue Strength (Oral Motor) WNL   Tongue Coordination/Speed (Oral Motor) WNL   Tongue ROM (Oral Motor) WNL   Jaw Function (Oral Motor)   Jaw Function (Oral Motor) WNL   Cough/Swallow/Gag Reflex (Oral Motor)   Soft Palate/Velum (Oral Motor) WNL   Volitional Throat Clear/Cough (Oral Motor) WNL   Volitional Swallow (Oral Motor) WNL   Vocal Quality/Secretion Management (Oral Motor)   Vocal Quality (Oral Motor) WNL   Secretion Management (Oral Motor) WNL   General Swallowing Observations   Past History of Dysphagia None per EMR, RN, or patient report.   Comment, General Swallowing Observations Patient reports that he had had emesis 4 times since being in the hospital.   Respiratory Support room air   Current Diet/Method of Nutritional Intake (General Swallowing Observations, NIS) regular diet;thin liquids (level 0)   Swallowing Evaluation Clinical " swallow evaluation   Clinical Swallow Evaluation   Clinical Swallow Evaluation Textures Trialed thin liquids;solid foods   Clinical Swallow Eval: Thin Liquid Texture Trial   Mode of Presentation, Thin Liquids cup;straw   Volume of Liquid or Food Presented 5oz   Oral Phase of Swallow WFL   Pharyngeal Phase of Swallow intact   Diagnostic Statement No overt s/s aspiration   Clinical Swallow Evaluation: Solid Food Texture Trial   Mode of Presentation self-fed   Volume Presented x1 cracker   Oral Phase WFL   Pharyngeal Phase intact   Diagnostic Statement No overt s/s aspiration   Esophageal Phase of Swallow   Patient reports or presents with symptoms of esophageal dysphagia No   Swallowing Recommendations   Diet Consistency Recommendations regular diet;thin liquids (level 0)   Recommended Feeding/Eating Techniques (Swallow Eval) maintain upright sitting position for eating   Medication Administration Recommendations, Swallowing (SLP) Per patient preference   Instrumental Assessment Recommendations instrumental evaluation not recommended at this time   Comment, Swallowing Recommendations Patient appears to be at low risk for aspiration when eating/drinking.   Clinical Impression   Criteria for Skilled Therapeutic Interventions Met (SLP Eval) Evaluation only   Risks & Benefits of therapy have been explained evaluation/treatment results reviewed;care plan/treatment goals reviewed;participants voiced agreement with care plan;participants included;patient   Clinical Impression Comments Patient participated in Clinical Swallow Evaluation. No s/s aspiration with any intake. Oral motor was WFL. Mastication was adequate. Hyolaryngeal movement was present. Recommend diet listed above. No anticipated SLP needs at this time. Please contact SLP with any questions or concerns.   SLP Total Evaluation Time   Eval: oral/pharyngeal swallow function, clinical swallow Minutes (72219) 20   SLP Discharge Planning   SLP Discharge  Recommendation other (see comments)   SLP Rationale for DC Rec No anticipated SLP needs at this time.   SLP Brief overview of current status  Recommend regular textures and thin liquids. No anticipated SLP needs at this time. Please contact SLP with any questions or concerns.   Total Session Time   Total Session Time (sum of timed and untimed services) 20

## 2024-02-05 LAB
ANION GAP SERPL CALCULATED.3IONS-SCNC: 8 MMOL/L (ref 7–15)
BACTERIA BLD CULT: NO GROWTH
BACTERIA BLD CULT: NO GROWTH
BUN SERPL-MCNC: 7.7 MG/DL (ref 6–20)
CALCIUM SERPL-MCNC: 8.4 MG/DL (ref 8.6–10)
CHLORIDE SERPL-SCNC: 102 MMOL/L (ref 98–107)
CREAT SERPL-MCNC: 0.58 MG/DL (ref 0.67–1.17)
CRP SERPL-MCNC: 116.5 MG/L
DEPRECATED HCO3 PLAS-SCNC: 29 MMOL/L (ref 22–29)
EGFRCR SERPLBLD CKD-EPI 2021: >90 ML/MIN/1.73M2
ERYTHROCYTE [DISTWIDTH] IN BLOOD BY AUTOMATED COUNT: 15.5 % (ref 10–15)
GLUCOSE BLDC GLUCOMTR-MCNC: 103 MG/DL (ref 70–99)
GLUCOSE BLDC GLUCOMTR-MCNC: 107 MG/DL (ref 70–99)
GLUCOSE BLDC GLUCOMTR-MCNC: 107 MG/DL (ref 70–99)
GLUCOSE BLDC GLUCOMTR-MCNC: 93 MG/DL (ref 70–99)
GLUCOSE SERPL-MCNC: 105 MG/DL (ref 70–99)
HCT VFR BLD AUTO: 34 % (ref 40–53)
HGB BLD-MCNC: 10.4 G/DL (ref 13.3–17.7)
MCH RBC QN AUTO: 26.7 PG (ref 26.5–33)
MCHC RBC AUTO-ENTMCNC: 30.6 G/DL (ref 31.5–36.5)
MCV RBC AUTO: 87 FL (ref 78–100)
PLATELET # BLD AUTO: 313 10E3/UL (ref 150–450)
POTASSIUM SERPL-SCNC: 3 MMOL/L (ref 3.4–5.3)
POTASSIUM SERPL-SCNC: 3.6 MMOL/L (ref 3.4–5.3)
RBC # BLD AUTO: 3.9 10E6/UL (ref 4.4–5.9)
SODIUM SERPL-SCNC: 139 MMOL/L (ref 135–145)
VANCOMYCIN SERPL-MCNC: 18.9 UG/ML
WBC # BLD AUTO: 9.2 10E3/UL (ref 4–11)

## 2024-02-05 PROCEDURE — 80202 ASSAY OF VANCOMYCIN: CPT | Performed by: STUDENT IN AN ORGANIZED HEALTH CARE EDUCATION/TRAINING PROGRAM

## 2024-02-05 PROCEDURE — 85027 COMPLETE CBC AUTOMATED: CPT | Performed by: STUDENT IN AN ORGANIZED HEALTH CARE EDUCATION/TRAINING PROGRAM

## 2024-02-05 PROCEDURE — 99232 SBSQ HOSP IP/OBS MODERATE 35: CPT | Performed by: INTERNAL MEDICINE

## 2024-02-05 PROCEDURE — 36415 COLL VENOUS BLD VENIPUNCTURE: CPT | Performed by: INTERNAL MEDICINE

## 2024-02-05 PROCEDURE — 250N000011 HC RX IP 250 OP 636: Mod: JZ | Performed by: INTERNAL MEDICINE

## 2024-02-05 PROCEDURE — 250N000013 HC RX MED GY IP 250 OP 250 PS 637: Performed by: INTERNAL MEDICINE

## 2024-02-05 PROCEDURE — 84132 ASSAY OF SERUM POTASSIUM: CPT | Performed by: INTERNAL MEDICINE

## 2024-02-05 PROCEDURE — 36415 COLL VENOUS BLD VENIPUNCTURE: CPT | Performed by: STUDENT IN AN ORGANIZED HEALTH CARE EDUCATION/TRAINING PROGRAM

## 2024-02-05 PROCEDURE — 258N000003 HC RX IP 258 OP 636: Performed by: INTERNAL MEDICINE

## 2024-02-05 PROCEDURE — 250N000013 HC RX MED GY IP 250 OP 250 PS 637: Performed by: STUDENT IN AN ORGANIZED HEALTH CARE EDUCATION/TRAINING PROGRAM

## 2024-02-05 PROCEDURE — 80048 BASIC METABOLIC PNL TOTAL CA: CPT | Performed by: STUDENT IN AN ORGANIZED HEALTH CARE EDUCATION/TRAINING PROGRAM

## 2024-02-05 PROCEDURE — 250N000011 HC RX IP 250 OP 636: Performed by: INTERNAL MEDICINE

## 2024-02-05 PROCEDURE — 120N000001 HC R&B MED SURG/OB

## 2024-02-05 PROCEDURE — 250N000011 HC RX IP 250 OP 636: Performed by: STUDENT IN AN ORGANIZED HEALTH CARE EDUCATION/TRAINING PROGRAM

## 2024-02-05 PROCEDURE — 86140 C-REACTIVE PROTEIN: CPT | Performed by: STUDENT IN AN ORGANIZED HEALTH CARE EDUCATION/TRAINING PROGRAM

## 2024-02-05 RX ORDER — POTASSIUM CHLORIDE 1500 MG/1
40 TABLET, EXTENDED RELEASE ORAL ONCE
Status: COMPLETED | OUTPATIENT
Start: 2024-02-05 | End: 2024-02-05

## 2024-02-05 RX ORDER — POTASSIUM CHLORIDE 1500 MG/1
20 TABLET, EXTENDED RELEASE ORAL ONCE
Status: COMPLETED | OUTPATIENT
Start: 2024-02-05 | End: 2024-02-05

## 2024-02-05 RX ORDER — CEFAZOLIN SODIUM 1 G/50ML
1250 SOLUTION INTRAVENOUS EVERY 8 HOURS
Status: DISCONTINUED | OUTPATIENT
Start: 2024-02-05 | End: 2024-02-11 | Stop reason: HOSPADM

## 2024-02-05 RX ADMIN — VANCOMYCIN HYDROCHLORIDE 1750 MG: 5 INJECTION, POWDER, LYOPHILIZED, FOR SOLUTION INTRAVENOUS at 07:56

## 2024-02-05 RX ADMIN — MEROPENEM 1 G: 1 INJECTION, POWDER, FOR SOLUTION INTRAVENOUS at 11:54

## 2024-02-05 RX ADMIN — KETOROLAC TROMETHAMINE 30 MG: 30 INJECTION, SOLUTION INTRAMUSCULAR; INTRAVENOUS at 00:14

## 2024-02-05 RX ADMIN — KETOROLAC TROMETHAMINE 30 MG: 30 INJECTION, SOLUTION INTRAMUSCULAR; INTRAVENOUS at 05:36

## 2024-02-05 RX ADMIN — MEROPENEM 1 G: 1 INJECTION, POWDER, FOR SOLUTION INTRAVENOUS at 20:45

## 2024-02-05 RX ADMIN — POTASSIUM CHLORIDE 20 MEQ: 1500 TABLET, EXTENDED RELEASE ORAL at 09:55

## 2024-02-05 RX ADMIN — VANCOMYCIN HYDROCHLORIDE 1750 MG: 5 INJECTION, POWDER, LYOPHILIZED, FOR SOLUTION INTRAVENOUS at 00:19

## 2024-02-05 RX ADMIN — FLUCONAZOLE 400 MG: 150 TABLET ORAL at 09:55

## 2024-02-05 RX ADMIN — POTASSIUM CHLORIDE 40 MEQ: 1500 TABLET, EXTENDED RELEASE ORAL at 08:06

## 2024-02-05 RX ADMIN — VANCOMYCIN HYDROCHLORIDE 1250 MG: 5 INJECTION, POWDER, LYOPHILIZED, FOR SOLUTION INTRAVENOUS at 18:29

## 2024-02-05 RX ADMIN — MEROPENEM 1 G: 1 INJECTION, POWDER, FOR SOLUTION INTRAVENOUS at 02:18

## 2024-02-05 ASSESSMENT — ACTIVITIES OF DAILY LIVING (ADL)
ADLS_ACUITY_SCORE: 23
ADLS_ACUITY_SCORE: 21
ADLS_ACUITY_SCORE: 24
ADLS_ACUITY_SCORE: 21
ADLS_ACUITY_SCORE: 23
ADLS_ACUITY_SCORE: 21
ADLS_ACUITY_SCORE: 23
ADLS_ACUITY_SCORE: 23
ADLS_ACUITY_SCORE: 21
ADLS_ACUITY_SCORE: 21

## 2024-02-05 NOTE — PROGRESS NOTES
Infectious Diseases Progress Note  [facility]    Date of visit: 02/05/2024     ASSESSMENT   38-year-old man with a history of recent umbilical hernia repair admitted with sepsis.  Concern for abdominal wall infection    Status postumbilical hernia repair.  Presenting in November with abdominal pain and incarcerated hernia.  Underwent repair with mesh on 11/14/2023.  Return to the hospital with drainage from the wound sites concerning for infected seroma.  Status post exploratory laparotomy on 11/24/2023  Postop wound infection.  Hospitalized in December with postop wound infection.  No drainable abscess.  Wound cultures were polymicrobial with MSSA, Enterobacter cloacae, and Corynebacterium striatum.  Discharged with levofloxacin, Flagyl, and doxycycline.  Sepsis.  Acute onset of chills and fever 1 day prior to admission.  No other associated symptoms.  Increased redness over the abdomen.  Chronic ulcers without recent history of worsening.  CT scan of the abdomen without any abscesses.  Abdominal wall cellulitis versus panniculitis.  Significant geographic erythema over the abdomen, very tender.  No obvious purulence from wounds.  Mesh infection is in the differential, but no obvious fluid collections around the surgical site. Soft tissue u/s without new findings. High CRP. Fevers improved. Wbc normalized. CRP improving, slowly  Inguinal intertrigo.  On fluconazole.  Hypoxic respiratory failure.  CT scan with new bilateral infiltrate worse on the left. Hypoxia, stable     Principal Problem:    Abdominal wall cellulitis       PLAN   -continue vancomycin and meropenem  -continue fluconazole   -no additional coverage for pneumonia  -With the recurrent nature of the infection, hardware infection is high on the differential diagnosis.      Jamal Smyth MD  [facility]  Direct messaging: [facility] Paging   On-Call ID provider: [phone], option: 9  "        ===========================================    SUBJECTIVE / INTERVAL HISTORY:   Feeling better. Less pain. Headache better controlled.     Antibiotics   Vancomycin 1/30-  Meropenem 1/31-  Fluconazole 2/3-    Previous:  Zosyn 1/30-1/31      Physical Exam     Temp:  [97.3  F (36.3  C)-98.3  F (36.8  C)] 98  F (36.7  C)  Pulse:  [71-87] 83  Resp:  [16] 16  BP: (139-158)/(75-84) 158/84  SpO2:  [89 %-96 %] 95 %    BP (!) 158/84 (BP Location: Right arm)   Pulse 83   Temp 98  F (36.7  C) (Oral)   Resp 16   Ht 1.803 m (5' 11\")   Wt (!) 184.4 kg (406 lb 8.5 oz)   SpO2 95%   BMI 56.70 kg/m      GENERAL:  well-developed, well-nourished, lying in bed, uncomfortable  HENT:  Head is normocephalic, atraumatic.   EYES:  Eyes have anicteric sclerae without conjunctival injection   LUNGS:  Clear to auscultation.  CARDIOVASCULAR:  Regular rate and rhythm with no murmurs, gallops or rubs.  ABDOMEN:  Normal bowel sounds, soft.  Large pannus.  2 packed wounds near the midline.  Erythema in a geographic distribution over the abdomen and lower pannus with induration and tenderness. Improving.   EXT: Extremities warm and without edema.  SKIN:  No acute rashes.    NEUROLOGIC:  Grossly nonfocal.      Cultures   1/30 blood cultures x 2: no growth to date  1/31 blood culture x 2: no growth to date     Susceptibility data from last 90 days.  Collected Specimen Info Organism Ampicillin Ampicillin/Sulbactam Cefepime Ceftazidime Ceftriaxone Ciprofloxacin Clindamycin Daptomycin Doxycycline Erythromycin Gentamicin Levofloxacin Meropenem Oxacillin Penicillin   12/17/23 Wound from Abdomen Enterobacter cloacae complex R R  S  S  S  S      S  S  S       Staphylococcus aureus        S  S  S  S  S    S      Corynebacterium striatum      I   R   S       I   12/17/23 Abscess from Abdomen Mixed Aerobic and Anaerobic gonzález                  11/24/23 Tissue from Abdomen Cutibacterium (Propionibacterium) acnes                    Collected Specimen " Info Organism Piperacillin/Tazobactam Tetracycline Tobramycin Trimethoprim/Sulfamethoxazole  Vancomycin   12/17/23 Wound from Abdomen Enterobacter cloacae complex  S   S  S      Staphylococcus aureus   S   S  S     Corynebacterium striatum     S  S   12/17/23 Abscess from Abdomen Mixed Aerobic and Anaerobic gonzález        11/24/23 Tissue from Abdomen Cutibacterium (Propionibacterium) acnes               Pertinent Labs:     Recent Labs   Lab 02/05/24  0635 02/04/24  1111 02/03/24  0659   WBC 9.2 12.4* 15.6*   HGB 10.4* 10.3* 10.6*    301 297       Recent Labs   Lab 02/05/24  0635 02/04/24  1111 02/03/24  0659 02/02/24  0637 01/31/24  0728 01/30/24  1806    139 139   < > 138 140   CO2 29 28 30*   < > 24 24   BUN 7.7 7.6 7.5   < > 9.4 10.8   ALBUMIN  --   --   --   --  3.1* 3.9   ALKPHOS  --   --   --   --  78 113   ALT  --   --   --   --  34 48   AST  --   --   --   --  24 47*    < > = values in this interval not displayed.       Recent Labs   Lab 02/05/24  0635 02/04/24  1111 02/03/24  0659   CRPI 116.50* 170.50* 256.70*           Imaging:     US Soft Tissue Abdominal Wall or Lower Back    Result Date: 1/31/2024  US SOFT TISSUE ABDOMINAL WALL OR LOWER BACK 1/31/2024 12:52 PM CST INDICATION: Erythema, large pannus; evaluate abscess. COMPARISON: Previous day CT. FINDINGS: Along the area of interest, subcutaneous edema present, though no focal collection or abscess.     CT Chest/Abdomen/Pelvis w Contrast    Result Date: 1/30/2024  EXAM: CT CHEST/ABDOMEN/PELVIS W CONTRAST LOCATION: [facility] DATE: 1/30/2024 INDICATION: Sepsis COMPARISON: CT of the abdomen and pelvis 12/16/2023 TECHNIQUE: CT scan of the chest, abdomen, and pelvis was performed following injection of IV contrast. Multiplanar reformats were obtained. Dose reduction techniques were used. CONTRAST: isovue 370 100ml FINDINGS: LUNGS AND PLEURA: Normal. MEDIASTINUM/AXILLAE: Normal. CORONARY ARTERY CALCIFICATION: None.  HEPATOBILIARY: Normal. PANCREAS: Normal. SPLEEN: Normal. ADRENAL GLANDS: Normal. KIDNEYS/BLADDER: Normal. BOWEL: Normal. LYMPH NODES: Normal. VASCULATURE: Unremarkable. PELVIC ORGANS: Normal. MUSCULOSKELETAL: Since 12/16/2023 the prior seen gas associated with the laparotomy site has nearly resolved there is no fluid collections seen to suggest an abscess.     IMPRESSION: 1.  Nearly resolved gas at the laparotomy site since 12/16/2023. No findings of an abscess. Nothing seen to explain patient's sepsis clinically.      Order  CT Chest Pulmonary Embolism w Contrast [[id number]] (Order [id number])  Exam Information    Exam Date Exam Time Exam Date Exam Time Accession # Performing Department Results    2/4/24  4:27 AM 2/4/24  4:27 AM [id number] [facility] CT      PACS Images     Show images for CT Chest Pulmonary Embolism w Contrast     Study Result    Narrative & Impression   EXAM: CT CHEST PULMONARY EMBOLISM W CONTRAST  LOCATION: [facility]  DATE: 2/4/2024     INDICATION: Patient with surgeries 2 months ago   difficult healing. Admitted x2. Patient with SOB, tachycardia, and RLE swelling. Concern for PE.  COMPARISON: CT chest 01/30/2024.  TECHNIQUE: CT chest pulmonary angiogram during arterial phase injection of IV contrast. Multiplanar reformats and MIP reconstructions were performed. Dose reduction techniques were used.   CONTRAST: isovue 370 100ml     FINDINGS:  ANGIOGRAM CHEST: No pulmonary embolus. No aortic aneurysm or dissection.     LUNGS AND PLEURA: Extensive somewhat nodular consolidation throughout both lungs, right worse than left. Small right and trace left pleural effusions.     MEDIASTINUM/AXILLAE: Normal.     CORONARY ARTERY CALCIFICATION: None.     UPPER ABDOMEN: Normal.     MUSCULOSKELETAL: Normal.                                                                      IMPRESSION:  1.  Extensive bilateral pneumonia, right worse than left.  2.   Small right and trace left pleural effusions.  3.  No pulmonary embolus.                                                                                                                                                                          Data reviewed today: I reviewed all medications, new labs and imaging results over the last 24 hours. I personally reviewed no images or EKG's today.  The patient's care was discussed with the Patient.

## 2024-02-05 NOTE — PHARMACY-VANCOMYCIN DOSING SERVICE
Pharmacy Vancomycin Note  Date of Service 2024  Patient's  1985   38 year old, male    Indication: Sepsis  Day of Therapy: 7  Current vancomycin regimen:  1250 mg IV q8h  Current vancomycin monitoring method: AUC  Current vancomycin therapeutic monitoring goal: 400-600 mg*h/L    Insight[employer] Prediction of Current Vancomycin Regimen  Regimen: 1750 mg IV every 8 hours.  Start time: 16:00 on 2024  Exposure target: AUC24 (range)400-600 mg/L.hr   AUC24,ss: 670 mg/L.hr  Probability of AUC24 > 400: 100 %  Ctrough,ss: 11.6 mg/L  Probability of Ctrough,ss > 20: 0 %  Probability of nephrotoxicity (Lodise SANTO ): 7 %    Current estimated CrCl = Estimated Creatinine Clearance: 290.4 mL/min (A) (based on SCr of 0.58 mg/dL (L)).    Creatinine for last 3 days  2/3/2024:  6:59 AM Creatinine 0.66 mg/dL;  6:59 AM Creatinine 0.66 mg/dL  2024: 11:11 AM Creatinine 0.56 mg/dL  2024:  6:35 AM Creatinine 0.58 mg/dL    Recent Vancomycin Levels (past 3 days)  2/3/2024:  6:59 AM Vancomycin 10.6 ug/mL  2024:  6:35 AM Vancomycin 18.9 ug/mL    Vancomycin IV Administrations (past 72 hours)                     vancomycin (VANCOCIN) 1,750 mg in sodium chloride 0.9 % 500 mL intermittent infusion (mg) 1,750 mg Given 24 0756     1,750 mg Given  0019     1,750 mg Given 24 1624     1,750 mg Given  0918     1,750 mg Given  0012     1,750 mg Given 24 1607    vancomycin (VANCOCIN) 1,500 mg in sodium chloride 0.9 % 250 mL intermittent infusion (mg) 1,500 mg New Bag 24 0925     1,500 mg New Bag 24 2352     1,500 mg New Bag  1631                    Nephrotoxins and other renal medications (From now, onward)      Start     Dose/Rate Route Frequency Ordered Stop    24 1600  vancomycin (VANCOCIN) 1,250 mg in sodium chloride 0.9 % 250 mL intermittent infusion         1,250 mg  over 90 Minutes Intravenous EVERY 8 HOURS 24 0829                 Contrast Orders - past 72 hours (72h ago,  onward)      Start     Dose/Rate Route Frequency Stop    02/04/24 1430  perflutren lipid microsphere (DEFINITY) injection SUSP 3 mL         3 mL Intravenous ONCE 02/04/24 1430    02/04/24 0400  iopamidol (ISOVUE-370) solution 100 mL         100 mL Intravenous ONCE 02/04/24 0421            Interpretation of levels and current regimen:  Vancomycin level is reflective of -600    InsightRX Prediction of Planned New Vancomycin Regimen  Regimen: 1250 mg IV every 8 hours.  Start time: 16:00 on 02/05/2024  Exposure target: AUC24 (range)400-600 mg/L.hr   AUC24,ss: 478 mg/L.hr  Probability of AUC24 > 400: 95 %  Ctrough,ss: 7.7 mg/L  Probability of Ctrough,ss > 20: 0 %  Probability of nephrotoxicity (Lodise SANTO 2009): 4 %      Plan:  Decrease Dose to 1250 mg IV q8hr  Vancomycin monitoring method: AUC  Vancomycin therapeutic monitoring goal: 400-600 mg*h/L  Pharmacy will check vancomycin levels as appropriate in 1-3 Days.  Serum creatinine levels will be ordered daily for the first week of therapy and at least twice weekly for subsequent weeks.    Vannesa Leal RPH

## 2024-02-05 NOTE — PROGRESS NOTES
[facility]    Medicine Progress Note - Hospitalist Service    Date of Admission:  1/30/2024    Assessment & Plan   Patient is a 38 year old male with PMH significant for morbid obesity, hypertension, DM2 and recent admission for umbilical hernia (11/22/23-11/30/23), s/p exploratory laparotomy with repair of incisional ventral hernia with mesh, re-admitted on 12/16/23-12/20/23 for concern regarding post-op wound infection     #Severe sepsis with lactic acidosis  #Abdominal wall cellulitis  Patient presents with fever, chills, and found to have lactic acidosis (elevated at 3.2), leucocytosis and tachycardia. Suspect ongoing abdominal wall infection. The lower abdominal wall has extensive redness and induration around the panus, though some of this is becoming chronic-appearing. No abscess and resolving gas on CT.    -ID consulted, continue vanc and meropenem  -Fluconazole added 2/3 per ID to cover superficial fungal contribution  -WBC/CRP initially stagnant, concerning for underlying mesh infection, but erythema improving and now some improvement  -1/30 and 1/31 Bcx: NG  -Examined under pannus 2/3 (difficult due to pain) and it was erythematous but no signs of skin break or ulceration  -Watch closely for chris gangrene progression   -May need to discuss with gen surg pending course, though surgery would want infection as controlled a possible before considering mesh removal so will hold off for now    #Acute hypoxemic respiratory failure  #Hospital-acquired pneumonia  Patient with a few days of worsening SOB, now requiring 2L O2. CT PE 2/4 revealed no PE, but did show a new diffuse bilateral pneumonia. CT chest on 1/30 showed clear lungs. He has been on Vanc/meropenem the duration between scans. Blood cultures from 1/30 and 1/31 remain negative. Endocarditis seems less likely with prior negative cultures, but worrisome for a possible source problem/intermittent bacteremia especially with  "concern of mesh infection discussed above.  - ID following, consult pulmonology  - Continue broad antibiotics  - SLP consult placed  - TTE ordered; EKG yesterday without new block; check BNP  - Repeat blood cultures ordered - addendum: being told they can't get get a blood draw on him  - Re-check Fluvid (negative 1/30), check MRSA nares, urine legionella and strep pneumo urina Ag; further tests per pulm and ID    #Sinus tachycardia  Continued tachycardia secondary to above  -Stop mIVF today and monitor  -CT PE 2/4 negative for PE     #Headache  Responsive to Toradol, continue q6h x2 days    #Hypertension   #DM-II, A1c 6.5  -Cont home meds, insulin          Diet: Combination Diet Regular Diet Adult; Moderate Consistent Carb (60 g CHO per Meal) Diet    DVT Prophylaxis: SCDs, patient refusing chemical ppx, doesn't like the shots  Duvall Catheter: Not present  Lines: None     Cardiac Monitoring: None  Code Status: Full Code      Clinically Significant Risk Factors        # Hypokalemia: Lowest K = 3 mmol/L in last 2 days, will replace as needed       # Hypoalbuminemia: Lowest albumin = 3.1 g/dL at 1/31/2024  7:28 AM, will monitor as appropriate     # Hypertension: Noted on problem list       # DMII: A1C = 6.5 % (Ref range: <5.7 %) within past 6 months   # Severe Obesity: Estimated body mass index is 56.7 kg/m  as calculated from the following:    Height as of this encounter: 1.803 m (5' 11\").    Weight as of this encounter: 184.4 kg (406 lb 8.5 oz).        # Financial/Environmental Concerns: none         Disposition Plan     Expected Discharge Date: 02/06/2024      Destination: home with family  Discharge Comments: IV abx: ID following            Arvin Hong MD  Hospitalist Service  [facility]  Securely message with [employer] (more info)  Text page via [employer] Paging/Directory   ______________________________________________________________________    Interval History   No new complaints today. "  Sitting comfortably on the recliner.  He states he noticed fluid drainage from the right lower quadrant region last night.  He states redness in the abdominal wall appears to be improving.  He endorsed difficulty with breathing when laying flat but feels better when sitting on the recliner.  He declines CPAP; prefers to use intranasal oxygen     Physical Exam   Vital Signs: Temp: 98  F (36.7  C) Temp src: Oral BP: (!) 158/84 (RN notified) Pulse: 83   Resp: 16 SpO2: 95 % O2 Device: Nasal cannula    Weight: 406 lbs 8.45 oz    General Appearance: Morbidly obese, sitting comfortably on the recliner  Respiratory: Mild diffuse crackles, but difficult to auscultate through thick chest wall  Cardiovascular: Tachycardic, no murmur   GI: Obese, superficial pain throughout abdominal pannus with erythema and tender to touch, no erythema or skin changes of inguinal area   Skin: Redness appears to be receding from the marked areas.  Significant redness in the lower and middle and upper part of the abdominal wall with prior surgical wound.  An area of mild purulence/sinus-crusted in the right lower quadrant.  Other: No sharp neck pain    Medical Decision Making       50 MINUTES SPENT BY ME on the date of service doing chart review, history, exam, documentation & further activities per the note.      Data     I have personally reviewed the following data over the past 24 hrs:    9.2  \   10.4 (L)   / 313     139 102 7.7 /  107 (H)   3.6 29 0.58 (L) \     Procal: N/A CRP: 116.50 (H) Lactic Acid: N/A         Imaging results reviewed over the past 24 hrs:   No results found for this or any previous visit (from the past 24 hour(s)).

## 2024-02-06 ENCOUNTER — APPOINTMENT (OUTPATIENT)
Dept: CT IMAGING | Facility: HOSPITAL | Age: 39
DRG: 871 | End: 2024-02-06
Attending: INTERNAL MEDICINE
Payer: COMMERCIAL

## 2024-02-06 ENCOUNTER — APPOINTMENT (OUTPATIENT)
Dept: OCCUPATIONAL THERAPY | Facility: HOSPITAL | Age: 39
DRG: 871 | End: 2024-02-06
Attending: INTERNAL MEDICINE
Payer: COMMERCIAL

## 2024-02-06 ENCOUNTER — APPOINTMENT (OUTPATIENT)
Dept: PHYSICAL THERAPY | Facility: HOSPITAL | Age: 39
DRG: 871 | End: 2024-02-06
Attending: INTERNAL MEDICINE
Payer: COMMERCIAL

## 2024-02-06 LAB
GLUCOSE BLDC GLUCOMTR-MCNC: 123 MG/DL (ref 70–99)
GLUCOSE BLDC GLUCOMTR-MCNC: 93 MG/DL (ref 70–99)
GLUCOSE BLDC GLUCOMTR-MCNC: 93 MG/DL (ref 70–99)
GLUCOSE BLDC GLUCOMTR-MCNC: 97 MG/DL (ref 70–99)
PLATELET # BLD AUTO: 347 10E3/UL (ref 150–450)
POTASSIUM SERPL-SCNC: 3.2 MMOL/L (ref 3.4–5.3)
POTASSIUM SERPL-SCNC: 3.5 MMOL/L (ref 3.4–5.3)

## 2024-02-06 PROCEDURE — 97161 PT EVAL LOW COMPLEX 20 MIN: CPT | Mod: GP

## 2024-02-06 PROCEDURE — 97165 OT EVAL LOW COMPLEX 30 MIN: CPT | Mod: GO

## 2024-02-06 PROCEDURE — 84132 ASSAY OF SERUM POTASSIUM: CPT | Performed by: INTERNAL MEDICINE

## 2024-02-06 PROCEDURE — 258N000003 HC RX IP 258 OP 636: Performed by: INTERNAL MEDICINE

## 2024-02-06 PROCEDURE — 250N000011 HC RX IP 250 OP 636: Mod: JZ | Performed by: INTERNAL MEDICINE

## 2024-02-06 PROCEDURE — 250N000011 HC RX IP 250 OP 636: Performed by: INTERNAL MEDICINE

## 2024-02-06 PROCEDURE — 250N000013 HC RX MED GY IP 250 OP 250 PS 637: Performed by: INTERNAL MEDICINE

## 2024-02-06 PROCEDURE — 85049 AUTOMATED PLATELET COUNT: CPT | Performed by: INTERNAL MEDICINE

## 2024-02-06 PROCEDURE — 97116 GAIT TRAINING THERAPY: CPT | Mod: GP

## 2024-02-06 PROCEDURE — 250N000013 HC RX MED GY IP 250 OP 250 PS 637: Performed by: STUDENT IN AN ORGANIZED HEALTH CARE EDUCATION/TRAINING PROGRAM

## 2024-02-06 PROCEDURE — 74177 CT ABD & PELVIS W/CONTRAST: CPT

## 2024-02-06 PROCEDURE — 120N000001 HC R&B MED SURG/OB

## 2024-02-06 PROCEDURE — 99232 SBSQ HOSP IP/OBS MODERATE 35: CPT | Performed by: INTERNAL MEDICINE

## 2024-02-06 PROCEDURE — 36415 COLL VENOUS BLD VENIPUNCTURE: CPT | Performed by: INTERNAL MEDICINE

## 2024-02-06 PROCEDURE — 97535 SELF CARE MNGMENT TRAINING: CPT | Mod: GO

## 2024-02-06 RX ORDER — MULTIPLE VITAMINS W/ MINERALS TAB 9MG-400MCG
1 TAB ORAL DAILY
Status: DISCONTINUED | OUTPATIENT
Start: 2024-02-06 | End: 2024-02-11 | Stop reason: HOSPADM

## 2024-02-06 RX ORDER — IOPAMIDOL 755 MG/ML
90 INJECTION, SOLUTION INTRAVASCULAR ONCE
Status: COMPLETED | OUTPATIENT
Start: 2024-02-06 | End: 2024-02-06

## 2024-02-06 RX ORDER — POTASSIUM CHLORIDE 1500 MG/1
40 TABLET, EXTENDED RELEASE ORAL ONCE
Qty: 2 TABLET | Refills: 0 | Status: COMPLETED | OUTPATIENT
Start: 2024-02-06 | End: 2024-02-06

## 2024-02-06 RX ADMIN — MEROPENEM 1 G: 1 INJECTION, POWDER, FOR SOLUTION INTRAVENOUS at 04:39

## 2024-02-06 RX ADMIN — POTASSIUM CHLORIDE 40 MEQ: 1500 TABLET, EXTENDED RELEASE ORAL at 14:07

## 2024-02-06 RX ADMIN — MEROPENEM 1 G: 1 INJECTION, POWDER, FOR SOLUTION INTRAVENOUS at 14:55

## 2024-02-06 RX ADMIN — FLUCONAZOLE 400 MG: 150 TABLET ORAL at 09:52

## 2024-02-06 RX ADMIN — IOPAMIDOL 90 ML: 755 INJECTION, SOLUTION INTRAVENOUS at 16:47

## 2024-02-06 RX ADMIN — VANCOMYCIN HYDROCHLORIDE 1250 MG: 5 INJECTION, POWDER, LYOPHILIZED, FOR SOLUTION INTRAVENOUS at 12:35

## 2024-02-06 RX ADMIN — VANCOMYCIN HYDROCHLORIDE 1250 MG: 5 INJECTION, POWDER, LYOPHILIZED, FOR SOLUTION INTRAVENOUS at 01:02

## 2024-02-06 RX ADMIN — Medication 60 ML: at 17:55

## 2024-02-06 RX ADMIN — VANCOMYCIN HYDROCHLORIDE 1250 MG: 5 INJECTION, POWDER, LYOPHILIZED, FOR SOLUTION INTRAVENOUS at 17:55

## 2024-02-06 RX ADMIN — MEROPENEM 1 G: 1 INJECTION, POWDER, FOR SOLUTION INTRAVENOUS at 21:05

## 2024-02-06 ASSESSMENT — ACTIVITIES OF DAILY LIVING (ADL)
ADLS_ACUITY_SCORE: 24
ADLS_ACUITY_SCORE: 29
ADLS_ACUITY_SCORE: 24
ADLS_ACUITY_SCORE: 29
ADLS_ACUITY_SCORE: 24
ADLS_ACUITY_SCORE: 29
ADLS_ACUITY_SCORE: 24
ADLS_ACUITY_SCORE: 24
ADLS_ACUITY_SCORE: 29
ADLS_ACUITY_SCORE: 24
ADLS_ACUITY_SCORE: 23
ADLS_ACUITY_SCORE: 29

## 2024-02-06 NOTE — PROGRESS NOTES
[facility]    Medicine Progress Note - Hospitalist Service    Date of Admission:  1/30/2024    Assessment & Plan   Patient is a 38 year old male with PMH significant for morbid obesity, hypertension, DM2 and recent admission for umbilical hernia (11/22/23-11/30/23), s/p exploratory laparotomy with repair of incisional ventral hernia with mesh, re-admitted on 12/16/23-12/20/23 for concern regarding post-op wound infection.            #Severe sepsis with lactic acidosis  #Abdominal wall cellulitis  Patient presents with fever, chills, and found to have lactic acidosis (elevated at 3.2), leucocytosis and tachycardia. Suspect ongoing abdominal wall infection. The lower abdominal wall has extensive redness and induration around the panus, though some of this is becoming chronic-appearing. No abscess and resolving gas on CT.    -ID consulted, continue vancomycin and meropenem  -Fluconazole added 2/3 per ID to cover superficial fungal contribution  -WBC/CRP initially stagnant, concerning for underlying mesh infection, but erythema improving and now some improvement  -1/30 and 1/31 Bcx: NG  -Examined under pannus 2/3 (difficult due to pain) and it was erythematous but no signs of skin break or ulceration  -Watch closely for chris gangrene progression   -May need to discuss with gen surg pending course, though surgery would want infection as controlled a possible before considering mesh removal so will hold off for now    #Acute hypoxemic respiratory failure  #Hospital-acquired pneumonia  Patient with a few days of worsening SOB, now requiring 2L O2. CT PE 2/4 revealed no PE, but did show a new diffuse bilateral pneumonia. CT chest on 1/30 showed clear lungs. He has been on Vanc/meropenem the duration between scans. Blood cultures from 1/30 and 1/31 remain negative. Endocarditis seems less likely with prior negative cultures, but worrisome for a possible source problem/intermittent bacteremia  "especially with concern of mesh infection discussed above.  - ID following, consult pulmonology  - Continue broad antibiotics  - SLP consult placed  - TTE ordered; EKG yesterday without new block; check BNP  - Repeat blood cultures ordered - addendum: being told they can't get get a blood draw on him  - Re-check Fluvid (negative 1/30), check MRSA nares, urine legionella and strep pneumo urina Ag; further tests per pulm and ID    #Sinus tachycardia  Continued tachycardia secondary to above  -Stop mIVF today and monitor  -CT PE 2/4 negative for PE     #Headache  Responsive to Toradol, continue q6h x2 days    #Hypertension   #DM-II, A1c 6.5  -Cont home meds, insulin          Diet: Combination Diet Regular Diet Adult; Moderate Consistent Carb (60 g CHO per Meal) Diet    DVT Prophylaxis: SCDs, patient refusing chemical ppx, doesn't like the shots  Duvall Catheter: Not present  Lines: None     Cardiac Monitoring: None  Code Status: Full Code      Clinically Significant Risk Factors        # Hypokalemia: Lowest K = 3 mmol/L in last 2 days, will replace as needed       # Hypoalbuminemia: Lowest albumin = 3.1 g/dL at 1/31/2024  7:28 AM, will monitor as appropriate     # Hypertension: Noted on problem list       # DMII: A1C = 6.5 % (Ref range: <5.7 %) within past 6 months   # Severe Obesity: Estimated body mass index is 56.7 kg/m  as calculated from the following:    Height as of this encounter: 1.803 m (5' 11\").    Weight as of this encounter: 184.4 kg (406 lb 8.5 oz).        # Financial/Environmental Concerns: none         Disposition Plan     Expected Discharge Date: 02/06/2024      Destination: home with family  Discharge Comments: IV abx: ID following            Arvin Hong MD  Hospitalist Service  [facility]  Securely message with [email or web address] (more info)  Text page via [employer] Paging/Directory   ______________________________________________________________________    Interval History   He " states he noticed slight bleeding from the pannus located within the lower abdominal fold. Otherwise no acute issues.        Physical Exam   Vital Signs: Temp: 97.5  F (36.4  C) Temp src: Oral BP: (!) 159/90 Pulse: 76   Resp: 16 SpO2: 98 % O2 Device: Nasal cannula Oxygen Delivery: 1 LPM  Weight: 406 lbs 8.45 oz    General Appearance: Morbidly obese, sitting comfortably on the recliner  Respiratory: Mild diffuse crackles, but difficult to auscultate through thick chest wall  Cardiovascular: Tachycardic, no murmur   GI: Obese, superficial pain throughout abdominal pannus with erythema and tender to touch, no erythema or skin changes of inguinal area   Skin: Redness appears to be receding from the marked areas.  Significant redness in the lower and middle and upper part of the abdominal wall with prior surgical wound.  An area of mild purulence/sinus-crusted in the right lower quadrant. Blood staining noticed on the  to the pannus.   Other: No sharp neck pain    Medical Decision Making       50 MINUTES SPENT BY ME on the date of service doing chart review, history, exam, documentation & further activities per the note.      Data     I have personally reviewed the following data over the past 24 hrs:    N/A  \   N/A   / 347     N/A N/A N/A /  93   3.2 (L) N/A N/A \       Imaging results reviewed over the past 24 hrs:   No results found for this or any previous visit (from the past 24 hour(s)).

## 2024-02-06 NOTE — PROGRESS NOTES
02/06/24 1420   Appointment Info   Signing Clinician's Name / Credentials (PT) Sweta Curran DPT   Living Environment   People in Home spouse;child(sherry), dependent   Current Living Arrangements house   Home Accessibility stairs to enter home;stairs within home   Number of Stairs, Main Entrance 1   Stair Railings, Main Entrance none   Number of Stairs, Within Home, Primary greater than 10 stairs   Stair Railings, Within Home, Primary railings safe and in good condition   Transportation Anticipated family or friend will provide   Self-Care   Usual Activity Tolerance moderate   Current Activity Tolerance fair   Equipment Currently Used at Home walker, rolling   General Information   Onset of Illness/Injury or Date of Surgery 01/30/24   Referring Physician Arvin Hong MD   Patient/Family Therapy Goals Statement (PT) none   Pertinent History of Current Problem (include personal factors and/or comorbidities that impact the POC) 38 year old male with PMH significant for morbid obesity, hypertension, DM2 and recent admission for umbilical hernia (11/22/23-11/30/23), s/p exploratory laparotomy with repair of incisional ventral hernia with mesh, re-admitted on 12/16/23-12/20/23 for concern regarding post-op wound infection.   Strength (Manual Muscle Testing)   Strength (Manual Muscle Testing) Deficits observed during functional mobility   Bed Mobility   Comment, (Bed Mobility) Pt in the recliner when PT arrived.   Transfers   Transfers sit-stand transfer   Sit-Stand Transfer   Sit-Stand [place] (Transfers) contact guard   Assistive Device (Sit-Stand Transfers) walker, front-wheeled   Gait/Stairs (Locomotion)   [place] Level (Gait) contact guard   Assistive Device (Gait) walker, front-wheeled   Distance in Feet (Gait) 15'   Pattern (Gait) step-through   Deviations/Abnormal Patterns (Gait) gait speed decreased   Clinical Impression   Criteria for Skilled Therapeutic Intervention Yes, treatment indicated    PT Diagnosis (PT) Impaired functional mobility   Influenced by the following impairments Fatigue, pain   Functional limitations due to impairments Impaired transfers, gait, endurance   Clinical Presentation (PT Evaluation Complexity) stable   Clinical Presentation Rationale Presents as diagnosed   Clinical Decision Making (Complexity) low complexity   Planned Therapy Interventions (PT) gait training;transfer training;stair training   Risk & Benefits of therapy have been explained patient   PT Total Evaluation Time   PT Marilual, Low Complexity Minutes (12459) 10   Physical Therapy Goals   PT Frequency Daily   PT Predicted Duration/Target Date for Goal Attainment 02/13/24   PT Goals Transfers;Gait;Stairs   PT: Transfers Modified independent;Sit to/from stand;Bed to/from chair;Assistive device   PT: Gait Modified independent;Rolling walker;Greater than 200 feet   PT: Stairs Supervision/stand-by assist;1 stair   PT Discharge Planning   PT Plan progress transfers, amb with FWW, TERESA   PT Discharge Recommendation (DC Rec) home with assist   PT Rationale for DC Rec Pt appears close to baseline mobility.   PT Brief overview of current status Amb 150' with FWW and CGA.   PT Equipment Needed at Discharge walker, rolling   Total Session Time   Total Session Time (sum of timed and untimed services) 10       Sweta Curran, PT, DPT  2/6/2024

## 2024-02-06 NOTE — PROGRESS NOTES
Infectious Diseases Progress Note  [facility]    Date of visit: 02/06/2024     ASSESSMENT   38-year-old man with a history of recent umbilical hernia repair admitted with sepsis.  Concern for abdominal wall infection    Status postumbilical hernia repair.  Presenting in November with abdominal pain and incarcerated hernia.  Underwent repair with mesh on 11/14/2023.  Return to the hospital with drainage from the wound sites concerning for infected seroma.  Status post exploratory laparotomy on 11/24/2023  Postop wound infection.  Hospitalized in December with postop wound infection.  No drainable abscess.  Wound cultures were polymicrobial with MSSA, Enterobacter cloacae, and Corynebacterium striatum.  Discharged with levofloxacin, Flagyl, and doxycycline.  Sepsis.  Acute onset of chills and fever 1 day prior to admission.  No other associated symptoms.  Increased redness over the abdomen.  Chronic ulcers without recent history of worsening.  CT scan of the abdomen without any abscesses.  Abdominal wall cellulitis versus panniculitis.  Significant geographic erythema over the abdomen, very tender.  No obvious purulence from wounds.  Mesh infection is in the differential, but no obvious fluid collections around the surgical site. Soft tissue u/s without new findings. High CRP. Fevers improved. Wbc normalized. CRP improving, slowly  Inguinal intertrigo.  On fluconazole.  Hypoxic respiratory failure.  CT scan with new bilateral infiltrate worse on the left. Hypoxia, stable     Principal Problem:    Abdominal wall cellulitis       PLAN   -continue vancomycin and meropenem  -continue fluconazole   -no additional coverage for pneumonia  -With the recurrent nature of the infection, hardware infection is high on the differential diagnosis.  -repeat CT abd with contrast today       Jamal Smyth MD  [facility]  Direct messaging: [employer] Paging   On-Call ID provider: [phone], option:  "9         ===========================================    SUBJECTIVE / INTERVAL HISTORY:   Pain better controlled. Tolerating antibiotics. Some bleeding for abd last night.     Antibiotics   Vancomycin 1/30-  Meropenem 1/31-  Fluconazole 2/3-    Previous:  Zosyn 1/30-1/31      Physical Exam     Temp:  [97.5  F (36.4  C)-98.2  F (36.8  C)] 97.5  F (36.4  C)  Pulse:  [76-83] 76  Resp:  [16-18] 16  BP: (140-159)/(75-90) 159/90  SpO2:  [95 %-98 %] 98 %    BP (!) 159/90 (BP Location: Left arm, Patient Position: Chair)   Pulse 76   Temp 97.5  F (36.4  C) (Oral)   Resp 16   Ht 1.803 m (5' 11\")   Wt (!) 184.4 kg (406 lb 8.5 oz)   SpO2 98%   BMI 56.70 kg/m      GENERAL:  well-developed, well-nourished, lying in bed, uncomfortable  HENT:  Head is normocephalic, atraumatic.   EYES:  Eyes have anicteric sclerae without conjunctival injection   LUNGS:  Clear to auscultation.  CARDIOVASCULAR:  Regular rate and rhythm with no murmurs, gallops or rubs.  ABDOMEN:  Normal bowel sounds, soft.  Large pannus.  2 packed wounds near the midline.  Erythema in a geographic distribution over the abdomen and lower pannus with induration and tenderness. Improving.   EXT: Extremities warm and without edema.  SKIN:  No acute rashes.    NEUROLOGIC:  Grossly nonfocal.      Cultures   1/30 blood cultures x 2: no growth to date  1/31 blood culture x 2: no growth to date     Susceptibility data from last 90 days.  Collected Specimen Info Organism Ampicillin Ampicillin/Sulbactam Cefepime Ceftazidime Ceftriaxone Ciprofloxacin Clindamycin Daptomycin Doxycycline Erythromycin Gentamicin Levofloxacin Meropenem Oxacillin Penicillin   12/17/23 Wound from Abdomen Enterobacter cloacae complex R R  S  S  S  S      S  S  S       Staphylococcus aureus        S  S  S  S  S    S      Corynebacterium striatum      I   R   S       I   12/17/23 Abscess from Abdomen Mixed Aerobic and Anaerobic gonzález                  11/24/23 Tissue from Abdomen Cutjerri " (Propionibacterium) acnes                    Collected Specimen Info Organism Piperacillin/Tazobactam Tetracycline Tobramycin Trimethoprim/Sulfamethoxazole  Vancomycin   12/17/23 Wound from Abdomen Enterobacter cloacae complex  S   S  S      Staphylococcus aureus   S   S  S     Corynebacterium striatum     S  S   12/17/23 Abscess from Abdomen Mixed Aerobic and Anaerobic gonzález        11/24/23 Tissue from Abdomen Cutibacterium (Propionibacterium) acnes               Pertinent Labs:     Recent Labs   Lab 02/06/24  0651 02/05/24  0635 02/04/24  1111 02/03/24  0659   WBC  --  9.2 12.4* 15.6*   HGB  --  10.4* 10.3* 10.6*    313 301 297       Recent Labs   Lab 02/05/24  0635 02/04/24  1111 02/03/24  0659 02/02/24  0637 01/31/24  0728 01/30/24  1806    139 139   < > 138 140   CO2 29 28 30*   < > 24 24   BUN 7.7 7.6 7.5   < > 9.4 10.8   ALBUMIN  --   --   --   --  3.1* 3.9   ALKPHOS  --   --   --   --  78 113   ALT  --   --   --   --  34 48   AST  --   --   --   --  24 47*    < > = values in this interval not displayed.       Recent Labs   Lab 02/05/24  0635 02/04/24  1111 02/03/24 0659   CRPI 116.50* 170.50* 256.70*           Imaging:     US Soft Tissue Abdominal Wall or Lower Back    Result Date: 1/31/2024  US SOFT TISSUE ABDOMINAL WALL OR LOWER BACK 1/31/2024 12:52 PM CST INDICATION: Erythema, large pannus; evaluate abscess. COMPARISON: Previous day CT. FINDINGS: Along the area of interest, subcutaneous edema present, though no focal collection or abscess.     CT Chest/Abdomen/Pelvis w Contrast    Result Date: 1/30/2024  EXAM: CT CHEST/ABDOMEN/PELVIS W CONTRAST LOCATION: [facility] DATE: 1/30/2024 INDICATION: Sepsis COMPARISON: CT of the abdomen and pelvis 12/16/2023 TECHNIQUE: CT scan of the chest, abdomen, and pelvis was performed following injection of IV contrast. Multiplanar reformats were obtained. Dose reduction techniques were used. CONTRAST: isovue 370 100ml FINDINGS: LUNGS  AND PLEURA: Normal. MEDIASTINUM/AXILLAE: Normal. CORONARY ARTERY CALCIFICATION: None. HEPATOBILIARY: Normal. PANCREAS: Normal. SPLEEN: Normal. ADRENAL GLANDS: Normal. KIDNEYS/BLADDER: Normal. BOWEL: Normal. LYMPH NODES: Normal. VASCULATURE: Unremarkable. PELVIC ORGANS: Normal. MUSCULOSKELETAL: Since 12/16/2023 the prior seen gas associated with the laparotomy site has nearly resolved there is no fluid collections seen to suggest an abscess.     IMPRESSION: 1.  Nearly resolved gas at the laparotomy site since 12/16/2023. No findings of an abscess. Nothing seen to explain patient's sepsis clinically.      Order  CT Chest Pulmonary Embolism w Contrast [[id number]] (Order [id number])  Exam Information    Exam Date Exam Time Exam Date Exam Time Accession # Performing Department Results    2/4/24  4:27 AM 2/4/24  4:27 AM [id number] [facility] CT      PACS Images     Show images for CT Chest Pulmonary Embolism w Contrast     Study Result    Narrative & Impression   EXAM: CT CHEST PULMONARY EMBOLISM W CONTRAST  LOCATION: [facility]  DATE: 2/4/2024     INDICATION: Patient with surgeries 2 months ago   difficult healing. Admitted x2. Patient with SOB, tachycardia, and RLE swelling. Concern for PE.  COMPARISON: CT chest 01/30/2024.  TECHNIQUE: CT chest pulmonary angiogram during arterial phase injection of IV contrast. Multiplanar reformats and MIP reconstructions were performed. Dose reduction techniques were used.   CONTRAST: isovue 370 100ml     FINDINGS:  ANGIOGRAM CHEST: No pulmonary embolus. No aortic aneurysm or dissection.     LUNGS AND PLEURA: Extensive somewhat nodular consolidation throughout both lungs, right worse than left. Small right and trace left pleural effusions.     MEDIASTINUM/AXILLAE: Normal.     CORONARY ARTERY CALCIFICATION: None.     UPPER ABDOMEN: Normal.     MUSCULOSKELETAL: Normal.                                                                       IMPRESSION:  1.  Extensive bilateral pneumonia, right worse than left.  2.  Small right and trace left pleural effusions.  3.  No pulmonary embolus.                                                                                                                                                                          Data reviewed today: I reviewed all medications, new labs and imaging results over the last 24 hours. I personally reviewed no images or EKG's today.  The patient's care was discussed with the Patient.

## 2024-02-06 NOTE — PROGRESS NOTES
CLINICAL NUTRITION SERVICES - ASSESSMENT NOTE     Nutrition Prescription    RECOMMENDATIONS FOR MDs/PROVIDERS TO ORDER:  Daily multivitamin for healing    Malnutrition Status:    Does not qualify     Recommendations already ordered by Registered Dietitian (RD):  -Lift CHO limit d/t not meeting nutrition needs for wound healing and BG WNL - Change diet to High consistent CHO (75 g/meal)   -Add special k bar BID to help meet nutrition needs =   - Expedite daily for wound healing    Future/Additional Recommendations:  -Adjust supplement pending intake, weight, tolerance, acceptance, wound healing, labs, BG  -Monitor need for diet restriction pending intake, BG  -Consider adding vit C for wound healing if intake remains inadequate           NUTRITION HISTORY  Pt reports eating WNL PTA.     Pt was last seen by hospital RD 11/29 post seroma and repair of ventral hernia incision. Pt had poor intake during that admission. Was liking and taking special k bar     Per Hx  Pt has had counseling with bariatric RD through [facility] 12/2021-4/2022 as he was pursuing [facility] bariatric surgery with weight loss goal to 380 lb or below to get surgery  Pt beginning weight of 400 lb and only achieved 6 lb weight loss in that time d/t stress eating while unemployed and no set regimen for exercise.   Pt has been counseled to eat high protein, avoid carbonated beverages in preparation for post surgery requirements, drink liquids away from meals     Pt reports he is signed up for the [facility] bariatric surgery program and was to have his first 2 appointments but then was hospitalized in November- he is still interested in bariatric surgery and plans to continue this program after recovery      CURRENT NUTRITION ORDERS  Diet: 60 gm cho per meal  Intake/Tolerance: Fair, >/= 75% of meals at 4433-4685 kcal, 68-78 g protein/day, meeting on average 65% of estimated kcal, and 60%of estimated protein needs    Pt is frustrated  with meal ordering and feeling too restricted by diet restriction. He reports he is eating less than baseline. Was too tired to eat supper last night and breakfast this am. He ate lunch and reports he plans to have supper. He is interested in taking special k bars again    LABS  Labs reviewed  K+ 3.2 (L), decreased  BG  mg/dl past 24 hours, WNL    MEDICATIONS  Medications reviewed  Ssi, iv abx, kcl today    Dosing Weight: 105 kg, adjusted BW    ASSESSED NUTRITION NEEDS  Estimated Energy Needs: 2100 - 2625 kcals/day (20 - 25 kcals/kg)  Justification: Obese  Estimated Protein Needs: 105 -126 grams protein/day (1 - 1.2 grams of pro/kg)  Justification: Wound healing  Estimated Fluid Needs: 3150 mL/day (30 mL/kg) , or per provider  Justification: Maintenance    PHYSICAL FINDINGS  Per chart,  Surgical abdominal wounds    MALNUTRITION:  % Weight Loss:  None noted  % Intake:  None noted  Subcutaneous Fat Loss:  None noted  Muscle Loss:  None noted, may be masked by obesity  Fluid Retention:  Moderate +1 generalized, +4 bilateral flank - associated with condition    Malnutrition Diagnosis: Does not qualify    NUTRITION DIAGNOSIS  Inadequate protein intake related to diet restriction, fatigue as evidenced by skipping some meals, meeting <75% of estimated needs     INTERVENTIONS  Implementation  -Lift CHO limit d/t not meeting nutrition needs for wound healing and BG WNL - Change diet to High consistent CHO (75 g/meal)   -Add special k bar BID to help meet nutrition needs =   - Provide Room service menu with CHO listings    Goals  Patient to continue to consume % of nutritionally adequate meals three times per day, or the equivalent with supplements/snacks.- not progressing     Monitoring/Evaluation  Progress toward goals will be monitored and evaluated per protocol.

## 2024-02-06 NOTE — PROGRESS NOTES
Care Management Follow Up    Length of Stay (days): 7    Expected Discharge Date: 02/07/2024     Concerns to be Addressed: discharge planning     Patient plan of care discussed at interdisciplinary rounds: Yes    Anticipated Discharge Disposition:  home     Anticipated Discharge Services:  per team   Anticipated Discharge DME:  per team     Patient/family educated on Medicare website which has current facility and service quality ratings:  NA  Education Provided on the Discharge Plan:  yes  Patient/Family in Agreement with the Plan:  yes    Referrals Placed by CM/SW:  Home Care  Private pay costs discussed: Not applicable    Additional Information:  SW met with patient in his room. SW discussed previous home care with him and which ones he most benefited from/would still like. He reported RN/HHA. SW educated patient on having PT/OT assess him here at the hospital to see if he has any further needs regarding those specialties. Patient reports that he was done receiving OT prior to admission and that the day after admission was supposed to be his last day of PT.     He asked SW about information about applying for/getting disability. SW educated patient on the process, and provided patient with resources to apply and get assistance with the process.     CM continuing to follow for discharge needs.     Patient should have new home care orders at discharge; patient had been closed per [facility] prior to admission. Referral sent to [facility] for RN/HHA.    RAE Miramontes

## 2024-02-06 NOTE — PROGRESS NOTES
"   02/06/24 1520   Appointment Info   Signing Clinician's Name / Credentials (OT) Sweta Michele, OTR/L   Living Environment   People in Home spouse;child(sherry), dependent   Current Living Arrangements house   Home Accessibility stairs to enter home;stairs within home   Number of Stairs, Main Entrance 1   Stair Railings, Main Entrance none   Number of Stairs, Within Home, Primary greater than 10 stairs   Stair Railings, Within Home, Primary railings safe and in good condition   Transportation Anticipated family or friend will provide   Self-Care   Equipment Currently Used at Home walker, rolling   Activity/Exercise/Self-Care Comment Pt independent with ADLs at baseline.   Instrumental Activities of Daily Living (IADL)   IADL Comments Pt on leave from work, does not drive.   General Information   Onset of Illness/Injury or Date of Surgery 01/30/24   Referring Physician Ady Godinez   Patient/Family Therapy Goal Statement (OT) none stated   Additional Occupational Profile Info/Pertinent History of Current Problem Per chart review, pt \"is a 38 year old male with PMH significant for morbid obesity, hypertension, DM2 and recent admission for umbilical hernia (11/22/23-11/30/23), s/p exploratory laparotomy with repair of incisional ventral hernia with mesh, re-admitted on 12/16/23-12/20/23 for concern regarding post-op wound infection.\"   Existing Precautions/Restrictions oxygen therapy device and L/min  (1LPM via [place])   Pain Assessment   Patient Currently in Pain Yes, see Vital Sign flowsheet  (in abdomen)   Range of Motion Comprehensive   General Range of Motion bilateral upper extremity ROM WFL   Strength Comprehensive (MMT)   Comment, General Manual Muscle Testing (MMT) Assessment Generalized BUE weakness noted functionally.   Transfers   Transfers sit-stand transfer   Sit-Stand Transfer   Sit-Stand [place] (Transfers) supervision   Assistive Device (Sit-Stand Transfers) walker, front-wheeled   Balance "   Balance Comments SBA for functional mobility using alberto walker   Activities of Daily Living   BADL Assessment/Intervention lower body dressing;grooming   Lower Body Dressing Assessment/Training   Comment, (Lower Body Dressing) Unable to perform d/t abdominal pain   Ness Level (Lower Body Dressing) not tested   Grooming Assessment/Training   Ness Level (Grooming) supervision   Comment, (Grooming) fatigued quickly   Clinical Impression   Criteria for Skilled Therapeutic Interventions Met (OT) Yes, treatment indicated   OT Diagnosis Impaired ability to perform ADLs and functional mobility.   Influenced by the following impairments abdominal wall cellulitis   OT Problem List-Impairments impacting ADL problems related to;activity tolerance impaired;mobility;pain   Assessment of Occupational Performance 1-3 Performance Deficits   Identified Performance Deficits lower body dressing, functional endurance, grooming/hygiene   Planned Therapy Interventions (OT) ADL retraining;strengthening;transfer training;home program guidelines;progressive activity/exercise;risk factor education   Clinical Decision Making Complexity (OT) problem focused assessment/low complexity   Risk & Benefits of therapy have been explained evaluation/treatment results reviewed;care plan/treatment goals reviewed;risks/benefits reviewed;current/potential barriers reviewed;participants voiced agreement with care plan;participants included;patient   OT Total Evaluation Time   OT Eval, Low Complexity Minutes (07373) 10   OT Goals   Therapy Frequency (OT) Daily   OT Predicted Duration/Target Date for Goal Attainment 02/13/24   OT Goals Hygiene/Grooming;Lower Body Dressing;Aerobic Activity   OT: Hygiene/Grooming modified independent;using adaptive equipment;while standing   OT: Lower Body Dressing Modified independent;using adaptive equipment   OT: Perform aerobic activity with stable cardiovascular response intermittent activity;10 minutes    Self-Care/Home Management   Self-Care/Home Mgmt/ADL, Compensatory, Meal Prep Minutes (79476) 9   Symptoms Noted During/After Treatment (Meal Preparation/Planning Training) fatigue;increased pain;shortness of breath   Treatment Detail/Skilled Intervention Discussed trialing AE for lower body dressing d/t difficulty because of pain, pt agreeable to trial in future session after education. Pt ambulated into bathroom with SBA using alberto walker, slow pace d/t fatigue. Extended time needed to stand at sink and complete G/H d/t fatigue and SOB, noted to support UEs on countertop. Frequent rest breaks needed while completing oral cares, cued for PLB tech. Pt left in recliner at end of session with call light in reach.   OT Discharge Planning   OT Plan functional endurance, trial reacher and sock aid, standing gabino for G/H, provide home walking program   OT Discharge Recommendation (DC Rec) home with assist   OT Rationale for DC Rec Pt moving well with SBA, fatigues quickly, will need assistance with IADLs at home.   OT Brief overview of current status SBA functional mobility and G/H   Total Session Time   Timed Code Treatment Minutes 9   Total Session Time (sum of timed and untimed services) 19

## 2024-02-07 LAB
GLUCOSE BLDC GLUCOMTR-MCNC: 147 MG/DL (ref 70–99)
GLUCOSE BLDC GLUCOMTR-MCNC: 191 MG/DL (ref 70–99)
GLUCOSE BLDC GLUCOMTR-MCNC: 98 MG/DL (ref 70–99)
GLUCOSE BLDC GLUCOMTR-MCNC: 99 MG/DL (ref 70–99)
POTASSIUM SERPL-SCNC: 3.5 MMOL/L (ref 3.4–5.3)
VANCOMYCIN SERPL-MCNC: 11.4 UG/ML

## 2024-02-07 PROCEDURE — 80202 ASSAY OF VANCOMYCIN: CPT | Performed by: INTERNAL MEDICINE

## 2024-02-07 PROCEDURE — 250N000011 HC RX IP 250 OP 636: Mod: JZ | Performed by: INTERNAL MEDICINE

## 2024-02-07 PROCEDURE — 84132 ASSAY OF SERUM POTASSIUM: CPT | Performed by: INTERNAL MEDICINE

## 2024-02-07 PROCEDURE — 99232 SBSQ HOSP IP/OBS MODERATE 35: CPT | Performed by: INTERNAL MEDICINE

## 2024-02-07 PROCEDURE — 120N000001 HC R&B MED SURG/OB

## 2024-02-07 PROCEDURE — 250N000013 HC RX MED GY IP 250 OP 250 PS 637: Performed by: INTERNAL MEDICINE

## 2024-02-07 PROCEDURE — 250N000011 HC RX IP 250 OP 636: Performed by: INTERNAL MEDICINE

## 2024-02-07 PROCEDURE — 36415 COLL VENOUS BLD VENIPUNCTURE: CPT | Performed by: INTERNAL MEDICINE

## 2024-02-07 PROCEDURE — 258N000003 HC RX IP 258 OP 636: Performed by: INTERNAL MEDICINE

## 2024-02-07 PROCEDURE — 250N000013 HC RX MED GY IP 250 OP 250 PS 637: Performed by: STUDENT IN AN ORGANIZED HEALTH CARE EDUCATION/TRAINING PROGRAM

## 2024-02-07 PROCEDURE — 250N000012 HC RX MED GY IP 250 OP 636 PS 637: Performed by: INTERNAL MEDICINE

## 2024-02-07 RX ORDER — IBUPROFEN 600 MG/1
600 TABLET, FILM COATED ORAL
Status: COMPLETED | OUTPATIENT
Start: 2024-02-07 | End: 2024-02-07

## 2024-02-07 RX ORDER — KETOROLAC TROMETHAMINE 30 MG/ML
30 INJECTION, SOLUTION INTRAMUSCULAR; INTRAVENOUS
Status: DISCONTINUED | OUTPATIENT
Start: 2024-02-07 | End: 2024-02-11 | Stop reason: HOSPADM

## 2024-02-07 RX ORDER — PREDNISONE 20 MG/1
60 TABLET ORAL DAILY
Status: COMPLETED | OUTPATIENT
Start: 2024-02-07 | End: 2024-02-09

## 2024-02-07 RX ADMIN — VANCOMYCIN HYDROCHLORIDE 1250 MG: 5 INJECTION, POWDER, LYOPHILIZED, FOR SOLUTION INTRAVENOUS at 09:19

## 2024-02-07 RX ADMIN — MEROPENEM 1 G: 1 INJECTION, POWDER, FOR SOLUTION INTRAVENOUS at 22:20

## 2024-02-07 RX ADMIN — FLUCONAZOLE 400 MG: 150 TABLET ORAL at 08:08

## 2024-02-07 RX ADMIN — MEROPENEM 1 G: 1 INJECTION, POWDER, FOR SOLUTION INTRAVENOUS at 04:19

## 2024-02-07 RX ADMIN — PREDNISONE 60 MG: 20 TABLET ORAL at 13:57

## 2024-02-07 RX ADMIN — IBUPROFEN 600 MG: 600 TABLET, FILM COATED ORAL at 02:37

## 2024-02-07 RX ADMIN — MEROPENEM 1 G: 1 INJECTION, POWDER, FOR SOLUTION INTRAVENOUS at 13:58

## 2024-02-07 RX ADMIN — Medication 1 TABLET: at 08:09

## 2024-02-07 RX ADMIN — VANCOMYCIN HYDROCHLORIDE 1250 MG: 5 INJECTION, POWDER, LYOPHILIZED, FOR SOLUTION INTRAVENOUS at 01:06

## 2024-02-07 RX ADMIN — VANCOMYCIN HYDROCHLORIDE 1250 MG: 5 INJECTION, POWDER, LYOPHILIZED, FOR SOLUTION INTRAVENOUS at 18:00

## 2024-02-07 ASSESSMENT — ACTIVITIES OF DAILY LIVING (ADL)
ADLS_ACUITY_SCORE: 29
ADLS_ACUITY_SCORE: 28
ADLS_ACUITY_SCORE: 29
ADLS_ACUITY_SCORE: 28
ADLS_ACUITY_SCORE: 29
ADLS_ACUITY_SCORE: 29

## 2024-02-07 NOTE — PROGRESS NOTES
Infectious Diseases Progress Note  [facility]    Date of visit: 02/07/2024     ASSESSMENT   38-year-old man with a history of recent umbilical hernia repair admitted with sepsis.  Concern for abdominal wall infection    Status postumbilical hernia repair.  Presenting in November with abdominal pain and incarcerated hernia.  Underwent repair with mesh on 11/14/2023.  Return to the hospital with drainage from the wound sites concerning for infected seroma.  Status post exploratory laparotomy on 11/24/2023  Postop wound infection.  Hospitalized in December with postop wound infection.  No drainable abscess.  Wound cultures were polymicrobial with MSSA, Enterobacter cloacae, and Corynebacterium striatum.  Discharged with levofloxacin, Flagyl, and doxycycline.  Sepsis.  Acute onset of chills and fever 1 day prior to admission.  No other associated symptoms.  Increased redness over the abdomen.  Chronic ulcers without recent history of worsening.  CT scan of the abdomen without any abscesses.  Abdominal wall cellulitis versus panniculitis.  Significant geographic erythema over the abdomen, very tender.  No obvious purulence from wounds.  Mesh infection is in the differential, but no obvious fluid collections around the surgical site. Soft tissue u/s without new findings. High CRP. Fevers improved. Wbc normalized. CRP improving, slowly. Repeat CT without significant changes. Still having significant edema.  Inguinal intertrigo.  On fluconazole.  Hypoxic respiratory failure.  CT scan with new bilateral infiltrate worse on the left. Hypoxia, stable. CT with improvement in pna     Principal Problem:    Abdominal wall cellulitis       PLAN   -continue vancomycin and meropenem  -continue fluconazole   -With the recurrent nature of the infection, hardware infection is high on the differential diagnosis.  -start prednisone 60mg daily x 3 days to help with edema and inflammation in abdominal wall       Jamal Smyth,  "MD [facility]  Direct messaging: [employer] Paging   On-Call ID provider: [phone], option: 9         ===========================================    SUBJECTIVE / INTERVAL HISTORY:   More comfortable. Intermittent bleeding from abdominal skin due to skin tears. Breathing improved.     Antibiotics   Vancomycin 1/30-  Meropenem 1/31-  Fluconazole 2/3-    Previous:  Zosyn 1/30-1/31      Physical Exam     Temp:  [97.6  F (36.4  C)-98.1  F (36.7  C)] 97.6  F (36.4  C)  Pulse:  [70-86] 70  Resp:  [18-20] 20  BP: (136-146)/(75-78) 136/76  SpO2:  [94 %] 94 %    /76 (BP Location: Right arm)   Pulse 70   Temp 97.6  F (36.4  C) (Oral)   Resp 20   Ht 1.803 m (5' 11\")   Wt (!) 179.7 kg (396 lb 3.2 oz)   SpO2 94%   BMI 55.26 kg/m      GENERAL:  well-developed, well-nourished, lying in bed, uncomfortable  HENT:  Head is normocephalic, atraumatic.   EYES:  Eyes have anicteric sclerae without conjunctival injection   LUNGS:  Clear to auscultation.  CARDIOVASCULAR:  Regular rate and rhythm with no murmurs, gallops or rubs.  ABDOMEN:  Normal bowel sounds, soft.  Large pannus.  2 packed wounds near the midline.  Erythema in a geographic distribution over the abdomen and lower pannus with induration and tenderness. Improving.   EXT: Extremities warm and without edema.  SKIN:  No acute rashes.    NEUROLOGIC:  Grossly nonfocal.      Cultures   1/30 blood cultures x 2: no growth to date  1/31 blood culture x 2: no growth to date     Susceptibility data from last 90 days.  Collected Specimen Info Organism Ampicillin Ampicillin/Sulbactam Cefepime Ceftazidime Ceftriaxone Ciprofloxacin Clindamycin Daptomycin Doxycycline Erythromycin Gentamicin Levofloxacin Meropenem Oxacillin Penicillin   12/17/23 Wound from Abdomen Enterobacter cloacae complex R R  S  S  S  S      S  S  S       Staphylococcus aureus        S  S  S  S  S    S      Corynebacterium striatum      I   R   S       I   12/17/23 Abscess from " Abdomen Mixed Aerobic and Anaerobic gonzález                  11/24/23 Tissue from Abdomen Cutibacterium (Propionibacterium) acnes                    Collected Specimen Info Organism Piperacillin/Tazobactam Tetracycline Tobramycin Trimethoprim/Sulfamethoxazole  Vancomycin   12/17/23 Wound from Abdomen Enterobacter cloacae complex  S   S  S      Staphylococcus aureus   S   S  S     Corynebacterium striatum     S  S   12/17/23 Abscess from Abdomen Mixed Aerobic and Anaerobic gonzález        11/24/23 Tissue from Abdomen Cutibacterium (Propionibacterium) acnes               Pertinent Labs:     Recent Labs   Lab 02/06/24  0651 02/05/24  0635 02/04/24  1111 02/03/24  0659   WBC  --  9.2 12.4* 15.6*   HGB  --  10.4* 10.3* 10.6*    313 301 297       Recent Labs   Lab 02/05/24  0635 02/04/24  1111 02/03/24  0659    139 139   CO2 29 28 30*   BUN 7.7 7.6 7.5       Recent Labs   Lab 02/05/24  0635 02/04/24  1111 02/03/24  0659   CRPI 116.50* 170.50* 256.70*           Imaging:     US Soft Tissue Abdominal Wall or Lower Back    Result Date: 1/31/2024  US SOFT TISSUE ABDOMINAL WALL OR LOWER BACK 1/31/2024 12:52 PM CST INDICATION: Erythema, large pannus; evaluate abscess. COMPARISON: Previous day CT. FINDINGS: Along the area of interest, subcutaneous edema present, though no focal collection or abscess.     CT Chest/Abdomen/Pelvis w Contrast    Result Date: 1/30/2024  EXAM: CT CHEST/ABDOMEN/PELVIS W CONTRAST LOCATION: [facility] DATE: 1/30/2024 INDICATION: Sepsis COMPARISON: CT of the abdomen and pelvis 12/16/2023 TECHNIQUE: CT scan of the chest, abdomen, and pelvis was performed following injection of IV contrast. Multiplanar reformats were obtained. Dose reduction techniques were used. CONTRAST: isovue 370 100ml FINDINGS: LUNGS AND PLEURA: Normal. MEDIASTINUM/AXILLAE: Normal. CORONARY ARTERY CALCIFICATION: None. HEPATOBILIARY: Normal. PANCREAS: Normal. SPLEEN: Normal. ADRENAL GLANDS: Normal.  KIDNEYS/BLADDER: Normal. BOWEL: Normal. LYMPH NODES: Normal. VASCULATURE: Unremarkable. PELVIC ORGANS: Normal. MUSCULOSKELETAL: Since 12/16/2023 the prior seen gas associated with the laparotomy site has nearly resolved there is no fluid collections seen to suggest an abscess.     IMPRESSION: 1.  Nearly resolved gas at the laparotomy site since 12/16/2023. No findings of an abscess. Nothing seen to explain patient's sepsis clinically.      Order  CT Chest Pulmonary Embolism w Contrast [[id number]] (Order [id number])  Exam Information    Exam Date Exam Time Exam Date Exam Time Accession # Performing Department Results    2/4/24  4:27 AM 2/4/24  4:27 AM [id number] [facility] CT      PACS Images     Show images for CT Chest Pulmonary Embolism w Contrast     Study Result    Narrative & Impression   EXAM: CT CHEST PULMONARY EMBOLISM W CONTRAST  LOCATION: [facility]  DATE: 2/4/2024     INDICATION: Patient with surgeries 2 months ago   difficult healing. Admitted x2. Patient with SOB, tachycardia, and RLE swelling. Concern for PE.  COMPARISON: CT chest 01/30/2024.  TECHNIQUE: CT chest pulmonary angiogram during arterial phase injection of IV contrast. Multiplanar reformats and MIP reconstructions were performed. Dose reduction techniques were used.   CONTRAST: isovue 370 100ml     FINDINGS:  ANGIOGRAM CHEST: No pulmonary embolus. No aortic aneurysm or dissection.     LUNGS AND PLEURA: Extensive somewhat nodular consolidation throughout both lungs, right worse than left. Small right and trace left pleural effusions.     MEDIASTINUM/AXILLAE: Normal.     CORONARY ARTERY CALCIFICATION: None.     UPPER ABDOMEN: Normal.     MUSCULOSKELETAL: Normal.                                                                      IMPRESSION:  1.  Extensive bilateral pneumonia, right worse than left.  2.  Small right and trace left pleural effusions.  3.  No pulmonary embolus.                                                                                                                                                                           Data reviewed today: I reviewed all medications, new labs and imaging results over the last 24 hours. I personally reviewed the abdominal CT image(s) showing no abscess .  The patient's care was discussed with the Patient.

## 2024-02-07 NOTE — PHARMACY-VANCOMYCIN DOSING SERVICE
Pharmacy Vancomycin Note  Date of Service 2024  Patient's  1985   38 year old, male    Indication: Sepsis  Day of Therapy: 9  Current vancomycin regimen:  1250 mg IV q8h  Current vancomycin monitoring method: AUC  Current vancomycin therapeutic monitoring goal: 400-600 mg*h/L    Insight[employer] Prediction of Current Vancomycin Regimen  Regimen: 1250 mg IV every 8 hours.  Start time: 18:29 on 2024  Exposure target: AUC24 (range)400-600 mg/L.hr   AUC24,ss: 429 mg/L.hr  Probability of AUC24 > 400: 78 %  Ctrough,ss: 7.8 mg/L  Probability of Ctrough,ss > 20: 0 %  Probability of nephrotoxicity (Lodise SANTO ): 4 %      Current estimated CrCl = Estimated Creatinine Clearance: 286 mL/min (A) (based on SCr of 0.58 mg/dL (L)).    Creatinine for last 3 days  2024: 11:11 AM Creatinine 0.56 mg/dL  2024:  6:35 AM Creatinine 0.58 mg/dL    Recent Vancomycin Levels (past 3 days)  2024:  6:35 AM Vancomycin 18.9 ug/mL  2024:  9:32 AM Vancomycin 11.4 ug/mL    Vancomycin IV Administrations (past 72 hours)                     vancomycin (VANCOCIN) 1,250 mg in sodium chloride 0.9 % 250 mL intermittent infusion (mg) 1,250 mg New Bag 24 0919     1,250 mg New Bag  0106     1,250 mg New Bag 24 1755     1,250 mg New Bag  1235     1,250 mg New Bag  0102     1,250 mg New Bag 24 1829    vancomycin (VANCOCIN) 1,750 mg in sodium chloride 0.9 % 500 mL intermittent infusion (mg) 1,750 mg Given 24 0756     1,750 mg Given  0019     1,750 mg Given 24 1624                    Nephrotoxins and other renal medications (From now, onward)      Start     Dose/Rate Route Frequency Ordered Stop    24 0229  ketorolac (TORADOL) injection 30 mg         30 mg Intravenous ONCE PRN 24 0229      24 1600  vancomycin (VANCOCIN) 1,250 mg in sodium chloride 0.9 % 250 mL intermittent infusion         1,250 mg  over 90 Minutes Intravenous EVERY 8 HOURS 24 6113                 Contrast  Orders - past 72 hours (72h ago, onward)      Start     Dose/Rate Route Frequency Stop    02/06/24 1700  iopamidol (ISOVUE-370) solution 90 mL         90 mL Intravenous ONCE 02/06/24 1647    02/04/24 1430  perflutren lipid microsphere (DEFINITY) injection SUSP 3 mL         3 mL Intravenous ONCE 02/04/24 1430            Interpretation of levels and current regimen:  Vancomycin level is reflective of -600    Plan:  Continue Current Dose  Vancomycin monitoring method: AUC  Vancomycin therapeutic monitoring goal: 400-600 mg*h/L  Pharmacy will check vancomycin levels as appropriate in 1-3 Days.  Serum creatinine levels will be ordered daily for the first week of therapy and at least twice weekly for subsequent weeks.    Vannesa Leal RPH

## 2024-02-07 NOTE — PROGRESS NOTES
[facility] Progress Note - Hospitalist Service    Date of Admission:  1/30/2024    Assessment & Plan   Patient is a 38 year old male with PMH significant for morbid obesity, hypertension, DM2 and recent admission for umbilical hernia (11/22/23-11/30/23), s/p exploratory laparotomy with repair of incisional ventral hernia with mesh, re-admitted on 12/16/23-12/20/23 for concern regarding post-op wound infection.     CT of the abdomen done on 1/6/2024 showed grossly stable findings of anterior abdominal wall cellulitis without evidence of abscess formation or subcutaneous emphysema, interval improvement of interstitial and alveolar opacities in the lung bases and increased diffuse subcutaneous edema     He is receiving vancomycin, meropenem and fluconazole as recommended by ID specialist.  Oral prednisone was initiated on 2/7/2024 for abdominal wall edema.     #Severe sepsis with lactic acidosis  #Abdominal wall cellulitis  CT of the abdomen done on 1/6/2024 showed grossly stable findings of anterior abdominal wall cellulitis without evidence of abscess formation or subcutaneous emphysema, interval improvement of interstitial and alveolar opacities in the lung bases and increased diffuse subcutaneous edema     He is receiving vancomycin, meropenem and fluconazole as recommended by ID specialist.  Oral prednisone was initiated on 2/7/2024 for abdominal wall edema.    Continue meropenem, vancomycin and fluconazole  ID [id number] assistance    #Acute hypoxemic respiratory failure  #Hospital-acquired pneumonia  CT done on 1/6/2024 showed interval improvement of interstitial and alveolar opacities in the lung bases   Continue meropenem  Vancomycin as per pharmacy  ID follow-appreciate assistance    #Sinus tachycardia  Resolved  Pulmonary CT angiogram was negative for pulmonary embolism.     #Headache  Responsive to Toradol, continue q6h x2 days    #Hypertension   #DM-II, A1c  "6.5  -Cont home meds, insulin          Diet: Snacks/Supplements Adult: [employer] Bar; Between Meals  Combination Diet High Consistent Carb (75 g CHO per Meal) Diet  Snacks/Supplements Adult: Expedite Bottle; Between Meals    DVT Prophylaxis: SCDs, patient refusing chemical ppx, doesn't like the shots  Duvall Catheter: Not present  Lines: None     Cardiac Monitoring: None  Code Status: Full Code      Clinically Significant Risk Factors        # Hypokalemia: Lowest K = 3.2 mmol/L in last 2 days, will replace as needed       # Hypoalbuminemia: Lowest albumin = 3.1 g/dL at 1/31/2024  7:28 AM, will monitor as appropriate     # Hypertension: Noted on problem list       # DMII: A1C = 6.5 % (Ref range: <5.7 %) within past 6 months   # Severe Obesity: Estimated body mass index is 55.26 kg/m  as calculated from the following:    Height as of this encounter: 1.803 m (5' 11\").    Weight as of this encounter: 179.7 kg (396 lb 3.2 oz).        # Financial/Environmental Concerns: none         Disposition Plan      Expected Discharge Date: 02/09/2024    Discharge Delays: IV Medication - consider oral or Home Infusion  Destination: home with family  Discharge Comments: IV abx: ID following; started PO steroids            Arvin Hong MD  Hospitalist Service  [facility]  Securely message with [employer] (more info)  Text page via [employer] Paging/Directory   ______________________________________________________________________    Interval History   No new complaints and no acute events overnight.  He states he has been ambulating with walker on the floor.       Physical Exam   Vital Signs: Temp: 97.8  F (36.6  C) Temp src: Oral BP: (!) 149/79 Pulse: 74   Resp: 18 SpO2: 97 % O2 Device: Nasal cannula Oxygen Delivery: 1 LPM  Weight: 396 lbs 3.2 oz    General Appearance: Morbidly obese, sitting comfortably on the recliner  Respiratory: Mild diffuse crackles, but difficult to auscultate through thick chest " wall  Cardiovascular: Regular rate and rhythm, no murmur   GI: Obese, superficial pain throughout abdominal pannus with erythema and tender to touch, no erythema or skin changes of inguinal area   Skin: Redness appears to be receding from the marked areas.  Significant redness in the lower and middle and upper part of the abdominal wall with prior surgical wound.  An area of mild purulence/sinus-crusted in the right lower quadrant. Blood staining noticed on the  to the pannus.   Other: No sharp neck pain    Medical Decision Making       50 MINUTES SPENT BY ME on the date of service doing chart review, history, exam, documentation & further activities per the note.      Data     I have personally reviewed the following data over the past 24 hrs:    N/A  \   N/A   / N/A     N/A N/A N/A /  98   3.5 N/A N/A \       Imaging results reviewed over the past 24 hrs:   Recent Results (from the past 24 hour(s))   CT Abdomen Pelvis w Contrast    Narrative    EXAM: CT ABDOMEN PELVIS W CONTRAST  LOCATION: [facility]  DATE: 2/6/2024    INDICATION: severe abdominal wall cellulitis, f up exam  COMPARISON: Abdominal CT 01/30/2024, ultrasound 01/31/2024, chest CT 02/04/2024  TECHNIQUE: CT scan of the abdomen and pelvis was performed following injection of IV contrast. Multiplanar reformats were obtained. Dose reduction techniques were used.  CONTRAST: 90ml isovue 370    FINDINGS:   LOWER CHEST: Interstitial and alveolar opacities in the lung bases are decreased in comparison to recent chest CT. Persistent small right pleural effusion.    HEPATOBILIARY: Normal.    PANCREAS: Normal.    SPLEEN: Normal.    ADRENAL GLANDS: Normal.    KIDNEYS/BLADDER: No hydronephrosis. Urinary bladder is unremarkable.    BOWEL: No obstruction or inflammatory change.    LYMPH NODES: No suspicious lymphadenopathy or ascites.    VASCULATURE: Unremarkable.    PELVIC ORGANS: Normal.    MUSCULOSKELETAL: No acute bony  abnormality. Extensive fat stranding near the abdominal midline with some associated fat necrosis, similar to prior exam. No drainable fluid collection or subcutaneous emphysema. Increased body wall edema.        Impression    IMPRESSION:   1.  Grossly stable findings of anterior abdominal wall cellulitis without evidence of abscess formation or subcutaneous emphysema. Increased diffuse subcutaneous edema since prior CT.  2.  Interval improvement of interstitial and alveolar opacities in the lung bases.

## 2024-02-08 ENCOUNTER — APPOINTMENT (OUTPATIENT)
Dept: PHYSICAL THERAPY | Facility: HOSPITAL | Age: 39
DRG: 871 | End: 2024-02-08
Payer: COMMERCIAL

## 2024-02-08 ENCOUNTER — APPOINTMENT (OUTPATIENT)
Dept: OCCUPATIONAL THERAPY | Facility: HOSPITAL | Age: 39
DRG: 871 | End: 2024-02-08
Payer: COMMERCIAL

## 2024-02-08 LAB
GLUCOSE BLDC GLUCOMTR-MCNC: 114 MG/DL (ref 70–99)
GLUCOSE BLDC GLUCOMTR-MCNC: 164 MG/DL (ref 70–99)
GLUCOSE BLDC GLUCOMTR-MCNC: 179 MG/DL (ref 70–99)
GLUCOSE BLDC GLUCOMTR-MCNC: 207 MG/DL (ref 70–99)
HOLD SPECIMEN: NORMAL
POTASSIUM SERPL-SCNC: 4.1 MMOL/L (ref 3.4–5.3)

## 2024-02-08 PROCEDURE — 97116 GAIT TRAINING THERAPY: CPT | Mod: GP

## 2024-02-08 PROCEDURE — 120N000001 HC R&B MED SURG/OB

## 2024-02-08 PROCEDURE — 250N000013 HC RX MED GY IP 250 OP 250 PS 637: Performed by: INTERNAL MEDICINE

## 2024-02-08 PROCEDURE — 99232 SBSQ HOSP IP/OBS MODERATE 35: CPT | Performed by: INTERNAL MEDICINE

## 2024-02-08 PROCEDURE — 250N000013 HC RX MED GY IP 250 OP 250 PS 637: Performed by: STUDENT IN AN ORGANIZED HEALTH CARE EDUCATION/TRAINING PROGRAM

## 2024-02-08 PROCEDURE — 84132 ASSAY OF SERUM POTASSIUM: CPT | Performed by: INTERNAL MEDICINE

## 2024-02-08 PROCEDURE — 97110 THERAPEUTIC EXERCISES: CPT | Mod: GP

## 2024-02-08 PROCEDURE — 250N000012 HC RX MED GY IP 250 OP 636 PS 637: Performed by: INTERNAL MEDICINE

## 2024-02-08 PROCEDURE — 258N000003 HC RX IP 258 OP 636: Performed by: INTERNAL MEDICINE

## 2024-02-08 PROCEDURE — 36415 COLL VENOUS BLD VENIPUNCTURE: CPT | Performed by: INTERNAL MEDICINE

## 2024-02-08 PROCEDURE — 99233 SBSQ HOSP IP/OBS HIGH 50: CPT | Performed by: INTERNAL MEDICINE

## 2024-02-08 PROCEDURE — 250N000011 HC RX IP 250 OP 636: Performed by: INTERNAL MEDICINE

## 2024-02-08 PROCEDURE — 250N000011 HC RX IP 250 OP 636: Mod: JZ | Performed by: INTERNAL MEDICINE

## 2024-02-08 PROCEDURE — 97535 SELF CARE MNGMENT TRAINING: CPT | Mod: GO

## 2024-02-08 RX ADMIN — MEROPENEM 1 G: 1 INJECTION, POWDER, FOR SOLUTION INTRAVENOUS at 13:54

## 2024-02-08 RX ADMIN — Medication 1 TABLET: at 08:35

## 2024-02-08 RX ADMIN — VANCOMYCIN HYDROCHLORIDE 1250 MG: 5 INJECTION, POWDER, LYOPHILIZED, FOR SOLUTION INTRAVENOUS at 18:34

## 2024-02-08 RX ADMIN — VANCOMYCIN HYDROCHLORIDE 1250 MG: 5 INJECTION, POWDER, LYOPHILIZED, FOR SOLUTION INTRAVENOUS at 01:40

## 2024-02-08 RX ADMIN — VANCOMYCIN HYDROCHLORIDE 1250 MG: 5 INJECTION, POWDER, LYOPHILIZED, FOR SOLUTION INTRAVENOUS at 10:27

## 2024-02-08 RX ADMIN — MEROPENEM 1 G: 1 INJECTION, POWDER, FOR SOLUTION INTRAVENOUS at 20:55

## 2024-02-08 RX ADMIN — MEROPENEM 1 G: 1 INJECTION, POWDER, FOR SOLUTION INTRAVENOUS at 05:07

## 2024-02-08 RX ADMIN — FLUCONAZOLE 400 MG: 150 TABLET ORAL at 08:35

## 2024-02-08 RX ADMIN — PREDNISONE 60 MG: 20 TABLET ORAL at 08:35

## 2024-02-08 ASSESSMENT — ACTIVITIES OF DAILY LIVING (ADL)
ADLS_ACUITY_SCORE: 29

## 2024-02-08 NOTE — PROGRESS NOTES
[facility] Progress Note - Hospitalist Service    Date of Admission:  1/30/2024    Assessment & Plan   Patient is a 38 year old male with PMH significant for morbid obesity, hypertension, DM2 and recent admission for umbilical hernia (11/22/23-11/30/23), s/p exploratory laparotomy with repair of incisional ventral hernia with mesh, re-admitted on 12/16/23-12/20/23 for concern regarding post-op wound infection.     CT of the abdomen done on 1/6/2024 showed grossly stable findings of anterior abdominal wall cellulitis without evidence of abscess formation or subcutaneous emphysema, interval improvement of interstitial and alveolar opacities in the lung bases and increased diffuse subcutaneous edema     He is receiving vancomycin, meropenem and fluconazole as recommended by ID specialist.  Oral prednisone was initiated on 2/7/2024 for abdominal wall edema.     #Severe sepsis with lactic acidosis  #Abdominal wall cellulitis  CT of the abdomen done on 1/6/2024 showed grossly stable findings of anterior abdominal wall cellulitis without evidence of abscess formation or subcutaneous emphysema, interval improvement of interstitial and alveolar opacities in the lung bases and increased diffuse subcutaneous edema     He is receiving vancomycin, meropenem and fluconazole as recommended by ID specialist.  Oral prednisone was initiated on 2/7/2024 for abdominal wall edema.    Continue meropenem, vancomycin and fluconazole  ID [id number] assistance    #Acute hypoxemic respiratory failure  #Hospital-acquired pneumonia  CT done on 1/6/2024 showed interval improvement of interstitial and alveolar opacities in the lung bases   Continue meropenem  Vancomycin as per pharmacy  ID follow-appreciate assistance    #Sinus tachycardia  Resolved  Pulmonary CT angiogram was negative for pulmonary embolism.     #Headache  Responsive to Toradol, continue q6h x2 days    #Hypertension   #DM-II, A1c  "6.5  -Cont home meds, insulin          Diet: Snacks/Supplements Adult: Special K Bar; Between Meals  Combination Diet High Consistent Carb (75 g CHO per Meal) Diet  Snacks/Supplements Adult: Expedite Bottle; Between Meals    DVT Prophylaxis: SCDs, patient refusing chemical ppx, doesn't like the shots  Duvall Catheter: Not present  Lines: None     Cardiac Monitoring: None  Code Status: Full Code      Clinically Significant Risk Factors              # Hypoalbuminemia: Lowest albumin = 3.1 g/dL at 1/31/2024  7:28 AM, will monitor as appropriate     # Hypertension: Noted on problem list       # DMII: A1C = 6.5 % (Ref range: <5.7 %) within past 6 months   # Severe Obesity: Estimated body mass index is 55.26 kg/m  as calculated from the following:    Height as of this encounter: 1.803 m (5' 11\").    Weight as of this encounter: 179.7 kg (396 lb 3.2 oz).        # Financial/Environmental Concerns: none         Disposition Plan      Expected Discharge Date: 02/09/2024    Discharge Delays: IV Medication - consider oral or Home Infusion  Destination: home with family  Discharge Comments: IV abx: ID following; started PO steroids            Arvin Hong MD  Hospitalist Service  [facility]  Securely message with [employer] (more info)  Text page via [employer] Paging/Directory   ______________________________________________________________________    Interval History   No new complaint. Does not want sliding scale insulin for hyperglycemia following initiation of steroid therapy.      Physical Exam   Vital Signs: Temp: 97.9  F (36.6  C) Temp src: Oral BP: (!) 151/79 Pulse: 109   Resp: 20 SpO2: 95 % O2 Device: None (Room air)    Weight: 396 lbs 3.2 oz    General Appearance: Morbidly obese, sitting comfortably on the recliner  Respiratory: Mild diffuse crackles, but difficult to auscultate through thick chest wall  Cardiovascular: Regular rate and rhythm, no murmur   GI: Obese, superficial pain throughout " abdominal pannus with erythema and tender to touch, no erythema or skin changes of inguinal area   Skin: Redness appears to be receding from the marked areas.  Significant redness in the lower and middle and upper part of the abdominal wall with prior surgical wound.  An area of mild purulence/sinus-crusted in the right lower quadrant-improving  Other: No sharp neck pain    Medical Decision Making       50 MINUTES SPENT BY ME on the date of service doing chart review, history, exam, documentation & further activities per the note.      Data     I have personally reviewed the following data over the past 24 hrs:    N/A  \   N/A   / N/A     N/A N/A N/A /  164 (H)   4.1 N/A N/A \       Imaging results reviewed over the past 24 hrs:   No results found for this or any previous visit (from the past 24 hour(s)).

## 2024-02-08 NOTE — PROGRESS NOTES
Care Management Follow Up    Length of Stay (days): 9    Expected Discharge Date: 02/09/2024     Concerns to be Addressed: discharge planning     Patient plan of care discussed at interdisciplinary rounds: Yes    Anticipated Discharge Disposition:  home with home care      Anticipated Discharge Services:  [facility] RN/HHA  Anticipated Discharge DME:  none    Patient/family educated on Medicare website which has current facility and service quality ratings:  NA  Education Provided on the Discharge Plan:  yes  Patient/Family in Agreement with the Plan:  yes    Referrals Placed by CM/SW:  home care   Private pay costs discussed: Not applicable    Additional Information:  Patient is not medically ready for discharge. Patient still on IV ABX. CM continuing to follow for discharge needs.      Patient should have new home care orders at discharge; patient had been closed per [facility] prior to admission. Referral sent to [facility] for RN/HHA.    RAE Miramontes

## 2024-02-09 ENCOUNTER — APPOINTMENT (OUTPATIENT)
Dept: PHYSICAL THERAPY | Facility: HOSPITAL | Age: 39
DRG: 871 | End: 2024-02-09
Payer: COMMERCIAL

## 2024-02-09 ENCOUNTER — APPOINTMENT (OUTPATIENT)
Dept: OCCUPATIONAL THERAPY | Facility: HOSPITAL | Age: 39
DRG: 871 | End: 2024-02-09
Payer: COMMERCIAL

## 2024-02-09 LAB
GLUCOSE BLDC GLUCOMTR-MCNC: 104 MG/DL (ref 70–99)
GLUCOSE BLDC GLUCOMTR-MCNC: 109 MG/DL (ref 70–99)
GLUCOSE BLDC GLUCOMTR-MCNC: 162 MG/DL (ref 70–99)
GLUCOSE BLDC GLUCOMTR-MCNC: 183 MG/DL (ref 70–99)
PLATELET # BLD AUTO: 475 10E3/UL (ref 150–450)
POTASSIUM SERPL-SCNC: 3.9 MMOL/L (ref 3.4–5.3)

## 2024-02-09 PROCEDURE — 272N000451 HC KIT SHRLOCK 5FR POWER PICC DOUBLE LUMEN

## 2024-02-09 PROCEDURE — G0463 HOSPITAL OUTPT CLINIC VISIT: HCPCS

## 2024-02-09 PROCEDURE — 250N000011 HC RX IP 250 OP 636: Mod: JZ | Performed by: INTERNAL MEDICINE

## 2024-02-09 PROCEDURE — 250N000012 HC RX MED GY IP 250 OP 636 PS 637: Performed by: INTERNAL MEDICINE

## 2024-02-09 PROCEDURE — 36569 INSJ PICC 5 YR+ W/O IMAGING: CPT

## 2024-02-09 PROCEDURE — 84132 ASSAY OF SERUM POTASSIUM: CPT | Performed by: INTERNAL MEDICINE

## 2024-02-09 PROCEDURE — 258N000003 HC RX IP 258 OP 636: Performed by: INTERNAL MEDICINE

## 2024-02-09 PROCEDURE — 97116 GAIT TRAINING THERAPY: CPT | Mod: GP

## 2024-02-09 PROCEDURE — 250N000013 HC RX MED GY IP 250 OP 250 PS 637: Performed by: INTERNAL MEDICINE

## 2024-02-09 PROCEDURE — 120N000001 HC R&B MED SURG/OB

## 2024-02-09 PROCEDURE — 250N000009 HC RX 250: Performed by: INTERNAL MEDICINE

## 2024-02-09 PROCEDURE — 250N000011 HC RX IP 250 OP 636: Performed by: INTERNAL MEDICINE

## 2024-02-09 PROCEDURE — 85049 AUTOMATED PLATELET COUNT: CPT | Performed by: INTERNAL MEDICINE

## 2024-02-09 PROCEDURE — 97110 THERAPEUTIC EXERCISES: CPT | Mod: GP

## 2024-02-09 PROCEDURE — 97110 THERAPEUTIC EXERCISES: CPT | Mod: GO

## 2024-02-09 PROCEDURE — 99232 SBSQ HOSP IP/OBS MODERATE 35: CPT | Performed by: INTERNAL MEDICINE

## 2024-02-09 PROCEDURE — 36415 COLL VENOUS BLD VENIPUNCTURE: CPT | Performed by: INTERNAL MEDICINE

## 2024-02-09 PROCEDURE — 97535 SELF CARE MNGMENT TRAINING: CPT | Mod: GO

## 2024-02-09 RX ORDER — LIDOCAINE 40 MG/G
CREAM TOPICAL
Status: DISCONTINUED | OUTPATIENT
Start: 2024-02-09 | End: 2024-02-11 | Stop reason: HOSPADM

## 2024-02-09 RX ORDER — MULTIVIT WITH MINERALS/LUTEIN
500 TABLET ORAL DAILY
Status: DISCONTINUED | OUTPATIENT
Start: 2024-02-09 | End: 2024-02-11 | Stop reason: HOSPADM

## 2024-02-09 RX ADMIN — Medication 500 MG: at 17:50

## 2024-02-09 RX ADMIN — MEROPENEM 1 G: 1 INJECTION, POWDER, FOR SOLUTION INTRAVENOUS at 22:01

## 2024-02-09 RX ADMIN — MEROPENEM 1 G: 1 INJECTION, POWDER, FOR SOLUTION INTRAVENOUS at 04:25

## 2024-02-09 RX ADMIN — VANCOMYCIN HYDROCHLORIDE 1250 MG: 5 INJECTION, POWDER, LYOPHILIZED, FOR SOLUTION INTRAVENOUS at 10:46

## 2024-02-09 RX ADMIN — VANCOMYCIN HYDROCHLORIDE 1250 MG: 5 INJECTION, POWDER, LYOPHILIZED, FOR SOLUTION INTRAVENOUS at 01:28

## 2024-02-09 RX ADMIN — Medication 1 TABLET: at 10:03

## 2024-02-09 RX ADMIN — LIDOCAINE HYDROCHLORIDE 2 ML: 10 INJECTION, SOLUTION EPIDURAL; INFILTRATION; INTRACAUDAL; PERINEURAL at 15:15

## 2024-02-09 RX ADMIN — MEROPENEM 1 G: 1 INJECTION, POWDER, FOR SOLUTION INTRAVENOUS at 15:55

## 2024-02-09 RX ADMIN — PREDNISONE 60 MG: 20 TABLET ORAL at 10:03

## 2024-02-09 RX ADMIN — VANCOMYCIN HYDROCHLORIDE 1250 MG: 5 INJECTION, POWDER, LYOPHILIZED, FOR SOLUTION INTRAVENOUS at 17:51

## 2024-02-09 RX ADMIN — FLUCONAZOLE 400 MG: 150 TABLET ORAL at 10:04

## 2024-02-09 RX ADMIN — INSULIN ASPART 1 UNITS: 100 INJECTION, SOLUTION INTRAVENOUS; SUBCUTANEOUS at 17:00

## 2024-02-09 ASSESSMENT — ACTIVITIES OF DAILY LIVING (ADL)
ADLS_ACUITY_SCORE: 29
ADLS_ACUITY_SCORE: 23
ADLS_ACUITY_SCORE: 29
ADLS_ACUITY_SCORE: 23
ADLS_ACUITY_SCORE: 29
ADLS_ACUITY_SCORE: 23
ADLS_ACUITY_SCORE: 29

## 2024-02-09 NOTE — PROCEDURES
"PICC Line Insertion Procedure Note  Pt. Name: Ady Godinez  MRN:        [id number]    Procedure: Insertion of a  dual Lumen  5 fr  Bard SOLO (valved) Power PICC, Lot number [id number]    Indications: Vascular Access    Contraindications : none    Procedure Details     Patient identified with 2 identifiers and \"Time Out\" conducted.  .     Central line insertion bundle followed: hand hygiene performed prior to procedure, site cleansed with cholraprep, hat, mask, sterile gloves, sterile gown worn, patient draped with maximum barrier head to toe drape, sterile field maintained.    The vein was assessed and found to be compressible and of adequate size.     Lidocaine 1% 2 ml administered sq to the insertion site. A 5 Fr PICC was inserted into the basilic vein of the right arm with ultrasound guidance. 1 attempt(s) required to access vein.   Catheter threaded without difficulty. Good blood return noted.    Modified Seldinger Technique used for insertion.    The 8 sharps that are included in the PICC insertion kit were accounted for and disposed of in the sharps container prior to breakdown of the sterile field.    Catheter secured with Statlock, biopatch and Tegaderm dressing applied.    Findings:    Total catheter length  47 cm, with 0 cm exposed. Mid upper arm circumference is 35 cm. Catheter was flushed with 30 cc NS. Patient  tolerated procedure well.    Tip placement verified by 3CG technology. Tip placement in the SVC.    CLABSI prevention brochure left at bedside.    Patient's primary RN notified PICC is ready for use.      Comments:        Grazyna Garibay PICC RN  [facility] [place]      "

## 2024-02-09 NOTE — PROGRESS NOTES
[facility] Progress Note - Hospitalist Service    Date of Admission:  1/30/2024    Assessment & Plan   Patient is a 38 year old male with PMH significant for morbid obesity, hypertension, DM2 and recent admission for umbilical hernia (11/22/23-11/30/23), s/p exploratory laparotomy with repair of incisional ventral hernia with mesh, re-admitted on 12/16/23-12/20/23 for concern regarding post-op wound infection.     CT of the abdomen done on 1/6/2024 showed grossly stable findings of anterior abdominal wall cellulitis without evidence of abscess formation or subcutaneous emphysema, interval improvement of interstitial and alveolar opacities in the lung bases and increased diffuse subcutaneous edema     He is receiving vancomycin, meropenem and fluconazole as recommended by ID specialist.  Oral prednisone was initiated on 2/7/2024 for abdominal wall edema.     #Severe sepsis with lactic acidosis  #Abdominal wall cellulitis  CT of the abdomen done on 1/6/2024 showed grossly stable findings of anterior abdominal wall cellulitis without evidence of abscess formation or subcutaneous emphysema, interval improvement of interstitial and alveolar opacities in the lung bases and increased diffuse subcutaneous edema     He is receiving vancomycin, meropenem and fluconazole as recommended by ID specialist.  Oral prednisone was initiated on 2/7/2024 for abdominal wall edema.    Continue meropenem, vancomycin and fluconazole  ID [id number] assistance    #Acute hypoxemic respiratory failure  #Hospital-acquired pneumonia  CT done on 1/6/2024 showed interval improvement of interstitial and alveolar opacities in the lung bases   Continue meropenem  Vancomycin as per pharmacy  ID follow-appreciate assistance    #Sinus tachycardia  Resolved  Pulmonary CT angiogram was negative for pulmonary embolism.     #Headache  Responsive to Toradol, continue q6h x2 days    #Hypertension   #DM-II, A1c  "6.5  -Cont home meds, insulin          Diet: Snacks/Supplements Adult: Special K Bar; Between Meals  Combination Diet High Consistent Carb (75 g CHO per Meal) Diet    DVT Prophylaxis: SCDs, patient refusing chemical ppx, doesn't like the shots  Duvall Catheter: Not present  Lines: None     Cardiac Monitoring: None  Code Status: Full Code      Clinically Significant Risk Factors              # Hypoalbuminemia: Lowest albumin = 3.1 g/dL at 1/31/2024  7:28 AM, will monitor as appropriate     # Hypertension: Noted on problem list       # DMII: A1C = 6.5 % (Ref range: <5.7 %) within past 6 months   # Severe Obesity: Estimated body mass index is 55.26 kg/m  as calculated from the following:    Height as of this encounter: 1.803 m (5' 11\").    Weight as of this encounter: 179.7 kg (396 lb 3.2 oz).        # Financial/Environmental Concerns: none         Disposition Plan     Expected Discharge Date: 02/09/2024    Discharge Delays: IV Medication - consider oral or Home Infusion  Destination: home with family  Discharge Comments: IV abx: ID following; started PO steroids            Arvin Hong MD  Hospitalist Service  [facility]  Securely message with [employer] (more info)  Text page via [employer] Paging/Directory   ______________________________________________________________________    Interval History   No complaints today and no acute events overnight. The erythema in the abdominal wall regions has diminished. Patient reports an improvement in symptoms and anticipates discharge on Sunday with prescription for oral antibiotics.    Physical Exam   Vital Signs: Temp: 97.5  F (36.4  C) Temp src: Oral BP: 131/78 Pulse: 62   Resp: 18 SpO2: 96 % O2 Device: None (Room air)    Weight: 396 lbs 3.2 oz    General Appearance: Morbidly obese, sitting comfortably on the recliner  Respiratory: Mild diffuse crackles, but difficult to auscultate through thick chest wall  Cardiovascular: Regular rate and rhythm, no " murmur   GI: Obese, superficial pain throughout abdominal pannus with erythema and tender to touch, no erythema or skin changes of inguinal area   Skin: Redness appears to be receding from the marked areas.  Redness in the lower and middle and upper part of the abdominal wall with prior surgical wound.  An area of mild purulence/sinus-crusted in the right lower quadrant-improving  Other: No sharp neck pain    Medical Decision Making       50 MINUTES SPENT BY ME on the date of service doing chart review, history, exam, documentation & further activities per the note.      Data     I have personally reviewed the following data over the past 24 hrs:    N/A  \   N/A   / 475 (H)     N/A N/A N/A /  104 (H)   3.9 N/A N/A \       Imaging results reviewed over the past 24 hrs:   No results found for this or any previous visit (from the past 24 hour(s)).

## 2024-02-09 NOTE — PROVIDER NOTIFICATION
"Contacted attending and ID Doctors regarding pt's loss of IV during vancomycin run to infiltration today.  It is agreed that pt needs a PICC line to complete his treatment plan.  Discussed this with pt.  After voicing concerns of \"not liking\" a PICC, pt agreed.  Attending updated, order for PICC line placed. Annabelle Carrion RN    " 61 Female

## 2024-02-09 NOTE — PROGRESS NOTES
[facility] Nurse Inpatient Assessment     Consulted for: abdomen    Patient History (according to provider note(s):      38-year-old man with a history of recent umbilical hernia repair admitted with sepsis.  Concern for abdominal wall infection     Status postumbilical hernia repair.  Presenting in November with abdominal pain and incarcerated hernia.  Underwent repair with mesh on 11/14/2023.  Return to the hospital with drainage from the wound sites concerning for infected seroma.  Status post exploratory laparotomy on 11/24/2023  Postop wound infection.  Hospitalized in December with postop wound infection.  No drainable abscess.  Wound cultures were polymicrobial.  Discharged with levofloxacin, Flagyl, and doxycycline.  Sepsis.  Acute onset of chills and fever 1 day prior to admission.  No other associated symptoms.  Increased redness over the abdomen.  Chronic ulcers without recent history of worsening.  CT scan of the abdomen without any abscesses.  Abdominal wall cellulitis versus panniculitis.  Significant geographic erythema over the abdomen, very tender.  No obvious purulence from wounds.  Mesh infection is in the differential, but no obvious fluid collections around the surgical site. Soft tissue u/s without new findings. High CRP. Fevers and wbc improving     Assessment:      Areas visualized during today's visit: Abdomen    Skin Injury Location: abdomen        2/2 2/2      Last photo: 2/9  Wound due to: Mixed Etiology: wounds could be from drains placed post surgery  Wound history/plan of care: multiple hernia surgeries, drains placed and wounds line up with sites  Wound base: proximal wound is 100% dried drainage distal 75% granular - 25% sub q fat     Palpation of the wound bed: firm      Drainage: scant     Description of drainage: serous     Measurements (length x width x depth, in cm): distal 1 cm x 2  cm x 0.5 cm     Tunneling: N/A     Undermining: N/A  Periwound skin: Dry/scaly, Edematous, and Erythema- blanchable      Color: pink and red      Temperature: warm  Odor: none  Pain: denies , none  Pain interventions prior to dressing change: no significant pain present   Treatment goal: Heal   STATUS: healing and improving  Supplies ordered: at bedside and supplies stored on unit      Treatment Plan:     Abdominal wound: Every other day     Cleanse wound with Vashe; gently dry  Moisten 2 x 2 gauze with Vashe and place into wound bed  Cover with Mepilex     Orders: Reviewed and Updated    RECOMMEND PRIMARY TEAM ORDER: None, at this time  Education provided: plan of care  Discussed plan of care with: Patient  WOC nurse follow-up plan: weekly  Notify WOC if wound(s) deteriorate.  Nursing to notify the Provider(s) and re-consult the WOC Nurse if new skin concern.    DATA:     Current support surface: Standard  Standard gel/foam mattress (IsoFlex, Atmos air, etc)  Containment of urine/stool: Continent of bladder and Continent of bowel  BMI: Body mass index is 55.26 kg/m .   Active diet order: Orders Placed This Encounter      Combination Diet High Consistent Carb (75 g CHO per Meal) Diet     Output: I/O last 3 completed shifts:  In: 480 [P.O.:480]  Out: -      Labs:   Recent Labs   Lab 02/05/24  0635   HGB 10.4*   WBC 9.2     Pressure injury risk assessment:   Sensory Perception: 4-->no impairment  Moisture: 3-->occasionally moist  Activity: 3-->walks occasionally  Mobility: 3-->slightly limited  Nutrition: 3-->adequate  Friction and Shear: 3-->no apparent problem  Samy Score: 19    Ingrid Peña, MSN RN CWOCN  Pager no longer is use, please contact through [employer] group: [facility] [employer] [facility]

## 2024-02-09 NOTE — PROGRESS NOTES
"CLINICAL NUTRITION SERVICES - ASSESSMENT NOTE     Nutrition Prescription    RECOMMENDATIONS FOR MDs/PROVIDERS TO ORDER:  -Recommend vit C 500 mg daily d/t poor food variety, limited intake of fruits and vegetables and wound healing needs    Malnutrition Status:    Does not qualify     Recommendations already ordered by Registered Dietitian (RD):  -Discontinue special k bar  d/t improved intake  - discontinue expedite d/t pt dislike    Future/Additional Recommendations:  -Adjust supplement pending intake, weight, tolerance, acceptance, wound healing, labs, BG  -Monitor need for diet restriction pending intake, BG         CURRENT NUTRITION ORDERS  Diet: 75 gm cho per meal, high consistent CHO  Supplement: Expedite daily, special k bar bid    Intake/Tolerance: Good, significantly improved  Eating 100% and ordering more  Ordering, with special k bar [id number] kcal, 164-182 g protein/day  Pt has been ordering the same food q meal    Pt has been refusing expedite d/t dislike    Pt reports he is eating more and enjoys the special k bar. He states he is ordering the same food q meal as it is what is appetizing to him right now. Dicussed that he is now exceeding his needs. He was ok stopping the special k bar    Pt is exceeding estimated nutrition needs    LABS  Labs reviewed  -207 mg/dl past 24 hours, in fair control. BG >160 post prandial- last steroid dose today    MEDICATIONS  Medications reviewed  Ssi, iv abx, mvi with minerals    ANTHROPOMETRICS  Height: 180.3 cm (5' 11\")  BMI: Obesity Grade III BMI >40   56.7  Weight History:   Date/Time Weight   02/07/24 0929 179.7 kg (396 lb 3.2 oz) Abnormal    02/07/24 0900 179.7 kg (396 lb 3.2 oz) Abnormal    02/03/24 0752 184.4 kg (406 lb 8.5 oz) Abnormal    01/30/24 1550 175.1 kg (386 lb) Abnormal          Wt Readings from Last 3 Encounters:   12/20/23 (!) 175.2 kg (386 lb 3.2 oz)   11/24/23 (!) 182.8 kg (403 lb)       Dosing Weight: 105 kg, adjusted BW    ASSESSED " NUTRITION NEEDS  Estimated Energy Needs: 2100 - 2625 kcals/day (20 - 25 kcals/kg)  Justification: Obese  Estimated Protein Needs: 105 -126 grams protein/day (1 - 1.2 grams of pro/kg)  Justification: Wound healing  Estimated Fluid Needs: 3150 mL/day (30 mL/kg) , or per provider  Justification: Maintenance    PHYSICAL FINDINGS  Per chart,  Surgical abdominal wounds- healing per WOC today    MALNUTRITION:  % Weight Loss:  None noted  % Intake:  None noted  Subcutaneous Fat Loss:  None noted  Muscle Loss:  None noted, may be masked by obesity  Fluid Retention:  Moderate +2 generalized, +4 bilateral flank - associated with condition    Malnutrition Diagnosis: Does not qualify    NUTRITION DIAGNOSIS  Inadequate protein intake related to diet restriction, fatigue as evidenced by skipping some meals, meeting <75% of estimated needs     INTERVENTIONS  Implementation  -Discontinue special k bar  d/t improved intake  -Recommend vit C 500 mg daily d/t poor food variety, limited intake of fruits and vegetables and wound healing needs  -- discontinue expedite d/t pt dislike    Goals  Patient to continue to consume % of nutritionally adequate meals three times per day, or the equivalent with supplements/snacks.- not progressing     Monitoring/Evaluation  Progress toward goals will be monitored and evaluated per protocol.

## 2024-02-09 NOTE — PROGRESS NOTES
Infectious Diseases Progress Note  [facility]    Date of visit: 02/09/2024     ASSESSMENT   38-year-old man with a history of recent umbilical hernia repair admitted with sepsis.  Concern for abdominal wall infection    Status postumbilical hernia repair.  Presenting in November with abdominal pain and incarcerated hernia.  Underwent repair with mesh on 11/14/2023.  Return to the hospital with drainage from the wound sites concerning for infected seroma.  Status post exploratory laparotomy on 11/24/2023  Postop wound infection.  Hospitalized in December with postop wound infection.  No drainable abscess.  Wound cultures were polymicrobial with MSSA, Enterobacter cloacae, and Corynebacterium striatum.  Discharged with levofloxacin, Flagyl, and doxycycline.  Sepsis.  Acute onset of chills and fever 1 day prior to admission.  No other associated symptoms.  Increased redness over the abdomen.  Chronic ulcers without recent history of worsening.  CT scan of the abdomen without any abscesses.  Abdominal wall cellulitis versus panniculitis.  Significant geographic erythema over the abdomen, very tender.  No obvious purulence from wounds.  Mesh infection is in the differential, but no obvious fluid collections around the surgical site. Soft tissue u/s without new findings. High CRP. Fevers improved. Wbc normalized. CRP improving, slowly. Repeat CT without significant changes. Still having significant edema.  Inguinal intertrigo.  On fluconazole.  Hypoxic respiratory failure.  CT scan with new bilateral infiltrate worse on the left. Hypoxia, stable. CT with improvement in pna     Principal Problem:    Abdominal wall cellulitis       PLAN   -continue vancomycin and meropenem, through Sunday  -finish fluconazole today  -prednisone 60mg daily x 3 days to help with edema and inflammation in abdominal wall, last day today  -okay to place PICC due to issues maintaining iv   -Tentative plan to discharge on Sunday with  "levofloxacin and doxycycline x 1 wk        Jamal Smyth MD  [facility]  Direct messaging: [employer]   On-Call ID provider: [phone], option: 9         ===========================================    SUBJECTIVE / INTERVAL HISTORY:   Lost 2 iv's yesterday, another one today. Abd feeling better. No longer needing packing for upper abd wound    Antibiotics   Vancomycin 1/30-  Meropenem 1/31-  Fluconazole 2/3-2/9    Previous:  Zosyn 1/30-1/31      Physical Exam     Temp:  [97.5  F (36.4  C)-98.2  F (36.8  C)] 97.5  F (36.4  C)  Pulse:  [] 62  Resp:  [18-20] 18  BP: (131-151)/(76-79) 131/78  SpO2:  [95 %-96 %] 96 %    /78 (BP Location: Right arm)   Pulse 62   Temp 97.5  F (36.4  C) (Oral)   Resp 18   Ht 1.803 m (5' 11\")   Wt (!) 179.7 kg (396 lb 3.2 oz)   SpO2 96%   BMI 55.26 kg/m      GENERAL:  well-developed, well-nourished, sitting in chair, no distress  HENT:  Head is normocephalic, atraumatic.   EYES:  Eyes have anicteric sclerae without conjunctival injection   LUNGS:  normal resp pattern  ABDOMEN:  Normal bowel sounds, soft.  Large pannus.  2 packed wounds near the midline.  Erythema in a geographic distribution over the abdomen and lower pannus with induration and tenderness. Erythema notably improved. Lower abd induration continues to improve with less pain and induration  SKIN:  No acute rashes.    NEUROLOGIC:  Grossly nonfocal.      Cultures   1/30 blood cultures x 2: no growth to date  1/31 blood culture x 2: no growth to date     Susceptibility data from last 90 days.  Collected Specimen Info Organism Ampicillin Ampicillin/Sulbactam Cefepime Ceftazidime Ceftriaxone Ciprofloxacin Clindamycin Daptomycin Doxycycline Erythromycin Gentamicin Levofloxacin Meropenem Oxacillin Penicillin   12/17/23 Wound from Abdomen Enterobacter cloacae complex R R  S  S  S  S      S  S  S       Staphylococcus aureus        S  S  S  S  S    S      Corynebacterium striatum     "  I   R   S       I   12/17/23 Abscess from Abdomen Mixed Aerobic and Anaerobic gonzález                  11/24/23 Tissue from Abdomen Cutibacterium (Propionibacterium) acnes                    Collected Specimen Info Organism Piperacillin/Tazobactam Tetracycline Tobramycin Trimethoprim/Sulfamethoxazole  Vancomycin   12/17/23 Wound from Abdomen Enterobacter cloacae complex  S   S  S      Staphylococcus aureus   S   S  S     Corynebacterium striatum     S  S   12/17/23 Abscess from Abdomen Mixed Aerobic and Anaerobic gonzález        11/24/23 Tissue from Abdomen Cutibacterium (Propionibacterium) acnes               Pertinent Labs:     Recent Labs   Lab 02/09/24  0626 02/06/24  0651 02/05/24  0635 02/04/24  1111 02/03/24  0659   WBC  --   --  9.2 12.4* 15.6*   HGB  --   --  10.4* 10.3* 10.6*   * 347 313 301 297       Recent Labs   Lab 02/05/24  0635 02/04/24  1111 02/03/24  0659    139 139   CO2 29 28 30*   BUN 7.7 7.6 7.5       Recent Labs   Lab 02/05/24  0635 02/04/24  1111 02/03/24  0659   CRPI 116.50* 170.50* 256.70*           Imaging:     US Soft Tissue Abdominal Wall or Lower Back    Result Date: 1/31/2024  US SOFT TISSUE ABDOMINAL WALL OR LOWER BACK 1/31/2024 12:52 PM CST INDICATION: Erythema, large pannus; evaluate abscess. COMPARISON: Previous day CT. FINDINGS: Along the area of interest, subcutaneous edema present, though no focal collection or abscess.     CT Chest/Abdomen/Pelvis w Contrast    Result Date: 1/30/2024  EXAM: CT CHEST/ABDOMEN/PELVIS W CONTRAST LOCATION: [facility] DATE: 1/30/2024 INDICATION: Sepsis COMPARISON: CT of the abdomen and pelvis 12/16/2023 TECHNIQUE: CT scan of the chest, abdomen, and pelvis was performed following injection of IV contrast. Multiplanar reformats were obtained. Dose reduction techniques were used. CONTRAST: isovue 370 100ml FINDINGS: LUNGS AND PLEURA: Normal. MEDIASTINUM/AXILLAE: Normal. CORONARY ARTERY CALCIFICATION: None. HEPATOBILIARY:  Normal. PANCREAS: Normal. SPLEEN: Normal. ADRENAL GLANDS: Normal. KIDNEYS/BLADDER: Normal. BOWEL: Normal. LYMPH NODES: Normal. VASCULATURE: Unremarkable. PELVIC ORGANS: Normal. MUSCULOSKELETAL: Since 12/16/2023 the prior seen gas associated with the laparotomy site has nearly resolved there is no fluid collections seen to suggest an abscess.     IMPRESSION: 1.  Nearly resolved gas at the laparotomy site since 12/16/2023. No findings of an abscess. Nothing seen to explain patient's sepsis clinically.      Order  CT Chest Pulmonary Embolism w Contrast [[id number]] (Order [id number])  Exam Information    Exam Date Exam Time Exam Date Exam Time Accession # Performing Department Results    2/4/24  4:27 AM 2/4/24  4:27 AM [id number] [facility] CT      PACS Images     Show images for CT Chest Pulmonary Embolism w Contrast     Study Result    Narrative & Impression   EXAM: CT CHEST PULMONARY EMBOLISM W CONTRAST  LOCATION: [facility]  DATE: 2/4/2024     INDICATION: Patient with surgeries 2 months ago   difficult healing. Admitted x2. Patient with SOB, tachycardia, and RLE swelling. Concern for PE.  COMPARISON: CT chest 01/30/2024.  TECHNIQUE: CT chest pulmonary angiogram during arterial phase injection of IV contrast. Multiplanar reformats and MIP reconstructions were performed. Dose reduction techniques were used.   CONTRAST: isovue 370 100ml     FINDINGS:  ANGIOGRAM CHEST: No pulmonary embolus. No aortic aneurysm or dissection.     LUNGS AND PLEURA: Extensive somewhat nodular consolidation throughout both lungs, right worse than left. Small right and trace left pleural effusions.     MEDIASTINUM/AXILLAE: Normal.     CORONARY ARTERY CALCIFICATION: None.     UPPER ABDOMEN: Normal.     MUSCULOSKELETAL: Normal.                                                                      IMPRESSION:  1.  Extensive bilateral pneumonia, right worse than left.  2.  Small right  and trace left pleural effusions.  3.  No pulmonary embolus.                                                                                                                                                                          Data reviewed today: I reviewed all medications, new labs and imaging results over the last 24 hours. I personally reviewed no images or EKG's today.  The patient's care was discussed with the Bedside Nurse and Patient.

## 2024-02-09 NOTE — PROGRESS NOTES
Infectious Diseases Progress Note  [facility]    Date of visit: 02/08/2024     ASSESSMENT   38-year-old man with a history of recent umbilical hernia repair admitted with sepsis.  Concern for abdominal wall infection    Status postumbilical hernia repair.  Presenting in November with abdominal pain and incarcerated hernia.  Underwent repair with mesh on 11/14/2023.  Return to the hospital with drainage from the wound sites concerning for infected seroma.  Status post exploratory laparotomy on 11/24/2023  Postop wound infection.  Hospitalized in December with postop wound infection.  No drainable abscess.  Wound cultures were polymicrobial with MSSA, Enterobacter cloacae, and Corynebacterium striatum.  Discharged with levofloxacin, Flagyl, and doxycycline.  Sepsis.  Acute onset of chills and fever 1 day prior to admission.  No other associated symptoms.  Increased redness over the abdomen.  Chronic ulcers without recent history of worsening.  CT scan of the abdomen without any abscesses.  Abdominal wall cellulitis versus panniculitis.  Significant geographic erythema over the abdomen, very tender.  No obvious purulence from wounds.  Mesh infection is in the differential, but no obvious fluid collections around the surgical site. Soft tissue u/s without new findings. High CRP. Fevers improved. Wbc normalized. CRP improving, slowly. Repeat CT without significant changes. Still having significant edema.  Inguinal intertrigo.  On fluconazole.  Hypoxic respiratory failure.  CT scan with new bilateral infiltrate worse on the left. Hypoxia, stable. CT with improvement in pna     Principal Problem:    Abdominal wall cellulitis       PLAN   -continue vancomycin and meropenem, through Sunday  -continue fluconazole, through tomorrow  -prednisone 60mg daily x 3 days to help with edema and inflammation in abdominal wall, last day tomorrow  -Tentative plan to discharge on Sunday with levofloxacin and doxycycline x 1 wk   "      Jamal Smyth MD  [facility]  Direct messaging: [employer] Paging   On-Call ID provider: [phone], option: 9         ===========================================    SUBJECTIVE / INTERVAL HISTORY:   Hyperglycemia today. Says that since hyperglycemia is from prednisone he doesn't need insulin. He wants to do everything possible to get better, so is now willing to take insulin for hyperglycemia. Is frustrated by long hospital stay.    Antibiotics   Vancomycin 1/30-  Meropenem 1/31-  Fluconazole 2/3-    Previous:  Zosyn 1/30-1/31      Physical Exam     Temp:  [97.5  F (36.4  C)-97.9  F (36.6  C)] 97.9  F (36.6  C)  Pulse:  [] 109  Resp:  [20] 20  BP: (142-151)/(76-79) 151/79  SpO2:  [95 %] 95 %    BP (!) 151/79 (BP Location: Right arm)   Pulse 109   Temp 97.9  F (36.6  C) (Oral)   Resp 20   Ht 1.803 m (5' 11\")   Wt (!) 179.7 kg (396 lb 3.2 oz)   SpO2 95%   BMI 55.26 kg/m      GENERAL:  well-developed, well-nourished, lying in bed, uncomfortable  HENT:  Head is normocephalic, atraumatic.   EYES:  Eyes have anicteric sclerae without conjunctival injection   LUNGS:  normal resp pattern  ABDOMEN:  Normal bowel sounds, soft.  Large pannus.  2 packed wounds near the midline.  Erythema in a geographic distribution over the abdomen and lower pannus with induration and tenderness. Erythema notable improved. Lower abd induration is softer today, less painful  SKIN:  No acute rashes.    NEUROLOGIC:  Grossly nonfocal.      Cultures   1/30 blood cultures x 2: no growth to date  1/31 blood culture x 2: no growth to date     Susceptibility data from last 90 days.  Collected Specimen Info Organism Ampicillin Ampicillin/Sulbactam Cefepime Ceftazidime Ceftriaxone Ciprofloxacin Clindamycin Daptomycin Doxycycline Erythromycin Gentamicin Levofloxacin Meropenem Oxacillin Penicillin   12/17/23 Wound from Abdomen Enterobacter cloacae complex R R  S  S  S  S      S  S  S       Staphylococcus " aureus        S  S  S  S  S    S      Corynebacterium striatum      I   R   S       I   12/17/23 Abscess from Abdomen Mixed Aerobic and Anaerobic gonzález                  11/24/23 Tissue from Abdomen Cutibacterium (Propionibacterium) acnes                    Collected Specimen Info Organism Piperacillin/Tazobactam Tetracycline Tobramycin Trimethoprim/Sulfamethoxazole  Vancomycin   12/17/23 Wound from Abdomen Enterobacter cloacae complex  S   S  S      Staphylococcus aureus   S   S  S     Corynebacterium striatum     S  S   12/17/23 Abscess from Abdomen Mixed Aerobic and Anaerobic gonzález        11/24/23 Tissue from Abdomen Cutibacterium (Propionibacterium) acnes               Pertinent Labs:     Recent Labs   Lab 02/06/24  0651 02/05/24  0635 02/04/24  1111 02/03/24  0659   WBC  --  9.2 12.4* 15.6*   HGB  --  10.4* 10.3* 10.6*    313 301 297       Recent Labs   Lab 02/05/24  0635 02/04/24  1111 02/03/24  0659    139 139   CO2 29 28 30*   BUN 7.7 7.6 7.5       Recent Labs   Lab 02/05/24  0635 02/04/24  1111 02/03/24  0659   CRPI 116.50* 170.50* 256.70*           Imaging:     US Soft Tissue Abdominal Wall or Lower Back    Result Date: 1/31/2024  US SOFT TISSUE ABDOMINAL WALL OR LOWER BACK 1/31/2024 12:52 PM CST INDICATION: Erythema, large pannus; evaluate abscess. COMPARISON: Previous day CT. FINDINGS: Along the area of interest, subcutaneous edema present, though no focal collection or abscess.     CT Chest/Abdomen/Pelvis w Contrast    Result Date: 1/30/2024  EXAM: CT CHEST/ABDOMEN/PELVIS W CONTRAST LOCATION: [facility] DATE: 1/30/2024 INDICATION: Sepsis COMPARISON: CT of the abdomen and pelvis 12/16/2023 TECHNIQUE: CT scan of the chest, abdomen, and pelvis was performed following injection of IV contrast. Multiplanar reformats were obtained. Dose reduction techniques were used. CONTRAST: isovue 370 100ml FINDINGS: LUNGS AND PLEURA: Normal. MEDIASTINUM/AXILLAE: Normal. CORONARY  ARTERY CALCIFICATION: None. HEPATOBILIARY: Normal. PANCREAS: Normal. SPLEEN: Normal. ADRENAL GLANDS: Normal. KIDNEYS/BLADDER: Normal. BOWEL: Normal. LYMPH NODES: Normal. VASCULATURE: Unremarkable. PELVIC ORGANS: Normal. MUSCULOSKELETAL: Since 12/16/2023 the prior seen gas associated with the laparotomy site has nearly resolved there is no fluid collections seen to suggest an abscess.     IMPRESSION: 1.  Nearly resolved gas at the laparotomy site since 12/16/2023. No findings of an abscess. Nothing seen to explain patient's sepsis clinically.      Order  CT Chest Pulmonary Embolism w Contrast [[id number]] (Order [id number])  Exam Information    Exam Date Exam Time Exam Date Exam Time Accession # Performing Department Results    2/4/24  4:27 AM 2/4/24  4:27 AM [id number] [facility] CT      PACS Images     Show images for CT Chest Pulmonary Embolism w Contrast     Study Result    Narrative & Impression   EXAM: CT CHEST PULMONARY EMBOLISM W CONTRAST  LOCATION: [facility]  DATE: 2/4/2024     INDICATION: Patient with surgeries 2 months ago   difficult healing. Admitted x2. Patient with SOB, tachycardia, and RLE swelling. Concern for PE.  COMPARISON: CT chest 01/30/2024.  TECHNIQUE: CT chest pulmonary angiogram during arterial phase injection of IV contrast. Multiplanar reformats and MIP reconstructions were performed. Dose reduction techniques were used.   CONTRAST: isovue 370 100ml     FINDINGS:  ANGIOGRAM CHEST: No pulmonary embolus. No aortic aneurysm or dissection.     LUNGS AND PLEURA: Extensive somewhat nodular consolidation throughout both lungs, right worse than left. Small right and trace left pleural effusions.     MEDIASTINUM/AXILLAE: Normal.     CORONARY ARTERY CALCIFICATION: None.     UPPER ABDOMEN: Normal.     MUSCULOSKELETAL: Normal.                                                                      IMPRESSION:  1.  Extensive bilateral pneumonia,  right worse than left.  2.  Small right and trace left pleural effusions.  3.  No pulmonary embolus.                                                                                                                                                                          Data reviewed today: I reviewed all medications, new labs and imaging results over the last 24 hours. I personally reviewed no images or EKG's today.  The patient's care was discussed with the Patient.

## 2024-02-09 NOTE — PROGRESS NOTES
Care Management Follow Up    Length of Stay (days): 10    Expected Discharge Date: 02/11/2024     Concerns to be Addressed: discharge planning     Patient plan of care discussed at interdisciplinary rounds: Yes    Anticipated Discharge Disposition:  home with home care     Anticipated Discharge Services:  [facility] RN/GERMANA  Anticipated Discharge DME:  none    Patient/family educated on Medicare website which has current facility and service quality ratings:  NA  Education Provided on the Discharge Plan:  yes  Patient/Family in Agreement with the Plan:  yes    Referrals Placed by CM/SW:  none  Private pay costs discussed: Not applicable    Additional Information:  Patient not medically ready. Likely here through Sunday. CM following for discharge needs and recs.     RAE Miramontes

## 2024-02-10 LAB
GLUCOSE BLDC GLUCOMTR-MCNC: 101 MG/DL (ref 70–99)
GLUCOSE BLDC GLUCOMTR-MCNC: 107 MG/DL (ref 70–99)
GLUCOSE BLDC GLUCOMTR-MCNC: 113 MG/DL (ref 70–99)
GLUCOSE BLDC GLUCOMTR-MCNC: 99 MG/DL (ref 70–99)
POTASSIUM SERPL-SCNC: 3.9 MMOL/L (ref 3.4–5.3)

## 2024-02-10 PROCEDURE — 250N000011 HC RX IP 250 OP 636: Mod: JZ | Performed by: INTERNAL MEDICINE

## 2024-02-10 PROCEDURE — 250N000013 HC RX MED GY IP 250 OP 250 PS 637: Performed by: INTERNAL MEDICINE

## 2024-02-10 PROCEDURE — 99232 SBSQ HOSP IP/OBS MODERATE 35: CPT | Performed by: INTERNAL MEDICINE

## 2024-02-10 PROCEDURE — 258N000003 HC RX IP 258 OP 636: Performed by: INTERNAL MEDICINE

## 2024-02-10 PROCEDURE — 250N000011 HC RX IP 250 OP 636: Performed by: INTERNAL MEDICINE

## 2024-02-10 PROCEDURE — 120N000001 HC R&B MED SURG/OB

## 2024-02-10 PROCEDURE — 84132 ASSAY OF SERUM POTASSIUM: CPT | Performed by: PSYCHIATRY & NEUROLOGY

## 2024-02-10 RX ADMIN — MEROPENEM 1 G: 1 INJECTION, POWDER, FOR SOLUTION INTRAVENOUS at 05:33

## 2024-02-10 RX ADMIN — MEROPENEM 1 G: 1 INJECTION, POWDER, FOR SOLUTION INTRAVENOUS at 13:03

## 2024-02-10 RX ADMIN — VANCOMYCIN HYDROCHLORIDE 1250 MG: 5 INJECTION, POWDER, LYOPHILIZED, FOR SOLUTION INTRAVENOUS at 18:04

## 2024-02-10 RX ADMIN — VANCOMYCIN HYDROCHLORIDE 1250 MG: 5 INJECTION, POWDER, LYOPHILIZED, FOR SOLUTION INTRAVENOUS at 02:13

## 2024-02-10 RX ADMIN — Medication 1 TABLET: at 09:12

## 2024-02-10 RX ADMIN — VANCOMYCIN HYDROCHLORIDE 1250 MG: 5 INJECTION, POWDER, LYOPHILIZED, FOR SOLUTION INTRAVENOUS at 09:12

## 2024-02-10 RX ADMIN — Medication 500 MG: at 18:04

## 2024-02-10 RX ADMIN — MEROPENEM 1 G: 1 INJECTION, POWDER, FOR SOLUTION INTRAVENOUS at 21:16

## 2024-02-10 ASSESSMENT — ACTIVITIES OF DAILY LIVING (ADL)
ADLS_ACUITY_SCORE: 23

## 2024-02-10 NOTE — PROGRESS NOTES
Infectious Diseases Progress Note  [facility]    Date of visit: 02/10/2024     ASSESSMENT   38-year-old man with a history of recent umbilical hernia repair admitted with sepsis.  Concern for abdominal wall infection    Status postumbilical hernia repair.  Presenting in November with abdominal pain and incarcerated hernia.  Underwent repair with mesh on 11/14/2023.  Return to the hospital with drainage from the wound sites concerning for infected seroma.  Status post exploratory laparotomy on 11/24/2023  Postop wound infection.  Hospitalized in December with postop wound infection.  No drainable abscess.  Wound cultures were polymicrobial with MSSA, Enterobacter cloacae, and Corynebacterium striatum.  Discharged with levofloxacin, Flagyl, and doxycycline.  Sepsis.  Acute onset of chills and fever 1 day prior to admission.  No other associated symptoms.  Increased redness over the abdomen.  Chronic ulcers without recent history of worsening.  CT scan of the abdomen without any abscesses.  Abdominal wall cellulitis versus panniculitis.  Significant geographic erythema over the abdomen, very tender.  No obvious purulence from wounds.  Mesh infection is in the differential, but no obvious fluid collections around the surgical site. Soft tissue u/s without new findings. High CRP. Fevers improved. Wbc normalized. CRP improving, slowly. Repeat CT without significant changes. Still having significant edema.  Inguinal intertrigo.  On fluconazole.  Hypoxic respiratory failure.  CT scan with new bilateral infiltrate worse on the left. Hypoxia, stable. CT with improvement in pna     Principal Problem:    Abdominal wall cellulitis       PLAN   -continue vancomycin and meropenem, through Sunday  -Would plan to discharge tomorrow with levofloxacin 500 mg po daily and doxycycline 100mg po bid x 7 days  -If recurrence happens, would strongly suspect infected mesh    ID will sign-off. Call with questions      Jamal MARY  "MD Libra  [facility]  Direct messaging: [email or web address] Paging   On-Call ID provider: [phone], option: 9         ===========================================    SUBJECTIVE / INTERVAL HISTORY:   PICC yesterday, went ok. Feel improvement.     Antibiotics   Vancomycin 1/30-  Meropenem 1/31-      Previous:  Fluconazole 2/3-2/9  Zosyn 1/30-1/31      Physical Exam     Temp:  [97.4  F (36.3  C)-97.6  F (36.4  C)] 97.6  F (36.4  C)  Pulse:  [68-72] 68  Resp:  [18-20] 18  BP: (126-139)/(62-78) 130/70  SpO2:  [95 %-96 %] 96 %    /70 (BP Location: Left arm)   Pulse 68   Temp 97.6  F (36.4  C) (Oral)   Resp 18   Ht 1.803 m (5' 11\")   Wt (!) 179.7 kg (396 lb 3.2 oz)   SpO2 96%   BMI 55.26 kg/m      GENERAL:  well-developed, well-nourished, sitting in chair, no distress  HENT:  Head is normocephalic, atraumatic.   EYES:  Eyes have anicteric sclerae without conjunctival injection   LUNGS:  normal resp pattern  ABDOMEN:  Normal bowel sounds, soft.  Large pannus.  2 packed wounds near the midline.  Erythema in a geographic distribution over the abdomen and lower pannus with induration and tenderness. Erythema notably improved, starting to clear up in right lower quadrant.   SKIN:  No acute rashes.    NEUROLOGIC:  Grossly nonfocal.      Cultures   1/30 blood cultures x 2: no growth to date  1/31 blood culture x 2: no growth to date     Susceptibility data from last 90 days.  Collected Specimen Info Organism Ampicillin Ampicillin/Sulbactam Cefepime Ceftazidime Ceftriaxone Ciprofloxacin Clindamycin Daptomycin Doxycycline Erythromycin Gentamicin Levofloxacin Meropenem Oxacillin Penicillin   12/17/23 Wound from Abdomen Enterobacter cloacae complex R R  S  S  S  S      S  S  S       Staphylococcus aureus        S  S  S  S  S    S      Corynebacterium striatum      I   R   S       I   12/17/23 Abscess from Abdomen Mixed Aerobic and Anaerobic gonzález                  11/24/23 Tissue from Abdomen " Cutibacterium (Propionibacterium) acnes                    Collected Specimen Info Organism Piperacillin/Tazobactam Tetracycline Tobramycin Trimethoprim/Sulfamethoxazole  Vancomycin   12/17/23 Wound from Abdomen Enterobacter cloacae complex  S   S  S      Staphylococcus aureus   S   S  S     Corynebacterium striatum     S  S   12/17/23 Abscess from Abdomen Mixed Aerobic and Anaerobic gonzález        11/24/23 Tissue from Abdomen Cutibacterium (Propionibacterium) acnes               Pertinent Labs:     Recent Labs   Lab 02/09/24  0626 02/06/24  0651 02/05/24  0635 02/04/24  1111   WBC  --   --  9.2 12.4*   HGB  --   --  10.4* 10.3*   * 347 313 301       Recent Labs   Lab 02/05/24  0635 02/04/24  1111    139   CO2 29 28   BUN 7.7 7.6       Recent Labs   Lab 02/05/24  0635 02/04/24  1111   CRPI 116.50* 170.50*           Imaging:     US Soft Tissue Abdominal Wall or Lower Back    Result Date: 1/31/2024  US SOFT TISSUE ABDOMINAL WALL OR LOWER BACK 1/31/2024 12:52 PM CST INDICATION: Erythema, large pannus; evaluate abscess. COMPARISON: Previous day CT. FINDINGS: Along the area of interest, subcutaneous edema present, though no focal collection or abscess.     CT Chest/Abdomen/Pelvis w Contrast    Result Date: 1/30/2024  EXAM: CT CHEST/ABDOMEN/PELVIS W CONTRAST LOCATION: [facility] DATE: 1/30/2024 INDICATION: Sepsis COMPARISON: CT of the abdomen and pelvis 12/16/2023 TECHNIQUE: CT scan of the chest, abdomen, and pelvis was performed following injection of IV contrast. Multiplanar reformats were obtained. Dose reduction techniques were used. CONTRAST: isovue 370 100ml FINDINGS: LUNGS AND PLEURA: Normal. MEDIASTINUM/AXILLAE: Normal. CORONARY ARTERY CALCIFICATION: None. HEPATOBILIARY: Normal. PANCREAS: Normal. SPLEEN: Normal. ADRENAL GLANDS: Normal. KIDNEYS/BLADDER: Normal. BOWEL: Normal. LYMPH NODES: Normal. VASCULATURE: Unremarkable. PELVIC ORGANS: Normal. MUSCULOSKELETAL: Since 12/16/2023  the prior seen gas associated with the laparotomy site has nearly resolved there is no fluid collections seen to suggest an abscess.     IMPRESSION: 1.  Nearly resolved gas at the laparotomy site since 12/16/2023. No findings of an abscess. Nothing seen to explain patient's sepsis clinically.      Order  CT Chest Pulmonary Embolism w Contrast [[id number]] (Order [id number])  Exam Information    Exam Date Exam Time Exam Date Exam Time Accession # Performing Department Results    2/4/24  4:27 AM 2/4/24  4:27 AM [id number] [facility] CT      PACS Images     Show images for CT Chest Pulmonary Embolism w Contrast     Study Result    Narrative & Impression   EXAM: CT CHEST PULMONARY EMBOLISM W CONTRAST  LOCATION: [facility]  DATE: 2/4/2024     INDICATION: Patient with surgeries 2 months ago   difficult healing. Admitted x2. Patient with SOB, tachycardia, and RLE swelling. Concern for PE.  COMPARISON: CT chest 01/30/2024.  TECHNIQUE: CT chest pulmonary angiogram during arterial phase injection of IV contrast. Multiplanar reformats and MIP reconstructions were performed. Dose reduction techniques were used.   CONTRAST: isovue 370 100ml     FINDINGS:  ANGIOGRAM CHEST: No pulmonary embolus. No aortic aneurysm or dissection.     LUNGS AND PLEURA: Extensive somewhat nodular consolidation throughout both lungs, right worse than left. Small right and trace left pleural effusions.     MEDIASTINUM/AXILLAE: Normal.     CORONARY ARTERY CALCIFICATION: None.     UPPER ABDOMEN: Normal.     MUSCULOSKELETAL: Normal.                                                                      IMPRESSION:  1.  Extensive bilateral pneumonia, right worse than left.  2.  Small right and trace left pleural effusions.  3.  No pulmonary embolus.                                                                                                                                                                           Data reviewed today: I reviewed all medications, new labs and imaging results over the last 24 hours. I personally reviewed no images or EKG's today.  The patient's care was discussed with the Patient.

## 2024-02-10 NOTE — PROGRESS NOTES
[facility]    Medicine Progress Note - Hospitalist Service    Date of Admission:  1/30/2024    Assessment & Plan   Patient is a 38 year old male with PMH significant for morbid obesity, hypertension, type 2 diabetes and umbilical hernia s/p exploratory laparotomy with repair of incisional ventral hernia with mesh, complicated by surgical wound infection admitted on 1/30/2024 with abdominal wall cellulitis versus panniculitis     CT of the abdomen done on 1/6/2024 showed grossly stable findings of anterior abdominal wall cellulitis without evidence of abscess formation or subcutaneous emphysema, interval improvement of interstitial and alveolar opacities in the lung bases and increased diffuse subcutaneous edema. He is receiving vancomycin and meropenem as recommended by ID specialist. He also received prednisone briefly for abdominal wall edema.  ID specialist plans to switch to tomorrow 2/11/2024 levofloxacin 500 mg po daily and doxycycline 100mg po bid x 7 days. According to the infectious disease specialist, any future recurrence would indicate an infection of the surgical mesh.  He can be discharged tomorrow 12/11/2024 after transitioning to oral antibiotics as planned.    Possible discharge tomorrow after transitioning to oral antibiotics per ID.       #Severe sepsis with lactic acidosis  #Abdominal wall cellulitis  CT of the abdomen done on 1/6/2024 showed grossly stable findings of anterior abdominal wall cellulitis without evidence of abscess formation or subcutaneous emphysema, interval improvement of interstitial and alveolar opacities in the lung bases and increased diffuse subcutaneous edema     He is receiving vancomycin, meropenem and fluconazole as recommended by ID specialist.  Oral prednisone was initiated on 2/7/2024 for abdominal wall edema.    Continue meropenem, vancomycin and fluconazole  ID [id number] assistance    #Acute hypoxemic respiratory  "failure  #Hospital-acquired pneumonia  CT done on 1/6/2024 showed interval improvement of interstitial and alveolar opacities in the lung bases   Continue meropenem  Vancomycin as per pharmacy  ID follow-appreciate assistance    #Sinus tachycardia  Resolved  Pulmonary CT angiogram was negative for pulmonary embolism.     #Headache  Responsive to Toradol, continue q6h x2 days    #Hypertension   #DM-II, A1c 6.5  -Cont home meds, insulin          Diet: Combination Diet High Consistent Carb (75 g CHO per Meal) Diet    DVT Prophylaxis: SCDs, patient refusing chemical ppx, doesn't like the shots  Duvall Catheter: Not present  Lines: PRESENT      PICC 02/09/24 Double Lumen Right Basilic Vascular Access; Antibiotic(s)-Site Assessment: WDL    Cardiac Monitoring: None  Code Status: Full Code      Clinically Significant Risk Factors              # Hypoalbuminemia: Lowest albumin = 3.1 g/dL at 1/31/2024  7:28 AM, will monitor as appropriate     # Hypertension: Noted on problem list       # DMII: A1C = 6.5 % (Ref range: <5.7 %) within past 6 months   # Severe Obesity: Estimated body mass index is 55.26 kg/m  as calculated from the following:    Height as of this encounter: 1.803 m (5' 11\").    Weight as of this encounter: 179.7 kg (396 lb 3.2 oz).        # Financial/Environmental Concerns: none         Disposition Plan     Expected Discharge Date: 02/11/2024    Discharge Delays: IV Medication - consider oral or Home Infusion  Destination: home with family  Discharge Comments: IV abx: ID following; PICC needed; started PO steroids  WOC following            Arvin Hong MD  Hospitalist Service  [facility]  Securely message with [employer] (more info)  Text page via [facility] Paging/Directory   ______________________________________________________________________    Interval History   No new complaints today and no acute events overnight.  Sitting comfortably on recliner.  He states he walked during PT " sessions yesterday and intends to climb the staircase during PT session today. Abdominal wall lesions are improving      Physical Exam   Vital Signs: Temp: 97.6  F (36.4  C) Temp src: Oral BP: 130/70 Pulse: 68   Resp: 18 SpO2: 96 % O2 Device: None (Room air)    Weight: 396 lbs 3.2 oz    General Appearance: Morbidly obese, sitting comfortably on the recliner  Respiratory: Mild diffuse crackles, but difficult to auscultate through thick chest wall  Cardiovascular: Regular rate and rhythm, no murmur   GI: Obese, superficial pain throughout abdominal pannus with erythema and tender to touch, no erythema or skin changes of inguinal area   Skin: Redness appears to be receding from the marked areas.  Redness in the lower and middle and upper part of the abdominal wall with prior surgical wound.  An area of mild purulence/sinus-crusted in the right lower quadrant-improving  Other: No sharp neck pain    Medical Decision Making       50 MINUTES SPENT BY ME on the date of service doing chart review, history, exam, documentation & further activities per the note.      Data     I have personally reviewed the following data over the past 24 hrs:    N/A  \   N/A   / N/A     N/A N/A N/A /  101 (H)   3.9 N/A N/A \       Imaging results reviewed over the past 24 hrs:   No results found for this or any previous visit (from the past 24 hour(s)).

## 2024-02-11 ENCOUNTER — APPOINTMENT (OUTPATIENT)
Dept: PHYSICAL THERAPY | Facility: HOSPITAL | Age: 39
DRG: 871 | End: 2024-02-11
Payer: COMMERCIAL

## 2024-02-11 VITALS
OXYGEN SATURATION: 97 % | RESPIRATION RATE: 18 BRPM | WEIGHT: 315 LBS | TEMPERATURE: 97.6 F | HEART RATE: 75 BPM | DIASTOLIC BLOOD PRESSURE: 70 MMHG | SYSTOLIC BLOOD PRESSURE: 125 MMHG | HEIGHT: 71 IN | BODY MASS INDEX: 44.1 KG/M2

## 2024-02-11 LAB
CREAT SERPL-MCNC: 0.7 MG/DL (ref 0.67–1.17)
EGFRCR SERPLBLD CKD-EPI 2021: >90 ML/MIN/1.73M2
GLUCOSE BLDC GLUCOMTR-MCNC: 108 MG/DL (ref 70–99)
GLUCOSE BLDC GLUCOMTR-MCNC: 87 MG/DL (ref 70–99)
POTASSIUM SERPL-SCNC: 4 MMOL/L (ref 3.4–5.3)

## 2024-02-11 PROCEDURE — 97116 GAIT TRAINING THERAPY: CPT | Mod: GP

## 2024-02-11 PROCEDURE — 250N000013 HC RX MED GY IP 250 OP 250 PS 637: Performed by: INTERNAL MEDICINE

## 2024-02-11 PROCEDURE — 82565 ASSAY OF CREATININE: CPT | Performed by: INTERNAL MEDICINE

## 2024-02-11 PROCEDURE — 258N000003 HC RX IP 258 OP 636: Performed by: INTERNAL MEDICINE

## 2024-02-11 PROCEDURE — 99239 HOSP IP/OBS DSCHRG MGMT >30: CPT | Performed by: INTERNAL MEDICINE

## 2024-02-11 PROCEDURE — 84132 ASSAY OF SERUM POTASSIUM: CPT | Performed by: INTERNAL MEDICINE

## 2024-02-11 PROCEDURE — 250N000011 HC RX IP 250 OP 636: Mod: JZ | Performed by: INTERNAL MEDICINE

## 2024-02-11 PROCEDURE — 250N000011 HC RX IP 250 OP 636: Performed by: INTERNAL MEDICINE

## 2024-02-11 RX ORDER — LEVOFLOXACIN 500 MG/1
500 TABLET, FILM COATED ORAL DAILY
Qty: 7 TABLET | Refills: 0 | Status: SHIPPED | OUTPATIENT
Start: 2024-02-11

## 2024-02-11 RX ORDER — MULTIPLE VITAMINS W/ MINERALS TAB 9MG-400MCG
1 TAB ORAL DAILY
Qty: 30 TABLET | Refills: 0 | Status: CANCELLED | OUTPATIENT
Start: 2024-02-11

## 2024-02-11 RX ORDER — ASCORBIC ACID 500 MG
500 TABLET ORAL DAILY
Qty: 30 TABLET | Refills: 0 | Status: SHIPPED | OUTPATIENT
Start: 2024-02-11

## 2024-02-11 RX ORDER — DOXYCYCLINE 100 MG/1
100 CAPSULE ORAL 2 TIMES DAILY
Qty: 14 CAPSULE | Refills: 0 | Status: SHIPPED | OUTPATIENT
Start: 2024-02-11 | End: 2024-02-18

## 2024-02-11 RX ADMIN — VANCOMYCIN HYDROCHLORIDE 1250 MG: 5 INJECTION, POWDER, LYOPHILIZED, FOR SOLUTION INTRAVENOUS at 02:15

## 2024-02-11 RX ADMIN — VANCOMYCIN HYDROCHLORIDE 1250 MG: 5 INJECTION, POWDER, LYOPHILIZED, FOR SOLUTION INTRAVENOUS at 10:00

## 2024-02-11 RX ADMIN — MEROPENEM 1 G: 1 INJECTION, POWDER, FOR SOLUTION INTRAVENOUS at 12:30

## 2024-02-11 RX ADMIN — MEROPENEM 1 G: 1 INJECTION, POWDER, FOR SOLUTION INTRAVENOUS at 04:32

## 2024-02-11 RX ADMIN — Medication 1 TABLET: at 09:05

## 2024-02-11 ASSESSMENT — ACTIVITIES OF DAILY LIVING (ADL)
ADLS_ACUITY_SCORE: 23

## 2024-02-11 NOTE — PROGRESS NOTES
Care Management Follow Up    Length of Stay (days): 12    Expected Discharge Date: 02/11/2024     Concerns to be Addressed: discharge planning     Patient plan of care discussed at interdisciplinary rounds: Yes    Anticipated Discharge Disposition:  patient to have home care services upon discharge     Anticipated Discharge Services:  home health services through [facility]  Anticipated Discharge DME:  as per care team recommendations    Patient/family educated on Medicare website which has current facility and service quality ratings:  n/a  Education Provided on the Discharge Plan:  yes  Patient/Family in Agreement with the Plan:  yes    Referrals Placed by CM/SW:  home care - accepted by [facility]  Private pay costs discussed: Not applicable    Additional Information:  Patient is soon ready for discharge. Per ID will transition to oral antibiotics. He will have home health services upon discharge through [facility] which he has had before. Family will transport home.    Toshia Romo, SHADIASW

## 2024-02-11 NOTE — PROVIDER NOTIFICATION
Clarified with Dr if we should run merrem prior to discharge.  We will run it and then discharge pt to home. Annabelle Carrion RN

## 2024-02-11 NOTE — DISCHARGE SUMMARY
"[facility]  Hospitalist Discharge Summary      Date of Admission:  1/30/2024  Date of Discharge:  2/11/2024    Discharging Provider: Yomaira Donnelly MD  Discharge Service: Hospitalist Service    Discharge Diagnoses     Principal Problem:    Abdominal wall cellulitis  Active Problems:    Morbid obesity (H)    Acute hypoxemic respiratory failure (H)    Pneumonia of both lungs due to infectious organism       Clinically Significant Risk Factors     # DMII: A1C = 6.5 % (Ref range: <5.7 %) within past 6 months  # Severe Obesity: Estimated body mass index is 55.26 kg/m  as calculated from the following:    Height as of this encounter: 1.803 m (5' 11\").    Weight as of this encounter: 179.7 kg (396 lb 3.2 oz).       Follow-ups Needed After Discharge   Follow-up Appointments     Follow-up and recommended labs and tests       Follow up with primary care provider, Elizabeth Black, within 7 days for   hospital follow- up.  No follow up labs or test are needed.  Recheck skin   rash on the abdominal wall to ensure resolution.          Discharge Disposition   Discharged to home  Condition at discharge: Stable    Hospital Course     Ady is a 38 year old male with a medical history significant for morbid obesity, hypertension, and type 2 diabetes. In November 2023, he had umbilical hernia and received exploratory laparotomy with repair of incisional ventral hernia with mesh.  In December, he was admitted for surgical wound infection. On 1/30/24, he was sent to ER by home visiting nurse, concerning abdominal wall cellulitis and sepsis.     Patient presented with fever and chills. He was found to have lactic acidosis, leucocytosis and tachycardia. CT scan of the abdomen showed grossly stable anterior abdominal wall cellulitis without evidence of abscess formation or subcutaneous emphysema. Patient was treated with vancomycin and meropenem as recommended by the infectious disease specialist. He also received " prednisone briefly for abdominal wall edema. His abdominal wall cellulitis improved. During this admission, CT scan of the chest revealed extensive bilateral pneumonia, right worse than left. Patient was hypoxic and required up to 3 LPM supplemental oxygen. After receiving antibiotics as above, his hypoxia resolved. Patient was discharged on 2/11/24 with levofloxacin and doxycycline for 7 days.       Consultations This Hospital Stay   INFECTIOUS DISEASES IP CONSULT  PHARMACY TO DOSE VANCO  WOUND OSTOMY CONTINENCE NURSE  IP CONSULT  CARE MANAGEMENT / SOCIAL WORK IP CONSULT  PULMONARY IP CONSULT  SPEECH LANGUAGE PATH ADULT IP CONSULT  PHYSICAL THERAPY ADULT IP CONSULT  OCCUPATIONAL THERAPY ADULT IP CONSULT  VASCULAR ACCESS ADULT IP CONSULT    Code Status   Prior    Time Spent on this Encounter   I, Yomaira Donnelly MD, personally saw the patient today and spent greater than 30 minutes discharging this patient.       Yomaira Donnelly MD  [place] [id number]  Phone: [phone]  Fax: [phone]  ______________________________________________________________________    Physical Exam   Vital Signs:                    Weight: 396 lbs 3.2 oz  General appearance: not in acute distress  HEENT: PERRL, EOMI  Lungs: Clear breath sounds in bilateral lung fields  Cardiovascular: Regular rate and rhythm, normal S1-S2  Abdomen: Soft, non tender, no distension. Lower abdominal wall mild erythema.   Musculoskeletal: No joint swelling  Skin: No rash and no edema  Neurology: AAO ×3.  Cranial nerves II - XII normal.  Normal muscle strength in all four extremities.        Primary Care Physician   Elizabeth Black    Discharge Orders      Home Care Referral      Reason for your hospital stay    Abdominal wall infection     Follow-up and recommended labs and tests     Follow up with primary care provider, Elizabeth Black, within 7 days for hospital follow- up.  No follow up labs or  test are needed.  Recheck skin rash on the abdominal wall to ensure resolution.     Activity    Your activity upon discharge: activity as tolerated     Discharge Instructions     Diet    Follow this diet upon discharge: Orders Placed This Encounter      Combination Diet High Consistent Carb (75 g CHO per Meal) Diet       Significant Results and Procedures   Most Recent 3 CBC's:  Recent Labs   Lab Test 02/09/24  0626 02/06/24  0651 02/05/24  0635 02/04/24  1111 02/03/24  0659   WBC  --   --  9.2 12.4* 15.6*   HGB  --   --  10.4* 10.3* 10.6*   MCV  --   --  87 87 86   * 347 313 301 297     Most Recent 3 BMP's:  Recent Labs   Lab Test 02/11/24  1257 02/11/24  0904 02/11/24  0626 02/10/24  2102 02/10/24  0828 02/10/24  0533 02/09/24  0825 02/09/24  0626 02/05/24  0746 02/05/24  0635 02/04/24  1227 02/04/24  1111 02/03/24  1303 02/03/24  0659   NA  --   --   --   --   --   --   --   --   --  139  --  139  --  139   POTASSIUM  --   --  4.0  --   --  3.9  --  3.9   < > 3.0*  --  3.5  --  3.6   CHLORIDE  --   --   --   --   --   --   --   --   --  102  --  103  --  102   CO2  --   --   --   --   --   --   --   --   --  29  --  28  --  30*   BUN  --   --   --   --   --   --   --   --   --  7.7  --  7.6  --  7.5   CR  --   --  0.70  --   --   --   --   --   --  0.58*  --  0.56*  --  0.66*  0.66*   ANIONGAP  --   --   --   --   --   --   --   --   --  8  --  8  --  7   JEFRY  --   --   --   --   --   --   --   --   --  8.4*  --  8.2*  --  8.5*   * 87  --  113*   < >  --    < >  --    < > 105*   < > 105*   < > 129*    < > = values in this interval not displayed.       EXAM: CT CHEST/ABDOMEN/PELVIS W CONTRAST  LOCATION: [facility]  DATE: 1/30/2024     INDICATION: Sepsis  COMPARISON: CT of the abdomen and pelvis 12/16/2023  TECHNIQUE: CT scan of the chest, abdomen, and pelvis was performed following injection of IV contrast. Multiplanar reformats were obtained. Dose reduction techniques were  used.   CONTRAST: isovue 370 100ml     FINDINGS:   LUNGS AND PLEURA: Normal.     MEDIASTINUM/AXILLAE: Normal.     CORONARY ARTERY CALCIFICATION: None.     HEPATOBILIARY: Normal.     PANCREAS: Normal.     SPLEEN: Normal.     ADRENAL GLANDS: Normal.     KIDNEYS/BLADDER: Normal.     BOWEL: Normal.     LYMPH NODES: Normal.     VASCULATURE: Unremarkable.     PELVIC ORGANS: Normal.     MUSCULOSKELETAL: Since 12/16/2023 the prior seen gas associated with the laparotomy site has nearly resolved there is no fluid collections seen to suggest an abscess.                                                                      IMPRESSION:  1.  Nearly resolved gas at the laparotomy site since 12/16/2023. No findings of an abscess. Nothing seen to explain patient's sepsis clinically.      US SOFT TISSUE ABDOMINAL WALL OR LOWER BACK  1/31/2024 12:52 PM CST     INDICATION: Erythema, large pannus; evaluate abscess.  COMPARISON: Previous day CT.     FINDINGS: Along the area of interest, subcutaneous edema present, though no focal collection or abscess.      EXAM: CT CHEST PULMONARY EMBOLISM W CONTRAST  LOCATION: [facility]  DATE: 2/4/2024     INDICATION: Patient with surgeries 2 months ago   difficult healing. Admitted x2. Patient with SOB, tachycardia, and RLE swelling. Concern for PE.  COMPARISON: CT chest 01/30/2024.  TECHNIQUE: CT chest pulmonary angiogram during arterial phase injection of IV contrast. Multiplanar reformats and MIP reconstructions were performed. Dose reduction techniques were used.   CONTRAST: isovue 370 100ml     FINDINGS:  ANGIOGRAM CHEST: No pulmonary embolus. No aortic aneurysm or dissection.     LUNGS AND PLEURA: Extensive somewhat nodular consolidation throughout both lungs, right worse than left. Small right and trace left pleural effusions.     MEDIASTINUM/AXILLAE: Normal.     CORONARY ARTERY CALCIFICATION: None.     UPPER ABDOMEN: Normal.     MUSCULOSKELETAL: Normal.                                                                       IMPRESSION:  1.  Extensive bilateral pneumonia, right worse than left.  2.  Small right and trace left pleural effusions.  3.  No pulmonary embolus.      Discharge Medications   Discharge Medication List as of 2/11/2024  1:08 PM        START taking these medications    Details   doxycycline hyclate (VIBRAMYCIN) 100 MG capsule Take 1 capsule (100 mg) by mouth 2 times daily for 7 days, Disp-14 capsule, R-0, E-Prescribe      levofloxacin (LEVAQUIN) 500 MG tablet Take 1 tablet (500 mg) by mouth daily, Disp-7 tablet, R-0, E-Prescribe      vitamin C (ASCORBIC ACID) 500 MG tablet Take 1 tablet (500 mg) by mouth daily, Disp-30 tablet, R-0, E-Prescribe           CONTINUE these medications which have NOT CHANGED    Details   acetaminophen (TYLENOL) 325 MG tablet Take 2 tablets (650 mg) by mouth every 6 hours as needed for mild pain, Disp-90 tablet, R-0, E-Prescribe           Allergies   No Known Allergies

## 2024-02-11 NOTE — PLAN OF CARE
Problem: Adult Inpatient Plan of Care  Goal: Absence of Hospital-Acquired Illness or Injury  Intervention: Identify and Manage Fall Risk  Recent Flowsheet Documentation  Taken 1/31/2024 2000 by Sanjiv Llamas RN  Safety Promotion/Fall Prevention: activity supervised     Problem: Pain Acute  Goal: Optimal Pain Control and Function  Intervention: Optimize Psychosocial Wellbeing  Recent Flowsheet Documentation  Taken 1/31/2024 2000 by Sanjiv Llamas RN  Spiritual Activities Assistance: affirmation provided  Supportive Measures:   active listening utilized   relaxation techniques promoted   Goal Outcome Evaluation:       Pt was febrile this PM shift. RN contacted cross cover provider and received a one time dose of ibuprofen to help manage fever and pain. Pain medication was ineffective. RN followed up the dose of ibuprofen with a PRN dose of acetaminophen. Pt was nauseous and vomited once following the transfer to room 31 from . RN administered IV zofran to manage symptoms. Pt is tolerating NS bolus and continuous NS infusion well. Pt is also tolerating IV antibiotics well. Pt is alert and oriented x4 and able to voice concerns. BG was 109 mg/dL. No other concerns noted.   Sanjiv Llamas RN  1/31/2024  11:19 PM                   
  Problem: Adult Inpatient Plan of Care  Goal: Absence of Hospital-Acquired Illness or Injury  Intervention: Identify and Manage Fall Risk  Recent Flowsheet Documentation  Taken 2/1/2024 0000 by Kaylee Wyatt RN  Safety Promotion/Fall Prevention: activity supervised  Intervention: Prevent Skin Injury  Recent Flowsheet Documentation  Taken 2/1/2024 0000 by Kaylee Wyatt RN  Body Position: position changed independently     Problem: Pain Acute  Goal: Optimal Pain Control and Function  Intervention: Prevent or Manage Pain  Recent Flowsheet Documentation  Taken 2/1/2024 0000 by Kaylee Wyatt RN  Medication Review/Management: medications reviewed  Intervention: Optimize Psychosocial Wellbeing  Recent Flowsheet Documentation  Taken 2/1/2024 0000 by Kaylee Wyatt RN  Spiritual Activities Assistance: affirmation provided  Supportive Measures:   active listening utilized   relaxation techniques promoted   Goal Outcome Evaluation:    VSS Tolerating IV fluids and abx. C/o chills and nausea vomiting. Up to bathroom independently after set up. Utilizes call light approprietly and is bale to verbalize needs effectively.       
  Problem: Adult Inpatient Plan of Care  Goal: Absence of Hospital-Acquired Illness or Injury  Intervention: Identify and Manage Fall Risk  Recent Flowsheet Documentation  Taken 2/5/2024 0957 by Sandra Curran RN  Safety Promotion/Fall Prevention:   lighting adjusted   nonskid shoes/slippers when out of bed   mobility aid in reach   clutter free environment maintained     Problem: Infection  Goal: Absence of Infection Signs and Symptoms  Outcome: Progressing   Goal Outcome Evaluation:  Pt A&O x4  No pain reported  Mepilex remains in place over wounds, no drainage noted.  Abdomen is still reddened and inflamed. Reddened areas are within outlined area. Some flakiness, but whole of abdomen appears dry.  Pt ambulating well with walker. Reports feeling steady when not attached to IV, pt given assistance when ambulating with the IV.   Eating/voiding well.                      
  Problem: Adult Inpatient Plan of Care  Goal: Absence of Hospital-Acquired Illness or Injury  Intervention: Prevent Infection  Recent Flowsheet Documentation  Taken 2/2/2024 0244 by Patricia Quintana, RN  Infection Prevention: hand hygiene promoted     Problem: Pain Acute  Goal: Optimal Pain Control and Function  Intervention: Develop Pain Management Plan  Recent Flowsheet Documentation  Taken 2/2/2024 0106 by Patricia Quintana, RN  Pain Management Interventions:   medication (see MAR)   emotional support   Goal Outcome Evaluation:       Patient continued IV antibiotic therapy for soft tissue infection and sepsis. Afebrile. Surgical site appeared red that spread to left and right of abdomen. Patient denied pain or discomfort in this shift. No Nausea or vomiting noted or reported. Patient ambulate in the room using rolling walker with assist of one person. VSS.                  
  Problem: Adult Inpatient Plan of Care  Goal: Absence of Hospital-Acquired Illness or Injury  Intervention: Prevent Infection  Recent Flowsheet Documentation  Taken 2/7/2024 0900 by Agustina Green RN  Infection Prevention: hand hygiene promoted   Goal Outcome Evaluation:      Up with assist of one and walker needs encouragement to walk  In hallway. K 3.5 re check in am.  Lungs diminished with occasional cough. No fevers started on Prednisone, blood sugars 99, 98  continues to be short of breathe   When up walking, Co headache mild 3/10 denies pain medication.                    
  Problem: Adult Inpatient Plan of Care  Goal: Absence of Hospital-Acquired Illness or Injury  Intervention: Prevent Skin Injury  Recent Flowsheet Documentation  Taken 2/6/2024 0956 by Agustina Green RN  Body Position: position changed independently   Goal Outcome Evaluation:       Alert cooperative with cares upset about diet wants more to eat  Dr called. Up with stand by assist, co shortness of breathe with activity.  Lungs diminished, K 3.2 medicated with 40 meq K re check at 1830  Today. Blood sugars 93 twice today.                 
  Problem: Adult Inpatient Plan of Care  Goal: Absence of Hospital-Acquired Illness or Injury  Outcome: Progressing  Intervention: Identify and Manage Fall Risk  Recent Flowsheet Documentation  Taken 2/4/2024 1624 by Kalie Hernandez, RN  Safety Promotion/Fall Prevention:   lighting adjusted   nonskid shoes/slippers when out of bed   mobility aid in reach   clutter free environment maintained  Intervention: Prevent Infection  Recent Flowsheet Documentation  Taken 2/4/2024 1624 by Kalie Hernandez, RN  Infection Prevention: hand hygiene promoted     Problem: Infection  Goal: Absence of Infection Signs and Symptoms  Outcome: Progressing   Goal Outcome Evaluation:      Plan of Care Reviewed With: patient      Patient on 1L NC sats in low to mid 90. SOB when ambulating to bathroom. Up in chair and stated he can breathe better.  and 110, refused coverage for the 157. Heparin d/c'd due to bleeding from morning injection. K 3.5, re-check in morning. Wound care done. Abdomen is improving in some areas.            
  Problem: Adult Inpatient Plan of Care  Goal: Optimal Comfort and Wellbeing  Intervention: Monitor Pain and Promote Comfort  Recent Flowsheet Documentation  Taken 2/2/2024 2748 by Kalie Hernandez RN  Pain Management Interventions:   medication (see MAR)   emotional support   Goal Outcome Evaluation:      Plan of Care Reviewed With: patient      Patient complaining of headache 9/10, PRN tylenol given and not effective. Toradol ordered and given pain rated 5/10.  and 139, patient refused the 1 unit for the 141. K 3.4, had vomited after pills on day shift so tried the packets, would not drink so now getting IV bump. After starting IV patient called writer back into room saying K was burning his arm. Tried Y-siting with saline and still burning. Refusing all forms of K. MD notified. On 2L NC sats in mid to high 90's.            
  Problem: Adult Inpatient Plan of Care  Goal: Optimal Comfort and Wellbeing  Outcome: Progressing   Goal Outcome Evaluation:        Pt on 2 L oxygen via NC, sat 96%. Toradol for mild headaches. CT  chest negative for PE. Standby with walker to the bathroom. Call light within reach.                    
  Problem: Adult Inpatient Plan of Care  Goal: Optimal Comfort and Wellbeing  Outcome: Progressing   Goal Outcome Evaluation:       Pt alert/oriented, denies pain. RA, no new concern. Abx therapy, call light within reach.                 
  Problem: Adult Inpatient Plan of Care  Goal: Plan of Care Review  Description: The Plan of Care Review/Shift note should be completed every shift.  The Outcome Evaluation is a brief statement about your assessment that the patient is improving, declining, or no change.  This information will be displayed automatically on your shift  note.  Outcome: Progressing    Problem: Fever  Goal: Body Temperature in Desired Range  Outcome: Progressing     Patient continues to have intermittent fever. Oral Tylenol given as ordered. Up with assistance with walker. Cellulitis on abdomen marked. On IV antibiotics.          
  Problem: Adult Inpatient Plan of Care  Goal: Plan of Care Review  Outcome: Progressing  Flowsheets (Taken 2/8/2024 2637)  Plan of Care Reviewed With: patient  Overall Patient Progress: improving     Problem: Adult Inpatient Plan of Care  Goal: Patient-Specific Goal (Individualized)  Outcome: Progressing     Problem: Adult Inpatient Plan of Care  Goal: Optimal Comfort and Wellbeing  Outcome: Progressing     Problem: Infection  Goal: Absence of Infection Signs and Symptoms  Outcome: Progressing     Problem: Pain Acute  Goal: Optimal Pain Control and Function  Outcome: Progressing   Goal Outcome Evaluation: Pt is alert and oriented, able to make needs known. VSS. Pt on K protocol, recheck in the am. Pt denies pain. Scheduled IV ABX administered.       Plan of Care Reviewed With: patient    Overall Patient Progress: improvingOverall Patient Progress: improving           
  Problem: Infection  Goal: Absence of Infection Signs and Symptoms  Outcome: Progressing   Goal Outcome Evaluation:        Pt denies pain. On 1 L oxygen via nc, sat 97%. Stated he is comfortable in the recliner.   Mepilex over wounds, no drainage noted.           
  Problem: Infection  Goal: Absence of Infection Signs and Symptoms  Outcome: Progressing  -Pt receiving iv ABX. Abdominal redness improvising. No fevers   Problem: Pain Acute  Goal: Optimal Pain Control and Function  Outcome: Progressing  -Denies pain   Goal Outcome Evaluation:       Pt slept well overnight. Slept on recliner stating that it was more comfortable than the bed. A/Ox4, VSS on RA. Ambulates with SBA. PICC line to RUE intact. Unit blood draw. No acute events reported.                  
  Problem: Pain Acute  Goal: Optimal Pain Control and Function  Outcome: Progressing   Goal Outcome Evaluation:       Pt on 2 L oxygen via NC, sat 96%. Reported headache and insisted on taking toradol. Prn. One time dose given with effectiveness. Surgical site appeared red. Assist of one with walker to the bathroom. Call light within reach.               
"  Problem: Adult Inpatient Plan of Care  Goal: Absence of Hospital-Acquired Illness or Injury  Intervention: Prevent Infection  Recent Flowsheet Documentation  Taken 2/10/2024 0845 by Archana Carrion RN  Infection Prevention:   single patient room provided   rest/sleep promoted   hand hygiene promoted   equipment surfaces disinfected   Goal Outcome Evaluation:  vancomycin and meropenem continue via IV. Pt is tolerating both well.  PICC line flushes with blood return in double lumen. Goal to transition to PO ABX 2/11/24 prior to discharge.        /61 (BP Location: Left arm)   Pulse 75   Temp 97.4  F (36.3  C) (Oral)   Resp 18   Ht 1.803 m (5' 11\")   Wt (!) 179.7 kg (396 lb 3.2 oz)   SpO2 96%   BMI 55.26 kg/m    A&O x4  VSS blood sugars WNL today, no insulin administered.  Good appetite, good hydration.  Pt had visitors today for quite a while bedside.  Denies pain, patient is upright in his chair. Annabelle Carrion RN                   "
"  Problem: Adult Inpatient Plan of Care  Goal: Plan of Care Review  Description: The Plan of Care Review/Shift note should be completed every shift.  The Outcome Evaluation is a brief statement about your assessment that the patient is improving, declining, or no change.  This information will be displayed automatically on your shift  note.  Outcome: Progressing  Goal: Patient-Specific Goal (Individualized)  Description: You can add care plan individualizations to a care plan. Examples of Individualization might be:  \"Parent requests to be called daily at 9am for status\", \"I have a hard time hearing out of my right ear\", or \"Do not touch me to wake me up as it startles  me\".  Outcome: Progressing  Goal: Absence of Hospital-Acquired Illness or Injury  Outcome: Progressing  Goal: Optimal Comfort and Wellbeing  Outcome: Progressing  Goal: Readiness for Transition of Care  Outcome: Progressing     Problem: Infection  Goal: Absence of Infection Signs and Symptoms  Outcome: Progressing     Problem: Fever  Goal: Body Temperature in Desired Range  Outcome: Progressing     Problem: Pain Acute  Goal: Optimal Pain Control and Function  Outcome: Progressing  Intervention: Optimize Psychosocial Wellbeing  Recent Flowsheet Documentation  Taken 2/8/2024 7321 by Mao Osuna RN  Supportive Measures: active listening utilized   Goal Outcome Evaluation:       VS stable. He denies any SOB and discomfort. He has been up in chair  and ambulated to bathroom with walker. Continue ABX.                  "
"Goal Outcome Evaluation:       /78 (BP Location: Left arm, Patient Position: Chair, Cuff Size: Adult Large)   Pulse 72   Temp 97.6  F (36.4  C) (Oral)   Resp 18   Ht 1.803 m (5' 11\")   Wt (!) 179.7 kg (396 lb 3.2 oz)   SpO2 95%   BMI 55.26 kg/m      Problem: Adult Inpatient Plan of Care  Goal: Absence of Hospital-Acquired Illness or Injury  Intervention: Identify and Manage Fall Risk  Recent Flowsheet Documentation  Taken 2/9/2024 0817 by Archana Carrion RN  Safety Promotion/Fall Prevention:   treat underlying cause   treat reversible contributory factors   safety round/check completed   room organization consistent   room near nurse's station   nonskid shoes/slippers when out of bed   patient and family education   mobility aid in reach   lighting adjusted   clutter free environment maintained     Problem: Adult Inpatient Plan of Care  Goal: Absence of Hospital-Acquired Illness or Injury  Intervention: Prevent and Manage VTE (Venous Thromboembolism) Risk  Recent Flowsheet Documentation  Taken 2/9/2024 0817 by Archana Carrion RN  VTE Prevention/Management:   SCDs (sequential compression devices) off   Pt is on medication for VTE prevention    In addition to the above, diabetes was also briefly addressed today. Health maintenance, medications and recent results reviewed. See orders.     Patient concerns: Pt states that he is concerned with insulin being given because he has \"never taken it before\".  Education given, education regarding the need to administer it in his arm vs abdomen due to current abdominal cellulitis.  Pt verbalized his understanding     Management changes: dietary     Follow up plan: continue to check blood sugars admin aspart when needed Discuss nutrition and activity / health habits.   Pt is in bed appearing comfortable at shift change. Annabelle Carrion RN          "
"Goal Outcome Evaluation:      Problem: Adult Inpatient Plan of Care  Goal: Patient-Specific Goal (Individualized)  Description: You can add care plan individualizations to a care plan. Examples of Individualization might be:  \"Parent requests to be called daily at 9am for status\", \"I have a hard time hearing out of my right ear\", or \"Do not touch me to wake me up as it startles  me\".  Outcome: Progressing     Problem: Adult Inpatient Plan of Care  Goal: Optimal Comfort and Wellbeing  Outcome: Progressing     Problem: Fever  Goal: Body Temperature in Desired Range  Outcome: Progressing   A/O x4. VSS. Denied pain. Slept in the chair overnight. Continent of B&B. Stand by assist with ADLs.                       "
Goal Outcome Evaluation:         Earnest, reported a headache this a.m. His 02 saturation was 84-87 on RA. He was placed on supplemental O2 via NC and titrated up to 3L.  mg acetaminophen was given for the headache and was not effective. He was able to walk to the bathroom independently.  He had a small emesis after swallowing the K+ tablets. Call light in reach.                
Goal Outcome Evaluation:        Pt A&O X 4, reported headache 8/10 PRN tylenol given.                 
Goal Outcome Evaluation:       CNA reported Earnest's O2 saturation this morning was 60% on RA. Supplemental O2 administered and titrated to 4L, during the day it was titrated down to 2L. Abdominal erythema improved. He c/o of headache in the morning, PRN ketorolac given, medication was effective. He showered this shift. Call light in reach.                  
Goal Outcome Evaluation:       Earnest's O2 saturation was in the mid 90's on 2 L supplemental O2, titrated O2 down throughout the shift. Incentive spirometer provided. Upper area of abdomen erythema is decreased,  lower area unchanged. Call light in reach.                 
Goal Outcome Evaluation:      Plan of Care Reviewed With: patient      Scheduled toradol given, patient was just starting to get a headache. On 2L nc. Short of breath when ambulating to bathroom. K 3.6 re-check in morning. BS 99, 98, no coverage needed.            
Goal Outcome Evaluation:      Plan of Care Reviewed With: patient    Overall Patient Progress: improvingOverall Patient Progress: improving     Patient denies pain this shift. Has a dry cough most frequently after ambulation. Patient dyspneic with ambulation. When ambulating patient to the bathroom noticed some drops of blood on the toilet and ground. Assessed abdomen and found a small skin tear under pannus slightly right of midline. Charted in LDA and sticky note placed for provider to order WOC consult. Skin under pannus is very difficult to assess due to patient's difficulty tolerating laying down, and large, edematous pannus. Patient on 1L nasal cannula.       
Goal Outcome Evaluation:      Plan of Care Reviewed With: patient    Overall Patient Progress: improvingOverall Patient Progress: improving     Patient denies pain. Potassium came back 3.5, recheck in am. Patient refusing multivitamin until he knows why it was ordered. On 1L NC. Wound care completed on the two wounds on top of the pannus. Asked patient if I could assess wound under pannus and patient refused, stating it was no longer bleeding and the doctor already looked at it.   
Goal Outcome Evaluation:      Pt denies pain. Has been up independent w/ walker to the bathroom as long as nothing is running through IV. BG ac 147 and hs 191. Pt refused ac meal insulin. MD aware. VSS on RA.      Problem: Adult Inpatient Plan of Care  Goal: Plan of Care Review  Description: The Plan of Care Review/Shift note should be completed every shift.  The Outcome Evaluation is a brief statement about your assessment that the patient is improving, declining, or no change.  This information will be displayed automatically on your shift  note.  Outcome: Progressing  Flowsheets (Taken 2/7/2024 6245)  Plan of Care Reviewed With: patient  Overall Patient Progress: improving     Problem: Adult Inpatient Plan of Care  Goal: Optimal Comfort and Wellbeing  Outcome: Progressing     Problem: Adult Inpatient Plan of Care  Goal: Readiness for Transition of Care  Outcome: Progressing       Plan of Care Reviewed With: patient    Overall Patient Progress: improvingOverall Patient Progress: improving           
Goal Outcome Evaluation:    Problem: Adult Inpatient Plan of Care  Goal: Optimal Comfort and Wellbeing  Outcome: Progressing     Problem: Infection  Goal: Absence of Infection Signs and Symptoms  Outcome: Progressing        Pt aox4, headache pain and abdominal pain with exertion, prn tyl given. Pt complains of nausea, prn zofran given which wasn't effective per pt. 1 episode of emesis around 2100, 1 time order for compazine obtained, moderately effective. Good appetite. IVF running, IV abx given.                  
Goal Outcome Evaluation:    Problem: Pain Acute  Goal: Optimal Pain Control and Function  Outcome: Progressing    Problem: Fever  Goal: Body Temperature in Desired Range  Outcome: Progressing    T-max of 103. Pt given Tylenol, ice packs, and fan. Temp recheck at 0600 down to 97.7.  Pt endorsed 7/10 pain in head and 6/10 pain in abdomen. Treated with Tylenol and one time dose of Toradol.   Redness on abdomen marked which is beginning to recede. Warm/hot to touch.  1 episode of emesis; treated with PRN Zofran x1.         Brad Ruggiero RN        
Goal Outcome Evaluation:    Slept well. Denies pain. Dressings in place pannus- redness and swelling appears reduced from when markings made. IV abx infused. Independent with walker in room unless hooked up to IV pole- is SBA at that point. Lung sounds diminished, clear. Still somewhat short of breath with exertion- on room air. ID following.     Temp: 97.5  F (36.4  C) Temp src: Oral BP: (!) 142/76 Pulse: 82   Resp: 20 SpO2: 95 % O2 Device: None (Room air)         
Goal Outcome Evaluation:    Slept well. Endorses bad headache 8/10- nothing but prn tylenol available- paged hospitalist and got order for one time PRN ibuprofen which was semi- helpful. X2 IV abx infused overnight. Up independently to bathroom when saline locked, SBA with pole help when receiving abx. Would not let RN assess pannus overnight. Lung sounds diminished, clear. Continues on 1L oxygen N/C and does get short of breath with exertion.     Temp: 98.1  F (36.7  C) Temp src: Oral BP: (!) 146/75 Pulse: 86   Resp: 18 SpO2: 94 % O2 Device: None (Room air) Oxygen Delivery: 1 LPM      
Occupational Therapy Discharge Summary    Reason for therapy discharge:    Discharged to home with home therapy.    Progress towards therapy goal(s). See goals on Care Plan in [facility] electronic health record for goal details.  Goals partially met.  Barriers to achieving goals:   discharge from facility.    Therapy recommendation(s):    No further therapy is recommended.      
Physical Therapy Discharge Summary    Reason for therapy discharge:    Discharged to home with home therapy.    Progress towards therapy goal(s). See goals on Care Plan in [facility] electronic health record for goal details.  Goals partially met.  Barriers to achieving goals:   limited tolerance for therapy and discharge from facility.    Therapy recommendation(s):    Continued therapy is recommended.  Rationale/Recommendations:  home PT.      
Labs/Imaging Studies/Medications

## 2024-02-13 ENCOUNTER — PATIENT OUTREACH (OUTPATIENT)
Dept: CARE COORDINATION | Facility: CLINIC | Age: 39
End: 2024-02-13

## 2024-02-13 ENCOUNTER — OFFICE VISIT (OUTPATIENT)
Dept: SURGERY | Facility: CLINIC | Age: 39
End: 2024-02-13
Payer: COMMERCIAL

## 2024-02-13 VITALS
BODY MASS INDEX: 44.1 KG/M2 | SYSTOLIC BLOOD PRESSURE: 120 MMHG | DIASTOLIC BLOOD PRESSURE: 70 MMHG | HEIGHT: 71 IN | WEIGHT: 315 LBS

## 2024-02-13 DIAGNOSIS — L03.311 CELLULITIS OF ABDOMINAL WALL: Primary | ICD-10-CM

## 2024-02-13 PROBLEM — E66.01 MORBID OBESITY (H): Status: ACTIVE | Noted: 2021-09-10

## 2024-02-13 PROBLEM — I10 HTN (HYPERTENSION): Status: ACTIVE | Noted: 2018-07-10

## 2024-02-13 PROBLEM — J18.9 PNEUMONIA OF BOTH LUNGS DUE TO INFECTIOUS ORGANISM: Status: ACTIVE | Noted: 2024-02-13

## 2024-02-13 PROBLEM — J96.01 ACUTE HYPOXEMIC RESPIRATORY FAILURE (H): Status: ACTIVE | Noted: 2018-07-10

## 2024-02-13 PROCEDURE — 99212 OFFICE O/P EST SF 10 MIN: CPT | Performed by: SURGERY

## 2024-02-13 RX ORDER — KETOCONAZOLE 20 MG/G
CREAM TOPICAL
COMMUNITY
Start: 2024-01-23

## 2024-02-13 RX ORDER — NYSTATIN 100000 [USP'U]/G
POWDER TOPICAL 2 TIMES DAILY
COMMUNITY
Start: 2024-01-23

## 2024-02-13 NOTE — LETTER
2/13/2024         RE: Ady Godinez  [address]  [facility] [address]        Dear Colleague,    Thank you for referring your patient, Ady Godinez, to the [facility]GERY CL[facility] AND BARIATRICS CARE Pool. Please see a copy of my visit note below.    General Surgery Clinic - Postop follow up  Ady Godinez MRN# [id number]   Age/Sex: 38 year old male YOB: 1985     Reason for visit: Post op visit                           Assessment and Plan:   Assessment:  1.  Cellulitic changes of the panniculus  2.  Surgical wounds from retention suture sites  3.  History of ventral abdominal wall hernia  4.  Pneumonia  5.  Numbness and tingling of the left upper extremity    38-year-old male presenting with surgical wounds from retention surgical sites from his exploratory laparotomy on 11/24/2023.  Patient's wounds are healing well with no purulent drainage.  The patient was recently diagnosed with cellulitic changes of his panniculus.  The cellulitic changes are improving.  Patient also improving from his pneumonia as well.  Patient also has some mild numbness and tingling of the left upper extremity likely secondary to IV extravasation site of the left medial forearm.    Plan:  -The patient is to contact me via phone in 4 weeks to keep me up-to-date on the healing of the surgical wound from his retention sutures.  Otherwise, patient can follow-up with me as needed in the future.  -Patient is to continue monitoring his cellulitic changes until it is fully improved.    -Continue supportive care for the pneumonia  -Continue with conservative management for the extravasation site.  Gentle hand exercises for the left upper extremity.  Patient is going to follow-up with his primary care team regarding the symptoms of the left upper extremity.          Chief Complaint:     Chief Complaint   Patient presents with     Surgical Followup     3 week f/u wound check *pt admitted 2/2  cancelled appt*. Pt state that he has been feeling sore. He has  tingling in fingers that shoot up his arm, left where IV was placed        History is obtained from the patient    HPI:   Ady Godinez is a 38 year old male who presents to the general surgery team for follow-up regarding his surgical wounds from the retention sutures.  Patient was recently hospitalized after he had his trip down to [place].  After his trip, patient was diagnosed with cellulitic changes of his pannus.  The patient has had improvement of his cellulitic changes of the pannus.  Patient has no significant pain or drainage from the surgical wounds from the retention suture at midline.  Patient does state that he has some mild numbness and tingling of the left upper extremity in the fingers 3 and 4.  Slowly improving.  He also has some pain at the medial portion of his left forearm where the IV site had extravasated.          Past Medical History:     Past Medical History:   Diagnosis Date     Abdominal hernia      Cellulitis 05/2016    right index finger     HTN (hypertension) 07/10/2018     Migraines      Obesity      Pure hypercholesterolemia      Sleep apnea               Past Surgical History:     Past Surgical History:   Procedure Laterality Date     APPENDECTOMY      childhood.     DAVINCI XI HERNIORRHAPHY VENTRAL N/A 11/14/2023    Procedure: HERNIORRHAPHY WITH MESH, VENTRAL, ROBOT-ASSISTED, LAPAROSCOPIC, USING DA ALBERT XI;REPAIR OF INCARCERATED HERNIA.;  Surgeon: Braydon Kim DO;  Location: [facility] OR     HERNIA REPAIR       HERNIORRHAPHY VENTRAL N/A 11/24/2023    Procedure: REPAIR OF INCISIONAL VENTRAL HERNIA.;  Surgeon: Braydon Kim DO;  Location: [facility] OR     IR CAROTID CEREBRAL ANGIOGRAM BILATERAL  04/15/2021     LAPAROTOMY EXPLORATORY N/A 11/24/2023    Procedure: EXPLORATORY LAPAROTOMY WITH;  Surgeon: Braydon Kim DO;  Location: [facility] OR     LUMBAR PUNCTURE FLUORO GUIDED DIAGNOSTIC  04/13/2021      "LYSIS, ADHESIONS, ROBOT-ASSISTED, LAPAROSCOPIC, USING DA ALBERT XI N/A 11/14/2023    Procedure: LYSIS OF ADHESIONS;  Surgeon: Braydon Kim DO;  Location: [facility] Main OR             Social History:    reports that he has never smoked. He has never used smokeless tobacco. He reports that he does not currently use alcohol. He reports that he does not use drugs.           Family History:     Family History   Problem Relation Age of Onset     Snoring Mother      Snoring Father               Allergies:   No Known Allergies           Medications:     Prior to Admission medications    Medication Sig Start Date End Date Taking? Authorizing Provider   acetaminophen (TYLENOL) 325 MG tablet Take 2 tablets (650 mg) by mouth every 6 hours as needed for mild pain 4/16/21  Yes Diana Fish PA-C   doxycycline hyclate (VIBRAMYCIN) 100 MG capsule Take 1 capsule (100 mg) by mouth 2 times daily for 7 days 2/11/24 2/18/24 Yes Yomaira Donnelly MD   ketoconazole (NIZORAL) 2 % external cream Apply twice per day until resolved - then apply twice per day for 7 additional days 1/23/24  Yes Reported, Patient   levofloxacin (LEVAQUIN) 500 MG tablet Take 1 tablet (500 mg) by mouth daily 2/11/24  Yes Yomaira Donnelly MD   nystatin (MYCOSTATIN) 884103 UNIT/GM external powder Apply topically 2 times daily 1/23/24  Yes Reported, Patient   vitamin C (ASCORBIC ACID) 500 MG tablet Take 1 tablet (500 mg) by mouth daily 2/11/24  Yes Yomaira Donnelly MD              Review of Systems:   A 12 point Review of Systems is negative other than noted in the HPI            Physical Exam:   Patient Vitals for the past 24 hrs:   BP Height Weight   02/13/24 1315 120/70 1.803 m (5' 11\") (!) 179.6 kg (396 lb)        [unfilled]   Constitutional:   awake, alert, cooperative, no apparent distress, and appears stated age       Eyes:   PERRL, conjunctiva/corneas clear, EOM's intact; no scleral edema or icterus noted        ENT:   Normocephalic, without " obvious abnormality, atraumatic, Lips, mucosa, and tongue normal        Hematologic / Lymphatic:   No lymphadenopathy       Lungs:   Normal respiratory effort, no accessory muscle use       Cardiovascular:   Regular rate and rhythm       Abdomen:   Soft, nondistended, nontender to palpation       Musculoskeletal:   No obvious swelling, bruising or deformity    -Grossly intact sensation of bilateral upper extremities.  At the medial portion of the left forearm, I cannot identify some mild swelling where the IV site extravasated.  Patient does grossly intact sensations of the left upper extremity.  Strength is 4 out of 5.  Good range of motion.       Skin:   Skin color and texture normal for patient, from the midline surgical wounds from the retention, there is continued healing and granulation tissue of the last main surgical wound.  It is clean.  No purulent drainage.    The patient also has ecchymosis in the right and left quadrants of the abdomen from the heparin shots.  Patient has erythema of the inferior portion of his panniculus.  6 sloughing of skin.  No crepitus.  Mild tenderness to palpation.               Data:            DO Braydon Mejia DO  General Surgeon  [facility]  Surgery [address] [address]?  Office: [phone]  Employed by - [employer]  Pager: [phone]         Again, thank you for allowing me to participate in the care of your patient.        Sincerely,        Braydon Kim DO

## 2024-02-13 NOTE — PROGRESS NOTES
General Surgery Clinic - Postop follow up  Ady Godinez MRN# [id number]   Age/Sex: 38 year old male YOB: 1985     Reason for visit: Post op visit                           Assessment and Plan:   Assessment:  1.  Cellulitic changes of the panniculus  2.  Surgical wounds from retention suture sites  3.  History of ventral abdominal wall hernia  4.  Pneumonia  5.  Numbness and tingling of the left upper extremity    38-year-old male presenting with surgical wounds from retention surgical sites from his exploratory laparotomy on 11/24/2023.  Patient's wounds are healing well with no purulent drainage.  The patient was recently diagnosed with cellulitic changes of his panniculus.  The cellulitic changes are improving.  Patient also improving from his pneumonia as well.  Patient also has some mild numbness and tingling of the left upper extremity likely secondary to IV extravasation site of the left medial forearm.    Plan:  -The patient is to contact me via phone in 4 weeks to keep me up-to-date on the healing of the surgical wound from his retention sutures.  Otherwise, patient can follow-up with me as needed in the future.  -Patient is to continue monitoring his cellulitic changes until it is fully improved.    -Continue supportive care for the pneumonia  -Continue with conservative management for the extravasation site.  Gentle hand exercises for the left upper extremity.  Patient is going to follow-up with his primary care team regarding the symptoms of the left upper extremity.          Chief Complaint:     Chief Complaint   Patient presents with    Surgical Followup     3 week f/u wound check *pt admitted 2/2 cancelled appt*. Pt state that he has been feeling sore. He has  tingling in fingers that shoot up his arm, left where IV was placed        History is obtained from the patient    HPI:   Ady Godinez is a 38 year old male who presents to the general surgery team for follow-up  regarding his surgical wounds from the retention sutures.  Patient was recently hospitalized after he had his trip down to [place].  After his trip, patient was diagnosed with cellulitic changes of his pannus.  The patient has had improvement of his cellulitic changes of the pannus.  Patient has no significant pain or drainage from the surgical wounds from the retention suture at midline.  Patient does state that he has some mild numbness and tingling of the left upper extremity in the fingers 3 and 4.  Slowly improving.  He also has some pain at the medial portion of his left forearm where the IV site had extravasated.          Past Medical History:     Past Medical History:   Diagnosis Date    Abdominal hernia     Cellulitis 05/2016    right index finger    HTN (hypertension) 07/10/2018    Migraines     Obesity     Pure hypercholesterolemia     Sleep apnea               Past Surgical History:     Past Surgical History:   Procedure Laterality Date    APPENDECTOMY      childhood.    DAVINCI XI HERNIORRHAPHY VENTRAL N/A 11/14/2023    Procedure: HERNIORRHAPHY WITH MESH, VENTRAL, ROBOT-ASSISTED, LAPAROSCOPIC, USING DA ALBERT XI;REPAIR OF INCARCERATED HERNIA.;  Surgeon: Braydon Kim DO;  Location: [facility] OR    HERNIA REPAIR      HERNIORRHAPHY VENTRAL N/A 11/24/2023    Procedure: REPAIR OF INCISIONAL VENTRAL HERNIA.;  Surgeon: Braydon Kim DO;  Location: [facility] OR    IR CAROTID CEREBRAL ANGIOGRAM BILATERAL  04/15/2021    LAPAROTOMY EXPLORATORY N/A 11/24/2023    Procedure: EXPLORATORY LAPAROTOMY WITH;  Surgeon: Braydon Kim DO;  Location: [facility] OR    LUMBAR PUNCTURE FLUORO GUIDED DIAGNOSTIC  04/13/2021    LYSIS, ADHESIONS, ROBOT-ASSISTED, LAPAROSCOPIC, USING DA ALBERT XI N/A 11/14/2023    Procedure: LYSIS OF ADHESIONS;  Surgeon: Braydon Kim DO;  Location: [facility] OR             Social History:    reports that he has never smoked. He has never used smokeless tobacco. He reports that he does not  "currently use alcohol. He reports that he does not use drugs.           Family History:     Family History   Problem Relation Age of Onset    Snoring Mother     Snoring Father               Allergies:   No Known Allergies           Medications:     Prior to Admission medications    Medication Sig Start Date End Date Taking? Authorizing Provider   acetaminophen (TYLENOL) 325 MG tablet Take 2 tablets (650 mg) by mouth every 6 hours as needed for mild pain 4/16/21  Yes Diana Fish PA-C   doxycycline hyclate (VIBRAMYCIN) 100 MG capsule Take 1 capsule (100 mg) by mouth 2 times daily for 7 days 2/11/24 2/18/24 Yes Yomaira Donnelly MD   ketoconazole (NIZORAL) 2 % external cream Apply twice per day until resolved - then apply twice per day for 7 additional days 1/23/24  Yes Reported, Patient   levofloxacin (LEVAQUIN) 500 MG tablet Take 1 tablet (500 mg) by mouth daily 2/11/24  Yes Yomaira Donnelly MD   nystatin (MYCOSTATIN) 770835 UNIT/GM external powder Apply topically 2 times daily 1/23/24  Yes Reported, Patient   vitamin C (ASCORBIC ACID) 500 MG tablet Take 1 tablet (500 mg) by mouth daily 2/11/24  Yes Yomaira Donnelly MD              Review of Systems:   A 12 point Review of Systems is negative other than noted in the HPI            Physical Exam:   Patient Vitals for the past 24 hrs:   BP Height Weight   02/13/24 1315 120/70 1.803 m (5' 11\") (!) 179.6 kg (396 lb)        [unfilled]   Constitutional:   awake, alert, cooperative, no apparent distress, and appears stated age       Eyes:   PERRL, conjunctiva/corneas clear, EOM's intact; no scleral edema or icterus noted        ENT:   Normocephalic, without obvious abnormality, atraumatic, Lips, mucosa, and tongue normal        Hematologic / Lymphatic:   No lymphadenopathy       Lungs:   Normal respiratory effort, no accessory muscle use       Cardiovascular:   Regular rate and rhythm       Abdomen:   Soft, nondistended, nontender to palpation "       Musculoskeletal:   No obvious swelling, bruising or deformity    -Grossly intact sensation of bilateral upper extremities.  At the medial portion of the left forearm, I cannot identify some mild swelling where the IV site extravasated.  Patient does grossly intact sensations of the left upper extremity.  Strength is 4 out of 5.  Good range of motion.       Skin:   Skin color and texture normal for patient, from the midline surgical wounds from the retention, there is continued healing and granulation tissue of the last main surgical wound.  It is clean.  No purulent drainage.    The patient also has ecchymosis in the right and left quadrants of the abdomen from the heparin shots.  Patient has erythema of the inferior portion of his panniculus.  6 sloughing of skin.  No crepitus.  Mild tenderness to palpation.               Data:            DO Braydon Mejia DO  General Surgeon  [facility]  Surgery [facility] - [address] [address]?  Office: [phone]  Employed by - [employer]  Pager: [phone]

## 2024-02-13 NOTE — PROGRESS NOTES
Connected Care Resource Center:   [facility] Resource Center Contact  [facility]/Voicemail     Clinical Data: Post-Discharge Outreach     Outreach attempted x 2.  Left message on patient's voicemail, providing [facility]'s central phone number of [phone] ([phone]) for questions/concerns and/or to schedule an appt with an [facility] provider, if they do not have a PCP.      Plan:  [facility] will do no further outreaches at this time.       MILLIE Michel  Connected Care Resource [place], [facility]    *Connected Care Resource Team does NOT follow patient ongoing. Referrals are identified based on internal discharge reports and the outreach is to ensure patient has an understanding of their discharge instructions.

## 2024-03-04 ASSESSMENT — ENCOUNTER SYMPTOMS: FEVER: 1

## 2024-03-09 ENCOUNTER — HEALTH MAINTENANCE LETTER (OUTPATIENT)
Age: 39
End: 2024-03-09

## 2024-07-27 ENCOUNTER — HEALTH MAINTENANCE LETTER (OUTPATIENT)
Age: 39
End: 2024-07-27

## 2024-10-28 ENCOUNTER — HOSPITAL ENCOUNTER (EMERGENCY)
Facility: HOSPITAL | Age: 39
Discharge: HOME | End: 2024-10-29
Attending: STUDENT IN AN ORGANIZED HEALTH CARE EDUCATION/TRAINING PROGRAM | Admitting: STUDENT IN AN ORGANIZED HEALTH CARE EDUCATION/TRAINING PROGRAM
Payer: COMMERCIAL

## 2024-10-28 ENCOUNTER — NURSE TRIAGE (OUTPATIENT)
Dept: NURSING | Facility: CLINIC | Age: 39
End: 2024-10-28
Payer: COMMERCIAL

## 2024-10-28 DIAGNOSIS — R00.0 TACHYCARDIA: ICD-10-CM

## 2024-10-28 DIAGNOSIS — E11.9 NEW ONSET TYPE 2 DIABETES MELLITUS (H): ICD-10-CM

## 2024-10-28 DIAGNOSIS — R07.9 CHEST PAIN, UNSPECIFIED TYPE: ICD-10-CM

## 2024-10-28 LAB
ANION GAP SERPL CALCULATED.3IONS-SCNC: 13 MMOL/L (ref 7–15)
BASOPHILS # BLD AUTO: 0.1 10E3/UL (ref 0–0.2)
BASOPHILS NFR BLD AUTO: 1 %
BUN SERPL-MCNC: 14.3 MG/DL (ref 6–20)
CALCIUM SERPL-MCNC: 8.9 MG/DL (ref 8.8–10.4)
CHLORIDE SERPL-SCNC: 103 MMOL/L (ref 98–107)
CREAT SERPL-MCNC: 0.8 MG/DL (ref 0.67–1.17)
D DIMER PPP FEU-MCNC: 0.44 UG/ML FEU (ref 0–0.5)
EGFRCR SERPLBLD CKD-EPI 2021: >90 ML/MIN/1.73M2
EOSINOPHIL # BLD AUTO: 0.4 10E3/UL (ref 0–0.7)
EOSINOPHIL NFR BLD AUTO: 4 %
ERYTHROCYTE [DISTWIDTH] IN BLOOD BY AUTOMATED COUNT: 15 % (ref 10–15)
EST. AVERAGE GLUCOSE BLD GHB EST-MCNC: 169 MG/DL
GLUCOSE SERPL-MCNC: 157 MG/DL (ref 70–99)
HBA1C MFR BLD: 7.5 %
HCO3 SERPL-SCNC: 24 MMOL/L (ref 22–29)
HCT VFR BLD AUTO: 40 % (ref 40–53)
HGB BLD-MCNC: 12.8 G/DL (ref 13.3–17.7)
HOLD SPECIMEN: NORMAL
IMM GRANULOCYTES # BLD: 0.1 10E3/UL
IMM GRANULOCYTES NFR BLD: 1 %
LYMPHOCYTES # BLD AUTO: 2.5 10E3/UL (ref 0.8–5.3)
LYMPHOCYTES NFR BLD AUTO: 24 %
MCH RBC QN AUTO: 27.2 PG (ref 26.5–33)
MCHC RBC AUTO-ENTMCNC: 32 G/DL (ref 31.5–36.5)
MCV RBC AUTO: 85 FL (ref 78–100)
MONOCYTES # BLD AUTO: 0.6 10E3/UL (ref 0–1.3)
MONOCYTES NFR BLD AUTO: 6 %
NEUTROPHILS # BLD AUTO: 6.8 10E3/UL (ref 1.6–8.3)
NEUTROPHILS NFR BLD AUTO: 65 %
NRBC # BLD AUTO: 0 10E3/UL
NRBC BLD AUTO-RTO: 0 /100
PLATELET # BLD AUTO: 270 10E3/UL (ref 150–450)
POTASSIUM SERPL-SCNC: 4.7 MMOL/L (ref 3.4–5.3)
RBC # BLD AUTO: 4.71 10E6/UL (ref 4.4–5.9)
SODIUM SERPL-SCNC: 140 MMOL/L (ref 135–145)
TROPONIN T SERPL HS-MCNC: <6 NG/L
WBC # BLD AUTO: 10.4 10E3/UL (ref 4–11)

## 2024-10-28 PROCEDURE — 93005 ELECTROCARDIOGRAM TRACING: CPT | Performed by: STUDENT IN AN ORGANIZED HEALTH CARE EDUCATION/TRAINING PROGRAM

## 2024-10-28 PROCEDURE — 84484 ASSAY OF TROPONIN QUANT: CPT | Performed by: STUDENT IN AN ORGANIZED HEALTH CARE EDUCATION/TRAINING PROGRAM

## 2024-10-28 PROCEDURE — 85379 FIBRIN DEGRADATION QUANT: CPT | Performed by: STUDENT IN AN ORGANIZED HEALTH CARE EDUCATION/TRAINING PROGRAM

## 2024-10-28 PROCEDURE — 83036 HEMOGLOBIN GLYCOSYLATED A1C: CPT | Performed by: STUDENT IN AN ORGANIZED HEALTH CARE EDUCATION/TRAINING PROGRAM

## 2024-10-28 PROCEDURE — 80048 BASIC METABOLIC PNL TOTAL CA: CPT | Performed by: STUDENT IN AN ORGANIZED HEALTH CARE EDUCATION/TRAINING PROGRAM

## 2024-10-28 PROCEDURE — 99284 EMERGENCY DEPT VISIT MOD MDM: CPT

## 2024-10-28 PROCEDURE — 85025 COMPLETE CBC W/AUTO DIFF WBC: CPT | Performed by: STUDENT IN AN ORGANIZED HEALTH CARE EDUCATION/TRAINING PROGRAM

## 2024-10-28 PROCEDURE — 36415 COLL VENOUS BLD VENIPUNCTURE: CPT | Performed by: STUDENT IN AN ORGANIZED HEALTH CARE EDUCATION/TRAINING PROGRAM

## 2024-10-28 ASSESSMENT — COLUMBIA-SUICIDE SEVERITY RATING SCALE - C-SSRS
1. IN THE PAST MONTH, HAVE YOU WISHED YOU WERE DEAD OR WISHED YOU COULD GO TO SLEEP AND NOT WAKE UP?: NO
6. HAVE YOU EVER DONE ANYTHING, STARTED TO DO ANYTHING, OR PREPARED TO DO ANYTHING TO END YOUR LIFE?: NO
2. HAVE YOU ACTUALLY HAD ANY THOUGHTS OF KILLING YOURSELF IN THE PAST MONTH?: NO

## 2024-10-28 ASSESSMENT — ACTIVITIES OF DAILY LIVING (ADL)
ADLS_ACUITY_SCORE: 0
ADLS_ACUITY_SCORE: 0

## 2024-10-28 NOTE — Clinical Note
Ady Godinez was seen and treated in our emergency department on 10/28/2024.  He may return to work on 10/31/2024.       If you have any questions or concerns, please don't hesitate to call.      Zaheer Szymanski MD

## 2024-10-29 VITALS
HEART RATE: 93 BPM | BODY MASS INDEX: 44.1 KG/M2 | SYSTOLIC BLOOD PRESSURE: 136 MMHG | DIASTOLIC BLOOD PRESSURE: 79 MMHG | WEIGHT: 315 LBS | RESPIRATION RATE: 22 BRPM | HEIGHT: 71 IN | TEMPERATURE: 98.4 F | OXYGEN SATURATION: 99 %

## 2024-10-29 LAB — TROPONIN T SERPL HS-MCNC: <6 NG/L

## 2024-10-29 PROCEDURE — 36415 COLL VENOUS BLD VENIPUNCTURE: CPT | Performed by: STUDENT IN AN ORGANIZED HEALTH CARE EDUCATION/TRAINING PROGRAM

## 2024-10-29 PROCEDURE — 84484 ASSAY OF TROPONIN QUANT: CPT | Performed by: STUDENT IN AN ORGANIZED HEALTH CARE EDUCATION/TRAINING PROGRAM

## 2024-10-29 NOTE — ED NOTES
EMERGENCY DEPARTMENT SIGN OUT NOTE        ED COURSE AND MEDICAL DECISION MAKING  Patient was signed out to me by Dr Kyle Muir at 10:16 PM  12:00 AM I rechecked and updated the patient on his repeat troponin, if negative he can be discharge. He states his chest pain feels improved. Dicussed his hemoglobin A1c, will start Metformin.   12:53 AM I rechecked and updated the patient on results. We discussed the plan for discharge and the patient is agreeable. Reviewed supportive cares, symptomatic treatment, outpatient follow up, and reasons to return to the Emergency Department. All questions and concerns were addressed. Patient to be discharged by ED RN.      In brief, Ady Godinez is a 39 year old male who initially presented for evaluation of an onset of a new chest pain at rest around 7:00 PM today. No history of heart problems.     At time of sign out, disposition was pending repeat troponin and D-dimer.  Patient signed out pending repeat troponin and D-dimer.  Dimer returned normal, initial troponin negative, metabolic panel did show hyperglycemia.  Patient has been told he possibly has borderline diabetes in past.  Hemoglobin A1c checked here and it was 7.5 which would be more consistent with likely early onset type 2 diabetes.  Patient will started on metformin and given a glucometer kit.  Patient repeat troponin returned negative again and symptoms now gone.  Patient be started on metformin and plan for discharge home with follow-up with primary doctor.    FINAL IMPRESSION    1. Chest pain, unspecified type    2. Tachycardia    3. New onset type 2 diabetes mellitus (H)        ED MEDS  Medications - No data to display    LAB  Labs Ordered and Resulted from Time of ED Arrival to Time of ED Departure   HEMOGLOBIN A1C - Abnormal       Result Value    Estimated Average Glucose 169 (*)     Hemoglobin A1C 7.5 (*)    BASIC METABOLIC PANEL - Abnormal    Sodium 140      Potassium 4.7      Chloride 103       Carbon Dioxide (CO2) 24      Anion Gap 13      Urea Nitrogen 14.3      Creatinine 0.80      GFR Estimate >90      Calcium 8.9      Glucose 157 (*)    CBC WITH PLATELETS AND DIFFERENTIAL - Abnormal    WBC Count 10.4      RBC Count 4.71      Hemoglobin 12.8 (*)     Hematocrit 40.0      MCV 85      MCH 27.2      MCHC 32.0      RDW 15.0      Platelet Count 270      % Neutrophils 65      % Lymphocytes 24      % Monocytes 6      % Eosinophils 4      % Basophils 1      % Immature Granulocytes 1      NRBCs per 100 WBC 0      Absolute Neutrophils 6.8      Absolute Lymphocytes 2.5      Absolute Monocytes 0.6      Absolute Eosinophils 0.4      Absolute Basophils 0.1      Absolute Immature Granulocytes 0.1      Absolute NRBCs 0.0     TROPONIN T, HIGH SENSITIVITY - Normal    Troponin T, High Sensitivity <6     D DIMER QUANTITATIVE - Normal    D-Dimer Quantitative 0.44     TROPONIN T, HIGH SENSITIVITY - Normal    Troponin T, High Sensitivity <6       RADIOLOGY    No orders to display       DISCHARGE MEDS  Discharge Medication List as of 10/29/2024 12:52 AM        START taking these medications    Details   blood glucose (NO BRAND SPECIFIED) lancets standard Use to test blood sugar one time daily or as directed.Disp-30 Lancet, R-0Local Print      blood glucose (NO BRAND SPECIFIED) test strip Use to test blood sugar one time daily or as directed., Disp-30 strip, R-0, Local Print      blood glucose monitoring (NO BRAND SPECIFIED) meter device kit Use to test blood sugar once daily or as directed. Test at the same time daily.Disp-1 kit, R-0Local Print      metFORMIN (GLUCOPHAGE) 500 MG tablet Take 1 tablet (500 mg) by mouth 2 times daily (with meals)., Disp-60 tablet, R-0, Local Print             I, Bethel Kim, am serving as a scribe to document services personally performed by Zaheer Szymanski MD, based on my observations and the provider's statements to me. I, Zaheer Szymanski MD attest that Bethel Kim is  acting in a scribe capacity, has observed my performance of the services and has documented them in accordance with my direction.      Zaheer Szymanski MD  [facility] EMERGENCY DEPARTMENT  [address] [place] [address] [id number] [phone]         Zaheer Szymanski MD  10/29/24 5938

## 2024-10-29 NOTE — ED PROVIDER NOTES
EMERGENCY DEPARTMENT ENCOUNTER       ED Course & Medical Decision Making     9:35 PM I met with the patient to gather history and perform an initial exam.     Final Impression  39 year old male  presents for evaluation of chest pain that began at about 7 or 7:15 PM today while he was at rest at his daughter swimming lessons.  Ongoing chest pain, though not reporting any shortness of breath.  No personal cardiac history.  Patient has a documented A1c of 6.5 about a year ago, states he is not on any medication for treatment of diabetes.    EKG nonischemic, will obtain troponins x 2 as well as D-dimer to screen for pulmonary embolism.  If D-dimer negative, should obtain chest x-ray.    Patient signed out to Dr Szymanski at shift change.    Prior to making a final disposition on this patient the results of patient's tests and other diagnostic studies were discussed with the patient. All questions were answered. Patient expressed understanding of the plan and was amenable to it.    Medical Decision Making  Supplemental history from: patient   External Record(s) reviewed as documented below;  7/11/2024, [facility] note, seen for follow-up of type 2 diabetes, chronic fatigue, yeast infection of skin, normocytic anemia  Chart documentation includes differential considered and any EKGs or imaging independently interpreted by provider, where specified.  DDx considered but not limited to: ACS, HHS/DKA, pulmonary embolism, pneumothorax  Care impacted by chronic illness: Diabetes  Disposition considerations: Admission considered. Patient was signed out to the oncoming physician, disposition pending.        Medications - No data to display    Final Impression     1. Chest pain, unspecified type    2. Tachycardia      Chief Complaint     Chief Complaint   Patient presents with    Chest Pain     Pt brought in by [facility]. Pt c/o mid chest pain for past hour. He states he was at his daughter's swimming lessons  "when pain started. Pt denies any known injury. Pt c/o shortness of breath with exertion, but this is normal for him. Pt states he has acid reflux, but this pain feels different. EMS gave pt 324mg ASA en route.    HPI     Ady Godinez is a 39 year old male who presents for evaluation of chest pain.     Patient reports he was sitting down at his daughter's swimming lesson around 1900 tonight when he had a sudden onset of chest pain. He says he had never had chest pain like that before. Currently, he still feels the chest pain. He has never been treated for diabetes as his doctor says it is under control. He does not take daily medications besides vitamin A and and iron. He denies a personal history of heart problems but mentions that both his mom and dads side of the family does have a history. Patient has otherwise been in their normal state of health and denies any other complaints at this time.    I, Julieta Ambrocio am serving as a scribe to document services personally performed by Dr. Kyle Muir MD, based on my observation and the provider's statements to me. I, Dr. Kyle Muir MD attest that Julieta Ambrocio is acting in a scribe capacity, has observed my performance of the services and has documented them in accordance with my direction.    Physical Exam     BP (!) 144/83   Pulse 103   Temp 98.4  F (36.9  C) (Oral)   Resp (!) 101   Ht 1.803 m (5' 11\")   Wt (!) 190.5 kg (420 lb)   SpO2 93%   BMI 58.58 kg/m    Constitutional: Awake, alert, in no acute distress.  Head: Normocephalic, atraumatic.  ENT: Mucous membranes moist.  Eyes: Conjunctiva normal.  Respiratory: Respirations even, unlabored, in no acute respiratory distress.  No appreciable coarseness or wheezing.  Cardiovascular:  sinus tachycardia. Good peripheral perfusion.  Trace pitting edema bilateral lower extremities up to the midshin.  GI: Abdomen soft, non-tender.  Musculoskeletal: Moves all 4 extremities equally.  Integument: Warm, " dry.  Neurologic: Alert & oriented x 3. Normal speech. Grossly normal motor and sensory function. No focal deficits noted.  Psychiatric: Normal mood    Labs & Imaging          EKG     EKG reviewed and independently interpreted as below;  Sinus tachycardia, rate 120.  .  QRS 76.  QTc 427.  No STEMI.    Procedures          Kyle Muir MD  10/28/24 5366

## 2024-10-29 NOTE — ED TRIAGE NOTES
Pt brought in by [facility]. Pt c/o mid chest pain for past hour. He states he was at his daughter's swimming lessons when pain started. Pt denies any known injury. Pt c/o shortness of breath with exertion, but this is normal for him. Pt states he has acid reflux, but this pain feels different. EMS gave pt 324mg ASA en route.     Triage Assessment (Adult)       Row Name 10/28/24 3262          Triage Assessment    Airway WDL WDL        Respiratory WDL    Respiratory WDL X  some SOB with exertion which is normal for pt        Skin Circulation/Temperature WDL    Skin Circulation/Temperature WDL WDL        Cardiac WDL    Cardiac WDL X;chest pain        Peripheral/Neurovascular WDL    Peripheral Neurovascular WDL WDL        Cognitive/Neuro/Behavioral WDL    Cognitive/Neuro/Behavioral WDL WDL

## 2024-10-30 LAB
ATRIAL RATE - MUSE: 102 BPM
DIASTOLIC BLOOD PRESSURE - MUSE: 74 MMHG
INTERPRETATION ECG - MUSE: NORMAL
P AXIS - MUSE: 64 DEGREES
PR INTERVAL - MUSE: 150 MS
QRS DURATION - MUSE: 76 MS
QT - MUSE: 328 MS
QTC - MUSE: 427 MS
R AXIS - MUSE: 48 DEGREES
SYSTOLIC BLOOD PRESSURE - MUSE: 142 MMHG
T AXIS - MUSE: 54 DEGREES
VENTRICULAR RATE- MUSE: 102 BPM

## 2025-01-06 ENCOUNTER — HOSPITAL ENCOUNTER (EMERGENCY)
Facility: HOSPITAL | Age: 40
Discharge: HOME | End: 2025-01-06
Attending: EMERGENCY MEDICINE
Payer: COMMERCIAL

## 2025-01-06 ENCOUNTER — APPOINTMENT (OUTPATIENT)
Dept: CT IMAGING | Facility: HOSPITAL | Age: 40
End: 2025-01-06
Attending: EMERGENCY MEDICINE
Payer: COMMERCIAL

## 2025-01-06 VITALS
WEIGHT: 315 LBS | BODY MASS INDEX: 42.66 KG/M2 | RESPIRATION RATE: 18 BRPM | HEIGHT: 72 IN | DIASTOLIC BLOOD PRESSURE: 62 MMHG | SYSTOLIC BLOOD PRESSURE: 128 MMHG | TEMPERATURE: 98.4 F | OXYGEN SATURATION: 94 % | HEART RATE: 94 BPM

## 2025-01-06 DIAGNOSIS — J32.0 RIGHT MAXILLARY SINUSITIS: ICD-10-CM

## 2025-01-06 LAB
ANION GAP SERPL CALCULATED.3IONS-SCNC: 11 MMOL/L (ref 7–15)
BUN SERPL-MCNC: 11.1 MG/DL (ref 6–20)
CALCIUM SERPL-MCNC: 9.9 MG/DL (ref 8.8–10.4)
CHLORIDE SERPL-SCNC: 99 MMOL/L (ref 98–107)
CREAT SERPL-MCNC: 0.72 MG/DL (ref 0.67–1.17)
CRP SERPL-MCNC: 34.8 MG/L
EGFRCR SERPLBLD CKD-EPI 2021: >90 ML/MIN/1.73M2
ERYTHROCYTE [DISTWIDTH] IN BLOOD BY AUTOMATED COUNT: 15 % (ref 10–15)
ERYTHROCYTE [SEDIMENTATION RATE] IN BLOOD BY WESTERGREN METHOD: 40 MM/HR (ref 0–15)
GLUCOSE SERPL-MCNC: 165 MG/DL (ref 70–99)
HCO3 SERPL-SCNC: 28 MMOL/L (ref 22–29)
HCT VFR BLD AUTO: 41.1 % (ref 40–53)
HGB BLD-MCNC: 13.4 G/DL (ref 13.3–17.7)
MCH RBC QN AUTO: 27.9 PG (ref 26.5–33)
MCHC RBC AUTO-ENTMCNC: 32.6 G/DL (ref 31.5–36.5)
MCV RBC AUTO: 85 FL (ref 78–100)
PLATELET # BLD AUTO: 279 10E3/UL (ref 150–450)
POTASSIUM SERPL-SCNC: 4.4 MMOL/L (ref 3.4–5.3)
RBC # BLD AUTO: 4.81 10E6/UL (ref 4.4–5.9)
SODIUM SERPL-SCNC: 138 MMOL/L (ref 135–145)
WBC # BLD AUTO: 12.8 10E3/UL (ref 4–11)

## 2025-01-06 PROCEDURE — 250N000011 HC RX IP 250 OP 636: Performed by: EMERGENCY MEDICINE

## 2025-01-06 PROCEDURE — 86140 C-REACTIVE PROTEIN: CPT | Performed by: EMERGENCY MEDICINE

## 2025-01-06 PROCEDURE — 99285 EMERGENCY DEPT VISIT HI MDM: CPT | Mod: 25

## 2025-01-06 PROCEDURE — 85027 COMPLETE CBC AUTOMATED: CPT | Performed by: EMERGENCY MEDICINE

## 2025-01-06 PROCEDURE — 258N000003 HC RX IP 258 OP 636: Performed by: EMERGENCY MEDICINE

## 2025-01-06 PROCEDURE — 96374 THER/PROPH/DIAG INJ IV PUSH: CPT

## 2025-01-06 PROCEDURE — 96361 HYDRATE IV INFUSION ADD-ON: CPT

## 2025-01-06 PROCEDURE — 36415 COLL VENOUS BLD VENIPUNCTURE: CPT | Performed by: EMERGENCY MEDICINE

## 2025-01-06 PROCEDURE — 82435 ASSAY OF BLOOD CHLORIDE: CPT | Performed by: EMERGENCY MEDICINE

## 2025-01-06 PROCEDURE — 85652 RBC SED RATE AUTOMATED: CPT | Performed by: EMERGENCY MEDICINE

## 2025-01-06 PROCEDURE — 250N000013 HC RX MED GY IP 250 OP 250 PS 637: Performed by: EMERGENCY MEDICINE

## 2025-01-06 PROCEDURE — 96375 TX/PRO/DX INJ NEW DRUG ADDON: CPT | Mod: 59

## 2025-01-06 PROCEDURE — 70487 CT MAXILLOFACIAL W/DYE: CPT

## 2025-01-06 PROCEDURE — 80048 BASIC METABOLIC PNL TOTAL CA: CPT | Performed by: EMERGENCY MEDICINE

## 2025-01-06 RX ORDER — FLUTICASONE PROPIONATE 50 MCG
2 SPRAY, SUSPENSION (ML) NASAL DAILY
Qty: 3.9 ML | Refills: 0 | Status: SHIPPED | OUTPATIENT
Start: 2025-01-06

## 2025-01-06 RX ORDER — DIPHENHYDRAMINE HYDROCHLORIDE 50 MG/ML
25 INJECTION INTRAMUSCULAR; INTRAVENOUS ONCE
Status: COMPLETED | OUTPATIENT
Start: 2025-01-06 | End: 2025-01-06

## 2025-01-06 RX ORDER — OXYMETAZOLINE HYDROCHLORIDE 0.05 G/100ML
2 SPRAY NASAL 2 TIMES DAILY
Qty: 3 ML | Refills: 0 | Status: SHIPPED | OUTPATIENT
Start: 2025-01-06 | End: 2025-01-12

## 2025-01-06 RX ORDER — METOCLOPRAMIDE HYDROCHLORIDE 5 MG/ML
10 INJECTION INTRAMUSCULAR; INTRAVENOUS ONCE
Status: COMPLETED | OUTPATIENT
Start: 2025-01-06 | End: 2025-01-06

## 2025-01-06 RX ORDER — KETOROLAC TROMETHAMINE 15 MG/ML
15 INJECTION, SOLUTION INTRAMUSCULAR; INTRAVENOUS ONCE
Status: COMPLETED | OUTPATIENT
Start: 2025-01-06 | End: 2025-01-06

## 2025-01-06 RX ORDER — IOPAMIDOL 755 MG/ML
75 INJECTION, SOLUTION INTRAVASCULAR ONCE
Status: COMPLETED | OUTPATIENT
Start: 2025-01-06 | End: 2025-01-06

## 2025-01-06 RX ADMIN — AMOXICILLIN AND CLAVULANATE POTASSIUM 1 TABLET: 875; 125 TABLET, FILM COATED ORAL at 13:31

## 2025-01-06 RX ADMIN — KETOROLAC TROMETHAMINE 15 MG: 15 INJECTION, SOLUTION INTRAMUSCULAR; INTRAVENOUS at 11:58

## 2025-01-06 RX ADMIN — METOCLOPRAMIDE 10 MG: 5 INJECTION, SOLUTION INTRAMUSCULAR; INTRAVENOUS at 11:58

## 2025-01-06 RX ADMIN — SODIUM CHLORIDE 500 ML: 9 INJECTION, SOLUTION INTRAVENOUS at 11:58

## 2025-01-06 RX ADMIN — DIPHENHYDRAMINE HYDROCHLORIDE 25 MG: 50 INJECTION, SOLUTION INTRAMUSCULAR; INTRAVENOUS at 11:58

## 2025-01-06 RX ADMIN — IOPAMIDOL 75 ML: 755 INJECTION, SOLUTION INTRAVENOUS at 12:59

## 2025-01-06 ASSESSMENT — COLUMBIA-SUICIDE SEVERITY RATING SCALE - C-SSRS
2. HAVE YOU ACTUALLY HAD ANY THOUGHTS OF KILLING YOURSELF IN THE PAST MONTH?: NO
6. HAVE YOU EVER DONE ANYTHING, STARTED TO DO ANYTHING, OR PREPARED TO DO ANYTHING TO END YOUR LIFE?: NO
1. IN THE PAST MONTH, HAVE YOU WISHED YOU WERE DEAD OR WISHED YOU COULD GO TO SLEEP AND NOT WAKE UP?: NO

## 2025-01-06 ASSESSMENT — ACTIVITIES OF DAILY LIVING (ADL)
ADLS_ACUITY_SCORE: 59

## 2025-01-06 NOTE — ED TRIAGE NOTES
Pt with c/o a migraine headache that started last night and got worse this morning.  Pt has not taken anything for the headache.  Pt also reports pain with turning head to left side and bringing chin to chest.  Denies n/v, no light sensitivity.

## 2025-01-06 NOTE — ED PROVIDER NOTES
EMERGENCY DEPARTMENT ENCOUNTER      NAME: Ady Godinez  AGE: 39 year old male  YOB: 1985  MRN: [id number]  EVALUATION DATE & TIME: No admission date for patient encounter.    PCP: Elizabeth Black    ED PROVIDER: Balbir Martin M.D.      Chief Complaint   Patient presents with    Headache         FINAL IMPRESSION:  1. Right maxillary sinusitis          ED COURSE & MEDICAL DECISION MAKING:    Pertinent Labs & Imaging studies reviewed below.  All EKGs below represent my independent interpretation.     ED Course as of 01/07/25 1036   Mon Jan 06, 2025   1020 Patient is a 39-year-old gentleman who presents with right-sided headache that began at 5 PM yesterday, about 17 hours ago, and has progressed since then.  It is worse with movement of his jaw, deep palpation around the preauricular area.  No history of similar headaches.  No recent upper respiratory illness symptoms or fever.  On arrival he is blood pressure from 143/81 with normal temperature, heart rate is 100.  He has no unilateral swelling, erythema.  He does have mild trismus, reproducible pain when he tries to open his mouth.  Not consistent with meningitis, and the lack of sudden onset severe pain is reassuring that this is not likely a ruptured subarachnoid hemorrhage.  Plan to give IV analgesia for headache, fluids.  Will also order CRP, ESR to screen for temporal arteritis, which would be atypical for his age, but would fit in its anatomical location. Trigeminal neuralgia also a possibility.   1232 I rechecked the patient, pain is only modestly improved with medication.  Plan to get a CT scan to look for signs of infection in the soft tissue of the right face (possible TMJ infection given trismus?)   CT of the facial bones with contrast shows opacified maxillary sinus on the right, no evidence of soft tissue abscess or edema otherwise.  I suspect he has had sinus congestion for quite a while given his explanation of upper  respiratory congestion and it became a bacterial infection within the last couple of days.  Movement of the fascia around the right side of the face is inflamed, which would explain the pain with movement and trismus.  No sign of osteomyelitis or indications for IV antibiotics at this time.  He was given a dose of Augmentin and will be sent home with a 10-day course and I discussed expected improvement timeline and importance of primary care follow-up.    Medical Decision Making  Obtained supplemental history:Supplemental history obtained?: No  Reviewed external records: External records reviewed?: No  Care impacted by chronic illness:Documented in Chart  Care significantly affected by social determinants of health:N/A  Did you consider but not order tests?: Work up considered but not performed and documented in chart, if applicable  Did you interpret images independently?: Independent interpretation of ECG and images noted in documentation, when applicable.  Consultation discussion with other provider: Phone conversation with consultants will be documented in the ED Course  Discharge. I prescribed additional prescription strength medication(s) as charted. N/A.    MIPS Criteria Documentation: Sinusitis:MDM for ABX: Antibiotics Prescribed: symptoms were improving and then suddenly worsened as evidences by new onset fever with either headache and/or purulent discharge         MEDICATIONS GIVEN IN THE EMERGENCY:  Medications   metoclopramide (REGLAN) injection 10 mg (10 mg Intravenous $Given 1/6/25 1158)   diphenhydrAMINE (BENADRYL) injection 25 mg (25 mg Intravenous $Given 1/6/25 1158)   ketorolac (TORADOL) injection 15 mg (15 mg Intravenous $Given 1/6/25 1158)   sodium chloride 0.9% BOLUS 500 mL (0 mLs Intravenous Stopped 1/6/25 1253)   iopamidol (ISOVUE-370) solution 75 mL (75 mLs Intravenous $Given 1/6/25 1259)   amoxicillin-clavulanate (AUGMENTIN) 875-125 MG per tablet 1 tablet (1 tablet Oral $Given 1/6/25 1331)  "        NEW PRESCRIPTIONS STARTED AT TODAY'S ER VISIT  Discharge Medication List as of 1/6/2025  1:32 PM        START taking these medications    Details   amoxicillin-clavulanate (AUGMENTIN) 875-125 MG tablet Take 1 tablet by mouth 2 times daily for 10 days., Disp-20 tablet, R-0, E-Prescribe      fluticasone (FLONASE) 50 MCG/ACT nasal spray Spray 2 sprays into both nostrils daily., Disp-3.9 mL, R-0, E-Prescribe      oxymetazoline (AFRIN) 0.05 % nasal spray Spray 0.2 mLs (2 sprays) into right nostril 2 times daily for 6 days., Disp-3 mL, R-0, E-Prescribe                =================================================================    HPI    Ady Godinez is a 39 year old male who presents to this ED for evaluation of headache.       VITALS:  /62   Pulse 94   Temp 98.4  F (36.9  C) (Oral)   Resp 18   Ht 1.816 m (5' 11.5\")   Wt (!) 190.5 kg (420 lb)   SpO2 94%   BMI 57.76 kg/m      PHYSICAL EXAM    Constitutional: uncomfortable appearing.  HENT: Atraumatic, mucous membranes moist, nose normal.  Tenderness around the right TMJ, preauricular area.  Right TM is not erythematous or bulging.  No pain with manipulation of the tragus or helix.  No overlying rash warmth or redness to the right side of the face.  Opening mouth fully reproduces pain.  Mild tenderness to the buccal mucosa in the mid upper mouth but nothing on the gingival aspect.  No edema or erythema to the tonsils.  Neck- Supple, gross ROM intact.   Eyes: Pupils mid-range, conjunctiva without injection, no discharge. No pain with EOM.  Respiratory: Clear to auscultation bilaterally, no respiratory distress, no wheezing, speaks full sentences easily. No cough.  Cardiovascular: Normal heart rate, regular rhythm, no murmurs.   Musculoskeletal: Moving all 4 extremities intentionally and without pain. No obvious deformity.  Skin: Warm, dry, no rash.  Neurologic: Alert & oriented x 3, cranial nerves grossly intact.  Psychiatric: Affect normal, " cooperative.        I, Tono Kmi am serving as a scribe to document services personally performed by Dr. Balbir Martin based on my observation and the provider's statements to me. I, Balbir Martin MD attest that Tono Kim is acting in a scribe capacity, has observed my performance of the services and has documented them in accordance with my direction.    Balbir Martin M.D.  Emergency Medicine  [facility] EMERGENCY DEPARTMENT  [address] [place] [id number]  [phone]  Dept: [phone]       Balbir Martin MD  01/07/25 1043

## 2025-01-06 NOTE — DISCHARGE INSTRUCTIONS
The pain you are experiencing has a large inflammatory component to it, so anti-inflammatories will be very helpful. Please take: 600 mg of ibuprofen AND 1000 mg of tylenol three times a day. It is ok to take these medications at the same time. After 2-3 days, please take the medications only as needed.     Antibiotics take about 24 hours to fully kick in.  That is when the inflammation and infection slows down.  If you notice that your symptoms are getting worse after 24 hours, or if at any time you have nausea, vomiting, or are unable to keep down antibiotics, I would like you to come back to the emergency department.

## 2025-02-16 ENCOUNTER — HEALTH MAINTENANCE LETTER (OUTPATIENT)
Age: 40
End: 2025-02-16

## 2025-03-16 ENCOUNTER — HEALTH MAINTENANCE LETTER (OUTPATIENT)
Age: 40
End: 2025-03-16

## 2025-04-10 NOTE — PROGRESS NOTES
HPI:  Ady Godinez is a 38 year old male s/p exploratory laparotomy evacuation of seroma as well as repair of recurrent incisional ventral hernia on 11/24/20 . Having significant pain. No fevers. Eating ok.       /76 (BP Location: Right arm, Patient Position: Sitting, Cuff Size: Adult Regular)     EXAM:  GENERAL:Appears well  ABDOMEN:  Soft, +BS  Midline with retention sutures. Three are causing pressure ulcers. With surrounding erythema.   Drains scant and serous. removed  Assessment/Plan: . Started Augmentin. He has pain medications at home. To see Kim next week. Call if worse.     Bradley Arnold MPA-C         [facility]    \"Patient was only able to receive 7 day supply of this medication per insurance limits, she will need a new rx for the remainder when due as appropriate.\"

## 2025-05-18 ENCOUNTER — HEALTH MAINTENANCE LETTER (OUTPATIENT)
Age: 40
End: 2025-05-18

## 2025-08-31 ENCOUNTER — HEALTH MAINTENANCE LETTER (OUTPATIENT)
Age: 40
End: 2025-08-31

## (undated) DEVICE — PAD POS XL 1X20X40IN PINK PIGAZZI

## (undated) DEVICE — DAVINCI XI SEAL UNIVERSAL 5-8MM 470361

## (undated) DEVICE — GLOVE UNDER INDICATOR PI SZ 7.0 LF 41670

## (undated) DEVICE — PLATE GROUNDING ADULT W/CORD 9165L

## (undated) DEVICE — SUCTION MANIFOLD NEPTUNE 2 SYS 4 PORT 0702-020-000

## (undated) DEVICE — SYR 50ML SLIP TIP W/O NDL 309654

## (undated) DEVICE — SOL NACL 0.9% IRRIG 1000ML BOTTLE 2F7124

## (undated) DEVICE — SU DERMABOND ADVANCED .7ML DNX12

## (undated) DEVICE — SUTURE VICRYL+ 4-0 UNDYED PS-2 VCP496H

## (undated) DEVICE — MARKER SURG SKIN 2 TIP STRL SPP99DT2AA

## (undated) DEVICE — CATH TRAY FOLEY SURESTEP 16FR DRAIN BAG STATOCK A899916

## (undated) DEVICE — SU ETHIBOND 0 CT-2 30" X412H

## (undated) DEVICE — SOL WATER IRRIG 1000ML BOTTLE 2F7114

## (undated) DEVICE — SU VICRYL+ 3-0 27IN SH UND VCP416H

## (undated) DEVICE — SYR 30ML LL W/O NDL 302832

## (undated) DEVICE — NDL INSUFFLATION 13GA 120MM C2201

## (undated) DEVICE — SUTURE PDS 0 60IN TP-1+ LPED VLT PDP991G

## (undated) DEVICE — STPL SKIN 35W 6.9MM  PXW35

## (undated) DEVICE — PREP CHLORAPREP 26ML TINTED HI-LITE ORANGE 930815

## (undated) DEVICE — DRAPE SHEET TABLE COVER KC 42301*

## (undated) DEVICE — DAVINCI HOT SHEARS TIP COVER  400180

## (undated) DEVICE — DRAIN BLAKE 19FR SIL 2231

## (undated) DEVICE — DAVINCI SEAL CANNULA AND STPL 12MM 470380

## (undated) DEVICE — SU PDS II 2-0 SH 27" Z317H

## (undated) DEVICE — NEEDLE HYPO 18X1-1/2 SAFETY 305918

## (undated) DEVICE — SPONGE LAP 18X18" X8435

## (undated) DEVICE — DAVINCI XI DRAPE ARM 470015

## (undated) DEVICE — DECANTER VIAL 2006S

## (undated) DEVICE — TUBING SMOKE EVAC PNEUMOCLEAR HIGH FLOW 0620050250

## (undated) DEVICE — SU SILK 2-0 FS-1 18" 685G

## (undated) DEVICE — ANTIFOG SOLUTION SEE SHARP 150M TROCAR SWABS 30978

## (undated) DEVICE — DRAPE OR LAPAROTOMY KC 89228*

## (undated) DEVICE — CUSTOM PACK LAP CHOLE SBA5BLCHEA

## (undated) DEVICE — SU ETHIBOND 0 CT-1 30" X424H

## (undated) DEVICE — DRAIN RESERVOIR 100ML JP 0070740

## (undated) DEVICE — DRAPE SHEET REV FOLD 3/4 9349

## (undated) DEVICE — SUTURE PDS 1 48IN TP1+ LPED VLT PDP880G

## (undated) DEVICE — Device

## (undated) DEVICE — DAVINCI XI SUCTION IRRIGATOR ENDOWRIST 480299

## (undated) DEVICE — SU SILK 2-0 SH 30" K833H

## (undated) DEVICE — DAVINCI XI OBTURATOR BLADELESS 8MM 470359

## (undated) DEVICE — DAVINCI XI DRAPE COLUMN 470341

## (undated) DEVICE — BLADE KNIFE SURG 11 371111

## (undated) DEVICE — DRSG DRAIN 4X4" 7086

## (undated) DEVICE — SU WND CLOSURE V-LOC PBT SZ 0 18" GS-21 VLOCN0326

## (undated) DEVICE — CUSTOM PACK GEN MAJOR SBA5BGMHEA

## (undated) DEVICE — DAVINCI XI REDUCER 8-12MM 470381

## (undated) DEVICE — DRAPE U SPLIT 74X120" 29440

## (undated) DEVICE — SYR 10ML FINGER CONTROL W/O NDL 309695

## (undated) DEVICE — DRSG ABD TNDRSRB WET PRUF 8IN X 10IN STRL  9194A

## (undated) DEVICE — SU PROLENE 0 CT-1 30" 8424H

## (undated) DEVICE — SOLUTION IRRIG 2B7127 .9NS 3000ML BAG

## (undated) DEVICE — GOWN XLG DISP 9545

## (undated) DEVICE — LUBRICANT INST ELECTROLUBE EL101

## (undated) RX ORDER — VERAPAMIL HYDROCHLORIDE 2.5 MG/ML
INJECTION, SOLUTION INTRAVENOUS
Status: DISPENSED
Start: 2021-04-15

## (undated) RX ORDER — LIDOCAINE HYDROCHLORIDE 10 MG/ML
INJECTION, SOLUTION EPIDURAL; INFILTRATION; INTRACAUDAL; PERINEURAL
Status: DISPENSED
Start: 2023-11-24

## (undated) RX ORDER — FENTANYL CITRATE 50 UG/ML
INJECTION, SOLUTION INTRAMUSCULAR; INTRAVENOUS
Status: DISPENSED
Start: 2023-11-14

## (undated) RX ORDER — FENTANYL CITRATE 50 UG/ML
INJECTION, SOLUTION INTRAMUSCULAR; INTRAVENOUS
Status: DISPENSED
Start: 2023-11-24

## (undated) RX ORDER — ONDANSETRON 2 MG/ML
INJECTION INTRAMUSCULAR; INTRAVENOUS
Status: DISPENSED
Start: 2023-11-14

## (undated) RX ORDER — HEPARIN SODIUM 200 [USP'U]/100ML
INJECTION, SOLUTION INTRAVENOUS
Status: DISPENSED
Start: 2021-04-15

## (undated) RX ORDER — DEXAMETHASONE SODIUM PHOSPHATE 10 MG/ML
INJECTION, SOLUTION INTRAMUSCULAR; INTRAVENOUS
Status: DISPENSED
Start: 2023-11-24

## (undated) RX ORDER — LIDOCAINE HYDROCHLORIDE AND EPINEPHRINE 10; 10 MG/ML; UG/ML
INJECTION, SOLUTION INFILTRATION; PERINEURAL
Status: DISPENSED
Start: 2023-11-14

## (undated) RX ORDER — ONDANSETRON 2 MG/ML
INJECTION INTRAMUSCULAR; INTRAVENOUS
Status: DISPENSED
Start: 2023-11-24

## (undated) RX ORDER — DEXAMETHASONE SODIUM PHOSPHATE 10 MG/ML
INJECTION, SOLUTION INTRAMUSCULAR; INTRAVENOUS
Status: DISPENSED
Start: 2023-11-14

## (undated) RX ORDER — NITROGLYCERIN 5 MG/ML
VIAL (ML) INTRAVENOUS
Status: DISPENSED
Start: 2021-04-15

## (undated) RX ORDER — CEFAZOLIN SODIUM 1 G/3ML
INJECTION, POWDER, FOR SOLUTION INTRAMUSCULAR; INTRAVENOUS
Status: DISPENSED
Start: 2023-11-14

## (undated) RX ORDER — LABETALOL HYDROCHLORIDE 5 MG/ML
INJECTION, SOLUTION INTRAVENOUS
Status: DISPENSED
Start: 2023-11-14

## (undated) RX ORDER — BUPIVACAINE HYDROCHLORIDE 2.5 MG/ML
INJECTION, SOLUTION INFILTRATION; PERINEURAL
Status: DISPENSED
Start: 2023-11-24

## (undated) RX ORDER — LIDOCAINE HYDROCHLORIDE 10 MG/ML
INJECTION, SOLUTION INFILTRATION; PERINEURAL
Status: DISPENSED
Start: 2021-04-15

## (undated) RX ORDER — HEPARIN SODIUM 1000 [USP'U]/ML
INJECTION, SOLUTION INTRAVENOUS; SUBCUTANEOUS
Status: DISPENSED
Start: 2021-04-15

## (undated) RX ORDER — PROPOFOL 10 MG/ML
INJECTION, EMULSION INTRAVENOUS
Status: DISPENSED
Start: 2023-11-24

## (undated) RX ORDER — FENTANYL CITRATE 50 UG/ML
INJECTION, SOLUTION INTRAMUSCULAR; INTRAVENOUS
Status: DISPENSED
Start: 2021-04-15

## (undated) RX ORDER — PROPOFOL 10 MG/ML
INJECTION, EMULSION INTRAVENOUS
Status: DISPENSED
Start: 2023-11-14